# Patient Record
Sex: FEMALE | Race: WHITE | Employment: PART TIME | URBAN - METROPOLITAN AREA
[De-identification: names, ages, dates, MRNs, and addresses within clinical notes are randomized per-mention and may not be internally consistent; named-entity substitution may affect disease eponyms.]

---

## 2017-06-06 PROBLEM — N94.6 DYSMENORRHEA: Status: ACTIVE | Noted: 2017-03-08

## 2017-06-06 PROBLEM — E55.9 UNSPECIFIED VITAMIN D DEFICIENCY: Status: ACTIVE | Noted: 2017-03-08

## 2017-06-06 PROBLEM — T78.40XA ALLERGIC STATE: Status: ACTIVE | Noted: 2017-03-08

## 2017-06-06 PROBLEM — R53.83 FATIGUE: Status: ACTIVE | Noted: 2017-03-08

## 2018-02-09 ENCOUNTER — HOSPITAL ENCOUNTER (EMERGENCY)
Age: 38
Discharge: HOME | End: 2018-02-09
Attending: EMERGENCY MEDICINE
Payer: COMMERCIAL

## 2018-02-09 VITALS
DIASTOLIC BLOOD PRESSURE: 65 MMHG | HEIGHT: 62 IN | TEMPERATURE: 98.1 F | HEART RATE: 53 BPM | SYSTOLIC BLOOD PRESSURE: 129 MMHG | BODY MASS INDEX: 35.88 KG/M2 | WEIGHT: 195 LBS | RESPIRATION RATE: 20 BRPM | OXYGEN SATURATION: 100 %

## 2018-02-09 DIAGNOSIS — G43.811 OTHER MIGRAINE WITH STATUS MIGRAINOSUS, INTRACTABLE: Primary | ICD-10-CM

## 2018-02-09 PROCEDURE — 96372 THER/PROPH/DIAG INJ SC/IM: CPT | Performed by: EMERGENCY MEDICINE

## 2018-02-09 PROCEDURE — 99284 EMERGENCY DEPT VISIT MOD MDM: CPT | Performed by: EMERGENCY MEDICINE

## 2018-02-09 PROCEDURE — 74011250636 HC RX REV CODE- 250/636: Performed by: EMERGENCY MEDICINE

## 2018-02-09 PROCEDURE — 81003 URINALYSIS AUTO W/O SCOPE: CPT | Performed by: EMERGENCY MEDICINE

## 2018-02-09 PROCEDURE — 74011250637 HC RX REV CODE- 250/637: Performed by: EMERGENCY MEDICINE

## 2018-02-09 RX ORDER — DEXAMETHASONE SODIUM PHOSPHATE 100 MG/10ML
10 INJECTION INTRAMUSCULAR; INTRAVENOUS
Status: COMPLETED | OUTPATIENT
Start: 2018-02-09 | End: 2018-02-09

## 2018-02-09 RX ORDER — ONDANSETRON 4 MG/1
4 TABLET, ORALLY DISINTEGRATING ORAL
Status: COMPLETED | OUTPATIENT
Start: 2018-02-09 | End: 2018-02-09

## 2018-02-09 RX ORDER — METOCLOPRAMIDE 10 MG/1
10 TABLET ORAL
Status: COMPLETED | OUTPATIENT
Start: 2018-02-09 | End: 2018-02-09

## 2018-02-09 RX ORDER — SUMATRIPTAN 6 MG/.5ML
6 INJECTION, SOLUTION SUBCUTANEOUS ONCE
Status: COMPLETED | OUTPATIENT
Start: 2018-02-09 | End: 2018-02-09

## 2018-02-09 RX ORDER — DIPHENHYDRAMINE HCL 25 MG
25 CAPSULE ORAL
Status: COMPLETED | OUTPATIENT
Start: 2018-02-09 | End: 2018-02-09

## 2018-02-09 RX ADMIN — DIPHENHYDRAMINE HYDROCHLORIDE 25 MG: 25 CAPSULE ORAL at 19:46

## 2018-02-09 RX ADMIN — SUMATRIPTAN 6 MG: 6 INJECTION, SOLUTION SUBCUTANEOUS at 17:27

## 2018-02-09 RX ADMIN — DEXAMETHASONE SODIUM PHOSPHATE 10 MG: 10 INJECTION INTRAMUSCULAR; INTRAVENOUS at 19:46

## 2018-02-09 RX ADMIN — ONDANSETRON 4 MG: 4 TABLET, ORALLY DISINTEGRATING ORAL at 17:27

## 2018-02-09 RX ADMIN — METOCLOPRAMIDE HYDROCHLORIDE 10 MG: 10 TABLET ORAL at 19:46

## 2018-02-09 NOTE — ED PROVIDER NOTES
HPI Comments: ppatient states she has been having episodes of headache with feelings of being off balance since 2008 intermittently. She has been worked up extensively for this and was told that she had \"vestibular migraines\". She has had several CT scans and MRIs, the last one being in 2015. She normally takes Imitrex 150 mg tablets for this but states that she was at work and unable to take any medicine. She has had a mild diffuse headache today with feeling of leaning to her left. She also has had some difficulty speaking which she says is not unusual for her episodes. She had some mild nausea with no vomiting, denies any aggravating or alleviating factors. On the way home from work she was taken to an urgent care who felt it necessary to send her here for evaluation. Elements of this note were created using speech recognition software. As such, errors of speech recognition may be present. Patient is a 40 y.o. female presenting with migraines. The history is provided by the patient. Migraine    Pertinent negatives include no fever and no vomiting.         Past Medical History:   Diagnosis Date    Asthma     Hyperlipidemia     Migraines     Movement disorder     Vertigo     Vestibular nerve disorder 2008       Past Surgical History:   Procedure Laterality Date    HX HEENT      oral, bilateral eye muscle    HX ORTHOPAEDIC      right wrist x 2, right shoulder          Family History:   Problem Relation Age of Onset    Asthma Mother     Diabetes Mother     Thyroid Disease Mother     Diabetes Father     Arthritis-osteo Father     Cancer Maternal Grandmother      lymphoma    Heart Disease Maternal Grandfather     Stroke Maternal Grandfather     Cancer Paternal Grandmother      brain tumor    Macular Degen Paternal Grandfather     Parkinson's Disease Paternal Grandfather        Social History     Social History    Marital status: SINGLE     Spouse name: N/A    Number of children: N/A  Years of education: N/A     Occupational History    Not on file. Social History Main Topics    Smoking status: Never Smoker    Smokeless tobacco: Never Used    Alcohol use No    Drug use: No    Sexual activity: No     Other Topics Concern    Not on file     Social History Narrative         ALLERGIES: Laurey President; Keflex [cephalexin]; Sulfa (sulfonamide antibiotics); Theophylline; Topamax [topiramate]; Ultram [tramadol]; Pneumococcal vaccine; and Verapamil    Review of Systems   Constitutional: Negative for chills and fever. Gastrointestinal: Negative for abdominal pain and vomiting. All other systems reviewed and are negative. Vitals:    02/09/18 1701   BP: 153/85   Pulse: (!) 58   Resp: 20   Temp: 98.8 °F (37.1 °C)   SpO2: 100%   Weight: 88.5 kg (195 lb)   Height: 5' 2\" (1.575 m)            Physical Exam   Constitutional: She is oriented to person, place, and time. She appears well-developed and well-nourished. HENT:   Head: Normocephalic and atraumatic. Eyes: Conjunctivae are normal. Pupils are equal, round, and reactive to light. Neck: Normal range of motion. Neck supple. Musculoskeletal: She exhibits no edema or tenderness. Neurological: She is alert and oriented to person, place, and time. Skin: Skin is warm and dry. Psychiatric: She has a normal mood and affect. Her behavior is normal.   Nursing note and vitals reviewed.        MDM  Number of Diagnoses or Management Options  Other migraine with status migrainosus, intractable: new and does not require workup  Diagnosis management comments: differential diagnosis: acute stroke, migraine, vestibular dysfunction  5:09 PM 9/8/15 MRI was unremarkable  6:46 PM patient states her headache is worse but her dizziness and left arm heaviness have improved       Amount and/or Complexity of Data Reviewed  Decide to obtain previous medical records or to obtain history from someone other than the patient: yes  Review and summarize past medical records: yes    Risk of Complications, Morbidity, and/or Mortality  Presenting problems: moderate  Diagnostic procedures: moderate  Management options: moderate    Patient Progress  Patient progress: improved        ED Course       Procedures

## 2018-02-09 NOTE — DISCHARGE INSTRUCTIONS
Migraine Headache: Care Instructions  Your Care Instructions  Migraines are painful, throbbing headaches that often start on one side of the head. They may cause nausea and vomiting and make you sensitive to light, sound, or smell. Without treatment, migraines can last from 4 hours to a few days. Medicines can help prevent migraines or stop them after they have started. Your doctor can help you find which ones work best for you. Follow-up care is a key part of your treatment and safety. Be sure to make and go to all appointments, and call your doctor if you are having problems. It's also a good idea to know your test results and keep a list of the medicines you take. How can you care for yourself at home? · Do not drive if you have taken a prescription pain medicine. · Rest in a quiet, dark room until your headache is gone. Close your eyes, and try to relax or go to sleep. Don't watch TV or read. · Put a cold, moist cloth or cold pack on the painful area for 10 to 20 minutes at a time. Put a thin cloth between the cold pack and your skin. · Use a warm, moist towel or a heating pad set on low to relax tight shoulder and neck muscles. · Have someone gently massage your neck and shoulders. · Take your medicines exactly as prescribed. Call your doctor if you think you are having a problem with your medicine. You will get more details on the specific medicines your doctor prescribes. · Be careful not to take pain medicine more often than the instructions allow. You could get worse or more frequent headaches when the medicine wears off. To prevent migraines  · Keep a headache diary so you can figure out what triggers your headaches. Avoiding triggers may help you prevent headaches. Record when each headache began, how long it lasted, and what the pain was like.  (Was it throbbing, aching, stabbing, or dull?) Write down any other symptoms you had with the headache, such as nausea, flashing lights or dark spots, or sensitivity to bright light or loud noise. Note if the headache occurred near your period. List anything that might have triggered the headache. Triggers may include certain foods (chocolate, cheese, wine) or odors, smoke, bright light, stress, or lack of sleep. · If your doctor has prescribed medicine for your migraines, take it as directed. You may have medicine that you take only when you get a migraine and medicine that you take all the time to help prevent migraines. ¨ If your doctor has prescribed medicine for when you get a headache, take it at the first sign of a migraine, unless your doctor has given you other instructions. ¨ If your doctor has prescribed medicine to prevent migraines, take it exactly as prescribed. Call your doctor if you think you are having a problem with your medicine. · Find healthy ways to deal with stress. Migraines are most common during or right after stressful times. Take time to relax before and after you do something that has caused a migraine in the past.  · Try to keep your muscles relaxed by keeping good posture. Check your jaw, face, neck, and shoulder muscles for tension. Try to relax them. When you sit at a desk, change positions often. And make sure to stretch for 30 seconds each hour. · Get plenty of sleep and exercise. · Eat meals on a regular schedule. Avoid foods and drinks that often trigger migraines. These include chocolate, alcohol (especially red wine and port), aspartame, monosodium glutamate (MSG), and some additives found in foods (such as hot dogs, rowley, cold cuts, aged cheeses, and pickled foods). · Limit caffeine. Don't drink too much coffee, tea, or soda. But don't quit caffeine suddenly. That can also give you migraines. · Do not smoke or allow others to smoke around you. If you need help quitting, talk to your doctor about stop-smoking programs and medicines. These can increase your chances of quitting for good.   · If you are taking birth control pills or hormone therapy, talk to your doctor about whether they are triggering your migraines. When should you call for help? Call 911 anytime you think you may need emergency care. For example, call if:  ? · You have signs of a stroke. These may include:  ¨ Sudden numbness, paralysis, or weakness in your face, arm, or leg, especially on only one side of your body. ¨ Sudden vision changes. ¨ Sudden trouble speaking. ¨ Sudden confusion or trouble understanding simple statements. ¨ Sudden problems with walking or balance. ¨ A sudden, severe headache that is different from past headaches. ?Call your doctor now or seek immediate medical care if:  ? · You have new or worse nausea and vomiting. ? · You have a new or higher fever. ? · Your headache gets much worse. ? Watch closely for changes in your health, and be sure to contact your doctor if:  ? · You are not getting better after 2 days (48 hours). Where can you learn more? Go to http://darion-ayesha.info/. Enter A921 in the search box to learn more about \"Migraine Headache: Care Instructions. \"  Current as of: October 14, 2016  Content Version: 11.4  © 4321-7966 [employer], [employer]. Care instructions adapted under license by [employer] (which disclaims liability or warranty for this information). If you have questions about a medical condition or this instruction, always ask your healthcare professional. [place] [address] any warranty or liability for your use of this information.

## 2018-02-09 NOTE — ED TRIAGE NOTES
Pt states she has been having off balance feelings that has been ongoing since 2008. Pt was diagnosed with vestibular migraines. Pt has a slight headache today and pt states she is leaning to the left side today.

## 2018-02-10 NOTE — ED NOTES
Discharge instructions and 0 prescriptions reviewed with patient. Patient verbalized understanding. VSS. Discharged to home in stable condition, assisted to vehicle by RN via wheelchair, accompanied by her mother.

## 2018-04-16 ENCOUNTER — HOSPITAL ENCOUNTER (OUTPATIENT)
Dept: CT IMAGING | Age: 38
Discharge: HOME | End: 2018-04-16
Attending: FAMILY MEDICINE
Payer: COMMERCIAL

## 2018-04-16 DIAGNOSIS — R68.84 JAW PAIN, NON-TMJ: ICD-10-CM

## 2018-04-16 PROCEDURE — 70486 CT MAXILLOFACIAL W/O DYE: CPT

## 2019-04-09 PROBLEM — T78.40XA ALLERGIC STATE: Status: RESOLVED | Noted: 2017-03-08 | Resolved: 2019-04-09

## 2019-04-09 PROBLEM — R13.10 DYSPHAGIA: Status: ACTIVE | Noted: 2019-04-09

## 2019-04-09 PROBLEM — E66.01 SEVERE OBESITY (HCC): Status: ACTIVE | Noted: 2019-04-09

## 2019-04-09 PROBLEM — Z83.71 FAMILY HISTORY OF COLONIC POLYPS: Status: ACTIVE | Noted: 2019-04-09

## 2019-04-09 PROBLEM — M53.0 CERVICOCRANIAL SYNDROME: Status: ACTIVE | Noted: 2019-04-09

## 2019-04-09 PROBLEM — G50.0 TRIGEMINAL NEURALGIA: Status: ACTIVE | Noted: 2019-04-09

## 2019-04-09 PROBLEM — M54.12 CERVICAL RADICULOPATHY: Status: ACTIVE | Noted: 2019-04-09

## 2019-05-01 PROBLEM — E03.4 HYPOTHYROIDISM DUE TO ACQUIRED ATROPHY OF THYROID: Status: ACTIVE | Noted: 2019-05-01

## 2019-05-08 ENCOUNTER — HOSPITAL ENCOUNTER (OUTPATIENT)
Dept: MAMMOGRAPHY | Age: 39
Discharge: HOME | End: 2019-05-08
Attending: FAMILY MEDICINE
Payer: COMMERCIAL

## 2019-05-08 DIAGNOSIS — N60.11 FIBROCYSTIC BREAST CHANGES OF BOTH BREASTS: ICD-10-CM

## 2019-05-08 DIAGNOSIS — N60.12 FIBROCYSTIC BREAST CHANGES OF BOTH BREASTS: ICD-10-CM

## 2019-05-08 PROCEDURE — 77066 DX MAMMO INCL CAD BI: CPT

## 2019-05-08 PROCEDURE — 76642 ULTRASOUND BREAST LIMITED: CPT

## 2019-05-15 ENCOUNTER — HOSPITAL ENCOUNTER (OUTPATIENT)
Dept: PHYSICAL THERAPY | Age: 39
Discharge: HOME | End: 2019-05-15
Attending: FAMILY MEDICINE
Payer: MEDICARE

## 2019-05-15 DIAGNOSIS — M54.50 ACUTE RIGHT-SIDED LOW BACK PAIN WITHOUT SCIATICA: ICD-10-CM

## 2019-05-15 PROCEDURE — 97110 THERAPEUTIC EXERCISES: CPT

## 2019-05-15 PROCEDURE — 97161 PT EVAL LOW COMPLEX 20 MIN: CPT

## 2019-05-15 NOTE — THERAPY EVALUATION
Yanira Martines : 1980 Payor: Demetria Moore / Plan: Kareem Law / Product Type: Managed Care Medicare /  
 [address]  at [facility] [address], [place] [address] [phone]   [phone] OUTPATIENT PHYSICAL THERAPY:Initial Assessment 5/15/2019 ICD-10: Treatment Diagnosis: Low back pain (M54.5); Muscle spasm of back (M62.830); Unsteadiness on feet (R26.81) Precautions/Allergies:  
Vickye Reamer; Keflex [cephalexin]; Sulfa (sulfonamide antibiotics); Theophylline; Topamax [topiramate]; Ultram [tramadol]; Pneumococcal vaccine; and Verapamil , shea butter TREATMENT PLAN: 
Effective Dates: 5/15/2019 TO 2019 (60 days). Frequency/Duration: 2 times a week for 60 Day(s) MEDICAL/REFERRING DIAGNOSIS: 
Acute right-sided low back pain without sciatica [M54.5] DATE OF ONSET: 2019 REFERRING PHYSICIAN: Evgeny Herrmann MD MD Orders: PT eval and tx Return MD Appointment: None scheduled at this time INITIAL ASSESSMENT:  Ms. Meghan Castaneda presents to PT eval w/ c/o R sided LB pain that started when she had to wear a boot on her L ankle for plantar fasciitis. She displays gross deficits in balance due to her vestibular disorder. Additionally, she has decreased proximal hip strength and core strength. With palpation, she was tender in her R piriformis and it reproduced her symptoms. She will benefit from continued skilled PT services to improve her deficits listed below and to progress towards her PLOF. PROBLEM LIST (Impacting functional limitations): 1. Decreased Strength 2. Decreased ADL/Functional Activities 3. Decreased Transfer Abilities 4. Decreased Ambulation Ability/Technique 5. Decreased Balance 6. Increased Pain 7. Decreased Flexibility/Joint Mobility 8. Decreased [place] with Home Exercise Program INTERVENTIONS PLANNED: 
1. Balance Exercise 2. Cold 3. Heat 4. Home Exercise Program (HEP) 5. Manual Therapy 6. Range of Motion (ROM) 7. Therapeutic Exercise/Strengthening GOALS: (Goals have been discussed and agreed upon with patient.) Short-Term Functional Goals: Time Frame: 4 weeks 1. Pt will be independent w/ HEP in order to improve outcomes and decrease pain. 2. Pt will report pain of 5/10 or less while performing ADLs in order to improve functional mobility. 3. Pt will have decreased tenderness/tightness in her R piriformis muscle by 50% in order to increase her functional mobility. Discharge Goals: Time Frame: 5 weeks 1. Pt will have Oswestry score of 16 or less in order to report decreased pain and decreased functional impairments. 2. Pt will report pain of 3/10 or less while performing ADLs in order to improve functional mobility. 3. Pt will have R hip strength of 4+/5 or greater in all major muscle groups in order to increase her functional mobility and balance. Outcome Measure: Tool Used: Modified Oswestry Low Back Pain Questionnaire Score:  Initial: 19/50  Most Recent: X/50 (Date: -- ) Interpretation of Score: Each section is scored on a 0-5 scale, 5 representing the greatest disability. The scores of each section are added together for a total score of 50. Medical Necessity:  
· Patient is expected to demonstrate progress in strength, range of motion and balance to increase independence with functional tasks. Reason for Services/Other Comments: 
· Patient continues to demonstrate capacity to improve strength, ROM, balance, mobility which will increase independence. Total Duration: 25 min eval, 10 therex, 5 manual 
PT Patient Time In/Time Out Time In: 0945 Time Out: 1028 Rehabilitation Potential For Stated Goals: Good Regarding Ana Edge's therapy, I certify that the treatment plan above will be carried out by a therapist or under their direction.  
Thank you for this referral, 
Sandra Terry, PT    
 
    
 
 
 The information in this section was collected on 5/15/19 (except where otherwise noted). HISTORY:  
History of Present Injury/Illness (Reason for Referral): 
Pt got plantar fascitis in her L foot and had to wear a boot for a month. Around that time her LB started to hurt on the R side. She has not had any imaging of her LB. Past Medical History/Comorbidities: Ms. Jere Andersen  has a past medical history of Asthma, Hyperlipidemia, Migraines, Movement disorder, Vertigo, and Vestibular nerve disorder (2008). Ms. Jere Andersen  has a past surgical history that includes hx orthopaedic and hx heent. R wrist 1995 and 1997, R shoulder 2004, bilateral eye sx 2010, multiple oral surgeries Social History/Living Environment:  
  Lives alone in one story home w/ 4-5 steps to enter. Has a tub/shower combo with bath seat. Prior Level of Function/Work/Activity: 
Independent, works part time as an occupational therapist specializing in vision therapy, walks 2-3x a week for 30-45 minutes Dominant Side:  
      [facility] H2 Model Falls Risk Assessment 5/15/19 Risk Factors: 
     (1)  Dizziness/Vertigo 
     (1)  Orthostatic drop in BP Ability to Rise from Chair: 
     (1)  Pushes up, successful in one attempt Falls Prevention Plan: 
     Physical Limitations to Exercise (specify):  pt will be monitored as she has a vestibular disorder Total: (5 or greater = High Risk): 3  
 ©2010 [facility] . [facility] States Patent #[id number]. Federal Law prohibits the replication, distribution or use without written permission from [facility] [employer] Current Medications:   
  
Current Outpatient Medications:  
  diclofenac (FLECTOR) 1.3 % pt12, 1 Patch by TransDERmal route every twelve (12) hours every twelve (12) hours. , Disp: 30 Patch, Rfl: 2 
  oxytocin in 5 % dextrose (OXYTOCIN IN D5W) 10 unit/1,000 mL soln, 100 Units by IntraTRACHeal route daily. , Disp: , Rfl:  
   OTHER, PLACE 1 SHANA UNDER THE TONGUE ONCE A DAY  DO NOT EAT DRINK FOR 15MIN AFTER TAKING, Disp: , Rfl: 5 
  fexofenadine-pseudoephedrine (ALLEGRA-D 24 HOUR) 180-240 mg per tablet, Take 1 Tab by mouth., Disp: , Rfl:  
  mometasone (NASONEX) 50 mcg/actuation nasal spray, 2 Sprays daily. , Disp: , Rfl:  
  cholecalciferol, vitamin D3, (VITAMIN D3 PO), Take  by mouth daily. , Disp: , Rfl:  
  MAGNESIUM PO, Take  by mouth daily. , Disp: , Rfl:  
  VITAMIN A PO, Take  by mouth daily. , Disp: , Rfl:  
  ARMOUR THYROID 30 mg tablet, TAKE 1 TABLET BY MOUTH EVERY DAY IN THE MORNING, Disp: , Rfl: 5 
  BUTTERBUR ROOT EXTRACT PO, Take  by mouth., Disp: , Rfl:  
  coenzyme q10-vitamin e (COQ10 ) 100-100 mg-unit cap, Take  by mouth., Disp: , Rfl:  
  omega-3 fatty acids-vitamin e (FISH OIL) 1,000 mg cap, Take 1 Cap by mouth., Disp: , Rfl:  
  multivitamin capsule, Take 1 Cap by mouth daily. , Disp: , Rfl:   
Date Last Reviewed:  5/15/2019 Number of Personal Factors/Comorbidities that affect the Plan of Care: 1-2: MODERATE COMPLEXITY EXAMINATION:  
Observation/Orthostatic Postural Assessment: Forward head, rounded shoulders, unsteady with static stand, antalgic gait Palpation:   
      Tender to R paraspinal, R glute Special Tests:  - tender to entire lumbar spine with increasing tenderness the closer to sacrum Functional Mobility:  
      Gait/Ambulation:  Antalgic, unsteady Transfers:  Uses UEs to push Bed Mobility:  Independent Balance:   
      Unsteady, poor balance due to vestibular disorder, unable to remain upright with feet together eyes closed, able to hold balance with eyes open. Difficulty w/ holding SLS due to weakness and balance disorder Sensation:  
      Intact w/ light touch, noted dullness over R thigh and lateral R ankle Joint/Muscle ROM Strength Updates Hip flexion WFL 4/5 R, 5/5 L Hip extension WFL 4/5 R, 5/5 L   
 Hip abduction WFL 4-/5 B Knee flexion WFL 4/5 R, 5/5 L Knee extension WFL 4/5 R, 5/5 L   
[facility] Lumbar Decreased extension Decreased core Body Structures Involved: 1. Nerves 2. Bones 3. Joints 4. Muscles 5. Ligaments Body Functions Affected: 1. Mental 
2. Sensory/Pain 3. Neuromusculoskeletal 
4. Movement Related Activities and Participation Affected: 1. General Tasks and Demands 2. Mobility 3. Self Care 4. Domestic Life 5. Interpersonal Interactions and Relationships 6. Community, Social and [place] Number of elements (examined above) that affect the Plan of Care: 3: MODERATE COMPLEXITY CLINICAL PRESENTATION:  
Presentation: Stable and uncomplicated: LOW COMPLEXITY CLINICAL DECISION MAKING:  
  
Use of outcome tool(s) and clinical judgement create a POC that gives a: Clear prediction of patient's progress: LOW COMPLEXITY Pain: Initial:  
Pain Intensity 1: 8  Post Session:  5/10 [employer] Portal 
Access Code: [email or web address]  
URL: [email or web address]  
Date: 05/15/2019 Prepared by: Garrett Mccarty Exercises Supine Piriformis Stretch with Foot on Ground - 10 reps - 1 sets - 10 hold - 1x daily - 3x weekly Clamshell with Resistance - 10 reps - 2 sets - 5 hold - 1x daily - 3x weekly Supine Bridge - 10 reps - 2 sets - 5 hold - 1x daily - 3x weekly Supine Active Straight Leg Raise - 10 reps - 2 sets - 5 hold - 1x daily - 3x weekly Variance from POC: none Garrett Mccarty, PT

## 2019-05-15 NOTE — PROGRESS NOTES
Owen Johnson : 1980 Primary: [employer] Medicare Advantage Secondary:  [facility] [address] [place] [address], [address], [place] [address] [phone]   [phone] OUTPATIENT PHYSICAL THERAPY: Daily Treatment Note 5/15/2019 Visit Count:  1 ICD-10: Treatment Diagnosis: Low back pain (M54.5); Muscle spasm of back (M62.830); Unsteadiness on feet (R26.81) Precautions/Allergies:  
Lei Pema; Keflex [cephalexin]; Sulfa (sulfonamide antibiotics); Theophylline; Topamax [topiramate]; Ultram [tramadol]; Pneumococcal vaccine; and Verapamil , shea butter TREATMENT PLAN: 
Effective Dates: 5/15/2019 TO 2019 (60 days). Frequency/Duration: 2 times a week for 60 Day(s) MEDICAL/REFERRING DIAGNOSIS: 
Acute right-sided low back pain without sciatica [M54.5] DATE OF ONSET: 2019 REFERRING PHYSICIAN: Joesph Grier MD MD Orders: PT eval and tx Return MD Appointment: None scheduled at this time Pre-treatment Symptoms/Complaints:  R sided LB pain. Pain: Initial:Pain Intensity 1: 8 Post Session:  5/10 Medications Last Reviewed:  5/15/2019 Updated Objective Findings:  See evaluation note from today TREATMENT:  
 
THERAPEUTIC EXERCISE: (10 minutes):  Exercises per grid below to improve mobility, strength and balance. Required minimal verbal and tactile cues to promote proper body alignment, promote proper body posture and promote proper body mechanics. Progressed resistance, range, repetitions and complexity of movement as indicated. MANUAL THERAPY: (5 minutes): Soft tissue mobilization was utilized and necessary because of the patient's restricted joint motion, painful spasm, loss of articular motion and restricted motion of soft tissue. Date: 
5/15/19 Date: 
 Date: Activity/Exercise Parameters Parameters Parameters Piriformis stretch (modified)  3x R side Clamshell 15x B YTB Bridge 15x SLR 10x B Soleus stretch 3x L side [employer] Portal 
Access Code: [email or web address]  
URL: [email or web address]  
Date: 05/15/2019 Prepared by: Viktoria Stevens Exercises Supine Piriformis Stretch with Foot on Ground - 10 reps - 1 sets - 10 hold - 1x daily - 3x weekly Clamshell with Resistance - 10 reps - 2 sets - 5 hold - 1x daily - 3x weekly Supine Bridge - 10 reps - 2 sets - 5 hold - 1x daily - 3x weekly Supine Active Straight Leg Raise - 10 reps - 2 sets - 5 hold - 1x daily - 3x weekly Treatment/Session Summary: · Response to Treatment:  Pt had decreased pain in LB/R hip following STM. She was challenged w/ therex. · Communication/Consultation:  None today · Equipment provided today:  None today · Recommendations/Intent for next treatment session: Next visit will focus on progression of functional activities as tolerated. Total Treatment Billable Duration:  10 mins therex, 5 minutes manual 
PT Patient Time In/Time Out Time In: 0945 Time Out: 1028 Viktoria Stevens, PT Future Appointments Date Time Provider Jacquie Smith 5/17/2019  8:15 AM Nida Venegas, PT [facility]  
5/22/2019  8:15 AM Nida Venegas, PT [facility]  
5/24/2019  9:00 AM Nida Venegas, PT [facility]  
5/29/2019  9:00 AM Nida Venegas, PT [facility]  
5/31/2019  8:15 AM Nida Venegas, PT [facility]  
6/5/2019  9:00 AM Nida Venegas, PT [facility]  
6/7/2019  9:00 AM Nida Venegas, PT [facility]  
6/12/2019  9:00 AM Nida Venegas, PT [facility]  
6/14/2019  9:00 AM Nida Venegas, PT [facility]  
10/17/2019  9:00 AM Jessica Bolden MD [email or web address]

## 2019-05-17 ENCOUNTER — HOSPITAL ENCOUNTER (OUTPATIENT)
Dept: PHYSICAL THERAPY | Age: 39
Discharge: HOME | End: 2019-05-17
Attending: FAMILY MEDICINE
Payer: MEDICARE

## 2019-05-17 PROCEDURE — 97110 THERAPEUTIC EXERCISES: CPT

## 2019-05-17 NOTE — PROGRESS NOTES
Oli Chowdhury : 1980 Primary: [employer] Medicare Advantage Secondary:  Therapy Center at [facility] [address], [place] [address] [phone]   [phone] OUTPATIENT PHYSICAL THERAPY: Daily Treatment Note 2019 Visit Count:  2 ICD-10: Treatment Diagnosis: Low back pain (M54.5); Muscle spasm of back (M62.830); Unsteadiness on feet (R26.81) Precautions/Allergies:  
Cheek Alvord; Keflex [cephalexin]; Sulfa (sulfonamide antibiotics); Theophylline; Topamax [topiramate]; Ultram [tramadol]; Pneumococcal vaccine; and Verapamil , shea butter TREATMENT PLAN: 
Effective Dates: 5/15/2019 TO 2019 (60 days). Frequency/Duration: 2 times a week for 60 Day(s) MEDICAL/REFERRING DIAGNOSIS: 
Acute right-sided low back pain without sciatica [M54.5] DATE OF ONSET: 2019 REFERRING PHYSICIAN: Almaz Jackson MD MD Orders: PT eval and tx Return MD Appointment: None scheduled at this time Pre-treatment Symptoms/Complaints:  R sided LB pain has moved to R hip. Pain: Initial:Pain Intensity 1: 4 Post Session:  -2/10 Medications Last Reviewed:  2019 Updated Objective Findings:  See evaluation note from today TREATMENT:  
 
THERAPEUTIC EXERCISE: (40 minutes):  Exercises per grid below to improve mobility, strength and balance. Required minimal verbal and tactile cues to promote proper body alignment, promote proper body posture and promote proper body mechanics. Progressed resistance, range, repetitions and complexity of movement as indicated. Date: 
5/15/19 Date: 
19 Date: Activity/Exercise Parameters Parameters Parameters Piriformis stretch (modified)  3x R side Clamshell 15x B YTB Bridge 15x 10x SLR 10x B 2x10 B Soleus stretch 3x L side NuStep  10 mins, 2.0 Prone press up  10x2 Prone hip extension  2x10 B Self foam roll  To R hip and R glute MedBridge Portal 
Access Code: [email or web address] URL: [email or web address]  
Date: 05/15/2019 Prepared by: Elayne Little Exercises Supine Piriformis Stretch with Foot on Ground - 10 reps - 1 sets - 10 hold - 1x daily - 3x weekly Clamshell with Resistance - 10 reps - 2 sets - 5 hold - 1x daily - 3x weekly Supine Bridge - 10 reps - 2 sets - 5 hold - 1x daily - 3x weekly Supine Active Straight Leg Raise - 10 reps - 2 sets - 5 hold - 1x daily - 3x weekly Treatment/Session Summary: · Response to Treatment:  Pt had decreased pain in LB and R hip w/ foam roll and exercises. She progressed this session w/o issue. · Communication/Consultation:  None today · Equipment provided today:  None today · Recommendations/Intent for next treatment session: Next visit will focus on progression of functional activities as tolerated. Total Treatment Billable Duration: 40 min therex PT Patient Time In/Time Out Time In: 4869 Time Out: 0900 Elayne Little, PT Future Appointments Date Time Provider Jacquie Smith 5/22/2019  8:15 AM Carrie Prudent, PT [facility]  
5/24/2019  9:00 AM Carrie Prudent, PT [facility]  
5/29/2019  9:00 AM Carrie Prudent, PT [facility]  
5/31/2019  8:15 AM Carrie Prudent, PT [facility]  
6/5/2019  9:00 AM Carrie Prudent, PT [facility]  
6/7/2019  9:00 AM Carrie Prudent, PT [facility]  
6/12/2019  9:00 AM Carrie Prudent, PT [facility]  
6/14/2019  9:00 AM Carrie Prudent, PT [facility]  
10/17/2019  9:00 AM Adriana Bolden MD [facility]

## 2019-05-22 ENCOUNTER — HOSPITAL ENCOUNTER (OUTPATIENT)
Dept: PHYSICAL THERAPY | Age: 39
Discharge: HOME | End: 2019-05-22
Attending: FAMILY MEDICINE
Payer: MEDICARE

## 2019-05-22 PROCEDURE — 97110 THERAPEUTIC EXERCISES: CPT

## 2019-05-22 NOTE — PROGRESS NOTES
Perla Retana  : 1980  Primary: Bree Cam Medicare Advantage  Secondary:  [address] at [facility]  [address], [address], [place] [address]  [phone]   [phone]      OUTPATIENT PHYSICAL THERAPY: Daily Treatment Note 2019  Visit Count:  3    ICD-10: Treatment Diagnosis: Low back pain (M54.5); Muscle spasm of back (M62.830); Unsteadiness on feet (R26.81)  Precautions/Allergies:   Vivek Core; Keflex [cephalexin]; Sulfa (sulfonamide antibiotics); Theophylline; Topamax [topiramate]; Ultram [tramadol]; Pneumococcal vaccine; and Verapamil , shea butter  TREATMENT PLAN:  Effective Dates: 5/15/2019 TO 2019 (60 days). Frequency/Duration: 2 times a week for 60 Day(s) MEDICAL/REFERRING DIAGNOSIS:  Acute right-sided low back pain without sciatica [M54.5]   DATE OF ONSET: 2019  REFERRING PHYSICIAN: Katelyn Paredes MD MD Orders: PT eval and tx  Return MD Appointment: None scheduled at this time     Pre-treatment Symptoms/Complaints:  Low pain in hip/back but having ankle pain still. Notes she was in bed all weekend with a stomach bug.   Pain: Initial:Pain Intensity 1: 1 Post Session:  1/10   Medications Last Reviewed:  2019  Updated Objective Findings:  None Today  TREATMENT:     THERAPEUTIC EXERCISE: (40 minutes):  Exercises per grid below to improve mobility, strength and balance. Required minimal verbal and tactile cues to promote proper body alignment, promote proper body posture and promote proper body mechanics. Progressed resistance, range, repetitions and complexity of movement as indicated.    Date:  5/15/19 Date:  19 Date:  19   Activity/Exercise Parameters Parameters Parameters   Piriformis stretch (modified)  3x R side     Clamshell 15x B YTB  15x B YTB   Bridge 15x 10x 2x10 B   SLR 10x B 2x10 B 2x10 B    Soleus stretch 3x L side     NuStep  10 mins, 2.0 10 mins, 2.5   Prone press up  10x2 10x2    Prone hip extension  2x10 B 2x10 B   Self foam roll  To R hip and R glute    SAQ   20x 2# B     [employer]st[employer] Portal  Access Code: [email or web address]   URL: [email or web address]   Date: 05/15/2019   Prepared by: Ivan Bowman     Exercises  Supine Piriformis Stretch with Foot on Ground - 10 reps - 1 sets - 10 hold - 1x daily - 3x weekly  Clamshell with Resistance - 10 reps - 2 sets - 5 hold - 1x daily - 3x weekly  Supine Bridge - 10 reps - 2 sets - 5 hold - 1x daily - 3x weekly  Supine Active Straight Leg Raise - 10 reps - 2 sets - 5 hold - 1x daily - 3x weekly    Treatment/Session Summary:    · Response to Treatment:  Pt had no increase in pain w/ therex. She is progressing towards her goals. · Communication/Consultation:  None today  · Equipment provided today:  None today  · Recommendations/Intent for next treatment session: Next visit will focus on progression of functional activities as tolerated.     Total Treatment Billable Duration: 40 min therex  PT Patient Time In/Time Out  Time In: 0815  Time Out: 0900  Ivan Bowman, PT    Future Appointments   Date Time Provider Jacquie Camachoi   5/24/2019  9:00 AM Oksana Alicea, PT [facility]   5/29/2019  9:00 AM Oksana Alicea, PT [facility]   5/31/2019  8:15 AM Oksana Alicea, PT [facility]   6/5/2019  9:00 AM Oksana Alicea, PT [facility]   6/7/2019  9:00 AM Oksana Alicea, PT [facility]   6/12/2019  9:00 AM Oksana Alicea, PT [facility]   6/14/2019  9:00 AM Oksana Alicea, PT [facility]   10/17/2019  9:00 AM Micheal Bolden MD [facility]

## 2019-05-29 ENCOUNTER — HOSPITAL ENCOUNTER (OUTPATIENT)
Dept: PHYSICAL THERAPY | Age: 39
Discharge: HOME | End: 2019-05-29
Attending: FAMILY MEDICINE
Payer: MEDICARE

## 2019-05-29 PROCEDURE — 97110 THERAPEUTIC EXERCISES: CPT

## 2019-05-29 NOTE — PROGRESS NOTES
Hayes Harley  : 1980  Primary: Felicity [employer] Medicare Advantage  Secondary:  [address] at [facility]  [address], [address], [place] [address]  [phone]   [phone]      OUTPATIENT PHYSICAL THERAPY: Daily Treatment Note 2019  Visit Count:  4    ICD-10: Treatment Diagnosis: Low back pain (M54.5); Muscle spasm of back (M62.830); Unsteadiness on feet (R26.81)  Precautions/Allergies:   Suri Climes; Keflex [cephalexin]; Sulfa (sulfonamide antibiotics); Theophylline; Topamax [topiramate]; Ultram [tramadol]; Pneumococcal vaccine; and Verapamil , shea butter  TREATMENT PLAN:  Effective Dates: 5/15/2019 TO 2019 (60 days). Frequency/Duration: 2 times a week for 60 Day(s) MEDICAL/REFERRING DIAGNOSIS:  Acute right-sided low back pain without sciatica [M54.5]   DATE OF ONSET: 2019  REFERRING PHYSICIAN: Lico Barros MD MD Orders: PT eval and tx  Return MD Appointment: None scheduled at this time     Pre-treatment Symptoms/Complaints:  Low pain levels in LB. Pain: Initial:Pain Intensity 1: 2 Post Session:  -2/10   Medications Last Reviewed:  2019  Updated Objective Findings:  None Today  TREATMENT:     THERAPEUTIC EXERCISE: (40 minutes):  Exercises per grid below to improve mobility, strength and balance. Required minimal verbal and tactile cues to promote proper body alignment, promote proper body posture and promote proper body mechanics. Progressed resistance, range, repetitions and complexity of movement as indicated.    Date:  5/15/19 Date:  19 Date:  19 Date:  19   Activity/Exercise Parameters Parameters Parameters Parameters   Piriformis stretch (modified)  3x R side      Clamshell 15x B YTB  15x B YTB 15x B YTB    Bridge 15x 10x 2x10 B 2x10    SLR 10x B 2x10 B 2x10 B  2x10 B 1.5#    Soleus stretch 3x L side      NuStep  10 mins, 2.0 10 mins, 2.5 10 mins, 3.0    Prone press up  10x2 10x2  10x   Prone hip extension  2x10 B  2x10 B 2x10 B    Self foam roll  To R hip and R glute     SAQ   20x 2# B 20x 2# B   Adductor squeeze bridge    15x    Pelvic tilt     15x   Pelvic tilt march    5x each   Gilbertff press                [employer] Portal  Access Code: [email or web address]   URL: [email or web address]   Date: 05/15/2019   Prepared by: Elayne Little     Exercises  Supine Piriformis Stretch with Foot on Ground - 10 reps - 1 sets - 10 hold - 1x daily - 3x weekly  Clamshell with Resistance - 10 reps - 2 sets - 5 hold - 1x daily - 3x weekly  Supine Bridge - 10 reps - 2 sets - 5 hold - 1x daily - 3x weekly  Supine Active Straight Leg Raise - 10 reps - 2 sets - 5 hold - 1x daily - 3x weekly    Treatment/Session Summary:    · Response to Treatment:  Pt had no increase in pain w/ therex. C/o muscle tightness in LB so potentially perform STM next session if still tight. · Communication/Consultation:  None today  · Equipment provided today:  None today  · Recommendations/Intent for next treatment session: Next visit will focus on progression of functional activities as tolerated.     Total Treatment Billable Duration: 40 min therex  PT Patient Time In/Time Out  Time In: 0900  Time Out: 0945  Elayne Little, BENITA    Future Appointments   Date Time Provider Jacquie Smith   5/31/2019  8:15 AM Carrie Prudent, PT [facility]   6/5/2019  9:00 AM Carrie Prudent, PT [facility]   6/7/2019  9:00 AM Carrie Prudent, PT [facility]   6/12/2019  9:00 AM Carrie Prudent, PT [facility]   6/14/2019  9:00 AM Carrie Prudent, PT [facility]   10/17/2019  9:00 AM Adriana Bolden MD [facility]

## 2019-05-31 ENCOUNTER — HOSPITAL ENCOUNTER (OUTPATIENT)
Dept: PHYSICAL THERAPY | Age: 39
Discharge: HOME | End: 2019-05-31
Attending: FAMILY MEDICINE
Payer: MEDICARE

## 2019-05-31 PROCEDURE — 97110 THERAPEUTIC EXERCISES: CPT

## 2019-05-31 NOTE — PROGRESS NOTES
Sonido Atkinson  : 1980  Primary: Laith Cam Medicare Advantage  Secondary:  [address] at [address]  [facility] [address], [place] [address]  [phone]   [phone]      OUTPATIENT PHYSICAL THERAPY: Daily Treatment Note 2019  Visit Count:  5    ICD-10: Treatment Diagnosis: Low back pain (M54.5); Muscle spasm of back (M62.830); Unsteadiness on feet (R26.81)  Precautions/Allergies:   Delwin Martinet; Keflex [cephalexin]; Sulfa (sulfonamide antibiotics); Theophylline; Topamax [topiramate]; Ultram [tramadol]; Pneumococcal vaccine; and Verapamil , shea butter  TREATMENT PLAN:  Effective Dates: 5/15/2019 TO 2019 (60 days). Frequency/Duration: 2 times a week for 60 Day(s) MEDICAL/REFERRING DIAGNOSIS:  Acute right-sided low back pain without sciatica [M54.5]   DATE OF ONSET: 2019  REFERRING PHYSICIAN: Yi June MD MD Orders: PT eval and tx  Return MD Appointment: None scheduled at this time     Pre-treatment Symptoms/Complaints:  Feels stiff all over. Especially in her neck. She is having trouble motor planning today. Pain: Initial:Pain Intensity 1: 2 Post Session:  -2/10   Medications Last Reviewed:  2019  Updated Objective Findings:  None Today  TREATMENT:     THERAPEUTIC EXERCISE: (40 minutes):  Exercises per grid below to improve mobility, strength and balance. Required minimal verbal and tactile cues to promote proper body alignment, promote proper body posture and promote proper body mechanics. Progressed resistance, range, repetitions and complexity of movement as indicated. MODALITIES: (5 minutes): *  Hot Pack Therapy in order to provide analgesia and relieve muscle spasm.     Date:  5/15/19 Date:  19 Date:  19 Date:  19 Date:  19   Activity/Exercise Parameters Parameters Parameters Parameters Parameters   Piriformis stretch (modified)  3x R side    10x B   Clamshell 15x B YTB  15x B YTB 15x B YTB     Bridge 15x 10x 2x10 B 2x10  2x10    SLR 10x B 2x10 B 2x10 B  2x10 B 1.5#     Soleus stretch 3x L side       NuStep  10 mins, 2.0 10 mins, 2.5 10 mins, 3.0  10 mins, 3.0    Prone press up  10x2 10x2  10x 10x   Prone hip extension  2x10 B  2x10 B 2x10 B  2x10 B    Self foam roll  To R hip and R glute      SAQ   20x 2# B 20x 2# B    Adductor squeeze bridge    15x     Pelvic tilt     15x 15x   Pelvic tilt march    5x each    Hand to knee press     10x B   DKTC     10x   Cat camel        Prayer stretch     10x     SUNDAY[employer] Portal  Access Code: [email or web address]   URL: [email or web address] [employer]/   Date: 05/15/2019   Prepared by: Kelli Ayala     Exercises  Supine Piriformis Stretch with Foot on Ground - 10 reps - 1 sets - 10 hold - 1x daily - 3x weekly  Clamshell with Resistance - 10 reps - 2 sets - 5 hold - 1x daily - 3x weekly  Supine Bridge - 10 reps - 2 sets - 5 hold - 1x daily - 3x weekly  Supine Active Straight Leg Raise - 10 reps - 2 sets - 5 hold - 1x daily - 3x weekly    Treatment/Session Summary:    · Response to Treatment:  Pt had no increase in pain w/ therex. Stretches all felt good and loosened her up. · Communication/Consultation:  None today  · Equipment provided today:  None today  · Recommendations/Intent for next treatment session: Next visit will focus on progression of functional activities as tolerated.     Total Treatment Billable Duration: 40 min therex  PT Patient Time In/Time Out  Time In: 0815  Time Out: 0900  Kelli Ayala PT    Future Appointments   Date Time Provider Jacquie Milleristi   6/5/2019  9:00 [facility]   6/12/2019  9:00 AM [facility]   6/14/2019  9:00 AM [facility]   10/17/2019  9:00 AM Renée Bolden MD [facility]

## 2019-06-05 ENCOUNTER — HOSPITAL ENCOUNTER (OUTPATIENT)
Dept: PHYSICAL THERAPY | Age: 39
Discharge: HOME | End: 2019-06-05
Attending: FAMILY MEDICINE
Payer: MEDICARE

## 2019-06-05 PROCEDURE — 97110 THERAPEUTIC EXERCISES: CPT

## 2019-06-05 PROCEDURE — 97140 MANUAL THERAPY 1/> REGIONS: CPT

## 2019-06-05 NOTE — PROGRESS NOTES
Kerline Kauffman  : 1980  Primary: Quinton Cam Medicare Advantage  Secondary:  [address] at [address]  [facility], [address], [place] [address]  [phone]   [phone]      OUTPATIENT PHYSICAL THERAPY: Daily Treatment Note 2019  Visit Count:  6    ICD-10: Treatment Diagnosis: Low back pain (M54.5); Muscle spasm of back (M62.830); Unsteadiness on feet (R26.81)  Precautions/Allergies:   Dontrell Mckeon; Keflex [cephalexin]; Sulfa (sulfonamide antibiotics); Theophylline; Topamax [topiramate]; Ultram [tramadol]; Pneumococcal vaccine; and Verapamil , shea butter  TREATMENT PLAN:  Effective Dates: 5/15/2019 TO 2019 (60 days). Frequency/Duration: 2 times a week for 60 Day(s) MEDICAL/REFERRING DIAGNOSIS:  Acute right-sided low back pain without sciatica [M54.5]   DATE OF ONSET: 2019  REFERRING PHYSICIAN: Viky Robb MD MD Orders: PT eval and tx  Return MD Appointment: None scheduled at this time     Pre-treatment Symptoms/Complaints: Feels okay still some pain. Liked stretches from last time. Pain: Initial:Pain Intensity 1: 2 Post Session: 0-1/10   Medications Last Reviewed:  2019  Updated Objective Findings:  None Today  TREATMENT:     THERAPEUTIC EXERCISE: (30 minutes):  Exercises per grid below to improve mobility, strength and balance. Required minimal verbal and tactile cues to promote proper body alignment, promote proper body posture and promote proper body mechanics. Progressed resistance, range, repetitions and complexity of movement as indicated. MANUAL THERAPY: (10 minutes): Soft tissue mobilization was utilized and necessary because of the patient's restricted joint motion and painful spasm. MODALITIES: (5 minutes): *  Hot Pack Therapy in order to provide analgesia and relieve muscle spasm.     Date:  5/15/19 Date:  19 Date:  19 Date:  19 Date:  19 Date:  19   Activity/Exercise Parameters Parameters Parameters Parameters Parameters Parameters   Piriformis stretch (modified)  3x R side    10x B 10x B   Clamshell 15x B YTB  15x B YTB 15x B YTB      Bridge 15x 10x 2x10 B 2x10  2x10  2x10    SLR 10x B 2x10 B 2x10 B  2x10 B 1.5#      Soleus stretch 3x L side        NuStep  10 mins, 2.0 10 mins, 2.5 10 mins, 3.0  10 mins, 3.0     Prone press up  10x2 10x2  10x 10x    Prone hip extension  2x10 B  2x10 B 2x10 B  2x10 B  10x B   Self foam roll  To R hip and R glute       SAQ   20x 2# B 20x 2# B     Adductor squeeze bridge    15x      Pelvic tilt     15x 15x 15x    Pelvic tilt march    5x each  10x B    Hand to knee press     10x B 10x B   DKTC     10x 10x   Cat camel         Prayer stretch     10x 10x      [employer] Portal  Access Code: [email or web address]   URL: [email or web address]   Date: 05/15/2019   Prepared by: Romulo Cortes     Exercises  Supine Piriformis Stretch with Foot on Ground - 10 reps - 1 sets - 10 hold - 1x daily - 3x weekly  Clamshell with Resistance - 10 reps - 2 sets - 5 hold - 1x daily - 3x weekly  Supine Bridge - 10 reps - 2 sets - 5 hold - 1x daily - 3x weekly  Supine Active Straight Leg Raise - 10 reps - 2 sets - 5 hold - 1x daily - 3x weekly    Treatment/Session Summary:    · Response to Treatment:  Pt had reduced pain following exercises and STM. · Communication/Consultation:  None today  · Equipment provided today:  None today  · Recommendations/Intent for next treatment session: Next visit will focus on progression of functional activities as tolerated.     Total Treatment Billable Duration: 30 min therex, 10 mins manual   PT Patient Time In/Time Out  Time In: 0900  Time Out: 0945  Romulo Cortes PT    Future Appointments   Date Time Provider Jacquie Camachoi   6/12/2019  9:00 AM BENITA [facility]   6/14/2019  9:00 AM BENITA [facility]   10/17/2019  9:00 AM Cele Bolden MD [facility]

## 2019-06-07 ENCOUNTER — APPOINTMENT (OUTPATIENT)
Dept: PHYSICAL THERAPY | Age: 39
End: 2019-06-07
Attending: FAMILY MEDICINE
Payer: MEDICARE

## 2019-06-12 ENCOUNTER — HOSPITAL ENCOUNTER (OUTPATIENT)
Dept: PHYSICAL THERAPY | Age: 39
Discharge: HOME | End: 2019-06-12
Attending: FAMILY MEDICINE
Payer: MEDICARE

## 2019-06-12 PROCEDURE — 97110 THERAPEUTIC EXERCISES: CPT

## 2019-06-12 NOTE — PROGRESS NOTES
Jeanine Mikie  : 1980  Primary: Caleb Cam Medicare Advantage  Secondary:  [address] at [address]  [facility], [address], [place] [address]  [phone]   [phone]      OUTPATIENT PHYSICAL THERAPY: Daily Treatment Note 2019  Visit Count:  7    ICD-10: Treatment Diagnosis: Low back pain (M54.5); Muscle spasm of back (M62.830); Unsteadiness on feet (R26.81)  Precautions/Allergies:   Mandy Halim; Keflex [cephalexin]; Sulfa (sulfonamide antibiotics); Theophylline; Topamax [topiramate]; Ultram [tramadol]; Pneumococcal vaccine; and Verapamil , shea butter  TREATMENT PLAN:  Effective Dates: 5/15/2019 TO 2019 (60 days). Frequency/Duration: 2 times a week for 60 Day(s) MEDICAL/REFERRING DIAGNOSIS:  Acute right-sided low back pain without sciatica [M54.5]   DATE OF ONSET: 2019  REFERRING PHYSICIAN: Divine Ordaz MD MD Orders: PT eval and tx  Return MD Appointment: None scheduled at this time     Pre-treatment Symptoms/Complaints: Feels okay. Felt a pop and now she feels better. Pain: Initial:Pain Intensity 1: 2 Post Session: 3/10   Medications Last Reviewed:  2019  Updated Objective Findings:  None Today  TREATMENT:     THERAPEUTIC EXERCISE: (40 minutes):  Exercises per grid below to improve mobility, strength and balance. Required minimal verbal and tactile cues to promote proper body alignment, promote proper body posture and promote proper body mechanics. Progressed resistance, range, repetitions and complexity of movement as indicated. MODALITIES: (5 minutes): *  Hot Pack Therapy in order to provide analgesia and relieve muscle spasm.     Date:  5/15/19 Date:  19 Date:  19 Date:  19 Date:  19 Date:  19    Activity/Exercise Parameters Parameters Parameters Parameters Parameters Parameters    Piriformis stretch (modified)  3x R side   10x B 10x B 10x B    Clamshell 15x B YTB 15x B YTB 15x B YTB    15x B YTB Bridge 15x 2x10 B 2x10  2x10  2x10  2x10 YTB    SLR 10x B 2x10 B  2x10 B 1.5#        Soleus stretch 3x L side         NuStep  10 mins, 2.5 10 mins, 3.0  10 mins, 3.0   10 mins, 3.0     Prone press up  10x2  10x 10x  10x    Prone hip extension  2x10 B 2x10 B  2x10 B  10x B 10x B    Self foam roll          SAQ  20x 2# B 20x 2# B       Adductor squeeze bridge   15x        Pelvic tilt    15x 15x 15x  15x    Pelvic tilt march   5x each  10x B  10x B    Hand to knee press    10x B 10x B 10x B    DKTC    10x 10x 10x     Cat camel          Prayer stretch    10x 10x  10x    Dead bug      10x B    Primal press      10x     Bird dog      10x B      [email or web address] Portal  Access Code: [email or web address]   URL: [email or web address]   Date: 05/15/2019   Prepared by: Martinez Parkinson     Exercises  Supine Piriformis Stretch with Foot on Ground - 10 reps - 1 sets - 10 hold - 1x daily - 3x weekly  Clamshell with Resistance - 10 reps - 2 sets - 5 hold - 1x daily - 3x weekly  Supine Bridge - 10 reps - 2 sets - 5 hold - 1x daily - 3x weekly  Supine Active Straight Leg Raise - 10 reps - 2 sets - 5 hold - 1x daily - 3x weekly    Treatment/Session Summary:    · Response to Treatment:  Pt had slight increase in pain following exercises and moist heat. Some new core exercises add this session w/o issue. · Communication/Consultation:  None today  · Equipment provided today:  None today  · Recommendations/Intent for next treatment session: Next visit will focus on progression of functional activities as tolerated.     Total Treatment Billable Duration:40 min therex  PT Patient Time In/Time Out  Time In: 0900  Time Out: 0945  Martinez Parkinson PT    Future Appointments   Date Time Provider Jacquie Sarah   6/14/2019  9:00 AM Claudia Boyd PT [facility]   10/17/2019  9:00 AM Van Bolden MD [facility]

## 2019-06-14 ENCOUNTER — HOSPITAL ENCOUNTER (OUTPATIENT)
Dept: PHYSICAL THERAPY | Age: 39
Discharge: HOME | End: 2019-06-14
Attending: FAMILY MEDICINE
Payer: MEDICARE

## 2019-06-14 PROCEDURE — 97140 MANUAL THERAPY 1/> REGIONS: CPT

## 2019-06-14 PROCEDURE — 97110 THERAPEUTIC EXERCISES: CPT

## 2019-06-14 NOTE — PROGRESS NOTES
Licha Fitzgerald  : 1980  Payor: Peggy Browning / Plan: Zoie Stuart / Product Type: Managed Care Medicare /    [address] [place] [address]  at [facility]  [address], [address], [place] [address]  [phone]   [phone]         OUTPATIENT PHYSICAL THERAPY:Progress Report 2019    ICD-10: Treatment Diagnosis: Low back pain (M54.5); Muscle spasm of back (M62.830); Unsteadiness on feet (R26.81)  Precautions/Allergies:   Schuyler Chyle; Keflex [cephalexin]; Sulfa (sulfonamide antibiotics); Theophylline; Topamax [topiramate]; Ultram [tramadol]; Pneumococcal vaccine; and Verapamil , shea butter  TREATMENT PLAN:  Effective Dates: 5/15/2019 TO 2019 (60 days). Frequency/Duration: 2 times a week for 60 Day(s) MEDICAL/REFERRING DIAGNOSIS:  Acute right-sided low back pain without sciatica [M54.5]   DATE OF ONSET: 2019  REFERRING PHYSICIAN: Erwin Pittman MD MD Orders: PT eval and tx  Return MD Appointment: None scheduled at this time      INITIAL ASSESSMENT:  Ms. Brittni Finn presents to PT eval w/ c/o R sided LB pain that started when she had to wear a boot on her L ankle for plantar fasciitis. She displays gross deficits in balance due to her vestibular disorder. Additionally, she has decreased proximal hip strength and core strength. With palpation, she was tender in her R piriformis and it reproduced her symptoms. She will benefit from continued skilled PT services to improve her deficits listed below and to progress towards her PLOF. Update: 19:  Ms. Brittni Finn presented to PT w/ R sided LB pain, but her pain has been improving w/ therapy. Additionally, her strength has improved. Her balance is still intermittently poor due to her vestibular disorder. She is scheduled to be out of town for the next 2 weeks and therefore was issued a comprehensive HEP.  Her next visit will be when she returns to see how she does performing exercises on her own and pain level control. At this time, she has met 5/6 PT goals and is progressing towards her final goal.    PROBLEM LIST (Impacting functional limitations):  1. Decreased Strength  2. Decreased ADL/Functional Activities  3. Decreased Transfer Abilities  4. Decreased Ambulation Ability/Technique  5. Decreased Balance  6. Increased Pain  7. Decreased Flexibility/Joint Mobility  8. Decreased [place] with Home Exercise Program INTERVENTIONS PLANNED:  1. Balance Exercise  2. Cold  3. Heat  4. Home Exercise Program (HEP)  5. Manual Therapy  6. Range of Motion (ROM)  7. Therapeutic Exercise/Strengthening     GOALS: (Goals have been discussed and agreed upon with patient.)  Short-Term Functional Goals: Time Frame: 4 weeks  1. Pt will be independent w/ HEP in order to improve outcomes and decrease pain. MET  2. Pt will report pain of 5/10 or less while performing ADLs in order to improve functional mobility. MET  3. Pt will have decreased tenderness/tightness in her R piriformis muscle by 50% in order to increase her functional mobility. MET  Discharge Goals: Time Frame: 5 weeks  1. Pt will have Oswestry score of 16 or less in order to report decreased pain and decreased functional impairments. MET  2. Pt will report pain of 3/10 or less while performing ADLs in order to improve functional mobility. Progressing  3. Pt will have R hip strength of 4+/5 or greater in all major muscle groups in order to increase her functional mobility and balance. MET    Outcome Measure: Tool Used: Modified Oswestry Low Back Pain Questionnaire  Score:  Initial: 19/50  Most Recent: X/50 (Date: -- )   Interpretation of Score: Each section is scored on a 0-5 scale, 5 representing the greatest disability. The scores of each section are added together for a total score of 50. Medical Necessity:   · Patient is expected to demonstrate progress in strength, range of motion and balance to increase independence with functional tasks.   Reason for Services/Other Comments:  · Patient continues to demonstrate capacity to improve strength, ROM, balance, mobility which will increase independence. PT Patient Time In/Time Out  Time In: 0900  Time Out: 0945    Rehabilitation Potential For Stated Goals: Good  Regarding Sudarshan Edge's therapy, I certify that the treatment plan above will be carried out by a therapist or under their direction. Thank you for this referral,  Radha Orona, PT                 The information in this section was collected on 5/15/19 (except where otherwise noted). HISTORY:   History of Present Injury/Illness (Reason for Referral):  Pt got plantar fascitis in her L foot and had to wear a boot for a month. Around that time her LB started to hurt on the R side. She has not had any imaging of her LB. Past Medical History/Comorbidities:   Ms. Casey Gibson  has a past medical history of Asthma, Hyperlipidemia, Migraines, Movement disorder, Vertigo, and Vestibular nerve disorder (2008). Ms. Casey Gibson  has a past surgical history that includes hx orthopaedic and hx heent. R wrist 1995 and 1997, R shoulder 2004, bilateral eye sx 2010, multiple oral surgeries   Social History/Living Environment:     Lives alone in one story home w/ 4-5 steps to enter. Has a tub/shower combo with bath seat. Prior Level of Function/Work/Activity:  Independent, works part time as an occupational therapist specializing in vision therapy, walks 2-3x a week for 30-45 minutes  Dominant Side:         RIGHT       Ambulatory/Rehab Services H2 Model Falls Risk Assessment 5/15/19    Risk Factors:       (1)  Dizziness/Vertigo       (1)  Orthostatic drop in BP Ability to Rise from Chair:       (1)  Pushes up, successful in one attempt    Falls Prevention Plan:       Physical Limitations to Exercise (specify):  pt will be monitored as she has a vestibular disorder   Total: (5 or greater = High Risk): 3    ©2010 [employer].  [place] Patent [id number]. Federal Law prohibits the replication, distribution or use without written permission from [facility] [employer] [facility]     Current Medications:       Current Outpatient Medications:     diclofenac (FLECTOR) 1.3 % pt12, 1 Patch by TransDERmal route every twelve (12) hours every twelve (12) hours. , Disp: 30 Patch, Rfl: 2    oxytocin in 5 % dextrose (OXYTOCIN IN D5W) 10 unit/1,000 mL soln, 100 Units by IntraTRACHeal route daily. , Disp: , Rfl:     OTHER, PLACE 1 SHANA UNDER THE TONGUE ONCE A DAY  DO NOT EAT DRINK FOR 15MIN AFTER TAKING, Disp: , Rfl: 5    fexofenadine-pseudoephedrine (ALLEGRA-D 24 HOUR) 180-240 mg per tablet, Take 1 Tab by mouth., Disp: , Rfl:     mometasone (NASONEX) 50 mcg/actuation nasal spray, 2 Sprays daily. , Disp: , Rfl:     cholecalciferol, vitamin D3, (VITAMIN D3 PO), Take  by mouth daily. , Disp: , Rfl:     MAGNESIUM PO, Take  by mouth daily. , Disp: , Rfl:     VITAMIN A PO, Take  by mouth daily. , Disp: , Rfl:     ARMOUR THYROID 30 mg tablet, TAKE 1 TABLET BY MOUTH EVERY DAY IN THE MORNING, Disp: , Rfl: 5    BUTTERBUR ROOT EXTRACT PO, Take  by mouth., Disp: , Rfl:     coenzyme q10-vitamin e (COQ10 ) 100-100 mg-unit cap, Take  by mouth., Disp: , Rfl:     omega-3 fatty acids-vitamin e (FISH OIL) 1,000 mg cap, Take 1 Cap by mouth., Disp: , Rfl:     multivitamin capsule, Take 1 Cap by mouth daily. , Disp: , Rfl:    Date Last Reviewed:  6/14/2019    EXAMINATION:   Observation/Orthostatic Postural Assessment:           Forward head, rounded shoulders, unsteady with static stand, antalgic gait  Palpation:          Tender to R paraspinal, R glute  Special Tests:           - tender to entire lumbar spine with increasing tenderness the closer to sacrum   Functional Mobility:         Gait/Ambulation:  Antalgic, unsteady         Transfers:  Uses UEs to push        Bed Mobility:  Independent  Balance:          Unsteady, poor balance due to vestibular disorder, unable to remain upright with feet together eyes closed, able to hold balance with eyes open. Difficulty w/ holding SLS due to weakness and balance disorder  Sensation:         Intact w/ light touch, noted dullness over R thigh and lateral R ankle     Joint/Muscle ROM Strength Updates   Hip flexion WFL 4/5 R, 5/5 L 5/5 B   Hip extension WFL 4/5 R, 5/5 L 5/5 R   Hip abduction WFL 4-/5 B 4+/5 R, 5/5 L   Knee flexion WFL 4/5 R, 5/5 L 5/5 R   Knee extension WFL 4/5 R, 5/5 L 5/5 R   DF WFL WFL    Lumbar Decreased extension Decreased core               Body Structures Involved:  1. Nerves  2. Bones  3. Joints  4. Muscles  5. Ligaments Body Functions Affected:  1. Mental  2. Sensory/Pain  3. Neuromusculoskeletal  4. Movement Related Activities and Participation Affected:  1. General Tasks and Demands  2. Mobility  3. Self Care  4. Domestic Life  5. Interpersonal Interactions and Relationships  6. Community, Social and Civic Life   CLINICAL PRESENTATION:   CLINICAL DECISION MAKING:                 Pain: Initial:   Pain Intensity 1: 4  Post Session:  2/10     [employer] Portal  Access Code: [email or web address]   URL: [email or web address]   Date: 06/14/2019   Prepared by: Eva Castellanos     Exercises  Supine Piriformis Stretch with Foot on Ground - 10 reps - 1 sets - 10 hold - 1x daily - 3x weekly  Clamshell with Resistance - 10 reps - 2 sets - 5 hold - 1x daily - 3x weekly  Supine Bridge - 10 reps - 2 sets - 5 hold - 1x daily - 3x weekly  Supine Active Straight Leg Raise - 10 reps - 2 sets - 5 hold - 1x daily - 3x weekly  Prone Hip Extension - 10 reps - 2 sets - 5 hold - 1x daily - 3x weekly  Supine Posterior Pelvic Tilt - 10 reps - 2 sets - 5 hold - 1x daily - 3x weekly  Supine March with Posterior Pelvic Tilt - 10 reps - 2 sets - 5 hold - 1x daily - 3x weekly  Supine Double Knee to Chest - 10 reps - 1 sets - 5 hold - 1x daily - 3x weekly  Child's Pose Stretch - 10 reps - 1 sets - 5 hold - 1x daily - 3x weekly  Bird Dog - 10 reps - 1 sets - 5 hold - 1x daily - 3x weekly  Supine Dead Bug with Leg Extension - 10 reps - 1 sets - 5 hold - 1x daily - 3x weekly  Primal Push Up - 10 reps - 1 sets - 5 hold - 1x daily - 3x weekly    Variance from POC: none     Felicitas Rust, PT

## 2019-06-14 NOTE — PROGRESS NOTES
Jessica Rashmi  : 1980  Primary: [employer] Medicare Advantage  Secondary:  [address]  at [facility]  [address], [address], [place] [address]  [phone]   [phone]      OUTPATIENT PHYSICAL THERAPY: Daily Treatment Note 2019  Visit Count:  8    ICD-10: Treatment Diagnosis: Low back pain (M54.5); Muscle spasm of back (M62.830); Unsteadiness on feet (R26.81)  Precautions/Allergies:   Jonh Blase; Keflex [cephalexin]; Sulfa (sulfonamide antibiotics); Theophylline; Topamax [topiramate]; Ultram [tramadol]; Pneumococcal vaccine; and Verapamil , shea butter  TREATMENT PLAN:  Effective Dates: 5/15/2019 TO 2019 (60 days). Frequency/Duration: 2 times a week for 60 Day(s) MEDICAL/REFERRING DIAGNOSIS:  Acute right-sided low back pain without sciatica [M54.5]   DATE OF ONSET: 2019  REFERRING PHYSICIAN: Paula King MD MD Orders: PT eval and tx  Return MD Appointment: None scheduled at this time     Pre-treatment Symptoms/Complaints: Feels okay. Sore in her hip though. Pain: Initial:Pain Intensity 1: 4 Post Session: 2/10   Medications Last Reviewed:  2019  Updated Objective Findings:  See progress note  TREATMENT:     THERAPEUTIC EXERCISE: (25 minutes):  Exercises per grid below to improve mobility, strength and balance. Required minimal verbal and tactile cues to promote proper body alignment, promote proper body posture and promote proper body mechanics. Progressed resistance, range, repetitions and complexity of movement as indicated. MANUAL THERAPY: (15 minutes): Soft tissue mobilization was utilized and necessary because of the patient's restricted joint motion, painful spasm, loss of articular motion and restricted motion of soft tissue. MODALITIES: (5 minutes): *  Hot Pack Therapy in order to provide analgesia and relieve muscle spasm.     Date:  5/15/19 Date:  19 Date:  19 Date:  19 Date:  19 Date:  19 Date:  6/14/19   Activity/Exercise Parameters Parameters Parameters Parameters Parameters Parameters Parameters   Piriformis stretch (modified)  3x R side   10x B 10x B 10x B 10x B   Clamshell 15x B YTB 15x B YTB 15x B YTB    15x B YTB 15x B YTB   Bridge 15x 2x10 B 2x10  2x10  2x10  2x10 YTB 2x10 YTB    SLR 10x B 2x10 B  2x10 B 1.5#        Soleus stretch 3x L side         NuStep  10 mins, 2.5 10 mins, 3.0  10 mins, 3.0   10 mins, 3.0  10 mins, 3.0    Prone press up  10x2  10x 10x  10x    Prone hip extension  2x10 B 2x10 B  2x10 B  10x B 10x B 10x B   Self foam roll          SAQ  20x 2# B 20x 2# B       Adductor squeeze bridge   15x        Pelvic tilt    15x 15x 15x  15x    Pelvic tilt march   5x each  10x B  10x B    Hand to knee press    10x B 10x B 10x B    DKTC    10x 10x 10x     Cat camel          Prayer stretch    10x 10x  10x    Dead bug      10x B    Primal press      10x     Bird dog      10x B      [employer] Portal  Access Code: [email or web address]   URL: [email or web address]   Date: 06/14/2019   Prepared by: Kelli Ayala     Exercises  Supine Piriformis Stretch with Foot on Ground - 10 reps - 1 sets - 10 hold - 1x daily - 3x weekly  Clamshell with Resistance - 10 reps - 2 sets - 5 hold - 1x daily - 3x weekly  Supine Bridge - 10 reps - 2 sets - 5 hold - 1x daily - 3x weekly  Supine Active Straight Leg Raise - 10 reps - 2 sets - 5 hold - 1x daily - 3x weekly  Prone Hip Extension - 10 reps - 2 sets - 5 hold - 1x daily - 3x weekly  Supine Posterior Pelvic Tilt - 10 reps - 2 sets - 5 hold - 1x daily - 3x weekly  Supine March with Posterior Pelvic Tilt - 10 reps - 2 sets - 5 hold - 1x daily - 3x weekly  Supine Double Knee to Chest - 10 reps - 1 sets - 5 hold - 1x daily - 3x weekly  Child's Pose Stretch - 10 reps - 1 sets - 5 hold - 1x daily - 3x weekly  Bird Dog - 10 reps - 1 sets - 5 hold - 1x daily - 3x weekly  Supine Dead Bug with Leg Extension - 10 reps - 1 sets - 5 hold - 1x daily - 3x weekly  Primal Push Up - 10 reps - 1 sets - 5 hold - 1x daily - 3x weekly  Treatment/Session Summary:    · Response to Treatment:  Pt had a decrease in pain following STM. She was issued a comprehensive HEP due to being out of town for 2 weeks. · Communication/Consultation:  None today  · Equipment provided today:  None today  · Recommendations/Intent for next treatment session: Next visit will focus on progression of functional activities as tolerated.     Total Treatment Billable Duration: 25 therex, 15 manual   PT Patient Time In/Time Out  Time In: 0900  Time Out: 0945  Elayne Little PT    Future Appointments   Date Time Provider Jacquie Smith   7/2/2019  2:30 PM Carrie Quiñones PT [facility]   10/17/2019  9:00 AM Adriana Bolden MD [facility]

## 2019-07-02 ENCOUNTER — HOSPITAL ENCOUNTER (OUTPATIENT)
Dept: PHYSICAL THERAPY | Age: 39
Discharge: HOME | End: 2019-07-02
Attending: FAMILY MEDICINE
Payer: MEDICARE

## 2019-07-02 PROCEDURE — 97110 THERAPEUTIC EXERCISES: CPT

## 2019-07-02 NOTE — PROGRESS NOTES
Jerry Rogers  : 1980  Primary: Lonnie [place] Medicare Advantage  Secondary:  [address] at [facility]  [address], [address], [place] [address]  [phone]   [phone]      OUTPATIENT PHYSICAL THERAPY: Daily Treatment Note 2019  Visit Count:  9    ICD-10: Treatment Diagnosis: Low back pain (M54.5); Muscle spasm of back (M62.830); Unsteadiness on feet (R26.81)  Precautions/Allergies:   Madeleine Salk; Keflex [cephalexin]; Sulfa (sulfonamide antibiotics); Theophylline; Topamax [topiramate]; Ultram [tramadol]; Pneumococcal vaccine; and Verapamil , shea butter  TREATMENT PLAN:  Effective Dates: 5/15/2019 TO 2019 (60 days). Frequency/Duration: 2 times a week for 60 Day(s) MEDICAL/REFERRING DIAGNOSIS:  Acute right-sided low back pain without sciatica [M54.5]   DATE OF ONSET: 2019  REFERRING PHYSICIAN: Moses Chavarria MD MD Orders: PT eval and tx  Return MD Appointment: None scheduled at this time     Pre-treatment Symptoms/Complaints: Feels good. Had no issues when traveling. Pain: Initial:Pain Intensity 1: 1 Post Session: 0/10   Medications Last Reviewed:  2019  Updated Objective Findings:  See DC note  TREATMENT:     THERAPEUTIC EXERCISE: (40 minutes):  Exercises per grid below to improve mobility, strength and balance. Required minimal verbal and tactile cues to promote proper body alignment, promote proper body posture and promote proper body mechanics. Progressed resistance, range, repetitions and complexity of movement as indicated. MODALITIES: (5 minutes): *  Hot Pack Therapy in order to provide analgesia and relieve muscle spasm.     Date:  5/15/19 Date:  19 Date:  19 Date:  19 Date:  19 Date:  19 Date:  19 Date:  19   Activity/Exercise Parameters Parameters Parameters Parameters Parameters Parameters Parameters Parameters   Piriformis stretch (modified)  3x R side   10x B 10x B 10x B 10x B 10x B Clamshell 15x B YTB 15x B YTB 15x B YTB    15x B YTB 15x B YTB 2x10 B   Bridge 15x 2x10 B 2x10  2x10  2x10  2x10 YTB 2x10 YTB  2x10 B    SLR 10x B 2x10 B  2x10 B 1.5#      2x10 B    Soleus stretch 3x L side          NuStep  10 mins, 2.5 10 mins, 3.0  10 mins, 3.0   10 mins, 3.0  10 mins, 3.0  10 mins, 3.0    Prone press up  10x2  10x 10x  10x  10x    Prone hip extension  2x10 B 2x10 B  2x10 B  10x B 10x B 10x B 10x B   Self foam roll           SAQ  20x 2# B 20x 2# B        Adductor squeeze bridge   15x         Pelvic tilt    15x 15x 15x  15x  15x   Pelvic tilt march   5x each  10x B  10x B  10x B   Hand to knee press    10x B 10x B 10x B     DKTC    10x 10x 10x   10x   Cat camel           Prayer stretch    10x 10x  10x  10x   Dead bug      10x B     Primal press      10x      Bird dog      10x B  10x B     [employer] Portal  Access Code: [email or web address]   URL: [email or web address]   Date: 06/14/2019   Prepared by: Mich Tellez     Exercises  Supine Piriformis Stretch with Foot on Ground - 10 reps - 1 sets - 10 hold - 1x daily - 3x weekly  Clamshell with Resistance - 10 reps - 2 sets - 5 hold - 1x daily - 3x weekly  Supine Bridge - 10 reps - 2 sets - 5 hold - 1x daily - 3x weekly  Supine Active Straight Leg Raise - 10 reps - 2 sets - 5 hold - 1x daily - 3x weekly  Prone Hip Extension - 10 reps - 2 sets - 5 hold - 1x daily - 3x weekly  Supine Posterior Pelvic Tilt - 10 reps - 2 sets - 5 hold - 1x daily - 3x weekly  Supine March with Posterior Pelvic Tilt - 10 reps - 2 sets - 5 hold - 1x daily - 3x weekly  Supine Double Knee to Chest - 10 reps - 1 sets - 5 hold - 1x daily - 3x weekly  Child's Pose Stretch - 10 reps - 1 sets - 5 hold - 1x daily - 3x weekly  Bird Dog - 10 reps - 1 sets - 5 hold - 1x daily - 3x weekly  Supine Dead Bug with Leg Extension - 10 reps - 1 sets - 5 hold - 1x daily - 3x weekly  Primal Push Up - 10 reps - 1 sets - 5 hold - 1x daily - 3x weekly  Treatment/Session Summary: · Response to Treatment:  Pt displayed independence w/ HEP. No issues w/ exercises. · Communication/Consultation:  None today  · Equipment provided today:  None today  · Recommendations/Intent for next treatment session: Next visit will focus on progression of functional activities as tolerated.     Total Treatment Billable Duration: 40 therex  PT Patient Time In/Time Out  Time In: 6859  Time Out: 200 [facility],     Future Appointments   Date Time Provider Jacquie Sarah   10/17/2019  9:00 AM Jazz Go MD [facility]

## 2019-07-02 NOTE — PROGRESS NOTES
Robin Mccain  : 1980  Payor: Nida Peguero / Plan: Michael Camarillo / Product Type: Managed Care Medicare /    [address] [place] [address]  at [facility] hospital  [address], [address], [place] [address]  [phone]   [phone]         OUTPATIENT PHYSICAL [facility] 2019    ICD-10: Treatment Diagnosis: Low back pain (M54.5); Muscle spasm of back (M62.830); Unsteadiness on feet (R26.81)  Precautions/Allergies:   [place]; Keflex [cephalexin]; Sulfa (sulfonamide antibiotics); Theophylline; Topamax [topiramate]; Ultram [tramadol]; Pneumococcal vaccine; and Verapamil , shea butter  TREATMENT PLAN:  Effective Dates: 5/15/2019 TO 2019 (60 days). Frequency/Duration: 2 times a week for 60 Day(s) MEDICAL/REFERRING DIAGNOSIS:  Acute right-sided low back pain without sciatica [M54.5]   DATE OF ONSET: 2019  REFERRING PHYSICIAN: Markus Page MD MD Orders: PT eval and tx  Return MD Appointment: None scheduled at this time      INITIAL ASSESSMENT:  Ms. Alonzo Milan presents to PT eval w/ c/o R sided LB pain that started when she had to wear a boot on her L ankle for plantar fasciitis. She displays gross deficits in balance due to her vestibular disorder. Additionally, she has decreased proximal hip strength and core strength. With palpation, she was tender in her R piriformis and it reproduced her symptoms. She will benefit from continued skilled PT services to improve her deficits listed below and to progress towards her PLOF. Update: 19:  Ms. Alonzo Milan presented to PT w/ R sided LB pain, but her pain has been improving w/ therapy. Additionally, her strength has improved. Her balance is still intermittently poor due to her vestibular disorder. She is scheduled to be out of town for the next 2 weeks and therefore was issued a comprehensive HEP.  Her next visit will be when she returns to see how she does performing exercises on her own and pain level control. At this time, she has met 5/6 PT goals and is progressing towards her final goal.   Update: 7/2/19: Ms. Anabelle Mabry presented to PT w/ R sided LB pain. Her pain is improved and she is able to perform her HEP to keep it at a low level. Additionally, she has met all PT goals. DC from outpatient PT at this time. GOALS: (Goals have been discussed and agreed upon with patient.)  Short-Term Functional Goals: Time Frame: 4 weeks  1. Pt will be independent w/ HEP in order to improve outcomes and decrease pain. MET  2. Pt will report pain of 5/10 or less while performing ADLs in order to improve functional mobility. MET  3. Pt will have decreased tenderness/tightness in her R piriformis muscle by 50% in order to increase her functional mobility. MET  Discharge Goals: Time Frame: 5 weeks  1. Pt will have Oswestry score of 16 or less in order to report decreased pain and decreased functional impairments. MET  2. Pt will report pain of 3/10 or less while performing ADLs in order to improve functional mobility. MET  3. Pt will have R hip strength of 4+/5 or greater in all major muscle groups in order to increase her functional mobility and balance. MET    Outcome Measure: Tool Used: Modified Oswestry Low Back Pain Questionnaire  Score:  Initial: 19/50  Most Recent: 15/50 (Date: 7/2/19)   Interpretation of Score: Each section is scored on a 0-5 scale, 5 representing the greatest disability. The scores of each section are added together for a total score of 50. Medical Necessity:   · Patient is expected to demonstrate progress in strength, range of motion and balance to increase independence with functional tasks. Reason for Services/Other Comments:  · Patient continues to demonstrate capacity to improve strength, ROM, balance, mobility which will increase independence.       PT Patient Time In/Time Out  Time In: 3740  Time Out: 52 [place] Potential For Stated Goals: Good  Regarding Martin Gabriel Minesh's therapy, I certify that the treatment plan above will be carried out by a therapist or under their direction. Thank you for this referral,  King Lorraine PT                 The information in this section was collected on 5/15/19 (except where otherwise noted). HISTORY:   History of Present Injury/Illness (Reason for Referral):  Pt got plantar fascitis in her L foot and had to wear a boot for a month. Around that time her LB started to hurt on the R side. She has not had any imaging of her LB. Past Medical History/Comorbidities:   Ms. Doni Mcmillan  has a past medical history of Asthma, Hyperlipidemia, Migraines, Movement disorder, Vertigo, and Vestibular nerve disorder (2008). Ms. Doni Mcmillan  has a past surgical history that includes hx orthopaedic and hx heent. R wrist 1995 and 1997, R shoulder 2004, bilateral eye sx 2010, multiple oral surgeries   Social History/Living Environment:     Lives alone in one story home w/ 4-5 steps to enter. Has a tub/shower combo with bath seat. Prior Level of Function/Work/Activity:  Independent, works part time as an occupational therapist specializing in vision therapy, walks 2-3x a week for 30-45 minutes  Dominant Side:         RIGHT       Ambulatory/Rehab Services H2 Model Falls Risk Assessment 5/15/19    Risk Factors:       (1)  Dizziness/Vertigo       (1)  Orthostatic drop in BP Ability to Rise from Chair:       (1)  Pushes up, successful in one attempt    Falls Prevention Plan:       Physical Limitations to Exercise (specify):  pt will be monitored as she has a vestibular disorder   Total: (5 or greater = High Risk): 3    ©2010 [facility] [address] [place] [address] Patent #[id number].  Federal Law prohibits the replication, distribution or use without written permission from [facility] [employer]     Current Medications:       Current Outpatient Medications:     diclofenac (FLECTOR) 1.3 % pt12, 1 Patch by TransDERmal route every twelve (12) hours every twelve (12) hours. , Disp: 30 Patch, Rfl: 2    oxytocin in 5 % dextrose (OXYTOCIN IN D5W) 10 unit/1,000 mL soln, 100 Units by IntraTRACHeal route daily. , Disp: , Rfl:     OTHER, PLACE 1 SHANA UNDER THE TONGUE ONCE A DAY  DO NOT EAT DRINK FOR 15MIN AFTER TAKING, Disp: , Rfl: 5    fexofenadine-pseudoephedrine (ALLEGRA-D 24 HOUR) 180-240 mg per tablet, Take 1 Tab by mouth., Disp: , Rfl:     mometasone (NASONEX) 50 mcg/actuation nasal spray, 2 Sprays daily. , Disp: , Rfl:     cholecalciferol, vitamin D3, (VITAMIN D3 PO), Take  by mouth daily. , Disp: , Rfl:     MAGNESIUM PO, Take  by mouth daily. , Disp: , Rfl:     VITAMIN A PO, Take  by mouth daily. , Disp: , Rfl:     ARMOUR THYROID 30 mg tablet, TAKE 1 TABLET BY MOUTH EVERY DAY IN THE MORNING, Disp: , Rfl: 5    BUTTERBUR ROOT EXTRACT PO, Take  by mouth., Disp: , Rfl:     coenzyme q10-vitamin e (COQ10 ) 100-100 mg-unit cap, Take  by mouth., Disp: , Rfl:     omega-3 fatty acids-vitamin e (FISH OIL) 1,000 mg cap, Take 1 Cap by mouth., Disp: , Rfl:     multivitamin capsule, Take 1 Cap by mouth daily. , Disp: , Rfl:    Date Last Reviewed:  7/2/2019    EXAMINATION:   Observation/Orthostatic Postural Assessment: Forward head, rounded shoulders, unsteady with static stand, antalgic gait  Palpation:          Tender to R paraspinal, R glute  Special Tests:           - tender to entire lumbar spine with increasing tenderness the closer to sacrum   Functional Mobility:         Gait/Ambulation:  Antalgic, unsteady         Transfers:  Uses UEs to push        Bed Mobility:  Independent  Balance:          Unsteady, poor balance due to vestibular disorder, unable to remain upright with feet together eyes closed, able to hold balance with eyes open.  Difficulty w/ holding SLS due to weakness and balance disorder  Sensation:         Intact w/ light touch, noted dullness over R thigh and lateral R ankle     Joint/Muscle ROM Strength Updates   Hip flexion WFL 4/5 R, 5/5 L 5/5 B   Hip extension WFL 4/5 R, 5/5 L 5/5 R   Hip abduction WFL 4-/5 B 4+/5 R, 5/5 L   Knee flexion WFL 4/5 R, 5/5 L 5/5 R   Knee extension WFL 4/5 R, 5/5 L 5/5 R   DF WFL WFL    Lumbar Decreased extension Decreased core               Body Structures Involved:  1. Nerves  2. Bones  3. Joints  4. Muscles  5. Ligaments Body Functions Affected:  1. Mental  2. Sensory/Pain  3. Neuromusculoskeletal  4. Movement Related Activities and Participation Affected:  1. General Tasks and Demands  2. Mobility  3. Self Care  4. Domestic Life  5. Interpersonal Interactions and Relationships  6. Community, Social and Civic Life   CLINICAL PRESENTATION:   CLINICAL DECISION MAKING:                 Pain: Initial:   Pain Intensity 1: 1  Post Session:  0/10     [employer]  Access Code: [email or web address]   URL: [email or web address]   Date: 06/14/2019   Prepared by: Scarlett Lopez     Exercises  Supine Piriformis Stretch with Foot on Ground - 10 reps - 1 sets - 10 hold - 1x daily - 3x weekly  Clamshell with Resistance - 10 reps - 2 sets - 5 hold - 1x daily - 3x weekly  Supine Bridge - 10 reps - 2 sets - 5 hold - 1x daily - 3x weekly  Supine Active Straight Leg Raise - 10 reps - 2 sets - 5 hold - 1x daily - 3x weekly  Prone Hip Extension - 10 reps - 2 sets - 5 hold - 1x daily - 3x weekly  Supine Posterior Pelvic Tilt - 10 reps - 2 sets - 5 hold - 1x daily - 3x weekly  Supine March with Posterior Pelvic Tilt - 10 reps - 2 sets - 5 hold - 1x daily - 3x weekly  Supine Double Knee to Chest - 10 reps - 1 sets - 5 hold - 1x daily - 3x weekly  Child's Pose Stretch - 10 reps - 1 sets - 5 hold - 1x daily - 3x weekly  Bird Dog - 10 reps - 1 sets - 5 hold - 1x daily - 3x weekly  Supine Dead Bug with Leg Extension - 10 reps - 1 sets - 5 hold - 1x daily - 3x weekly  Primal Push Up - 10 reps - 1 sets - 5 hold - 1x daily - 3x weekly    Variance from POC: none     Scarlett Lopez, PT

## 2019-09-08 ENCOUNTER — HOSPITAL ENCOUNTER (EMERGENCY)
Age: 39
Discharge: HOME | End: 2019-09-08
Attending: EMERGENCY MEDICINE
Payer: MEDICARE

## 2019-09-08 ENCOUNTER — APPOINTMENT (OUTPATIENT)
Dept: ULTRASOUND IMAGING | Age: 39
End: 2019-09-08
Attending: NURSE PRACTITIONER
Payer: MEDICARE

## 2019-09-08 VITALS
SYSTOLIC BLOOD PRESSURE: 122 MMHG | RESPIRATION RATE: 12 BRPM | BODY MASS INDEX: 35.5 KG/M2 | OXYGEN SATURATION: 100 % | TEMPERATURE: 98.1 F | HEART RATE: 72 BPM | HEIGHT: 61 IN | WEIGHT: 188 LBS | DIASTOLIC BLOOD PRESSURE: 66 MMHG

## 2019-09-08 DIAGNOSIS — K80.20 GALLSTONES: Primary | ICD-10-CM

## 2019-09-08 LAB
ALBUMIN SERPL-MCNC: 4.2 G/DL (ref 3.5–5)
ALBUMIN/GLOB SERPL: 1 {RATIO} (ref 1.2–3.5)
ALP SERPL-CCNC: 88 U/L (ref 50–130)
ALT SERPL-CCNC: 54 U/L (ref 12–65)
ANION GAP SERPL CALC-SCNC: 9 MMOL/L (ref 7–16)
AST SERPL-CCNC: 18 U/L (ref 15–37)
BASOPHILS # BLD: 0 K/UL (ref 0–0.2)
BASOPHILS NFR BLD: 0 % (ref 0–2)
BILIRUB SERPL-MCNC: 0.5 MG/DL (ref 0.2–1.1)
BUN SERPL-MCNC: 10 MG/DL (ref 6–23)
CALCIUM SERPL-MCNC: 9.5 MG/DL (ref 8.3–10.4)
CHLORIDE SERPL-SCNC: 109 MMOL/L (ref 98–107)
CO2 SERPL-SCNC: 22 MMOL/L (ref 21–32)
CREAT SERPL-MCNC: 0.78 MG/DL (ref 0.6–1)
DIFFERENTIAL METHOD BLD: ABNORMAL
EOSINOPHIL # BLD: 0 K/UL (ref 0–0.8)
EOSINOPHIL NFR BLD: 0 % (ref 0.5–7.8)
ERYTHROCYTE [DISTWIDTH] IN BLOOD BY AUTOMATED COUNT: 11.8 % (ref 11.9–14.6)
GLOBULIN SER CALC-MCNC: 4.1 G/DL (ref 2.3–3.5)
GLUCOSE SERPL-MCNC: 124 MG/DL (ref 65–100)
HCT VFR BLD AUTO: 43 % (ref 35.8–46.3)
HGB BLD-MCNC: 15 G/DL (ref 11.7–15.4)
IMM GRANULOCYTES # BLD AUTO: 0.1 K/UL (ref 0–0.5)
IMM GRANULOCYTES NFR BLD AUTO: 1 % (ref 0–5)
LIPASE SERPL-CCNC: 213 U/L (ref 73–393)
LYMPHOCYTES # BLD: 1 K/UL (ref 0.5–4.6)
LYMPHOCYTES NFR BLD: 8 % (ref 13–44)
MCH RBC QN AUTO: 29.8 PG (ref 26.1–32.9)
MCHC RBC AUTO-ENTMCNC: 34.9 G/DL (ref 31.4–35)
MCV RBC AUTO: 85.3 FL (ref 79.6–97.8)
MONOCYTES # BLD: 0.3 K/UL (ref 0.1–1.3)
MONOCYTES NFR BLD: 3 % (ref 4–12)
NEUTS SEG # BLD: 11.3 K/UL (ref 1.7–8.2)
NEUTS SEG NFR BLD: 89 % (ref 43–78)
NRBC # BLD: 0 K/UL (ref 0–0.2)
PLATELET # BLD AUTO: 264 K/UL (ref 150–450)
PMV BLD AUTO: 10 FL (ref 9.4–12.3)
POTASSIUM SERPL-SCNC: 4.1 MMOL/L (ref 3.5–5.1)
PROT SERPL-MCNC: 8.3 G/DL (ref 6.3–8.2)
RBC # BLD AUTO: 5.04 M/UL (ref 4.05–5.2)
SODIUM SERPL-SCNC: 140 MMOL/L (ref 136–145)
WBC # BLD AUTO: 12.8 K/UL (ref 4.3–11.1)

## 2019-09-08 PROCEDURE — 83690 ASSAY OF LIPASE: CPT

## 2019-09-08 PROCEDURE — 99284 EMERGENCY DEPT VISIT MOD MDM: CPT | Performed by: NURSE PRACTITIONER

## 2019-09-08 PROCEDURE — 85025 COMPLETE CBC W/AUTO DIFF WBC: CPT

## 2019-09-08 PROCEDURE — 80053 COMPREHEN METABOLIC PANEL: CPT

## 2019-09-08 PROCEDURE — 96375 TX/PRO/DX INJ NEW DRUG ADDON: CPT | Performed by: NURSE PRACTITIONER

## 2019-09-08 PROCEDURE — 76705 ECHO EXAM OF ABDOMEN: CPT

## 2019-09-08 PROCEDURE — 96361 HYDRATE IV INFUSION ADD-ON: CPT | Performed by: NURSE PRACTITIONER

## 2019-09-08 PROCEDURE — 74011250636 HC RX REV CODE- 250/636: Performed by: NURSE PRACTITIONER

## 2019-09-08 PROCEDURE — 96374 THER/PROPH/DIAG INJ IV PUSH: CPT | Performed by: NURSE PRACTITIONER

## 2019-09-08 RX ORDER — ONDANSETRON 4 MG/1
4 TABLET, ORALLY DISINTEGRATING ORAL
Qty: 6 TAB | Refills: 0 | Status: SHIPPED | OUTPATIENT
Start: 2019-09-08 | End: 2019-12-17 | Stop reason: SDUPTHER

## 2019-09-08 RX ORDER — HYDROCODONE BITARTRATE AND ACETAMINOPHEN 5; 325 MG/1; MG/1
1 TABLET ORAL
Qty: 18 TAB | Refills: 0 | Status: SHIPPED | OUTPATIENT
Start: 2019-09-08 | End: 2019-09-11

## 2019-09-08 RX ORDER — ONDANSETRON 2 MG/ML
4 INJECTION INTRAMUSCULAR; INTRAVENOUS
Status: COMPLETED | OUTPATIENT
Start: 2019-09-08 | End: 2019-09-08

## 2019-09-08 RX ORDER — HYDROMORPHONE HYDROCHLORIDE 1 MG/ML
0.5 INJECTION, SOLUTION INTRAMUSCULAR; INTRAVENOUS; SUBCUTANEOUS
Status: COMPLETED | OUTPATIENT
Start: 2019-09-08 | End: 2019-09-08

## 2019-09-08 RX ADMIN — HYDROMORPHONE HYDROCHLORIDE 0.5 MG: 1 INJECTION, SOLUTION INTRAMUSCULAR; INTRAVENOUS; SUBCUTANEOUS at 11:26

## 2019-09-08 RX ADMIN — SODIUM CHLORIDE 1000 ML: 900 INJECTION, SOLUTION INTRAVENOUS at 11:26

## 2019-09-08 RX ADMIN — ONDANSETRON 4 MG: 2 INJECTION INTRAMUSCULAR; INTRAVENOUS at 11:26

## 2019-09-08 NOTE — DISCHARGE INSTRUCTIONS
Low-fat diet to help decrease pain and discomfort. Medications as prescribed. Call general surgery tomorrow to schedule a follow up appointment. Return to the emergency department for ongoing nausea, vomiting, fever, and/or for any additional concerns you may have.

## 2019-09-08 NOTE — ED TRIAGE NOTES
Pt to ED from UC needing an US of RUQ abdomen. Pt states pain after eating. +n/v. No diarrhea. No fever. Pt sent due to elevated WBC of 13. Urine completed at office.

## 2019-09-08 NOTE — ED NOTES
I have reviewed discharge instructions with the patient. The patient verbalized understanding. Patient left ED via Discharge Method: ambulatory to Home with family. Opportunity for questions and clarification provided. Patient given 2 scripts. To continue your aftercare when you leave the hospital, you may receive an automated call from our care team to check in on how you are doing. This is a free service and part of our promise to provide the best care and service to meet your aftercare needs.  If you have questions, or wish to unsubscribe from this service please call [phone]. Thank you for Choosing our [facility].

## 2019-09-08 NOTE — ED PROVIDER NOTES
Patient presents from urgent care with RUQ pain that has been present for the past 3 weeks. She states pain increases with eating and drinking. She states she has had nausea and vomiting also. The history is provided by the patient.         Past Medical History:   Diagnosis Date    Asthma     Hyperlipidemia     Migraines     Movement disorder     Vertigo     Vestibular nerve disorder 2008       Past Surgical History:   Procedure Laterality Date    HX HEENT      oral, bilateral eye muscle    HX ORTHOPAEDIC      right wrist x 2, right shoulder          Family History:   Problem Relation Age of Onset    Asthma Mother     Diabetes Mother     Thyroid Disease Mother     Diabetes Father     Arthritis-osteo Father     Cancer Maternal Grandmother         lymphoma    Heart Disease Maternal Grandfather     Stroke Maternal Grandfather     Cancer Paternal Grandmother         brain tumor    Macular Degen Paternal Grandfather     Parkinson's Disease Paternal Grandfather        Social History     Socioeconomic History    Marital status: SINGLE     Spouse name: Not on file    Number of children: Not on file    Years of education: Not on file    Highest education level: Not on file   Occupational History    Not on file   Social Needs    Financial resource strain: Not on file    Food insecurity:     Worry: Not on file     Inability: Not on file    Transportation needs:     Medical: Not on file     Non-medical: Not on file   Tobacco Use    Smoking status: Never Smoker    Smokeless tobacco: Never Used   Substance and Sexual Activity    Alcohol use: No    Drug use: No    Sexual activity: Never   Lifestyle    Physical activity:     Days per week: Not on file     Minutes per session: Not on file    Stress: Not on file   Relationships    Social connections:     Talks on phone: Not on file     Gets together: Not on file     Attends Evangelical service: Not on file     Active member of club or organization: Not on file     Attends meetings of clubs or organizations: Not on file     Relationship status: Not on file    Intimate partner violence:     Fear of current or ex partner: Not on file     Emotionally abused: Not on file     Physically abused: Not on file     Forced sexual activity: Not on file   Other Topics Concern    Not on file   Social History Narrative    Not on file         ALLERGIES: Marval Bullion; Keflex [cephalexin]; Sulfa (sulfonamide antibiotics); Theophylline; Topamax [topiramate]; Ultram [tramadol]; Pneumococcal vaccine; and Verapamil    Review of Systems   Constitutional: Negative for fever. Respiratory: Negative for cough. Gastrointestinal: Positive for abdominal pain, nausea and vomiting. Musculoskeletal: Negative for arthralgias and myalgias. Vitals:    09/08/19 1036   BP: 142/89   Pulse: 85   Resp: 18   Temp: 98.1 °F (36.7 °C)   SpO2: 98%   Weight: 85.3 kg (188 lb)   Height: 5' 1\" (1.549 m)            Physical Exam   Constitutional: She is oriented to person, place, and time. She appears well-developed and well-nourished. No distress. HENT:   Head: Normocephalic and atraumatic. Eyes: Conjunctivae and EOM are normal.   Neck: Normal range of motion. Neck supple. Cardiovascular: Normal rate and regular rhythm. Pulmonary/Chest: Effort normal and breath sounds normal.   Abdominal: Soft. Normal appearance and bowel sounds are normal. There is tenderness in the right upper quadrant. Neurological: She is alert and oriented to person, place, and time. Skin: Skin is warm and dry. She is not diaphoretic. Psychiatric: She has a normal mood and affect. Her behavior is normal.   Nursing note and vitals reviewed. [employer]    Result Date: 9/8/2019  RIGHT UPPER QUADRANT ULTRASOUND. HISTORY: Right upper quadrant quadrant pain, nausea and vomiting COMPARISON: No relevant comparison studies available. FINDINGS: The ultrasonographic Victor's sign is reported as negative.  Multiple small, 2-4 mm echogenic shadowing gallstones. The gallbladder wall is not thickened. The common bile duct is not dilated, 5 mm. Intrahepatic biliary tree is not dilated. Included portion of the pancreas and right kidney are unremarkable. IMPRESSION: Many small, 2-4 mm gallstones. No evidence of acute cholecystitis or biliary tree obstruction. Recent Results (from the past 12 hour(s))   CBC WITH AUTOMATED DIFF    Collection Time: 09/08/19 11:07 AM   Result Value Ref Range    WBC 12.8 (H) 4.3 - 11.1 K/uL    RBC 5.04 4.05 - 5.2 M/uL    HGB 15.0 11.7 - 15.4 g/dL    HCT 43.0 35.8 - 46.3 %    MCV 85.3 79.6 - 97.8 FL    MCH 29.8 26.1 - 32.9 PG    MCHC 34.9 31.4 - 35.0 g/dL    RDW 11.8 (L) 11.9 - 14.6 %    PLATELET 713 025 - 176 K/uL    MPV 10.0 9.4 - 12.3 FL    ABSOLUTE NRBC 0.00 0.0 - 0.2 K/uL    DF AUTOMATED      NEUTROPHILS 89 (H) 43 - 78 %    LYMPHOCYTES 8 (L) 13 - 44 %    MONOCYTES 3 (L) 4.0 - 12.0 %    EOSINOPHILS 0 (L) 0.5 - 7.8 %    BASOPHILS 0 0.0 - 2.0 %    IMMATURE GRANULOCYTES 1 0.0 - 5.0 %    ABS. NEUTROPHILS 11.3 (H) 1.7 - 8.2 K/UL    ABS. LYMPHOCYTES 1.0 0.5 - 4.6 K/UL    ABS. MONOCYTES 0.3 0.1 - 1.3 K/UL    ABS. EOSINOPHILS 0.0 0.0 - 0.8 K/UL    ABS. BASOPHILS 0.0 0.0 - 0.2 K/UL    ABS. IMM. GRANS. 0.1 0.0 - 0.5 K/UL   METABOLIC PANEL, COMPREHENSIVE    Collection Time: 09/08/19 11:07 AM   Result Value Ref Range    Sodium 140 136 - 145 mmol/L    Potassium 4.1 3.5 - 5.1 mmol/L    Chloride 109 (H) 98 - 107 mmol/L    CO2 22 21 - 32 mmol/L    Anion gap 9 7 - 16 mmol/L    Glucose 124 (H) 65 - 100 mg/dL    BUN 10 6 - 23 MG/DL    Creatinine 0.78 0.6 - 1.0 MG/DL    GFR est AA >60 >60 ml/min/1.73m2    GFR est non-AA >60 >60 ml/min/1.73m2    Calcium 9.5 8.3 - 10.4 MG/DL    Bilirubin, total 0.5 0.2 - 1.1 MG/DL    ALT (SGPT) 54 12 - 65 U/L    AST (SGOT) 18 15 - 37 U/L    Alk.  phosphatase 88 50 - 130 U/L    Protein, total 8.3 (H) 6.3 - 8.2 g/dL    Albumin 4.2 3.5 - 5.0 g/dL    Globulin 4.1 (H) 2.3 - 3.5 g/dL    A-G Ratio 1.0 (L) 1.2 - 3.5     LIPASE    Collection Time: 09/08/19 11:07 AM   Result Value Ref Range    Lipase 213 73 - 393 U/L       MDM  Number of Diagnoses or Management Options  Gallstones:   Diagnosis management comments: No acute abnormalities noted on lab results. US positive for gallstones but negative for cholecystitis. Patient referred to Dr. Jagruti Greene with general surgery. Amount and/or Complexity of Data Reviewed  Clinical lab tests: ordered and reviewed  Tests in the radiology section of CPT®: ordered and reviewed  Tests in the medicine section of CPT®: ordered and reviewed  Discuss the patient with other providers: yes (Deborah Raygoza  )    Risk of Complications, Morbidity, and/or Mortality  Presenting problems: moderate  Diagnostic procedures: moderate  Management options: moderate    Patient Progress  Patient progress: stable    ED Course as of Sep 08 1405 [facility] Sep 08, 2019   1218 Patient discussed with Dr. Deborah Raygoza. He agrees with plan of care. [JM]   6915 Patient back from ultrasound at this time. She voiced no concerns at this time. Patient waiting on ultrasound results.      [JM]      ED Course User Index  [JM] Milton Sprague, GLADIS       Procedures

## 2019-09-10 ENCOUNTER — ANESTHESIA EVENT (OUTPATIENT)
Dept: SURGERY | Age: 39
End: 2019-09-10
Payer: MEDICARE

## 2019-09-10 ENCOUNTER — HOSPITAL ENCOUNTER (OUTPATIENT)
Dept: SURGERY | Age: 39
Discharge: HOME | End: 2019-09-10

## 2019-09-10 VITALS — BODY MASS INDEX: 36.25 KG/M2 | WEIGHT: 192 LBS | HEIGHT: 61 IN

## 2019-09-10 RX ORDER — CLONAZEPAM 0.5 MG/1
0.5 TABLET ORAL
COMMUNITY
End: 2021-01-11 | Stop reason: SDUPTHER

## 2019-09-10 RX ORDER — MINERAL OIL
180 ENEMA (ML) RECTAL
COMMUNITY

## 2019-09-10 RX ORDER — ALBUTEROL SULFATE 90 UG/1
2 AEROSOL, METERED RESPIRATORY (INHALATION)
COMMUNITY

## 2019-09-10 RX ORDER — SUMATRIPTAN 100 MG/1
100 TABLET, FILM COATED ORAL
COMMUNITY
End: 2019-12-17 | Stop reason: SDUPTHER

## 2019-09-10 NOTE — H&P (VIEW-ONLY)
aDate: 9/10/2019      Name: Jorge Prince      MRN: [id number]       : 1980       Age: 44 y.o. Sex: female        Dejon Skaggs MD       CC:    Chief Complaint   Patient presents with    New Patient     ER 19 gallstones       HPI:     Jorge Prince is a 44 y.o. female who presents for evaluation of gallbladder problems as a referral from the ED. The patient was seen in the ED on 19 after she had her fifth episode of RUG pain, nausea and vomiting in the past 2 months. The patient had an ultrasound done on 19 which showed:    HISTORY: Right upper quadrant quadrant pain, nausea and vomiting     COMPARISON: No relevant comparison studies available.     FINDINGS: The ultrasonographic Victor's sign is reported as negative. Multiple  small, 2-4 mm echogenic shadowing gallstones. The gallbladder wall is not  thickened. The common bile duct is not dilated, 5 mm. Intrahepatic biliary tree  is not dilated. Included portion of the pancreas and right kidney are  unremarkable.     IMPRESSION  IMPRESSION: Many small, 2-4 mm gallstones. No evidence of acute cholecystitis or  biliary tree obstruction.       PMH:    Past Medical History:   Diagnosis Date    Asthma     Hyperlipidemia     Migraines     Movement disorder     Thyroid disease     Vertigo     Vestibular nerve disorder        PSH:    Past Surgical History:   Procedure Laterality Date    HX HEENT      oral, bilateral eye muscle    HX ORTHOPAEDIC      right wrist x 2, right shoulder        MEDS:    Current Outpatient Medications   Medication Sig    ondansetron (ZOFRAN ODT) 4 mg disintegrating tablet Take 1 Tab by mouth every eight (8) hours as needed for Nausea.  HYDROcodone-acetaminophen (NORCO) 5-325 mg per tablet Take 1 Tab by mouth every four (4) hours as needed for Pain for up to 3 days. Max Daily Amount: 6 Tabs.     OTHER PLACE 1 SHANA UNDER THE TONGUE ONCE A DAY  DO NOT EAT DRINK FOR 15MIN AFTER TAKING    fexofenadine-pseudoephedrine (ALLEGRA-D 24 HOUR) 180-240 mg per tablet Take 1 Tab by mouth.  mometasone (NASONEX) 50 mcg/actuation nasal spray 2 Sprays daily.  cholecalciferol, vitamin D3, (VITAMIN D3 PO) Take  by mouth daily.  MAGNESIUM PO Take  by mouth daily.  VITAMIN A PO Take  by mouth daily.  ARMOUR THYROID 30 mg tablet TAKE 1 TABLET BY MOUTH EVERY DAY IN THE MORNING    BUTTERBUR ROOT EXTRACT PO Take  by mouth.  coenzyme q10-vitamin e (COQ10 ) 100-100 mg-unit cap Take  by mouth.  omega-3 fatty acids-vitamin e (FISH OIL) 1,000 mg cap Take 1 Cap by mouth.  multivitamin capsule Take 1 Cap by mouth daily. No current facility-administered medications for this visit. ALLERGIES:      Allergies   Allergen Reactions    [place] Shortness of Breath and Swelling    Keflex [Cephalexin] Vertigo    Sulfa (Sulfonamide Antibiotics) Rash    Theophylline Shortness of Breath    Topamax [Topiramate] Vertigo    Ultram [Tramadol] Rash    Pneumococcal Vaccine Swelling    Verapamil Other (comments)     Lowers blood pressure       SH:    Social History     Tobacco Use    Smoking status: Never Smoker    Smokeless tobacco: Never Used   Substance Use Topics    Alcohol use: No    Drug use: No       FH:    Family History   Problem Relation Age of Onset    Asthma Mother     Diabetes Mother     Thyroid Disease Mother     Diabetes Father     Arthritis-osteo Father     Cancer Maternal Grandmother         lymphoma    Heart Disease Maternal Grandfather     Stroke Maternal Grandfather     Cancer Paternal Grandmother         brain tumor    Macular Degen Paternal Grandfather     Parkinson's Disease Paternal Grandfather        ROS: The patient has no difficulty with chest pain or shortness of breath. No fever or chills. Comprehensive 13 point review of systems was otherwise unremarkable except as noted above.     Physical Exam:     Visit Vitals  /90   Pulse 84   Ht 5' 1\" (1.549 m) Wt 192 lb (87.1 kg)   LMP 08/18/2019   BMI 36.28 kg/m²       General: Alert, oriented, cooperative white female in no acute distress. Eyes: Sclera are clear. Conjunctiva and lids within normal limits. No icterus. Ears and Nose: no gross deformities to visual inspection, gross hearing intact  Neck: Supple, trachea midline, no appreciable thyromegaly  Resp: Breathing is  non-labored. Lungs clear to auscultation without wheezing or rhonchi   CV: RRR. No murmurs, rubs or gallops appreciated. Abd: soft, RUQ tenderness to palpation, active BS's. Psych:  Mood and affect appropriate. Short-term memory and understanding intact      Assessment/Plan:  Do Peña is a 44 y.o. female who has signs and symptoms consistent with cholelithiasis/cholecystitis. 1. Laparoscopic, possible open, cholecystectomy. I went through the risks of bleeding, infection and anesthesia. I went through other risks of injury to the liver, biliary tree structures, stomach, small bowel, large bowel , pancreas and the potential need to convert to an open procedure.     Noel Crowley MD      FACS   9/10/2019  12:58 PM

## 2019-09-10 NOTE — PERIOP NOTES
Patient verified name and . Order for consent NOT found in EHR; patient verifies procedure. Type 2 surgery, phone assessment complete. Orders NOT received. Labs per surgeon: unknown; no orders received at time of phone assessment. Labs per anesthesia protocol: HGB=15.0 on 19; result within anesthesia protocols and available in EHR for reference if needed. EKG: not needed per anesthesia protocols. Patient answered medical/surgical history questions at their best of ability. All prior to admission medications documented in [facility]. Patient instructed to take the following medications the day of surgery according to anesthesia guidelines with a small sip of water: albuterol if needed, armour thyroid, klonopin if needed, norco if needed, zofran if needed, imitrex if needed. Hold all vitamins 7 days prior to surgery and NSAIDS 5 days prior to surgery. Prescription meds to hold: none. Instructed to bring inhaler dos. Patient instructed on the following:  Arrive at A Entrance, time of arrival to be called the day before by 1700  NPO after midnight including gum, mints, and ice chips  Responsible adult must drive patient to the hospital, stay during surgery, and patient will need supervision 24 hours after anesthesia  Use dial antibacterial soap in shower the night before surgery and on the morning of surgery  All piercings must be removed prior to arrival.    Leave all valuables (money and jewelry) at home but bring insurance card and ID on DOS. Do not wear make-up, nail polish, lotions, cologne, perfumes, powders, or oil on skin. Patient teach back successful and patient demonstrates knowledge of instruction.

## 2019-09-11 ENCOUNTER — HOSPITAL ENCOUNTER (OUTPATIENT)
Age: 39
Setting detail: OUTPATIENT SURGERY
Discharge: HOME | End: 2019-09-11
Attending: SURGERY | Admitting: SURGERY
Payer: MEDICARE

## 2019-09-11 ENCOUNTER — ANESTHESIA (OUTPATIENT)
Dept: SURGERY | Age: 39
End: 2019-09-11
Payer: MEDICARE

## 2019-09-11 VITALS
DIASTOLIC BLOOD PRESSURE: 69 MMHG | TEMPERATURE: 99.3 F | RESPIRATION RATE: 16 BRPM | HEART RATE: 65 BPM | SYSTOLIC BLOOD PRESSURE: 119 MMHG | BODY MASS INDEX: 36.35 KG/M2 | OXYGEN SATURATION: 98 % | WEIGHT: 192.4 LBS

## 2019-09-11 DIAGNOSIS — K80.10 CALCULUS OF GALLBLADDER WITH CHRONIC CHOLECYSTITIS WITHOUT OBSTRUCTION: Primary | ICD-10-CM

## 2019-09-11 LAB — HCG UR QL: NEGATIVE

## 2019-09-11 PROCEDURE — 77030008606 HC TRCR ENDOSC KII AMR -B: Performed by: SURGERY

## 2019-09-11 PROCEDURE — 74011250636 HC RX REV CODE- 250/636

## 2019-09-11 PROCEDURE — 77030036733 HC ENDOLP LIG VCRL SUT J&J -C: Performed by: SURGERY

## 2019-09-11 PROCEDURE — 77030040361 HC SLV COMPR DVT MDII -B: Performed by: SURGERY

## 2019-09-11 PROCEDURE — 81025 URINE PREGNANCY TEST: CPT

## 2019-09-11 PROCEDURE — 77030031139 HC SUT VCRL2 J&J -A: Performed by: SURGERY

## 2019-09-11 PROCEDURE — 76010000160 HC OR TIME 0.5 TO 1 HR INTENSV-TIER 1: Performed by: SURGERY

## 2019-09-11 PROCEDURE — 74011000250 HC RX REV CODE- 250: Performed by: ANESTHESIOLOGY

## 2019-09-11 PROCEDURE — 77030018836 HC SOL IRR NACL ICUM -A: Performed by: SURGERY

## 2019-09-11 PROCEDURE — 77030019940 HC BLNKT HYPOTHRM STRY -B: Performed by: ANESTHESIOLOGY

## 2019-09-11 PROCEDURE — 77030021158 HC TRCR BLN GELPRT AMR -B: Performed by: SURGERY

## 2019-09-11 PROCEDURE — 77030034154 HC SHR COAG HARM ACE J&J -F: Performed by: SURGERY

## 2019-09-11 PROCEDURE — 76210000020 HC REC RM PH II FIRST 0.5 HR: Performed by: SURGERY

## 2019-09-11 PROCEDURE — 77030039266 HC ADH SKN EXOFIN S2SG -A: Performed by: SURGERY

## 2019-09-11 PROCEDURE — 74011250636 HC RX REV CODE- 250/636: Performed by: ANESTHESIOLOGY

## 2019-09-11 PROCEDURE — 77030020829: Performed by: SURGERY

## 2019-09-11 PROCEDURE — 76060000033 HC ANESTHESIA 1 TO 1.5 HR: Performed by: SURGERY

## 2019-09-11 PROCEDURE — 77030008522 HC TBNG INSUF LAPRO STRY -B: Performed by: SURGERY

## 2019-09-11 PROCEDURE — 77030037088 HC TUBE ENDOTRACH ORAL NSL COVD-A: Performed by: ANESTHESIOLOGY

## 2019-09-11 PROCEDURE — 74011000250 HC RX REV CODE- 250: Performed by: SURGERY

## 2019-09-11 PROCEDURE — 77030002933 HC SUT MCRYL J&J -A: Performed by: SURGERY

## 2019-09-11 PROCEDURE — 76210000016 HC OR PH I REC 1 TO 1.5 HR: Performed by: SURGERY

## 2019-09-11 PROCEDURE — 74011250636 HC RX REV CODE- 250/636: Performed by: SURGERY

## 2019-09-11 PROCEDURE — 74011000250 HC RX REV CODE- 250

## 2019-09-11 PROCEDURE — 77030039425 HC BLD LARYNG TRULITE DISP TELE -A: Performed by: ANESTHESIOLOGY

## 2019-09-11 PROCEDURE — 88304 TISSUE EXAM BY PATHOLOGIST: CPT

## 2019-09-11 RX ORDER — EPHEDRINE SULFATE 50 MG/ML
INJECTION, SOLUTION INTRAVENOUS AS NEEDED
Status: DISCONTINUED | OUTPATIENT
Start: 2019-09-11 | End: 2019-09-11 | Stop reason: HOSPADM

## 2019-09-11 RX ORDER — SODIUM CHLORIDE, SODIUM LACTATE, POTASSIUM CHLORIDE, CALCIUM CHLORIDE 600; 310; 30; 20 MG/100ML; MG/100ML; MG/100ML; MG/100ML
50 INJECTION, SOLUTION INTRAVENOUS CONTINUOUS
Status: DISCONTINUED | OUTPATIENT
Start: 2019-09-11 | End: 2019-09-11 | Stop reason: HOSPADM

## 2019-09-11 RX ORDER — FENTANYL CITRATE 50 UG/ML
100 INJECTION, SOLUTION INTRAMUSCULAR; INTRAVENOUS ONCE
Status: DISCONTINUED | OUTPATIENT
Start: 2019-09-11 | End: 2019-09-11 | Stop reason: HOSPADM

## 2019-09-11 RX ORDER — BUPIVACAINE HYDROCHLORIDE 5 MG/ML
INJECTION, SOLUTION EPIDURAL; INTRACAUDAL AS NEEDED
Status: DISCONTINUED | OUTPATIENT
Start: 2019-09-11 | End: 2019-09-11 | Stop reason: HOSPADM

## 2019-09-11 RX ORDER — MIDAZOLAM HYDROCHLORIDE 1 MG/ML
2 INJECTION, SOLUTION INTRAMUSCULAR; INTRAVENOUS ONCE
Status: DISCONTINUED | OUTPATIENT
Start: 2019-09-11 | End: 2019-09-11 | Stop reason: HOSPADM

## 2019-09-11 RX ORDER — OXYCODONE AND ACETAMINOPHEN 5; 325 MG/1; MG/1
1-2 TABLET ORAL
Qty: 30 TAB | Refills: 0 | Status: SHIPPED | OUTPATIENT
Start: 2019-09-11 | End: 2019-09-14

## 2019-09-11 RX ORDER — DEXAMETHASONE SODIUM PHOSPHATE 4 MG/ML
INJECTION, SOLUTION INTRA-ARTICULAR; INTRALESIONAL; INTRAMUSCULAR; INTRAVENOUS; SOFT TISSUE AS NEEDED
Status: DISCONTINUED | OUTPATIENT
Start: 2019-09-11 | End: 2019-09-11 | Stop reason: HOSPADM

## 2019-09-11 RX ORDER — FENTANYL CITRATE 50 UG/ML
INJECTION, SOLUTION INTRAMUSCULAR; INTRAVENOUS AS NEEDED
Status: DISCONTINUED | OUTPATIENT
Start: 2019-09-11 | End: 2019-09-11 | Stop reason: HOSPADM

## 2019-09-11 RX ORDER — LIDOCAINE HYDROCHLORIDE 20 MG/ML
INJECTION, SOLUTION EPIDURAL; INFILTRATION; INTRACAUDAL; PERINEURAL AS NEEDED
Status: DISCONTINUED | OUTPATIENT
Start: 2019-09-11 | End: 2019-09-11 | Stop reason: HOSPADM

## 2019-09-11 RX ORDER — NEOSTIGMINE METHYLSULFATE 1 MG/ML
INJECTION INTRAVENOUS AS NEEDED
Status: DISCONTINUED | OUTPATIENT
Start: 2019-09-11 | End: 2019-09-11 | Stop reason: HOSPADM

## 2019-09-11 RX ORDER — ALBUTEROL SULFATE 0.83 MG/ML
2.5 SOLUTION RESPIRATORY (INHALATION) AS NEEDED
Status: DISCONTINUED | OUTPATIENT
Start: 2019-09-11 | End: 2019-09-11 | Stop reason: HOSPADM

## 2019-09-11 RX ORDER — SODIUM CHLORIDE, SODIUM LACTATE, POTASSIUM CHLORIDE, CALCIUM CHLORIDE 600; 310; 30; 20 MG/100ML; MG/100ML; MG/100ML; MG/100ML
75 INJECTION, SOLUTION INTRAVENOUS CONTINUOUS
Status: DISCONTINUED | OUTPATIENT
Start: 2019-09-11 | End: 2019-09-11 | Stop reason: HOSPADM

## 2019-09-11 RX ORDER — MIDAZOLAM HYDROCHLORIDE 1 MG/ML
2 INJECTION, SOLUTION INTRAMUSCULAR; INTRAVENOUS
Status: COMPLETED | OUTPATIENT
Start: 2019-09-11 | End: 2019-09-11

## 2019-09-11 RX ORDER — ONDANSETRON 2 MG/ML
INJECTION INTRAMUSCULAR; INTRAVENOUS AS NEEDED
Status: DISCONTINUED | OUTPATIENT
Start: 2019-09-11 | End: 2019-09-11 | Stop reason: HOSPADM

## 2019-09-11 RX ORDER — ROCURONIUM BROMIDE 10 MG/ML
INJECTION, SOLUTION INTRAVENOUS AS NEEDED
Status: DISCONTINUED | OUTPATIENT
Start: 2019-09-11 | End: 2019-09-11 | Stop reason: HOSPADM

## 2019-09-11 RX ORDER — PROPOFOL 10 MG/ML
INJECTION, EMULSION INTRAVENOUS AS NEEDED
Status: DISCONTINUED | OUTPATIENT
Start: 2019-09-11 | End: 2019-09-11 | Stop reason: HOSPADM

## 2019-09-11 RX ORDER — GLYCOPYRROLATE 0.2 MG/ML
INJECTION INTRAMUSCULAR; INTRAVENOUS AS NEEDED
Status: DISCONTINUED | OUTPATIENT
Start: 2019-09-11 | End: 2019-09-11 | Stop reason: HOSPADM

## 2019-09-11 RX ORDER — CLINDAMYCIN PHOSPHATE 900 MG/50ML
900 INJECTION INTRAVENOUS
Status: COMPLETED | OUTPATIENT
Start: 2019-09-11 | End: 2019-09-11

## 2019-09-11 RX ORDER — LIDOCAINE HYDROCHLORIDE 10 MG/ML
0.1 INJECTION INFILTRATION; PERINEURAL AS NEEDED
Status: DISCONTINUED | OUTPATIENT
Start: 2019-09-11 | End: 2019-09-11 | Stop reason: HOSPADM

## 2019-09-11 RX ORDER — HYDROMORPHONE HYDROCHLORIDE 2 MG/ML
0.5 INJECTION, SOLUTION INTRAMUSCULAR; INTRAVENOUS; SUBCUTANEOUS
Status: COMPLETED | OUTPATIENT
Start: 2019-09-11 | End: 2019-09-11

## 2019-09-11 RX ORDER — OXYCODONE HYDROCHLORIDE 5 MG/1
5 TABLET ORAL
Status: DISCONTINUED | OUTPATIENT
Start: 2019-09-11 | End: 2019-09-11 | Stop reason: HOSPADM

## 2019-09-11 RX ADMIN — SODIUM CHLORIDE, SODIUM LACTATE, POTASSIUM CHLORIDE, AND CALCIUM CHLORIDE 75 ML/HR: 600; 310; 30; 20 INJECTION, SOLUTION INTRAVENOUS at 10:21

## 2019-09-11 RX ADMIN — HYDROMORPHONE HYDROCHLORIDE 0.5 MG: 2 INJECTION INTRAMUSCULAR; INTRAVENOUS; SUBCUTANEOUS at 12:20

## 2019-09-11 RX ADMIN — PROPOFOL 40 MG: 10 INJECTION, EMULSION INTRAVENOUS at 11:20

## 2019-09-11 RX ADMIN — HYDROMORPHONE HYDROCHLORIDE 0.5 MG: 2 INJECTION INTRAMUSCULAR; INTRAVENOUS; SUBCUTANEOUS at 12:06

## 2019-09-11 RX ADMIN — HYDROMORPHONE HYDROCHLORIDE 0.5 MG: 2 INJECTION INTRAMUSCULAR; INTRAVENOUS; SUBCUTANEOUS at 12:13

## 2019-09-11 RX ADMIN — NEOSTIGMINE METHYLSULFATE 3 MG: 1 INJECTION INTRAVENOUS at 11:37

## 2019-09-11 RX ADMIN — PROPOFOL 160 MG: 10 INJECTION, EMULSION INTRAVENOUS at 10:57

## 2019-09-11 RX ADMIN — CLINDAMYCIN PHOSPHATE 900 MG: 900 INJECTION, SOLUTION INTRAVENOUS at 10:52

## 2019-09-11 RX ADMIN — LIDOCAINE HYDROCHLORIDE 80 MG: 20 INJECTION, SOLUTION EPIDURAL; INFILTRATION; INTRACAUDAL; PERINEURAL at 10:57

## 2019-09-11 RX ADMIN — FENTANYL CITRATE 100 MCG: 50 INJECTION, SOLUTION INTRAMUSCULAR; INTRAVENOUS at 10:57

## 2019-09-11 RX ADMIN — EPHEDRINE SULFATE 15 MG: 50 INJECTION, SOLUTION INTRAVENOUS at 11:07

## 2019-09-11 RX ADMIN — PROMETHAZINE HYDROCHLORIDE 3.12 MG: 25 INJECTION INTRAMUSCULAR; INTRAVENOUS at 12:04

## 2019-09-11 RX ADMIN — DEXAMETHASONE SODIUM PHOSPHATE 10 MG: 4 INJECTION, SOLUTION INTRA-ARTICULAR; INTRALESIONAL; INTRAMUSCULAR; INTRAVENOUS; SOFT TISSUE at 11:07

## 2019-09-11 RX ADMIN — MIDAZOLAM HYDROCHLORIDE 2 MG: 1 INJECTION, SOLUTION INTRAMUSCULAR; INTRAVENOUS at 10:44

## 2019-09-11 RX ADMIN — ROCURONIUM BROMIDE 30 MG: 10 INJECTION, SOLUTION INTRAVENOUS at 10:58

## 2019-09-11 RX ADMIN — GLYCOPYRROLATE 0.4 MG: 0.2 INJECTION INTRAMUSCULAR; INTRAVENOUS at 11:37

## 2019-09-11 RX ADMIN — ONDANSETRON 4 MG: 2 INJECTION INTRAMUSCULAR; INTRAVENOUS at 11:37

## 2019-09-11 RX ADMIN — HYDROMORPHONE HYDROCHLORIDE 0.5 MG: 2 INJECTION INTRAMUSCULAR; INTRAVENOUS; SUBCUTANEOUS at 11:59

## 2019-09-11 NOTE — DISCHARGE INSTRUCTIONS
1. Diet as tolerated except for a  low fat diet after laparoscopic cholecystectomy. 2. Showering is allowed, but no tub baths, hot tubs or swimming. 3. Drainage is common from the wounds. Change the dressings as needed. Call our office if the wounds become reddened, tender, feel warm to the touch or pus starts to drain from the wound. 4. Take prescribed pain medication as directed, usually Percocet, Norco, Ultram or Dilaudid. Take over the counter medication for minor pain. 5. Ice may be applied intermittently to the surgical site or sites. 6. Call or office, [phone], if problems arise. 7. Follow up in the office at the assigned time. 8. Resume all medications as taken per surgery, unless specifically instructed not to take certain ones. 9. No lifting more than 25 pounds until told otherwise. 10. Driving is allowed 3 days after surgery as long as you feel comfortable enough to drive and have not taken any prescription pain medication prior to driving.

## 2019-09-11 NOTE — ANESTHESIA POSTPROCEDURE EVALUATION
Procedure(s):  CHOLECYSTECTOMY LAPAROSCOPIC.     general    Anesthesia Post Evaluation      Multimodal analgesia: multimodal analgesia used between 6 hours prior to anesthesia start to PACU discharge  Patient location during evaluation: PACU  Patient participation: complete - patient participated  Level of consciousness: responsive to verbal stimuli and awake  Pain management: adequate  Airway patency: patent  Anesthetic complications: no  Cardiovascular status: acceptable  Respiratory status: acceptable, spontaneous ventilation and nonlabored ventilation  Hydration status: acceptable  Post anesthesia nausea and vomiting:  none      Vitals Value Taken Time   /65 9/11/2019 12:43 PM   Temp 37.4 °C (99.3 °F) 9/11/2019 11:57 AM   Pulse 61 9/11/2019 12:43 PM   Resp 16 9/11/2019 12:43 PM   SpO2 95 % 9/11/2019 12:43 PM

## 2019-09-11 NOTE — ANESTHESIA PREPROCEDURE EVALUATION
Relevant Problems   No relevant active problems       Anesthetic History   No history of anesthetic complications            Review of Systems / Medical History  Patient summary reviewed, nursing notes reviewed and pertinent labs reviewed    Pulmonary            Asthma        Neuro/Psych         Headaches     Cardiovascular                  Exercise tolerance: >4 METS     GI/Hepatic/Renal  Within defined limits              Endo/Other      Hypothyroidism: well controlled  Obesity     Other Findings   Comments: Vertigo- especially when changes position quickly         Physical Exam    Airway  Mallampati: II    Neck ROM: normal range of motion   Mouth opening: Normal     Cardiovascular  Regular rate and rhythm,  S1 and S2 normal,  no murmur, click, rub, or gallop             Dental  No notable dental hx       Pulmonary  Breath sounds clear to auscultation               Abdominal         Other Findings            Anesthetic Plan    ASA: 2  Anesthesia type: general          Induction: Intravenous  Anesthetic plan and risks discussed with: Patient and Mother

## 2019-09-11 NOTE — INTERVAL H&P NOTE
H&P Update:  Komal Johnston was seen and examined. History and physical has been reviewed. The patient has been examined.  There have been no significant clinical changes since the completion of the originally dated History and Physical.

## 2019-09-11 NOTE — OP NOTES
Cholecystectomy Op Note Template Note     Indications: The patient was admitted to the hospital with cholelithiasis/cholecystitis. The patient now presents for a laparoscopic, possible open, cholecystectomy after discussing therapeutic alternatives. Pre-Op Diagnosis: CHOLELITHIASIS/CHOLECYSTITIS    Post-Op Diagnosis: SAME    Procedure: LAPAROSCOPIC CHOLECYSTECTOMY      Surgeon: Eri Seaman MD    Anesthesia:  General plus local         Procedure Details     The patient was brought to the operating room and placed supine. After induction of a general anesthetic, the abdomen was prepped and draped in standard fashion. An incision was made in the umbilicus and a Koby trocar was placed in the usual fashion after which  the abdomen was insufflated to 15mmHg sterile CO2. The other trocars were then placed under direct vision. These were positioned four fingerbreadths below the right costal margin in the anterior axillary line and mid clavicular line and just to the right of the falciform ligament in the epigatrium. The gallbladder was grasped. Omental adhesions were taken down. The area of Calot's triangle was dissected with the cystic duct and cystic artery being exposed. Once these two structures had been identified the gallbladder graspers were repositioned, so that one was on the liver/gallbladder junction and a second on the fundus of the gallbladder. The gallbladder was  from the liver with the use of the Harmonic scalpel. The dissection was taken down to the cystic artery which was divided between four 5 mm clips. The cystic duct was divided between four 5 mm clips. The gallbladder was free of all attachments and was removed from the abdomen via the umbilical incision site. The gallbladder was sent to pathology for evaluation at this point. The Koby was returned to the umbilical incision site, the insufflation was restarted and the camera placed through the Koby trocar.  The gallbladder fossa was without evidence of bleeding, there was no bleeding from the cystic artery stump and there was no evidence of bile leak from the cystic duct stump. The three upper abdominal trocars were removed under the direct vision without bleeding from the trocar sites. The Koby trocar was removed and the fascia of the umbilicus was closed with 0'0  Vicryl. The trocar sites were closed with the subcuticular Monocryl and Steri-strips. A sterile dressing was applied. The patient was taken to the recovery room in good condition, having tolerated the procedure well. Instrument, sponge, and needle counts were correct prior to abdominal closure and at the conclusion of the case. Findings: Gallstones.     Estimated Blood Loss:    10 cc's          Specimens: Gallbladder       Signed: Yumiko Ayala MD

## 2019-09-18 PROBLEM — Z90.49 S/P CHOLECYSTECTOMY: Status: ACTIVE | Noted: 2019-09-18

## 2019-11-05 ENCOUNTER — HOSPITAL ENCOUNTER (OUTPATIENT)
Dept: PHYSICAL THERAPY | Age: 39
Discharge: HOME | End: 2019-11-05
Attending: PSYCHIATRY & NEUROLOGY
Payer: MEDICARE

## 2019-11-05 DIAGNOSIS — R42 VERTIGO: ICD-10-CM

## 2019-11-05 DIAGNOSIS — G43.109 MIGRAINE WITH VERTIGO: ICD-10-CM

## 2019-11-05 PROCEDURE — 97112 NEUROMUSCULAR REEDUCATION: CPT

## 2019-11-05 PROCEDURE — 97163 PT EVAL HIGH COMPLEX 45 MIN: CPT

## 2019-11-05 NOTE — THERAPY EVALUATION
Amanda Shah  : 1980  Primary: Susanne Cam Medicare Advantage  Secondary:  [address]  at [facility]  [address] [address], [facility].  [phone]   [phone]        OUTPATIENT PHYSICAL THERAPY:Initial Assessment 2019   ICD-10: Treatment Diagnosis: Difficulty in walking, not elsewhere classified (R26.2)  Precautions/Allergies:   Rupesh Pastures; Keflex [cephalexin]; Sulfa (sulfonamide antibiotics); Theophylline; Topamax [topiramate]; Ultram [tramadol]; Adhesive; Pneumococcal vaccine; and Verapamil   TREATMENT PLAN:  Effective Dates: 2019 TO 2/3/2020 (90 days). Frequency/Duration: 1-2 times a week for 90 Day(s) MEDICAL/REFERRING DIAGNOSIS:  Migraine with vertigo [G43.109]  Vertigo [R42]   DATE OF ONSET: Chronic   REFERRING PHYSICIAN: Kassidy Bolden MD Orders: Evaluate and treat   Return MD Appointment: unknown      INITIAL ASSESSMENT:  Ms. Carolann Torres presents with imbalance, dizziness, and difficulty walking. Patient is at higher fall risk based on history of falls and scores received on Elizabeth Balance Scale and Dynamic Gait Index. Patient would benefit from skilled physical therapy to address problems and goal.  Thank you for this referral.      PROBLEM LIST (Impacting functional limitations):  1. Decreased Ambulation Ability/Technique  2. Decreased Balance  3. Decreased Activity Tolerance  4. Increased Fatigue  5. Decreased [place] with Home Exercise Program  6. Increased dizziness INTERVENTIONS PLANNED: (Treatment may consist of any combination of the following)  1. Balance Exercise  2. Gait Training  3. Home Exercise Program (HEP)  4. Habituation exercises     GOALS: (Goals have been discussed and agreed upon with patient.)  Short-Term Functional Goals: Time Frame: 30 days   1. Patient will be independent with home exercise program to improve balance.    2. Patient will increase score on Elizabeth Balance Scale to greater than or equal to 40/56 demonstrating improved balance and decreased fall risk with daily activities. Discharge Goals: Time Frame: 90 days   1. Patient will increase score on Elizabeth Balance Scale to greater than or equal to 46/56 demonstrating improved balance and decreased fall risk with daily activities. 2. Patient will increase score on Dynamic Gait Index to greater than or equal to 18/24 demonstrating improved balance and decreased fall risk with daily activities. 3. Patient will report being able to walk with improved stability. OUTCOME MEASURE:   Tool Used: Elizabeth Balance Scale  Score:  Initial: 35/56 Most Recent: X/56 (Date: -- )   Interpretation of Score: Each section is scored on a 0-4 scale, 0 representing the patients inability to perform the task and 4 representing independence. The scores of each section are added together for a total score of 56. The higher the patients score, the more independent the patient is. Any score below 45 indicates increased risk for falls. Tool Used: Dynamic Gait Index  Score:  Initial: 9/24 Most Recent: X/24 (Date: -- )   Interpretation of Score: Each section is scored on a 0-3 scale, 0 representing the patients inability to perform the task and 3 representing independence. The scores of each section are added together for a total score of 24. Any score below 19 indicates increased risk for falls. MEDICAL NECESSITY:   · Patient is expected to demonstrate progress in balance and dizziness to improve safety during activities of daily living. REASON FOR SERVICES/OTHER COMMENTS:  · Patient continues to require skilled intervention due to higher fall risk with daily activities. Total Duration:  PT Patient Time In/Time Out  Time In: 1015  Time Out: 1100    Rehabilitation Potential For Stated Goals: Good  Regarding Jessy Edge's therapy, I certify that the treatment plan above will be carried out by a therapist or under their direction.   Thank you for this referral,  Lexii Lima PT     Referring Physician Signature: Alyson Holstein,* _______________________________ Date _____________     PAIN/SUBJECTIVE:   Initial: Pain Intensity 1: 2  Pain Location 1: Head  Post Session:  2/10   HISTORY:   History of Injury/Illness (Reason for Referral):  Patient has a complicated history. Patient reports vertigo began May 17, 2008. Patient then began experiencing balance issues in February 2013. Patient has been to the [facility]. They believe patient was exposed to lead which led to lead toxicity. Patient reports regressing since 9/11/2019 due to gallbladder surgery. Patient reports vestibular rehab has helped her symptoms in the past.  Patient rates current dizziness as 4/10. Patient rates pain level as 2/10 in her head. Patient has chronic migraines. Patient has had four falls over the past couple of months. Patient tends to lose her balance to the left. Patient uses a walking stick during ambulation. Past Medical History/Comorbidities:   Ms. Duane Doe  has a past medical history of Adverse effect of anesthesia, Asthma, BMI 36.0-36.9,adult, Chronic pain, Hyperlipidemia, Migraines, Movement disorder, Thyroid disease, Vertigo, and Vestibular nerve disorder (2008). She also has no past medical history of Difficult intubation, Malignant hyperthermia due to anesthesia, Nausea & vomiting, or Pseudocholinesterase deficiency. Ms. Duane Doe  has a past surgical history that includes hx orthopaedic and hx heent. Social History/Living Environment:     Lives alone in an apartment. Patient has four steps to enter home. Patient has family nearby which helps her when needed. Prior Level of Function/Work/Activity:  Independent. Works at [employer]. Was walking a mile several times a week, but stopped due to gallbladder surgery. Dominant Side:         RIGHT  Personal Factors:          Sex:  female        Age:  44 y.o.    Ambulatory/Rehab Services H2 Model Falls Risk Assessment   Risk Factors:       (1)  Dizziness/Vertigo       (1)  Any administered benzodiazepines       (5)  History of Recent Falls [w/in 3 months] Ability to Rise from Chair:       (0)  Ability to rise in a single movement   Falls Prevention Plan:       Exercise/Equipment Adaptation (specify):  Patient will be supervised at all times due to higher fall risk7   Total: (5 or greater = High Risk): 7   ©2010 [facility] . [employer] Patent #[id number]. Federal Law prohibits the replication, distribution or use without written permission from [facility] [employer]   Current Medications:       Current Outpatient Medications:     fexofenadine (ALLEGRA) 180 mg tablet, Take 180 mg by mouth nightly., Disp: , Rfl:     SUMAtriptan (IMITREX) 100 mg tablet, Take 100 mg by mouth daily as needed for Migraine. Take / use AM day of surgery  per anesthesia protocols if needed. Indications: migraine headache, Disp: , Rfl:     clonazePAM (KLONOPIN) 0.5 mg tablet, Take 0.5 mg by mouth daily as needed. Take / use AM day of surgery  per anesthesia protocols if needed. , Disp: , Rfl:     OTHER,NON-FORMULARY,, by IntraVENous route every Monday. Calcium EDTA treatments once a week, Disp: , Rfl:     albuterol (PROVENTIL HFA, VENTOLIN HFA, PROAIR HFA) 90 mcg/actuation inhaler, Take 2 Puffs by inhalation every four (4) hours as needed for Wheezing or Shortness of Breath. Take / use AM day of surgery  per anesthesia protocols if needed.    Indications: Asthma Attack, Disp: , Rfl:     ondansetron (ZOFRAN ODT) 4 mg disintegrating tablet, Take 1 Tab by mouth every eight (8) hours as needed for Nausea., Disp: 6 Tab, Rfl: 0    OTHER, PLACE 1 SHANA UNDER THE TONGUE ONCE A DAY  DO NOT EAT DRINK FOR 15MIN AFTER TAKING, Disp: , Rfl: 5    mometasone (NASONEX) 50 mcg/actuation nasal spray, 1 [place] by Both Nostrils route nightly., Disp: , Rfl:     cholecalciferol, vitamin D3, (VITAMIN D3 PO), Take 10,000 Units by mouth daily. , Disp: , Rfl:     MAGNESIUM PO, Take 2 Tabs by mouth daily. , Disp: , Rfl:     VITAMIN A PO, Take 1 Tab by mouth daily. , Disp: , Rfl:     ARMOUR THYROID 30 mg tablet, TAKE 1 TABLET BY MOUTH EVERY DAY IN THE MORNING, Disp: , Rfl: 5    BUTTERBUR ROOT EXTRACT PO, Take 2 Tabs by mouth daily. , Disp: , Rfl:     multivitamin capsule, Take 2 Caps by mouth daily. , Disp: , Rfl:    Date Last Reviewed:  11/5/2019   Number of Personal Factors/Comorbidities that affect the Plan of Care: 3+: HIGH COMPLEXITY   EXAMINATION:   Functional Mobility:         Gait/Ambulation:  Patient ambulates with decreased gait speed with wide base of support. Patient loses her balance more with left handed turns. Strength:          Bilateral lower extremity strength 5/5. Sensation:         Within normal limits. Postural Control & Balance:  · Elizabeth Balance Scale:  35/56.   (A score less than 45/56 indicates high risk of falls)     · Dynamic Gait Index:  9/24.   (A score less than or equal to19/24 is abnormal and predictive of falls)        Body Structures Involved:  1. Nerves  2. Eyes and Ears  3. Muscles Body Functions Affected:  1. Neuromusculoskeletal Activities and Participation Affected:  1. General Tasks and Demands  2. Mobility  3. Interpersonal Interactions and Relationships  4.  Community, Social and [place]   Number of elements (examined above) that affect the Plan of Care: 4+: HIGH COMPLEXITY   CLINICAL PRESENTATION:   Presentation: Evolving clinical presentation with unstable and unpredictable characteristics: HIGH COMPLEXITY   CLINICAL DECISION MAKING:   Use of outcome tool(s) and clinical judgement create a POC that gives a: Difficult prediction of patient's progress: HIGH COMPLEXITY

## 2019-11-05 NOTE — PROGRESS NOTES
Nilda Maciel  : 1980  Primary: Andrekeithlondon Earnestine [employer] Medicare Advantage  Secondary:  Emely Flowers at [address] [facility], [address], [place] [address].  [phone]   [phone]     OUTPATIENT PHYSICAL THERAPY: Daily Treatment Note 2019  Visit Count:  1    ICD-10: Treatment Diagnosis: Difficulty in walking, not elsewhere classified (R26.2)  Precautions/Allergies:   Kingsley Hawk; Keflex [cephalexin]; Sulfa (sulfonamide antibiotics); Theophylline; Topamax [topiramate]; Ultram [tramadol]; Adhesive; Pneumococcal vaccine; and Verapamil   TREATMENT PLAN:  Effective Dates: 2019 TO 2/3/2020 (90 days). Frequency/Duration: 1-2 times a week for 90 Day(s)    Pre-treatment Symptoms/Complaints:  Imbalance, dizziness, and difficulty walking  Pain: Initial: Pain Intensity 1: 2  Pain Location 1: Head  Post Session:  2/10   Medications Last Reviewed:  2019  Updated Objective Findings:  See evaluation note from today  TREATMENT:     NEUROMUSCULAR RE-EDUCATION: (10 minutes):  Exercise/activities per grid below to improve balance and coordination. Required minimal verbal cues to promote dynamic balance in standing. Date:  2019 Date:   Date:     Activity/Exercise Parameters Parameters Parameters   Marching in place 10 reps bilateral     Single step forward 10 reps bilateral     Single step sideways 10 reps bilateral                                 [facility] Portal  Treatment/Session Summary:    · Response to Treatment:  Patient reports increased dizziness with exercises. .  · Communication/Consultation:  None today  · Equipment provided today:  Home exercise program  · Recommendations/Intent for next treatment session: Next visit will focus on habituation exercises, gait, and balance exercises.     Total Treatment Billable Duration:  10 minutes  PT Patient Time In/Time Out  Time In: 1015  Time Out: [address], PT    Future Appointments   Date Time Provider Jacquie Smith   11/19/2019  9:30 AM Abdifatah Nielsen, PT SFEORPT S[facility]   12/17/2019  9:00 AM Artis Garrett NP BSNE BSNE

## 2019-11-19 ENCOUNTER — HOSPITAL ENCOUNTER (OUTPATIENT)
Dept: PHYSICAL THERAPY | Age: 39
Discharge: HOME | End: 2019-11-19
Attending: PSYCHIATRY & NEUROLOGY
Payer: MEDICARE

## 2019-11-19 PROCEDURE — 97112 NEUROMUSCULAR REEDUCATION: CPT

## 2019-11-19 NOTE — PROGRESS NOTES
Argentina Marks  : 1980  Primary: Artis Cam Medicare Advantage  Secondary:  [address]  at [address] [facility], [address], [place] [address].  [phone]   [phone]     OUTPATIENT PHYSICAL THERAPY: Daily Treatment Note 2019  Visit Count:  2    ICD-10: Treatment Diagnosis: Difficulty in walking, not elsewhere classified (R26.2)  Precautions/Allergies:   Deetta Seaman; Keflex [cephalexin]; Sulfa (sulfonamide antibiotics); Theophylline; Topamax [topiramate]; Ultram [tramadol]; Adhesive; Pneumococcal vaccine; and Verapamil   TREATMENT PLAN:  Effective Dates: 2019 TO 2/3/2020 (90 days). Frequency/Duration: 1-2 times a week for 90 Day(s)    Pre-treatment Symptoms/Complaints: \"No falls. I caught myself. No nausea. 2/10 dizziness. Went to bed after I saw you last time\". Pain: Initial: Pain Intensity 1: 3  Pain Location 1: Hip  Pain Orientation 1: Left  Post Session:  3/10   Medications Last Reviewed:  2019  Updated Objective Findings:  None Today  TREATMENT:     NEUROMUSCULAR RE-EDUCATION: (45 minutes):  Exercise/activities per grid below to improve balance and coordination. Required minimal verbal cues to promote dynamic balance in standing.     Balance/Vestibular Treatment:   Activity   Date  19 Date Date Date Date Date   Activity/Exercise   Sets/reps/equipment Sets/reps/  equipment Sets/reps/  equipment Sets/reps/  equipment Sets/reps/  equipment Sets/reps/  equipment   Walking with head turns             Walking with head up & down             Step ups     2 inch step  15 reps bilateral        Step taps     2 inch step  2x10 reps        Marching   2 laps        Sidestepping           Crossovers           [place]           Walking  backwards             Tandem walking           Weaving in/out of cones     4 laps        Picking up cones             Stepping over half foam   2x 10 reps-  Forward/ lateral         [employer] ball           Figure 8s            Circles right/left           Walking with 360 degree turns           Spirals           Weight shifting:    Left & Right             Weight shifting: Forward & Backward              Static Standing Balance             Standing with feet apart             Standing with feet together     Blue foam eyes open/ eyes closed        Standing with feet semitandem           Standing with feet tandem           Single leg stance           X1/X2 Viewing exercises             Hallpike-Martin testing for BPPV (Benign Paroxysmal Positional Vertigo)             Echevarria-Daroff exercises           Canalith Repositioning treatment/Epley Maneuver  for BPPV (Benign Paroxysmal Positional Vertigo)           Smart Equitest Training: See scanned report. [place] Portal  Treatment/Session Summary:    · Response to Treatment:  Patient reports increase in dizziness to 6/10 after treatment. Continue to progress to tolerance. · Communication/Consultation:  None today  · Equipment provided today:  None today  · Recommendations/Intent for next treatment session: Next visit will focus on habituation exercises, gait, and balance exercises.     Total Treatment Billable Duration:  45 minutes  PT Patient Time In/Time Out  Time In: 0930  Time Out: Lupillo Vale, PT    Future Appointments   Date Time Provider Jacquie Smith   11/25/2019  8:45 AM Stefanie Messina PT [facility]PT S[facility]   12/9/2019  9:30 AM Ollie Nielsen, PT [facility]PT [facility]   12/17/2019  9:00 AM Edin Knapp NP BSNE BSNE   1/14/2020 10:15 AM Ollie Nielsen, PT [facility]ORPT SFE   1/20/2020 10:15 AM Ollie Nielsen, PT [facility]ORPT SFE   1/27/2020 10:15 AM Ollie Nielsen, PT S[facility]ORPT SFE

## 2019-11-25 ENCOUNTER — HOSPITAL ENCOUNTER (OUTPATIENT)
Dept: PHYSICAL THERAPY | Age: 39
Discharge: HOME | End: 2019-11-25
Attending: PSYCHIATRY & NEUROLOGY
Payer: MEDICARE

## 2019-11-25 PROCEDURE — 97112 NEUROMUSCULAR REEDUCATION: CPT

## 2019-11-25 NOTE — PROGRESS NOTES
Anahy Marshall  : 1980  Primary: Glenn Yusuf Aetna Medicare Advantage  Secondary:  [address]  at [place] [id number] [facility], [address], [place] [address].  [phone]   [phone]     OUTPATIENT PHYSICAL THERAPY: Daily Treatment Note 2019  Visit Count:  3    ICD-10: Treatment Diagnosis: Difficulty in walking, not elsewhere classified (R26.2)  Precautions/Allergies:   W[place]en Genre; Keflex [cephalexin]; Sulfa (sulfonamide antibiotics); Theophylline; Topamax [topiramate]; Ultram [tramadol]; Adhesive; Pneumococcal vaccine; and Verapamil   TREATMENT PLAN:  Effective Dates: 2019 TO 2/3/2020 (90 days). Frequency/Duration: 1-2 times a week for 90 Day(s)    Pre-treatment Symptoms/Complaints: \"All weekend was bad.  4/10 dizziness\". Pain: Initial: Pain Intensity 1: 0  Post Session:  0/10   Medications Last Reviewed:  2019  Updated Objective Findings:  None Today  TREATMENT:     NEUROMUSCULAR RE-EDUCATION: (45 minutes):  Exercise/activities per grid below to improve balance and coordination. Required minimal verbal cues to promote dynamic balance in standing.     Balance/Vestibular Treatment:   Activity   Date  19 Date  19 Date Date Date Date   Activity/Exercise   Sets/reps/equipment Sets/reps/  equipment Sets/reps/  equipment Sets/reps/  equipment Sets/reps/  equipment Sets/reps/  equipment   Walking with head turns      Standing  3 reps       Walking with head up & down      Standing 5 reps        Step ups     2 inch step  15 reps bilateral 4 inch step  15 reps bilateral       Step taps     2 inch step  2x10 reps 4 inch step  2x10 reps       Marching   2 laps 4 laps       Sidestepping    4 laps       Crossovers           [place]           Walking  backwards             Tandem walking           Weaving in/out of cones     4 laps 4 laps       Picking up cones             Stepping over half foam   2x 10 reps-  Forward/ lateral  2x 5 reps-  Forward/lateral 2x10 reps        [employer] Networksck ball           Figure 8s            Circles right/left           Walking with 360 degree turns           Spirals           Weight shifting:    Left & Right             Weight shifting: Forward & Backward              Static Standing Balance             Standing with feet apart             Standing with feet together     Blue foam eyes open/ eyes closed Eyes open/ eyes closed       Standing with feet semitandem           Standing with feet tandem           Single leg stance           X1/X2 Viewing exercises             Hallpike-[place] testing for BPPV (Benign Paroxysmal Positional Vertigo)             Echevarria-Daroff exercises           Canalith Repositioning treatment/Epley Maneuver  for BPPV (Benign Paroxysmal Positional Vertigo)           Smart Equitest Training: See scanned report. [employer] Portal  Treatment/Session Summary:    · Response to Treatment:  Patient tends to lose her balancc with left turns. Patient rates dizziness as 6/10 after treatment. · Communication/Consultation:  None today  · Equipment provided today:  None today  · Recommendations/Intent for next treatment session: Next visit will focus on habituation exercises, gait, and balance exercises.     Total Treatment Billable Duration:  45 minutes  PT Patient Time In/Time Out  Time In: 0845  Time Out: Robert Irving 66., PT    Future Appointments   Date Time Provider Jacquie Smith   12/9/2019  9:30 AM Rafa Nash PT [facility]   12/17/2019  9:00 AM GENEVA MaldonadoNE BSNE   1/14/2020 10:15 AM Ida Nielsen, PT [facility]   1/20/2020 10:15 AM Ida Nielsen, PT [facility]   1/27/2020 10:15 AM Ida Nielsen PT [facility]

## 2019-12-09 ENCOUNTER — HOSPITAL ENCOUNTER (OUTPATIENT)
Dept: PHYSICAL THERAPY | Age: 39
Discharge: HOME | End: 2019-12-09
Attending: PSYCHIATRY & NEUROLOGY
Payer: MEDICARE

## 2019-12-09 PROCEDURE — 97112 NEUROMUSCULAR REEDUCATION: CPT

## 2019-12-09 NOTE — PROGRESS NOTES
Belenda Angles  : 1980  Primary: Tiago Epps Aetna Medicare Advantage  Secondary:  [address] at [place] [id number] [facility], [address] [place] [address],[address], [facility] [address]  [phone]   [phone]     OUTPATIENT PHYSICAL THERAPY: Daily Treatment Note 2019  Visit Count:  4    ICD-10: Treatment Diagnosis: Difficulty in walking, not elsewhere classified (R26.2)  Precautions/Allergies:   [place]; Keflex [cephalexin]; Sulfa (sulfonamide antibiotics); Theophylline; Topamax [topiramate]; Ultram [tramadol]; Adhesive; Pneumococcal vaccine; and Verapamil   TREATMENT PLAN:  Effective Dates: 2019 TO 2/3/2020 (90 days). Frequency/Duration: 1-2 times a week for 90 Day(s)    Pre-treatment Symptoms/Complaints:  \"Doing okay. Balance is better. Headache is worse\". Pain: Initial: Pain Intensity 1: 3  Pain Location 1: Head  Post Session:  3/10   Medications Last Reviewed:  2019  Updated Objective Findings:  None Today  TREATMENT:     NEUROMUSCULAR RE-EDUCATION: (45 minutes):  Exercise/activities per grid below to improve balance and coordination. Required minimal verbal cues to promote dynamic balance in standing.     Balance/Vestibular Treatment:   Activity   Date  19 Date  19 Date  19 Date Date Date   Activity/Exercise   Sets/reps/equipment Sets/reps/  equipment Sets/reps/  equipment Sets/reps/  equipment Sets/reps/  equipment Sets/reps/  equipment   Walking with head turns      Standing  3 reps 2 laps      Walking with head up & down      Standing 5 reps  2 laps      Step ups     2 inch step  15 reps bilateral 4 inch step  15 reps bilateral 4 inch step  15 reps bilateral      Step taps     2 inch step  2x10 reps 4 inch step  2x10 reps 4 inch step  2x10 reps      Marching   2 laps 4 laps 4 laps       Sidestepping    4 laps 4 laps      Crossovers           [place]           Walking  backwards             Tandem walking           Weaving in/out of cones     4 laps 4 laps 4 laps      Picking up cones             Stepping over half foam   2x 10 reps-  Forward/ lateral  2x 5 reps-  Forward/lateral 2x10 reps  Forward/lateral 2x10 reps       Ball toss           Kick ball           Figure 8s            Circles right/left           Walking with 360 degree turns           Spirals           Weight shifting:    Left & Right             Weight shifting: Forward & Backward              Static Standing Balance       [place]/ blue foam eyes open and eyes closed; tiltboard eyes open      Standing with feet apart             Standing with feet together     Blue foam eyes open/ eyes closed Eyes open/ eyes closed Eyes open      Standing with feet semitandem           Standing with feet tandem           Single leg stance           X1/X2 Viewing exercises             Hallpike-Per[place] testing for BPPV (Benign Paroxysmal Positional Vertigo)             Echevarria-Daroff exercises           Canalith Repositioning treatment/Epley Maneuver  for BPPV (Benign Paroxysmal Positional Vertigo)           Smart Equitest Training: See scanned report. [employer]  Treatment/Session Summary:    · Response to Treatment:  Patient tolerated addtional exercises. Patient reports increased dizziness after treatment. Patient progressing well with therapy. · Communication/Consultation:  None today  · Equipment provided today:  None today  · Recommendations/Intent for next treatment session: Next visit will focus on habituation exercises, gait, and balance exercises.     Total Treatment Billable Duration:  45 minutes  PT Patient Time In/Time Out  Time In: 0930  Time Out: Lupillo Vale, PT    Future Appointments   Date Time Provider Jacquie Smith   12/17/2019  9:00 AM Bettye Zamudio NP [facility]   1/14/2020 10:15 AM Santo Nielsen, PT [facility]   1/20/2020 10:15 AM Santo Nielsen, PT [facility]   1/27/2020 10:15 AM Santo Nielsen, PT [facility]

## 2019-12-09 NOTE — PROGRESS NOTES
Nilda Maciel  : 1980  Primary: Andreendlolondon Earnestine Aetna Medicare Advantage  Secondary:  [address]  at [place] [id number] [facility], [address] [place] [address],[address], [place] [address].  [phone]   [phone]        OUTPATIENT PHYSICAL THERAPY:Progress Report 2019   ICD-10: Treatment Diagnosis: Difficulty in walking, not elsewhere classified (R26.2)  Precautions/Allergies:   [place]; Keflex [cephalexin]; Sulfa (sulfonamide antibiotics); Theophylline; Topamax [topiramate]; Ultram [tramadol]; Adhesive; Pneumococcal vaccine; and Verapamil   TREATMENT PLAN:  Effective Dates: 2019 TO 2/3/2020 (90 days). Frequency/Duration: 1-2 times a week for 90 Day(s) MEDICAL/REFERRING DIAGNOSIS:  Migraine with aura, not intractable, without status migrainosus [G43.109]   DATE OF ONSET: Chronic   REFERRING PHYSICIAN: Jarod Bolden MD Orders: Evaluate and treat   Return MD Appointment: unknown      INITIAL ASSESSMENT:  Ms. Manuelito Roberson presents with imbalance, dizziness, and difficulty walking. Patient is at higher fall risk based on history of falls and scores received on Elizabeth Balance Scale and Dynamic Gait Index. Patient would benefit from skilled physical therapy to address problems and goal.  Thank you for this referral.     Progress note:  Patient has attended four scheduled physical therapy appointments from 2019 to 2019. Patient progressing well with therapy. Patient would benefit from continuing physical therapy to address problems and goals. Thank you for this referral.     PROBLEM LIST (Impacting functional limitations):  1. Decreased Ambulation Ability/Technique  2. Decreased Balance  3. Decreased Activity Tolerance  4. Increased Fatigue  5. Decreased [place] with Home Exercise Program  6. Increased dizziness INTERVENTIONS PLANNED: (Treatment may consist of any combination of the following)  1. Balance Exercise  2. Gait Training  3.  Home Exercise Program (HEP)  4. Habituation exercises     GOALS: (Goals have been discussed and agreed upon with patient.)  Short-Term Functional Goals: Time Frame: 30 days   1. Patient will be independent with home exercise program to improve balance. Goal met. 2. Patient will increase score on Elizabeth Balance Scale to greater than or equal to 40/56 demonstrating improved balance and decreased fall risk with daily activities. Goal met. Discharge Goals: Time Frame: 90 days   1. Patient will increase score on Elizabeth Balance Scale to greater than or equal to 46/56 demonstrating improved balance and decreased fall risk with daily activities. Goal ongoing. 2. Patient will increase score on Dynamic Gait Index to greater than or equal to 18/24 demonstrating improved balance and decreased fall risk with daily activities. Goal ongoing. 3. Patient will report being able to walk with improved stability. Goal ongoing. OUTCOME MEASURE:   Tool Used: Elizabeth Balance Scale  Score:  Initial: 35/56 Most Recent: 40/56 (Date: 12-9-19 )   Interpretation of Score: Each section is scored on a 0-4 scale, 0 representing the patients inability to perform the task and 4 representing independence. The scores of each section are added together for a total score of 56. The higher the patients score, the more independent the patient is. Any score below 45 indicates increased risk for falls. Tool Used: Dynamic Gait Index  Score:  Initial: 9/24 Most Recent: X/24 (Date: -- )   Interpretation of Score: Each section is scored on a 0-3 scale, 0 representing the patients inability to perform the task and 3 representing independence. The scores of each section are added together for a total score of 24. Any score below 19 indicates increased risk for falls. MEDICAL NECESSITY:   · Patient is expected to demonstrate progress in balance and dizziness to improve safety during activities of daily living.   REASON FOR SERVICES/OTHER COMMENTS:  · Patient continues to require skilled intervention due to higher fall risk with daily activities. PAIN/SUBJECTIVE:   Initial: Pain Intensity 1: 3  Pain Location 1: Head  Post Session:  3/10   HISTORY:   History of Injury/Illness (Reason for Referral):  Patient has a complicated history. Patient reports vertigo began May 17, 2008. Patient then began experiencing balance issues in February 2013. Patient has been to the [facility]. They believe patient was exposed to lead which led to lead toxicity. Patient reports regressing since 9/11/2019 due to gallbladder surgery. Patient reports vestibular rehab has helped her symptoms in the past.  Patient rates current dizziness as 4/10. Patient rates pain level as 2/10 in her head. Patient has chronic migraines. Patient has had four falls over the past couple of months. Patient tends to lose her balance to the left. Patient uses a walking stick during ambulation. Past Medical History/Comorbidities:   Ms. Natali Naik  has a past medical history of Adverse effect of anesthesia, Asthma, BMI 36.0-36.9,adult, Chronic pain, Hyperlipidemia, Migraines, Movement disorder, Thyroid disease, Vertigo, and Vestibular nerve disorder (2008). She also has no past medical history of Difficult intubation, Malignant hyperthermia due to anesthesia, Nausea & vomiting, or Pseudocholinesterase deficiency. Ms. Natali Naik  has a past surgical history that includes hx orthopaedic and hx heent. Social History/Living Environment:     Lives alone in an apartment. Patient has four steps to enter home. Patient has family nearby which helps her when needed. Prior Level of Function/Work/Activity:  Independent. Works at [employer]. Was walking a mile several times a week, but stopped due to gallbladder surgery. Dominant Side:         RIGHT  Personal Factors:          Sex:  female        Age:  44 y.o.    Ambulatory/Rehab Services H2 Model Falls Risk Assessment   Risk Factors:       (1) Dizziness/Vertigo       (1)  Any administered benzodiazepines       (5)  History of Recent Falls [w/in 3 months] Ability to Rise from Chair:       (0)  Ability to rise in a single movement   Falls Prevention Plan:       Exercise/Equipment Adaptation (specify):  Patient will be supervised at all times due to higher fall risk7   Total: (5 or greater = High Risk): 7   ©2010 [facility] Patent #[id number]. Federal Law prohibits the replication, distribution or use without written permission from [facility] [employer]   Current Medications:       Current Outpatient Medications:     fexofenadine (ALLEGRA) 180 mg tablet, Take 180 mg by mouth nightly., Disp: , Rfl:     SUMAtriptan (IMITREX) 100 mg tablet, Take 100 mg by mouth daily as needed for Migraine. Take / use AM day of surgery  per anesthesia protocols if needed. Indications: migraine headache, Disp: , Rfl:     clonazePAM (KLONOPIN) 0.5 mg tablet, Take 0.5 mg by mouth daily as needed. Take / use AM day of surgery  per anesthesia protocols if needed. , Disp: , Rfl:     OTHER,NON-FORMULARY,, by IntraVENous route every Monday. Calcium EDTA treatments once a week, Disp: , Rfl:     albuterol (PROVENTIL HFA, VENTOLIN HFA, PROAIR HFA) 90 mcg/actuation inhaler, Take 2 Puffs by inhalation every four (4) hours as needed for Wheezing or Shortness of Breath. Take / use AM day of surgery  per anesthesia protocols if needed. Indications: Asthma Attack, Disp: , Rfl:     ondansetron (ZOFRAN ODT) 4 mg disintegrating tablet, Take 1 Tab by mouth every eight (8) hours as needed for Nausea., Disp: 6 Tab, Rfl: 0    OTHER, PLACE 1 SHANA UNDER THE TONGUE ONCE A DAY  DO NOT EAT DRINK FOR 15MIN AFTER TAKING, Disp: , Rfl: 5    mometasone (NASONEX) 50 mcg/actuation nasal spray, 1 Zephyr by Both Nostrils route nightly., Disp: , Rfl:     cholecalciferol, vitamin D3, (VITAMIN D3 PO), Take 10,000 Units by mouth daily. , Disp: , Rfl:    MAGNESIUM PO, Take 2 Tabs by mouth daily. , Disp: , Rfl:     VITAMIN A PO, Take 1 Tab by mouth daily. , Disp: , Rfl:     ARMOUR THYROID 30 mg tablet, TAKE 1 TABLET BY MOUTH EVERY DAY IN THE MORNING, Disp: , Rfl: 5    BUTTERBUR ROOT EXTRACT PO, Take 2 Tabs by mouth daily. , Disp: , Rfl:     multivitamin capsule, Take 2 Caps by mouth daily. , Disp: , Rfl:    Date Last Reviewed:  12/9/2019   Number of Personal Factors/Comorbidities that affect the Plan of Care: 3+: HIGH COMPLEXITY   EXAMINATION:   Functional Mobility:         Gait/Ambulation:  Patient ambulates with decreased gait speed with wide base of support. Patient loses her balance more with left handed turns. Strength:          Bilateral lower extremity strength 5/5. Sensation:         Within normal limits. Postural Control & Balance:  · Elizabeth Balance Scale:  35/56.   (A score less than 45/56 indicates high risk of falls)     · Dynamic Gait Index:  9/24.   (A score less than or equal to19/24 is abnormal and predictive of falls)        Body Structures Involved:  1. Nerves  2. Eyes and Ears  3. Muscles Body Functions Affected:  1. Neuromusculoskeletal Activities and Participation Affected:  1. General Tasks and Demands  2. Mobility  3. Interpersonal Interactions and Relationships  4.  Community, Social and [place]   Number of elements (examined above) that affect the Plan of Care: 4+: HIGH COMPLEXITY   CLINICAL PRESENTATION:   Presentation: Evolving clinical presentation with unstable and unpredictable characteristics: HIGH COMPLEXITY   CLINICAL DECISION MAKING:   Use of outcome tool(s) and clinical judgement create a POC that gives a: Difficult prediction of patient's progress: HIGH COMPLEXITY

## 2019-12-17 PROBLEM — G43.809 VESTIBULAR MIGRAINE: Status: ACTIVE | Noted: 2019-12-17

## 2019-12-31 ENCOUNTER — HOSPITAL ENCOUNTER (OUTPATIENT)
Dept: MRI IMAGING | Age: 39
Discharge: HOME | End: 2019-12-31
Attending: NURSE PRACTITIONER
Payer: MEDICARE

## 2019-12-31 DIAGNOSIS — G43.809 VESTIBULAR MIGRAINE: ICD-10-CM

## 2019-12-31 DIAGNOSIS — R42 VERTIGO: ICD-10-CM

## 2019-12-31 PROCEDURE — 70544 MR ANGIOGRAPHY HEAD W/O DYE: CPT

## 2020-01-14 ENCOUNTER — APPOINTMENT (OUTPATIENT)
Dept: PHYSICAL THERAPY | Age: 40
End: 2020-01-14
Attending: PSYCHIATRY & NEUROLOGY
Payer: MEDICARE

## 2020-01-20 ENCOUNTER — HOSPITAL ENCOUNTER (OUTPATIENT)
Dept: PHYSICAL THERAPY | Age: 40
Discharge: HOME | End: 2020-01-20
Attending: PSYCHIATRY & NEUROLOGY
Payer: MEDICARE

## 2020-01-20 PROCEDURE — 97112 NEUROMUSCULAR REEDUCATION: CPT

## 2020-01-20 NOTE — PROGRESS NOTES
Morgan Camarillo  : 1980  Primary: Shante Cam Medicare Advantage  Secondary:  [address] at [place] [address] [facility], [address] [place] [address],[address], [address] [facility]  [phone]   [phone]     OUTPATIENT PHYSICAL THERAPY: Daily Treatment Note 2020  Visit Count:  5    ICD-10: Treatment Diagnosis: Difficulty in walking, not elsewhere classified (R26.2)  Precautions/Allergies:   Aaron Left; Keflex [cephalexin]; Sulfa (sulfonamide antibiotics); Theophylline; Topamax [topiramate]; Ultram [tramadol]; Adhesive; Pneumococcal vaccine; and Verapamil   TREATMENT PLAN:  Effective Dates: 2019 TO 2/3/2020 (90 days). Frequency/Duration: 1-2 times a week for 90 Day(s)    Pre-treatment Symptoms/Complaints: \"Feel like I am going sideways. Dizziness 3/10 and no headache. I would like you to show me how to fall and then how to get up from the floor\". Pain: Initial: Pain Intensity 1: 0  Post Session:  3/10   Medications Last Reviewed:  2020  Updated Objective Findings:  None Today  TREATMENT:     NEUROMUSCULAR RE-EDUCATION: (30 minutes):  Exercise/activities per grid below to improve balance and coordination. Required minimal verbal cues to promote dynamic balance in standing.     Balance/Vestibular Treatment:   Activity   Date  19 Date  19 Date  19 Date  20 Date Date   Activity/Exercise   Sets/reps/equipment Sets/reps/  equipment Sets/reps/  equipment Sets/reps/  equipment Sets/reps/  equipment Sets/reps/  equipment   Walking with head turns      Standing  3 reps 2 laps      Walking with head up & down      Standing 5 reps  2 laps      Step ups     2 inch step  15 reps bilateral 4 inch step  15 reps bilateral 4 inch step  15 reps bilateral      Step taps     2 inch step  2x10 reps 4 inch step  2x10 reps 4 inch step  2x10 reps      Marching   2 laps 4 laps 4 laps       Sidestepping    4 laps 4 laps 4 laps     Crossovers           [place] Walking  backwards             Tandem walking           Weaving in/out of cones     4 laps 4 laps 4 laps      Picking up cones             Stepping over half foam   2x 10 reps-  Forward/ lateral  2x 5 reps-  Forward/lateral 2x10 reps  Forward/lateral 2x10 reps       Ball toss           Kick ball           Figure 8s            Circles right/left           Walking with 360 degree turns           Spirals           Weight shifting:    Left & Right             Weight shifting: Forward & Backward              Static Standing Balance       [place]/ blue foam eyes open and eyes closed; tiltboard eyes open      Standing with feet apart             Standing with feet together     Blue foam eyes open/ eyes closed Eyes open/ eyes closed Eyes open      Practiced getting down to the floor and then getting up from the floor      Red mat x 25 minutes     Standing with feet tandem           Single leg stance           X1/X2 Viewing exercises             Hallpike-Martin testing for BPPV (Benign Paroxysmal Positional Vertigo)             Echevarria-Daroff exercises           Canalith Repositioning treatment/Epley Maneuver  for BPPV (Benign Paroxysmal Positional Vertigo)           Smart Equitest Training: See scanned report. [employer] Portal  Treatment/Session Summary:    · Response to Treatment:  Patient tends to lose her balance to the left when getting up from the floor. Educated patient regarding slowing down her movements with transitions for tall kneeling to half kneeling to standing. This seemed to help patient's stability. Patient requested to stop therapy early due to not feeling well from getting up and down from the floor. · Communication/Consultation:  None today  · Equipment provided today:  None today  · Recommendations/Intent for next treatment session: Next visit will focus on habituation exercises, gait, and balance exercises.     Total Treatment Billable Duration:  30 minutes  PT Patient Time In/Time Out  Time In: 7516  Time Out: [id number], PT    Future Appointments   Date Time Provider Jacquie Sarah   1/27/2020 10:15 AM Edna Stevens, PT [facility]   2/10/2020 10:15 AM Terry Nielsen, PT [facility]   2/17/2020 10:15 AM Terry Nielsen, PT [facility]   2/24/2020 11:00 AM Terry Nielsen, PT [facility] S[facility]   3/24/2020  9:00 AM Davon Deleon NP BSNE BSNE

## 2020-01-27 ENCOUNTER — HOSPITAL ENCOUNTER (OUTPATIENT)
Dept: PHYSICAL THERAPY | Age: 40
Discharge: HOME | End: 2020-01-27
Attending: PSYCHIATRY & NEUROLOGY
Payer: MEDICARE

## 2020-01-27 NOTE — PROGRESS NOTES
Lashae Spann  : 1980  Primary: Mica Cam Medicare Advantage  Secondary:  [address] [place]  at [address] [place] [address], [address] [place] [address]  [phone]   [phone]     OUTPATIENT DAILY NOTE    NAME/AGE/GENDER: Lashae Spann is a 44 y.o. female. DATE: 2020    Ms. Saint Sloan for today's appointment due to illness.     Oliver Lawler, PT

## 2020-02-10 ENCOUNTER — HOSPITAL ENCOUNTER (OUTPATIENT)
Dept: PHYSICAL THERAPY | Age: 40
Discharge: HOME | End: 2020-02-10
Attending: PSYCHIATRY & NEUROLOGY
Payer: MEDICARE

## 2020-02-10 PROCEDURE — 97112 NEUROMUSCULAR REEDUCATION: CPT

## 2020-02-10 NOTE — THERAPY RECERTIFICATION
Arnie Carroll  : 1980  Primary: Mahamed Cam Medicare Advantage  Secondary:  [address] at [facility]  [address] [facility], [address] [place] [address] [address], 7[address] [facility]  [phone]   [phone]        OUTPATIENT PHYSICAL [address]    ICD-10: Treatment Diagnosis: Difficulty in walking, not elsewhere classified (R26.2)  Precautions/Allergies:   Zada Borer; Keflex [cephalexin]; Sulfa (sulfonamide antibiotics); Theophylline; Topamax [topiramate]; Ultram [tramadol]; Adhesive; Kenalog [triamcinolone acetonide]; Pneumococcal vaccine; and Verapamil   TREATMENT PLAN:  Effective Dates: 2/10/2020 TO 5/10/2020 (90 days). Frequency/Duration: 1-2 times a week for 90 Days   MEDICAL/REFERRING DIAGNOSIS:  Migraine with aura, not intractable, without status migrainosus [G43.109]   DATE OF ONSET: Chronic   REFERRING PHYSICIAN: Isabela Bolden MD Orders: Evaluate and treat   Return MD Appointment: unknown      INITIAL ASSESSMENT:  Ms. Jean  presents with imbalance, dizziness, and difficulty walking. Patient is at higher fall risk based on history of falls and scores received on Elizabeth Balance Scale and Dynamic Gait Index. Patient would benefit from skilled physical therapy to address problems and goal.  Thank you for this referral.     Recertification:  Patient attended six scheduled physical therapy appointments from 2019 to 2/10/2020. Patient had to cancel an appointment due to illness. Recent illness has set patient back regarding her therapy. Patient would benefit from continuing skilled physical therapy to address problems and goals. Thank you for this referral.     PROBLEM LIST (Impacting functional limitations):  1. Decreased Ambulation Ability/Technique  2. Decreased Balance  3. Decreased Activity Tolerance  4. Increased Fatigue  5. Decreased [place] with Home Exercise Program  6.  Increased dizziness INTERVENTIONS PLANNED: (Treatment may consist of any combination of the following)  1. Balance Exercise  2. Gait Training  3. Home Exercise Program (HEP)  4. Habituation exercises     GOALS: (Goals have been discussed and agreed upon with patient.)  Short-Term Functional Goals: Time Frame: 30 days   1. Patient will be independent with home exercise program to improve balance. Goal met. 2. Patient will increase score on Elizabeth Balance Scale to greater than or equal to 40/56 demonstrating improved balance and decreased fall risk with daily activities. Goal ongoing. Discharge Goals: Time Frame: 90 days   1. Patient will increase score on Elizabeth Balance Scale to greater than or equal to 46/56 demonstrating improved balance and decreased fall risk with daily activities. Goal ongoing. 2. Patient will increase score on Dynamic Gait Index to greater than or equal to 18/24 demonstrating improved balance and decreased fall risk with daily activities. Goal ongoing. 3. Patient will report being able to walk with improved stability. Goal ongoing. OUTCOME MEASURE:   Tool Used: Elizabeth Balance Scale  Score:  Initial: 35/56 Most Recent: 40/56 (Date: 2- )   Interpretation of Score: Each section is scored on a 0-4 scale, 0 representing the patients inability to perform the task and 4 representing independence. The scores of each section are added together for a total score of 56. The higher the patients score, the more independent the patient is. Any score below 45 indicates increased risk for falls. Tool Used: Dynamic Gait Index  Score:  Initial: 9/24 Most Recent: X/24 (Date: -- )   Interpretation of Score: Each section is scored on a 0-3 scale, 0 representing the patients inability to perform the task and 3 representing independence. The scores of each section are added together for a total score of 24. Any score below 19 indicates increased risk for falls.       MEDICAL NECESSITY:   · Patient is expected to demonstrate progress in balance and dizziness to improve safety during activities of daily living. REASON FOR SERVICES/OTHER COMMENTS:  · Patient continues to require skilled intervention due to higher fall risk with daily activities. Total Duration:  PT Patient Time In/Time Out  Time In: 1015  Time Out: 1100    Rehabilitation Potential For Stated Goals: Good  Regarding Toñito Edge's therapy, I certify that the treatment plan above will be carried out by a therapist or under their direction. Thank you for this referral,  Claudia Jang, BENITA     Referring Physician Signature: Fabiola Mo,* _______________________________ Date _____________     PAIN/SUBJECTIVE:   Initial: Pain Intensity 1: 0  Post Session:  0/10   HISTORY:   History of Injury/Illness (Reason for Referral):  Patient has a complicated history. Patient reports vertigo began May 17, 2008. Patient then began experiencing balance issues in February 2013. Patient has been to the [facility]. They believe patient was exposed to lead which led to lead toxicity. Patient reports regressing since 9/11/2019 due to gallbladder surgery. Patient reports vestibular rehab has helped her symptoms in the past.  Patient rates current dizziness as 4/10. Patient rates pain level as 2/10 in her head. Patient has chronic migraines. Patient has had four falls over the past couple of months. Patient tends to lose her balance to the left. Patient uses a walking stick during ambulation. Past Medical History/Comorbidities:   Ms. Amy Vargas  has a past medical history of Adverse effect of anesthesia, Asthma, BMI 36.0-36.9,adult, Chronic pain, Hyperlipidemia, Migraines, Movement disorder, Thyroid disease, Vertigo, and Vestibular nerve disorder (2008). She also has no past medical history of Difficult intubation, Malignant hyperthermia due to anesthesia, Nausea & vomiting, or Pseudocholinesterase deficiency.   Ms. Amy Vargas  has a past surgical history that includes hx orthopaedic and hx heent. Social History/Living Environment:     Lives alone in an apartment. Patient has four steps to enter home. Patient has family nearby which helps her when needed. Prior Level of Function/Work/Activity:  Independent. Works at [employer]. Was walking a mile several times a week, but stopped due to gallbladder surgery. Dominant Side:         RIGHT  Personal Factors:          Sex:  female        Age:  44 y.o. Ambulatory/Rehab Services H2 Model Falls Risk Assessment   Risk Factors:       (1)  Dizziness/Vertigo       (1)  Any administered benzodiazepines       (5)  History of Recent Falls [w/in 3 months] Ability to Rise from Chair:       (0)  Ability to rise in a single movement   Falls Prevention Plan:       Exercise/Equipment Adaptation (specify):  Patient will be supervised at all times due to higher fall risk7   Total: (5 or greater = High Risk): 7   ©2010 [facility]. [facility] Patent #[id number]. Federal Law prohibits the replication, distribution or use without written permission from [facility] [employer]   Current Medications:       Current Outpatient Medications:     fremanezumab-vfrm (AJOVY) 225 mg/1.5 mL syrg, 225 mg by SubCUTAneous route every thirty (30) days. , Disp: 1 Syringe, Rfl: 11    ondansetron (ZOFRAN ODT) 4 mg disintegrating tablet, Take 1 Tab by mouth every eight (8) hours as needed for Nausea., Disp: 24 Tab, Rfl: 2    SUMAtriptan (IMITREX) 100 mg tablet, Take 1 Tab by mouth daily as needed for Migraine. Indications: a migraine headache, Disp: 10 Tab, Rfl: 5    fexofenadine (ALLEGRA) 180 mg tablet, Take 180 mg by mouth nightly., Disp: , Rfl:     clonazePAM (KLONOPIN) 0.5 mg tablet, Take 0.5 mg by mouth daily as needed. Take / use AM day of surgery  per anesthesia protocols if needed. , Disp: , Rfl:     OTHER,NON-FORMULARY,, by IntraVENous route every Monday.  Calcium EDTA treatments once a week, Disp: , Rfl:     albuterol (PROVENTIL HFA, VENTOLIN HFA, PROAIR HFA) 90 mcg/actuation inhaler, Take 2 Puffs by inhalation every four (4) hours as needed for Wheezing or Shortness of Breath. Take / use AM day of surgery  per anesthesia protocols if needed. Indications: Asthma Attack, Disp: , Rfl:     OTHER, PLACE 1 SHANA UNDER THE TONGUE ONCE A DAY  DO NOT EAT DRINK FOR 15MIN AFTER TAKING, Disp: , Rfl: 5    mometasone (NASONEX) 50 mcg/actuation nasal spray, 1 [place] by Both Nostrils route nightly., Disp: , Rfl:     cholecalciferol, vitamin D3, (VITAMIN D3 PO), Take 10,000 Units by mouth daily. , Disp: , Rfl:     MAGNESIUM PO, Take 2 Tabs by mouth daily. , Disp: , Rfl:     VITAMIN A PO, Take 1 Tab by mouth daily. , Disp: , Rfl:     ARMOUR THYROID 30 mg tablet, TAKE 1 TABLET BY MOUTH EVERY DAY IN THE MORNING, Disp: , Rfl: 5    BUTTERBUR ROOT EXTRACT PO, Take 2 Tabs by mouth daily. , Disp: , Rfl:     multivitamin capsule, Take 2 Caps by mouth daily. , Disp: , Rfl:    Date Last Reviewed:  2/10/2020   Number of Personal Factors/Comorbidities that affect the Plan of Care: 3+: HIGH COMPLEXITY   EXAMINATION:   Functional Mobility:         Gait/Ambulation:  Patient ambulates with decreased gait speed with wide base of support. Patient loses her balance more with left handed turns. Strength:          Bilateral lower extremity strength 5/5. Sensation:         Within normal limits. Postural Control & Balance:  · Elizabeth Balance Scale:  35/56.   (A score less than 45/56 indicates high risk of falls)     · Dynamic Gait Index:  9/24.   (A score less than or equal to19/24 is abnormal and predictive of falls)        Body Structures Involved:  1. Nerves  2. Eyes and Ears  3. Muscles Body Functions Affected:  1. Neuromusculoskeletal Activities and Participation Affected:  1. General Tasks and Demands  2. Mobility  3. Interpersonal Interactions and Relationships  4.  Community, Social and [place]   Number of elements (examined above) that affect the Plan of Care: 4+: HIGH COMPLEXITY   CLINICAL PRESENTATION:   Presentation: Evolving clinical presentation with unstable and unpredictable characteristics: HIGH COMPLEXITY   CLINICAL DECISION MAKING:   Use of outcome tool(s) and clinical judgement create a POC that gives a: Difficult prediction of patient's progress: HIGH COMPLEXITY

## 2020-02-10 NOTE — PROGRESS NOTES
Shree Troncoso  : 1980  Primary: Beverely Corona [employer] Medicare Advantage  Secondary:  [address]  at [place] [address] [facility], [address] [place] [address],[address], 2009 [facility]  [phone]   [phone]     OUTPATIENT PHYSICAL THERAPY: Daily Treatment Note 2/10/2020  Visit Count:  6    ICD-10: Treatment Diagnosis: Difficulty in walking, not elsewhere classified (R26.2)  Precautions/Allergies:   Geannie Legato; Keflex [cephalexin]; Sulfa (sulfonamide antibiotics); Theophylline; Topamax [topiramate]; Ultram [tramadol]; Adhesive; Pneumococcal vaccine; and Verapamil   TREATMENT PLAN:  Effective Dates: 2/10/2020 TO 5/10/2020 (90 days). Frequency/Duration: 1-2 times a week for 90 Days  Pre-treatment Symptoms/Complaints:  \"I have not done anything in two weeks. They put me on steroids and I broke out in hive. I had to take Benadryl and it made me sleepy. I slept for a week. The doctor thinks I need to see an ENT doctor regarding my voice. No falls. Dizziness 6/10 with no nausea\". Pain: Initial: Pain Intensity 1: 0  Post Session:  0/10   Medications Last Reviewed:  2/10/2020  Updated Objective Findings:  None Today  TREATMENT:     NEUROMUSCULAR RE-EDUCATION: (45 minutes):  Exercise/activities per grid below to improve balance and coordination. Required minimal verbal cues to promote dynamic balance in standing.     Balance/Vestibular Treatment:   Activity   Date  19 Date  19 Date  19 Date  2/10/20 Date Date   Activity/Exercise   Sets/reps/equipment Sets/reps/  equipment Sets/reps/  equipment Sets/reps/  equipment Sets/reps/  equipment Sets/reps/  equipment   Walking with head turns      Standing  3 reps 2 laps      Walking with head up & down      Standing 5 reps  2 laps      Step ups     2 inch step  15 reps bilateral 4 inch step  15 reps bilateral 4 inch step  15 reps bilateral      Step taps     2 inch step  2x10 reps 4 inch step  2x10 reps 4 inch step  2x10 reps      Marching   2 laps 4 laps 4 laps  4 laps with hiking pole     Sidestepping    4 laps 4 laps      Crossovers           Seated alternate hip flexion    2x5 reps     Walking  backwards             Tandem walking           Weaving in/out of cones     4 laps 4 laps 4 laps      Picking up cones             Stepping over half foam   2x 10 reps-  Forward/ lateral  2x 5 reps-  Forward/lateral 2x10 reps  Forward/lateral 2x10 reps       Ball toss           Sitting looking over a shoulder      5 reps     Figure 8s            Circles right/left           Walking with 360 degree turns           Focusing on white ball following with eyes left and right    3 reps each     Weight shifting:    Left & Right             Weight shifting: Forward & Backward              Static Standing Balance       Sanddune/ blue foam eyes open and eyes closed; tiltboard eyes open      Standing with feet apart             Standing with feet together     Blue foam eyes open/ eyes closed Eyes open/ eyes closed Eyes open      Standing with feet semitandem           Standing with feet tandem           Single leg stance           X1/X2 Viewing exercises             Hallpike-Martin testing for BPPV (Benign Paroxysmal Positional Vertigo)             Echevarria-Daroff exercises           Canalith Repositioning treatment/Epley Maneuver  for BPPV (Benign Paroxysmal Positional Vertigo)           Smart Equitest Training: See scanned report. [employer] Portal  Treatment/Session Summary:    · Response to Treatment:  Patient needs extensive rest breaks due to increased dizziness and instability. Patient has diffiuclty tolerating standing exercises so exercises were performed sitting down in a chair. .  · Communication/Consultation:  None today  · Equipment provided today:  None today  · Recommendations/Intent for next treatment session: Next visit will focus on habituation exercises, gait, and balance exercises.     Total Treatment Billable Duration:  45 minutes  PT Patient Time In/Time Out  Time In: 1015  Time Out: Divina 51, PT    Future Appointments   Date Time Provider Jacquie Sarah   2/17/2020  7:00 PM Stacey Stuart, PT [facility]   2/24/2020 11:00 AM Stephania Nielsen, PT [facility]   3/2/2020 10:15 AM [facility]   3/24/2020  9:00 AM Yue Morales NP [facility]

## 2020-02-17 ENCOUNTER — APPOINTMENT (OUTPATIENT)
Dept: PHYSICAL THERAPY | Age: 40
End: 2020-02-17
Attending: PSYCHIATRY & NEUROLOGY
Payer: MEDICARE

## 2020-02-24 ENCOUNTER — HOSPITAL ENCOUNTER (OUTPATIENT)
Dept: PHYSICAL THERAPY | Age: 40
Discharge: HOME | End: 2020-02-24
Attending: PSYCHIATRY & NEUROLOGY
Payer: MEDICARE

## 2020-02-24 PROCEDURE — 97112 NEUROMUSCULAR REEDUCATION: CPT

## 2020-02-24 NOTE — PROGRESS NOTES
Ada Rhodes  : 1980  Primary: Saroj Cam Medicare Advantage  Secondary:  [address] at [place] [id number] [facility], [address] [place] [address],[address], [facility] [address]  [phone]   [phone]     OUTPATIENT PHYSICAL THERAPY: Daily Treatment Note 2020  Visit Count:  7    ICD-10: Treatment Diagnosis: Difficulty in walking, not elsewhere classified (R26.2)  Precautions/Allergies:   Samuella Olivia; Keflex [cephalexin]; Sulfa (sulfonamide antibiotics); Theophylline; Topamax [topiramate]; Ultram [tramadol]; Adhesive; Pneumococcal vaccine; and Verapamil   TREATMENT PLAN:  Effective Dates: 2/10/2020 TO 5/10/2020 (90 days). Frequency/Duration: 1-2 times a week for 90 Days  Pre-treatment Symptoms/Complaints:  \"I went to see an ENT and they discovered that my vocal cords were not closing all the way. I fell at the course I was taking in [place]. I think I caught my toe, but I am not sure. I have an headache, not as dizzy\". Pain: Initial: Pain Intensity 1: 5  Pain Location 1: Head  Post Session:  3/10   Medications Last Reviewed:  2020  Updated Objective Findings:  None Today  TREATMENT:     NEUROMUSCULAR RE-EDUCATION: (45 minutes):  Exercise/activities per grid below to improve balance and coordination. Required minimal verbal cues to promote dynamic balance in standing.     Balance/Vestibular Treatment:   Activity   Date  19 Date  19 Date  19 Date  2/10/20 Date  20 Date   Activity/Exercise   Sets/reps/equipment Sets/reps/  equipment Sets/reps/  equipment Sets/reps/  equipment Sets/reps/  equipment Sets/reps/  equipment   Walking with head turns      Standing  3 reps 2 laps      Walking with head up & down      Standing 5 reps  2 laps      Step ups     2 inch step  15 reps bilateral 4 inch step  15 reps bilateral 4 inch step  15 reps bilateral      Step taps     2 inch step  2x10 reps 4 inch step  2x10 reps 4 inch step  2x10 reps      Marching   2 laps 4 laps 4 laps  4 laps with hiking pole Seated marching x 15 bilateral     Sidestepping    4 laps 4 laps      Crossovers           Seated alternate hip flexion    2x5 reps     Walking  backwards             Tandem walking           Weaving in/out of cones     4 laps 4 laps 4 laps  4 laps    Picking up cones             Stepping over half foam   2x 10 reps-  Forward/ lateral  2x 5 reps-  Forward/lateral 2x10 reps  Forward/lateral 2x10 reps       Ball toss           Sitting looking over a shoulder      5 reps 5 reps bilateral    Figure 8s            Circles right/left           Walking with 360 degree turns           Focusing on white ball following with eyes left and right    3 reps each     Weight shifting:    Left & Right             Weight shifting: Forward & Backward              Static Standing Balance       Sanddune/ blue foam eyes open and eyes closed; tiltboard eyes open      Standing with feet apart             Standing with feet together     Blue foam eyes open/ eyes closed Eyes open/ eyes closed Eyes open      Standing with feet semitandem           Standing with feet tandem           Bending forward       5 reps     X1/X2 Viewing exercises             Hallpike-[place] testing for BPPV (Benign Paroxysmal Positional Vertigo)             Echevarria-Daroff exercises           Canalith Repositioning treatment/Epley Maneuver  for BPPV (Benign Paroxysmal Positional Vertigo)           Smart Equitest Training: See scanned report. [employer] Portal  Treatment/Session Summary:    · Response to Treatment:  Patient reports more dizziness and less headache after treatment. Paitent admitted to not moving as much at home. Patient encouraged to move more and perform seated exerciss without visual fixation at home.   · Communication/Consultation:  None today  · Equipment provided today:  None today  · Recommendations/Intent for next treatment session: Next visit will focus on habituation exercises, gait, and balance exercises.     Total Treatment Billable Duration:  45 minutes  PT Patient Time In/Time Out  Time In: 1100  Time Out: 1010 [facility], PT    Future Appointments   Date Time Provider Jacquie Sarah   3/2/2020 10:15 AM Taco Peña, BENITA [facility]   3/10/2020  9:30 AM [facility]reg[facility]   3/16/2020 10:15 AM Gladys VICKERS, PT [facility]   3/24/2020  9:00 AM Fadi Pandya NP [facility]   3/30/2020 10:15 AM Eli Nielsen, PT [facility]

## 2020-03-02 ENCOUNTER — HOSPITAL ENCOUNTER (OUTPATIENT)
Dept: PHYSICAL THERAPY | Age: 40
Discharge: HOME | End: 2020-03-02
Attending: PSYCHIATRY & NEUROLOGY
Payer: MEDICARE

## 2020-03-02 PROCEDURE — 97112 NEUROMUSCULAR REEDUCATION: CPT

## 2020-03-02 NOTE — PROGRESS NOTES
Declan Horn  : 1980  Primary: Gini Cam Medicare Advantage  Secondary:  [address] at [place] [id number] [facility], [address] [place] [address],[address], [address] [facility]  [phone]   [phone]     OUTPATIENT PHYSICAL THERAPY: Daily Treatment Note 3/2/2020  Visit Count:  8    ICD-10: Treatment Diagnosis: Difficulty in walking, not elsewhere classified (R26.2)  Precautions/Allergies:   Chevis Roll; Keflex [cephalexin]; Sulfa (sulfonamide antibiotics); Theophylline; Topamax [topiramate]; Ultram [tramadol]; Adhesive; Pneumococcal vaccine; and Verapamil   TREATMENT PLAN:  Effective Dates: 2/10/2020 TO 5/10/2020 (90 days). Frequency/Duration: 1-2 times a week for 90 Days  Pre-treatment Symptoms/Complaints:  \"I had a breakdown on Tuesday. 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 Dizziness 2/10. No headache right now, but I had one for most of the week\". Pain: Initial: Pain Intensity 1: 0  Post Session:  010   Medications Last Reviewed:  3/2/2020  Updated Objective Findings:  None Today  TREATMENT:     NEUROMUSCULAR RE-EDUCATION: (45 minutes):  Exercise/activities per grid below to improve balance and coordination. Required minimal verbal cues to promote dynamic balance in standing.     Balance/Vestibular Treatment:   Activity   Date  19 Date  19 Date  19 Date  2/10/20 Date  20 Date  3/2/2020   Activity/Exercise   Sets/reps/equipment Sets/reps/  equipment Sets/reps/  equipment Sets/reps/  equipment Sets/reps/  equipment Sets/reps/  equipment   Walking with head turns      Standing  3 reps 2 laps   Seated 5 reps bilateral    Walking with head up & down      Standing 5 reps  2 laps      Step ups     2 inch step  15 reps bilateral 4 inch step  15 reps bilateral 4 inch step  15 reps bilateral      Step taps     2 inch step  2x10 reps 4 inch step  2x10 reps 4 inch step  2x10 reps   Blue foam  5 reps bilateral    Marching   2 laps 4 laps 4 laps  4 laps with hiking pole Seated marching x 15 bilateral  2 laps in hallway   Sidestepping    4 laps 4 laps   2 laps   Crossovers           Seated alternate hip flexion    2x5 reps  2x5 reps   Walking  backwards             Tandem walking           Weaving in/out of cones     4 laps 4 laps 4 laps  4 laps    Picking up cones             Stepping over half foam   2x 10 reps-  Forward/ lateral  2x 5 reps-  Forward/lateral 2x10 reps  Forward/lateral 2x10 reps       Ball toss           Sitting looking over a shoulder      5 reps 5 reps bilateral    Figure 8s            Circles right/left           Walking with 360 degree turns           Focusing on white ball following with eyes left and right    3 reps each     Weight shifting:    Left & Right             Weight shifting: Forward & Backward              Static Standing Balance       Sanddune/ blue foam eyes open and eyes closed; tiltboard eyes open      Standing with feet apart             Standing with feet together     Blue foam eyes open/ eyes closed Eyes open/ eyes closed Eyes open      Standing with feet semitandem           Standing with feet tandem           Bending forward       5 reps     Bouncing red ball while seated in chair-focusing on red ball      2x5 reps   Hallpike-[place] testing for BPPV (Benign Paroxysmal Positional Vertigo)             Echevarria-Daroff exercises           Canalith Repositioning treatment/Epley Maneuver  for BPPV (Benign Paroxysmal Positional Vertigo)           Smart Equitest Training: See scanned report. [employer] Portal  Treatment/Session Summary:    · Response to Treatment:  Patient has most difficulty turning to the left. Patient rates increase in dizziness as 6/10 after treatment. · Communication/Consultation:  None today  · Equipment provided today:  None today  · Recommendations/Intent for next treatment session: Next visit will focus on habituation exercises, gait, and balance exercises.     Total Treatment Billable Duration: 45 minutes  PT Patient Time In/Time Out  Time In: 1015  Time Out: Divina 51, PT    Future Appointments   Date Time Provider Jacquie Milleristi   3/10/2020  9:30 AM Sarahi Gilmore MD [facility] [place] [facility]   3/16/2020 10:15 AM Moni VICKERS, PT [facility]   3/24/2020  9:00 AM Verónica Price NP [facility]   3/30/2020 10:15 AM Carlton Nielsen, PT [facility]

## 2020-03-16 ENCOUNTER — HOSPITAL ENCOUNTER (OUTPATIENT)
Dept: PHYSICAL THERAPY | Age: 40
Discharge: HOME | End: 2020-03-16
Attending: PSYCHIATRY & NEUROLOGY
Payer: MEDICARE

## 2020-03-16 NOTE — PROGRESS NOTES
Erick Oliva  : 1980  Primary: Jimena Cam Medicare Advantage  Secondary:  [address]  at [place] [address],[address] [facility], [place] [address].  [phone]   [phone]     OUTPATIENT DAILY NOTE    NAME/AGE/GENDER: Erick Oliva is a 44 y.o. female. DATE: 3/16/2020    Ms. Yashiranguyen Lucas for today's appointment due to her co-worker showing symptoms related to COVID-19.     Jorje Cruz, PT

## 2020-09-08 NOTE — THERAPY DISCHARGE
Sonido Atkinson  : 1980  Primary: Laith Cam Medicare Advantage  Secondary:  [address] at [facility]  [address] [facility], [address] [place] [address],[address], [place] [address].  [phone]   [phone]        OUTPATIENT PHYSICAL [facility] Summary    ICD-10: Treatment Diagnosis: Difficulty in walking, not elsewhere classified (R26.2)  Precautions/Allergies:   Delwin Martinet; Keflex [cephalexin]; Sulfa (sulfonamide antibiotics); Theophylline; Topamax [topiramate]; Ultram [tramadol]; Adhesive; Kenalog [triamcinolone acetonide]; Pneumococcal vaccine; and Verapamil   TREATMENT PLAN:   Frequency/Duration: Patient discharged from physical therapy. MEDICAL/REFERRING DIAGNOSIS:  Migraine with aura, not intractable, without status migrainosus [G43.109]   DATE OF ONSET: Chronic   REFERRING PHYSICIAN: Simeon Bolden MD Orders: Evaluate and treat   Return MD Appointment: unknown      INITIAL ASSESSMENT:  Ms. Elbert Felipe presents with imbalance, dizziness, and difficulty walking. Patient is at higher fall risk based on history of falls and scores received on Elizabeth Balance Scale and Dynamic Gait Index. Patient would benefit from skilled physical therapy to address problems and goal.  Thank you for this referral.     Discharge note:  Patient attended eight scheduled physical therapy appointments from 2019 to 3/2/2020. Patient cancelled last scheduled appointment on 3/16/2020 due to COVID-19. Problems and goals unable to be reassessed. Patient discharged from physical therapy. Thank you for this referral.     PROBLEM LIST (Impacting functional limitations):  1. Decreased Ambulation Ability/Technique  2. Decreased Balance  3. Decreased Activity Tolerance  4. Increased Fatigue  5. Decreased [place] with Home Exercise Program  6. Increased dizziness INTERVENTIONS PLANNED: (Treatment may consist of any combination of the following)  1. Balance Exercise  2. Gait Training  3.  Home Exercise Program (HEP)  4. Habituation exercises     GOALS: (Goals have been discussed and agreed upon with patient.)  Short-Term Functional Goals: Time Frame: 30 days   1. Patient will be independent with home exercise program to improve balance. Goal met. 2. Patient will increase score on Elizabeth Balance Scale to greater than or equal to 40/56 demonstrating improved balance and decreased fall risk with daily activities. Goal unable to be reassessed. Discharge Goals: Time Frame: 90 days   1. Patient will increase score on Elizabeth Balance Scale to greater than or equal to 46/56 demonstrating improved balance and decreased fall risk with daily activities. Goal unable to be reassessed. 2. Patient will increase score on Dynamic Gait Index to greater than or equal to 18/24 demonstrating improved balance and decreased fall risk with daily activities. Goal unable to be reassessed. 3. Patient will report being able to walk with improved stability. Goal unable to be reassessed. OUTCOME MEASURE:   Tool Used: Elizabeth Balance Scale  Score:  Initial: 35/56 Most Recent: 40/56 (Date: 2- )   Interpretation of Score: Each section is scored on a 0-4 scale, 0 representing the patients inability to perform the task and 4 representing independence. The scores of each section are added together for a total score of 56. The higher the patients score, the more independent the patient is. Any score below 45 indicates increased risk for falls. Tool Used: Dynamic Gait Index  Score:  Initial: 9/24 Most Recent: X/24 (Date: -- )   Interpretation of Score: Each section is scored on a 0-3 scale, 0 representing the patients inability to perform the task and 3 representing independence. The scores of each section are added together for a total score of 24. Any score below 19 indicates increased risk for falls.            PAIN/SUBJECTIVE:       HISTORY:   History of Injury/Illness (Reason for Referral):  Patient has a complicated history. Patient reports vertigo began May 17, 2008. Patient then began experiencing balance issues in February 2013. Patient has been to the [facility]. They believe patient was exposed to lead which led to lead toxicity. Patient reports regressing since 9/11/2019 due to gallbladder surgery. Patient reports vestibular rehab has helped her symptoms in the past.  Patient rates current dizziness as 4/10. Patient rates pain level as 2/10 in her head. Patient has chronic migraines. Patient has had four falls over the past couple of months. Patient tends to lose her balance to the left. Patient uses a walking stick during ambulation. Past Medical History/Comorbidities:   Ms. Toño Grimes  has a past medical history of Adverse effect of anesthesia, Asthma, BMI 36.0-36.9,adult, Chronic pain, Hyperlipidemia, Migraines, Movement disorder, Thyroid disease, Vertigo, and Vestibular nerve disorder (2008). She also has no past medical history of Difficult intubation, Malignant hyperthermia due to anesthesia, Nausea & vomiting, or Pseudocholinesterase deficiency. Ms. Toño Grimes  has a past surgical history that includes hx orthopaedic and hx heent. Social History/Living Environment:     Lives alone in an apartment. Patient has four steps to enter home. Patient has family nearby which helps her when needed. Prior Level of Function/Work/Activity:  Independent. Works at [employer]. Was walking a mile several times a week, but stopped due to gallbladder surgery. Dominant Side:         RIGHT  Personal Factors:          Sex:  female        Age:  36 y.o.    Ambulatory/Rehab Services H2 Model Falls Risk Assessment   Risk Factors:       (1)  Dizziness/Vertigo       (1)  Any administered benzodiazepines       (5)  History of Recent Falls [w/in 3 months] Ability to Rise from Chair:       (0)  Ability to rise in a single movement   Falls Prevention Plan:       Exercise/Equipment Adaptation (specify):  Patient will be supervised at all times due to higher fall risk7   Total: (5 or greater = High Risk): 7   ©2010 [facility] States Patent #[id number]. Federal Law prohibits the replication, distribution or use without written permission from [facility] [employer]   Current Medications:       Current Outpatient Medications:     amoxicillin-clavulanate (AUGMENTIN) 500-125 mg per tablet, Take 1 Tab by mouth two (2) times a day., Disp: 20 Tab, Rfl: 0    predniSONE (DELTASONE) 10 mg tablet, Take 10 mg by mouth daily (with breakfast). , Disp: 5 Tab, Rfl: 0    SUMAtriptan (Imitrex) 100 mg tablet, Take 1 Tab by mouth daily as needed for Migraine. Indications: a migraine headache, Disp: 10 Tab, Rfl: 5    esomeprazole (NEXIUM) 40 mg capsule, Take 1 Cap by mouth two (2) times a day. Indications: gastroesophageal reflux disease, Disp: 60 Cap, Rfl: 6    fremanezumab-vfrm (AJOVY) 225 mg/1.5 mL syrg, 225 mg by SubCUTAneous route every thirty (30) days. , Disp: 1 Syringe, Rfl: 11    ondansetron (ZOFRAN ODT) 4 mg disintegrating tablet, Take 1 Tab by mouth every eight (8) hours as needed for Nausea., Disp: 24 Tab, Rfl: 2    fexofenadine (ALLEGRA) 180 mg tablet, Take 180 mg by mouth nightly., Disp: , Rfl:     clonazePAM (KLONOPIN) 0.5 mg tablet, Take 0.5 mg by mouth daily as needed. Take / use AM day of surgery  per anesthesia protocols if needed. , Disp: , Rfl:     OTHER,NON-FORMULARY,, by IntraVENous route every Monday. Calcium EDTA treatments once a week, Disp: , Rfl:     albuterol (PROVENTIL HFA, VENTOLIN HFA, PROAIR HFA) 90 mcg/actuation inhaler, Take 2 Puffs by inhalation every four (4) hours as needed for Wheezing or Shortness of Breath. Take / use AM day of surgery  per anesthesia protocols if needed.    Indications: Asthma Attack, Disp: , Rfl:     OTHER, PLACE 1 SHANA UNDER THE TONGUE ONCE A DAY  DO NOT EAT DRINK FOR 15MIN AFTER TAKING, Disp: , Rfl: 5    mometasone (NASONEX) 50 mcg/actuation nasal spray, 1 [place] by Both Nostrils route nightly., Disp: , Rfl:     cholecalciferol, vitamin D3, (VITAMIN D3 PO), Take 10,000 Units by mouth daily. , Disp: , Rfl:     MAGNESIUM PO, Take 2 Tabs by mouth daily. , Disp: , Rfl:     VITAMIN A PO, Take 1 Tab by mouth daily. , Disp: , Rfl:     ARMOUR THYROID 30 mg tablet, TAKE 1 TABLET BY MOUTH EVERY DAY IN THE MORNING, Disp: , Rfl: 5    BUTTERBUR ROOT EXTRACT PO, Take 2 Tabs by mouth daily. , Disp: , Rfl:     multivitamin capsule, Take 2 Caps by mouth daily. , Disp: , Rfl:       Number of Personal Factors/Comorbidities that affect the Plan of Care: 3+: HIGH COMPLEXITY   EXAMINATION:   Functional Mobility:         Gait/Ambulation:  Patient ambulates with decreased gait speed with wide base of support. Patient loses her balance more with left handed turns. Strength:          Bilateral lower extremity strength 5/5. Sensation:         Within normal limits. Postural Control & Balance:  · Elizabeth Balance Scale:  35/56.   (A score less than 45/56 indicates high risk of falls)     · Dynamic Gait Index:  9/24.   (A score less than or equal to19/24 is abnormal and predictive of falls)        Body Structures Involved:  1. Nerves  2. Eyes and Ears  3. Muscles Body Functions Affected:  1. Neuromusculoskeletal Activities and Participation Affected:  1. General Tasks and Demands  2. Mobility  3. Interpersonal Interactions and Relationships  4.  Community, Social and [place]   Number of elements (examined above) that affect the Plan of Care: 4+: HIGH COMPLEXITY   CLINICAL PRESENTATION:   Presentation: Evolving clinical presentation with unstable and unpredictable characteristics: HIGH COMPLEXITY   CLINICAL DECISION MAKING:   Use of outcome tool(s) and clinical judgement create a POC that gives a: Difficult prediction of patient's progress: HIGH COMPLEXITY

## 2021-01-18 ENCOUNTER — HOSPITAL ENCOUNTER (OUTPATIENT)
Dept: MRI IMAGING | Age: 41
Discharge: HOME | End: 2021-01-18
Attending: NURSE PRACTITIONER
Payer: MEDICARE

## 2021-01-18 DIAGNOSIS — G43.809 VESTIBULAR MIGRAINE: ICD-10-CM

## 2021-01-18 DIAGNOSIS — G43.809 MIGRAINE VARIANT: ICD-10-CM

## 2021-01-18 PROCEDURE — 72156 MRI NECK SPINE W/O & W/DYE: CPT

## 2021-01-18 PROCEDURE — 74011250636 HC RX REV CODE- 250/636: Performed by: NURSE PRACTITIONER

## 2021-01-18 PROCEDURE — 70553 MRI BRAIN STEM W/O & W/DYE: CPT

## 2021-01-18 PROCEDURE — A9575 INJ GADOTERATE MEGLUMI 0.1ML: HCPCS | Performed by: NURSE PRACTITIONER

## 2021-01-18 RX ORDER — SODIUM CHLORIDE 0.9 % (FLUSH) 0.9 %
10 SYRINGE (ML) INJECTION
Status: COMPLETED | OUTPATIENT
Start: 2021-01-18 | End: 2021-01-18

## 2021-01-18 RX ORDER — GADOTERATE MEGLUMINE 376.9 MG/ML
18 INJECTION INTRAVENOUS
Status: COMPLETED | OUTPATIENT
Start: 2021-01-18 | End: 2021-01-18

## 2021-01-18 RX ADMIN — GADOTERATE MEGLUMINE 18 ML: 376.9 INJECTION INTRAVENOUS at 14:53

## 2021-01-18 RX ADMIN — Medication 10 ML: at 14:53

## 2021-02-15 PROBLEM — T50.905A DRUG REACTION: Status: ACTIVE | Noted: 2021-02-15

## 2021-02-15 PROBLEM — G43.109 CHRONIC MIGRAINE WITH AURA: Status: ACTIVE | Noted: 2021-02-15

## 2021-03-02 ENCOUNTER — HOSPITAL ENCOUNTER (OUTPATIENT)
Dept: PHYSICAL THERAPY | Age: 41
Discharge: HOME | End: 2021-03-02
Attending: NURSE PRACTITIONER
Payer: MEDICARE

## 2021-03-02 DIAGNOSIS — R42 VERTIGO: ICD-10-CM

## 2021-03-02 PROCEDURE — 97162 PT EVAL MOD COMPLEX 30 MIN: CPT

## 2021-03-02 PROCEDURE — 97140 MANUAL THERAPY 1/> REGIONS: CPT

## 2021-03-02 NOTE — THERAPY EVALUATION
Omaira Guerrier  : 1980  Primary: Freya Smalls [employer] Medicare Advantage  Secondary:  [address]  at [place] [id number] [facility], [address] [place] [address],[address], [place] [address].  [phone]   [phone]        OUTPATIENT PHYSICAL THERAPY:Initial Assessment 3/2/2021   ICD-10: Treatment Diagnosis: Difficulty in walking, not elsewhere classified (R26.2)  Precautions/Allergies:   Creig Miguelina, Imitrex [sumatriptan], Keflex [cephalexin], Sulfa (sulfonamide antibiotics), Theophylline, Topamax [topiramate], Ultram [tramadol], Zomig [zolmitriptan], Adhesive, Kenalog [triamcinolone acetonide], Pneumococcal vaccine, and Verapamil   TREATMENT PLAN:  Effective Dates: 3/2/2021 TO 2021 (90 days). Frequency/Duration: 2 times a week for 90 Day(s) MEDICAL/REFERRING DIAGNOSIS:  Vertigo [R42]   DATE OF ONSET: Chronic   REFERRING PHYSICIAN: Laila Haile NP/Valerie Whittington MD MD Orders: Evaluate and treat  Return MD Appointment: unknown      INITIAL ASSESSMENT:  Ms. Kory Mcmahan presents with dizziness, neck pain, imbalance, and difficulty walking. Patient could be experiencing some cervicogenic dizziness. Patient is at higher fall risk based on history of multiple falls and score received on Elizabeth Balance Scale. Patient would benefit from skilled physical therapy to address problems and goals. Thank you for this referral.      PROBLEM LIST (Impacting functional limitations):  1. Decreased Transfer Abilities  2. Decreased Ambulation Ability/Technique  3. Decreased Balance  4. Increased Pain  5. Decreased Flexibility/Joint Mobility  6. Decreased [place] with Home Exercise Program  7. Increased dizziness INTERVENTIONS PLANNED: (Treatment may consist of any combination of the following)  1. Balance Exercise  2. Gait Training  3. Home Exercise Program (HEP)  4. Manual Therapy  5.  Range of Motion (ROM)  6. Habituation exercises     GOALS: (Goals have been discussed and agreed upon with patient.)  Short-Term Functional Goals: Time Frame: 30 days   1. Patient will be independent with home exercise program to improve balance. 2. Patient will score greater than or equal to 30/56 on Elizabeth Balance Scale indicating improved balance and decreased fall risk with daily activities. Discharge Goals: Time Frame: 90 days   1. Patient will score greater than or equal to 40/56 on Elizabeth Balance Scale indicating improved balance and decreased fall risk with daily activities. 2. Patient will report improved stability at home and at work. OUTCOME MEASURE:   Tool Used: Elizabeth Balance Scale  Score:  Initial: 26/56 Most Recent: X/56 (Date: -- )   Interpretation of Score: Each section is scored on a 0-4 scale, 0 representing the patients inability to perform the task and 4 representing independence. The scores of each section are added together for a total score of 56. The higher the patients score, the more independent the patient is. Any score below 45 indicates increased risk for falls. MEDICAL NECESSITY:   · Patient is expected to demonstrate progress in balance and dizziness to improve safety during activities of daily living. REASON FOR SERVICES/OTHER COMMENTS:  · Patient continues to require skilled intervention due to higher fall risk with daily activities. Total Duration:  PT Patient Time In/Time Out  Time In: 1015  Time Out: 1100    Rehabilitation Potential For Stated Goals: Good  Regarding Zhanna Edge's therapy, I certify that the treatment plan above will be carried out by a therapist or under their direction.   Thank you for this referral,  Jorje Cruz, PT     Referring Physician Signature: Oskar Soliz MD _______________________________ Date _____________     PAIN/SUBJECTIVE:   Initial: Pain Intensity 1: 3  Pain Location 1: Neck  Pain Orientation 1: Left  Post Session:  3/10   HISTORY:   History of Injury/Illness (Reason for Referral):  Patient complains of dizziness, imbalance, and difficulty walking. Patient here in the clinic last year, but stopped therapy when COVID-19 began. Patient reports symptoms have gotten worse. Patient has been falling a lot more frequently. Patient has to use two hiking poles when walking instead of just one hiking pole. Patient rates current dizziness as 3/10 and current pain level in cervical spine as 3/10. Patient tends to lose her balance backwards and to the left. Patient has a history of lead toxicity. Patient reports more recent exposure to mold. Past Medical History/Comorbidities:   Ms. Lula Santamaria  has a past medical history of Adverse effect of anesthesia, Asthma, BMI 36.0-36.9,adult, Chronic pain, Hyperlipidemia, Migraines, Movement disorder, Thyroid disease, Vertigo, and Vestibular nerve disorder (2008). She also has no past medical history of Difficult intubation, Malignant hyperthermia due to anesthesia, Nausea & vomiting, or Pseudocholinesterase deficiency. Ms. Lula Santamaria  has a past surgical history that includes hx orthopaedic and hx heent. Social History/Living Environment:     Lives alone in an apartment. Patient has four steps to enter her home. Patient has family nearby which helps her when needed. Prior Level of Function/Work/Activity:  Independent. Works as an OT. Dominant Side:         RIGHT  Personal Factors:          Sex:  female        Age:  36 y.o. Ambulatory/Rehab Services H2 Model Falls Risk Assessment   Risk Factors:       (1)  Dizziness/Vertigo       (1)  Any administered benzodiazepines       (5)  History of Recent Falls [w/in 3 months] Ability to Rise from Chair:       (1)  Pushes up, successful in one attempt   Falls Prevention Plan:       Exercise/Equipment Adaptation (specify):  Patient will be supervised in the clinic at all times due to higher fall risk   Total: (5 or greater = High Risk): 8   ©2010 [facility] of [facility]. [facility] States Patent #[id number].  Federal Law prohibits the replication, distribution or use without written permission from [facility]   Current Medications:       Current Outpatient Medications:     ubrogepant (Ubrelvy) 100 mg tablet, Take 1 Tab by mouth daily as needed for Migraine. TAKE ONE AT ONSET OF MIGRAINE, MAY REPEAT X 1 IF NEEDED. NOT TO EXCEED 2 TABS PER 24 HOUR PERIOD., Disp: 10 Tab, Rfl: 11    erenumab-aooe (Aimovig Autoinjector) 70 mg/mL injection, 1 mL by SubCUTAneous route every thirty (30) days. , Disp: 1 Each, Rfl: 11    clonazePAM (KlonoPIN) 0.5 mg tablet, Take 1 Tab by mouth daily as needed (severe vertigo). Take / use AM day of surgery  per anesthesia protocols if needed. , Disp: 30 Tab, Rfl: 1    esomeprazole (NEXIUM) 40 mg capsule, Take 1 Cap by mouth two (2) times a day. Indications: gastroesophageal reflux disease, Disp: 60 Cap, Rfl: 6    ondansetron (ZOFRAN ODT) 4 mg disintegrating tablet, Take 1 Tab by mouth every eight (8) hours as needed for Nausea., Disp: 24 Tab, Rfl: 2    fexofenadine (ALLEGRA) 180 mg tablet, Take 180 mg by mouth nightly., Disp: , Rfl:     OTHER,NON-FORMULARY,, by IntraVENous route every Monday. Calcium EDTA treatments once a week, Disp: , Rfl:     albuterol (PROVENTIL HFA, VENTOLIN HFA, PROAIR HFA) 90 mcg/actuation inhaler, Take 2 Puffs by inhalation every four (4) hours as needed for Wheezing or Shortness of Breath. Take / use AM day of surgery  per anesthesia protocols if needed. Indications: Asthma Attack, Disp: , Rfl:     OTHER, PLACE 1 SHANA UNDER THE TONGUE ONCE A DAY  DO NOT EAT DRINK FOR 15MIN AFTER TAKING, Disp: , Rfl: 5    mometasone (NASONEX) 50 mcg/actuation nasal spray, 1 [place] by Both Nostrils route nightly., Disp: , Rfl:     cholecalciferol, vitamin D3, (VITAMIN D3 PO), Take 10,000 Units by mouth daily. , Disp: , Rfl:     MAGNESIUM PO, Take 2 Tabs by mouth daily. , Disp: , Rfl:     VITAMIN A PO, Take 1 Tab by mouth daily. , Disp: , Rfl:     ARMOUR THYROID 30 mg tablet, TAKE 1 TABLET BY MOUTH EVERY DAY IN THE MORNING, Disp: , Rfl: 5    BUTTERBUR ROOT EXTRACT PO, Take 2 Tabs by mouth daily. , Disp: , Rfl:     multivitamin capsule, Take 2 Caps by mouth daily. , Disp: , Rfl:    Date Last Reviewed:  3/2/2021   Number of Personal Factors/Comorbidities that affect the Plan of Care: 3+: HIGH COMPLEXITY   EXAMINATION:   Observation/Orthostatic Postural Assessment:          Slight forward head rounded shoulders posture. Palpation: Increased tenderness along bilateral upper traps/SCM, left worse than right. Tender throughout C5-C7. Functional Mobility:         Gait/Ambulation:  Patient ambulates with bilateral hiking poles demonstrating decreased gait speed with wider base of support and moderate deviation from straight path. ROM:          Cervical range of motion is as follows: flexion within normal limits, extension within normal limits, right rotation within normal limits, left rotation 75%, right lateral flexion 50%, left lateral flexion 75%. Postural Control & Balance:  · Elizabeth Balance Scale:  26/56.   (A score less than 45/56 indicates high risk of falls)     · Dynamic Gait Index:  Not tested. Body Structures Involved:  1. Nerves  2. Eyes and Ears  3. Muscles Body Functions Affected:  1. Neuromusculoskeletal Activities and Participation Affected:  1. General Tasks and Demands  2. Mobility  3.  Community, Social and [place]   Number of elements (examined above) that affect the Plan of Care: 4+: HIGH COMPLEXITY   CLINICAL PRESENTATION:   Presentation: Evolving clinical presentation with changing clinical characteristics: MODERATE COMPLEXITY   CLINICAL DECISION MAKING:   Use of outcome tool(s) and clinical judgement create a POC that gives a: Questionable prediction of patient's progress: MODERATE COMPLEXITY

## 2021-03-02 NOTE — PROGRESS NOTES
Danika Ashleigh  : 1980  Primary: Amy Cam Medicare Advantage  Secondary:  [address]  at [place] [id number] [facility], [address] [place] [address],[address], [address] [facility]  [phone]   [phone]     OUTPATIENT PHYSICAL THERAPY: Daily Treatment Note 3/2/2021  Visit Count:  1    ICD-10: Treatment Diagnosis: Difficulty in walking, not elsewhere classified (R26.2)  Precautions/Allergies:   [place], Imitrex [sumatriptan], Keflex [cephalexin], Sulfa (sulfonamide antibiotics), Theophylline, Topamax [topiramate], Ultram [tramadol], Zomig [zolmitriptan], Adhesive, Kenalog [triamcinolone acetonide], Pneumococcal vaccine, and Verapamil   TREATMENT PLAN:  Effective Dates: 3/2/2021 TO 2021 (90 days). Frequency/Duration: 2 times a week for 90 Day(s)    Pre-treatment Symptoms/Complaints:  Dizziness, imbalance, increased neck pain, and difficulty walking  Pain: Initial: Pain Intensity 1: 3  Pain Location 1: Neck  Pain Orientation 1: Left  Post Session:  3/10   Medications Last Reviewed:  3/2/2021  Updated Objective Findings:  See evaluation note from today  TREATMENT:     MANUAL THERAPY: (10 minutes): Soft tissue mobilization was utilized and necessary because of the patient's painful spasm. Patient received trigger point release along left upper trap/SCM/left rhomboid to decrease pain and tightness. Skin intact after treatment. [employer] Portal  Treatment/Session Summary:    · Response to Treatment:  Patient reports increased dizziness with trigger point along left upper trap and patient reports decreased dizziness with trigger point release along left rhomboid. · Communication/Consultation:  None today  · Equipment provided today:  None today  · Recommendations/Intent for next treatment session: Next visit will focus on balance exercises, vestibular exercises, and manual therapy.     Total Treatment Billable Duration:  10 minutes+ evaluation  PT Patient Time In/Time Out  Time In: 1015  Time Out: [facility] Appointments   Date Time Provider Jacquie Smith   3/5/2021  8:00 AM Syd Barajas, BENITA [facility] SFSAMSON   3/16/2021  8:45 AM Stefanie Nielsen, PT SFEORPSY SFE   3/19/2021 10:15 AM Stefanie Nielsen, PT SFEORPT SFE   3/29/2021 11:00 AM Stefanie Nielsen, PT SFEORPT SFE   5/11/2021 10:00 AM Boni Chang NP BSNE BSNE

## 2021-03-05 ENCOUNTER — HOSPITAL ENCOUNTER (OUTPATIENT)
Dept: PHYSICAL THERAPY | Age: 41
Discharge: HOME | End: 2021-03-05
Attending: NURSE PRACTITIONER
Payer: MEDICARE

## 2021-03-05 PROCEDURE — 97112 NEUROMUSCULAR REEDUCATION: CPT

## 2021-03-05 NOTE — PROGRESS NOTES
Fred Brito  : 1980  Primary: Robin Cam Medicare Advantage  Secondary:  [address] at [address], [address] [place] [address],[address] [facility], [place] [address].  [phone]   [phone]     OUTPATIENT PHYSICAL THERAPY: Daily Treatment Note 3/5/2021  Visit Count:  2    ICD-10: Treatment Diagnosis: Difficulty in walking, not elsewhere classified (R26.2)  Precautions/Allergies:   [place], Imitrex [sumatriptan], Keflex [cephalexin], Sulfa (sulfonamide antibiotics), Theophylline, Topamax [topiramate], Ultram [tramadol], Zomig [zolmitriptan], Adhesive, Kenalog [triamcinolone acetonide], Pneumococcal vaccine, and Verapamil   TREATMENT PLAN:  Effective Dates: 3/2/2021 TO 2021 (90 days). Frequency/Duration: 2 times a week for 90 Day(s)    Pre-treatment Symptoms/Complaints:  Patient reports feeling more pain and popping in her neck after trigger point release last appointment. \"Slight headache today\". Pain: Initial: Pain Intensity 1: 3  Pain Location 1: Neck  Pain Orientation 1: Left  Post Session:  3/10   Medications Last Reviewed:  3/5/2021  Updated Objective Findings:  None Today  TREATMENT:     NEUROMUSCULAR RE-EDUCATION: (45 minutes):  Exercise/activities per grid below to improve balance, coordination, kinesthetic sense and posture. Required moderate verbal cues to promote static and dynamic balance in standing.      Date:  3/5/21 Date:   Date:     Activity/Exercise Parameters Parameters Parameters   Marching in down anthony with bilateral hiking poles 4 laps     Sidestepping down hallway with bilateral hiking poles 4 laps     Walking through cones with bilateral hiking poles 4 laps     Single steps forward with bilateral hiking poles 2x10 reps     Single step sideways with bilateral hiking poles 2x10 reps     Kinesio taping left upper trap/left thoracic paraspinal 5 minutes             [employer] Portal  Treatment/Session Summary:    · Response to Treatment:  Patient tends to lose her balance to the left when turning to the left. Patient needs verbal cues to slow down movements and focus on putting more weight on right lower extremity to avoid losing her balance to the left with exercises. Patient reports improved stability after kinesio taping. · Communication/Consultation:  None today  · Equipment provided today:  None today  · Recommendations/Intent for next treatment session: Next visit will focus on balance exercises, vestibular exercises, and manual therapy.     Total Treatment Billable Duration:  45 minutes  PT Patient Time In/Time Out  Time In: 0805  Time Out: [place], PT    Future Appointments   Date Time Provider Jacquie Smith   3/16/2021  8:45 AM Caren Zaragoza, PT [facility] SFE   3/19/2021 10:15 AM Zohreh Nielsen, PT [facility]   3/29/2021 11:00 AM Zohreh Nielsen, PT [facility]   4/2/2021 10:15 AM Zohreh Nielsen, PT [facility]   4/13/2021 10:15 AM Zohreh Nielsen, PT [facility]   4/16/2021 10:15 AM Zohreh Nielsen, PT [facility]   4/27/2021 10:15 AM Zohreh Nielsen, PT [facility]   4/30/2021 10:15 AM Zohreh Nielsen, PT [facility]   5/11/2021 10:00 AM Carlton Lew, NP [facility] BS[facility]

## 2021-03-16 ENCOUNTER — HOSPITAL ENCOUNTER (OUTPATIENT)
Dept: PHYSICAL THERAPY | Age: 41
Discharge: HOME | End: 2021-03-16
Attending: NURSE PRACTITIONER
Payer: MEDICARE

## 2021-03-16 PROCEDURE — 97112 NEUROMUSCULAR REEDUCATION: CPT

## 2021-03-16 NOTE — PROGRESS NOTES
Elza Sever  : 1980  Primary: Inna Cam Medicare Advantage  Secondary:  [address] at [place] [address] [facility], [address] [place] [address],[address], [facility].  [phone]   [phone]     OUTPATIENT PHYSICAL THERAPY: Daily Treatment Note 3/16/2021  Visit Count:  3    ICD-10: Treatment Diagnosis: Difficulty in walking, not elsewhere classified (R26.2)  Precautions/Allergies:   [place], Imitrex [sumatriptan], Keflex [cephalexin], Sulfa (sulfonamide antibiotics), Theophylline, Topamax [topiramate], Ultram [tramadol], Zomig [zolmitriptan], Adhesive, Kenalog [triamcinolone acetonide], Pneumococcal vaccine, and Verapamil   TREATMENT PLAN:  Effective Dates: 3/2/2021 TO 2021 (90 days). Frequency/Duration: 2 times a week for 90 Day(s)    Pre-treatment Symptoms/Complaints: \"Dizziness 5/10.  kinesiotaping really helped last time. I was able to go grocery shopping that weekend\". Pain: Initial: Pain Intensity 1: 1  Pain Location 1: Neck  Pain Orientation 1: Left  Post Session:  1/10   Medications Last Reviewed:  3/16/2021  Updated Objective Findings:  None Today  TREATMENT:     NEUROMUSCULAR RE-EDUCATION: (45 minutes):  Exercise/activities per grid below to improve balance, coordination, kinesthetic sense and posture. Required moderate verbal cues to promote static and dynamic balance in standing.      Date:  3/5/21 Date:  3/16/21 Date:     Activity/Exercise Parameters Parameters Parameters   Marching in down anthony with bilateral hiking poles 4 laps 4 laps    Sidestepping down hallway with bilateral hiking poles 4 laps 4 laps    Walking through cones with bilateral hiking poles 4 laps 4 laps    Single steps forward with bilateral hiking poles 2x10 reps 2x10 reps without poles    Single step sideways with bilateral hiking poles 2x10 reps 2x10 reps without poles    Kinesio taping left upper trap/left thoracic paraspinal 5 minutes 2 minutes    Step ups  Lateral step ups  2 inch step left lower extremity 10 reps      [employer] Portal  Treatment/Session Summary:    · Response to Treatment:  Kinesiotaped patient at the beginning of therapy and this seemed to help her stability. Patient reports improved proprioreception in left lower extremity during treatment. Patient has difficulty activating left hip abductor during lateral step ups. · Communication/Consultation:  None today  · Equipment provided today:  None today  · Recommendations/Intent for next treatment session: Next visit will focus on balance exercises, vestibular exercises, and manual therapy.     Total Treatment Billable Duration:  45 minutes  PT Patient Time In/Time Out  Time In: 6835  Time Out: [address], PT    Future Appointments   Date Time Provider Jacquie Smith   3/19/2021 10:15 AM Graciela Archibald, PT [facility]FE   3/29/2021 11:00 AM Molly Nielsen Old, PT [facility]   4/2/2021 10:15 AM VisCastillo johnsonona Old, PT [facility]   4/13/2021 10:15 AM Castillo Nielsenona Old, PT [facility]   4/16/2021 10:15 AM Viselizabeth Delona Old, PT [facility]   4/27/2021 10:15 AM VissaCastillo proona Old, PT [facility]   4/30/2021 10:15 AM Castillo Nielsenona Old, PT [facility]   5/11/2021 10:00 AM GENEVA SykesNE [facility]

## 2021-03-19 ENCOUNTER — HOSPITAL ENCOUNTER (OUTPATIENT)
Dept: PHYSICAL THERAPY | Age: 41
Discharge: HOME | End: 2021-03-19
Attending: NURSE PRACTITIONER
Payer: MEDICARE

## 2021-03-19 PROCEDURE — 97112 NEUROMUSCULAR REEDUCATION: CPT

## 2021-03-19 NOTE — PROGRESS NOTES
Nawaf Prim  : 1980  Primary: Gael Cam Medicare Advantage  Secondary:  [address] at [place] [address] [facility], [address] [place] [address],[address], [address] [facility]  [phone]   [phone]     OUTPATIENT PHYSICAL THERAPY: Daily Treatment Note 3/19/2021  Visit Count:  4    ICD-10: Treatment Diagnosis: Difficulty in walking, not elsewhere classified (R26.2)  Precautions/Allergies:   [place], Imitrex [sumatriptan], Keflex [cephalexin], Sulfa (sulfonamide antibiotics), Theophylline, Topamax [topiramate], Ultram [tramadol], Zomig [zolmitriptan], Adhesive, Kenalog [triamcinolone acetonide], Pneumococcal vaccine, and Verapamil   TREATMENT PLAN:  Effective Dates: 3/2/2021 TO 2021 (90 days). Frequency/Duration: 2 times a week for 90 Day(s)    Pre-treatment Symptoms/Complaints: \"Dizziness is 1/10. I had to take the kinesiotape off yesterday because it was bothering my neck \". Pain: Initial: Pain Intensity 1: 5  Pain Location 1: Neck, Head  Pain Orientation 1: Right  Post Session:  3/10   Medications Last Reviewed:  3/19/2021  Updated Objective Findings:  None Today  TREATMENT:     NEUROMUSCULAR RE-EDUCATION: (45 minutes):  Exercise/activities per grid below to improve balance, coordination, kinesthetic sense and posture. Required moderate verbal cues to promote static and dynamic balance in standing.      Date:  3/5/21 Date:  3/16/21 Date:  3/19/2021   Activity/Exercise Parameters Parameters Parameters   Marching in down anthony with bilateral hiking poles 4 laps 4 laps 4 laps   Sidestepping down hallway with bilateral hiking poles 4 laps 4 laps 4 laps   Walking through cones with bilateral hiking poles 4 laps 4 laps 4 laps   Single steps forward with bilateral hiking poles 2x10 reps 2x10 reps without poles 2x10 reps using half foam   Single step sideways with bilateral hiking poles 2x10 reps 2x10 reps without poles X   Kinesio taping left upper trap/left thoracic paraspinal 5 minutes 2 minutes 2 minutes   Step ups  Lateral step ups  2 inch step left lower extremity 10 reps Lateral step ups  2 inch step left lower extremity 10 reps  4 inch step left lower extremity 10 reps     [employer] Portal  Treatment/Session Summary:    · Response to Treatment:  Patient reports decreased headache, but increased dizziness after treatment. (headache 3/10 and dizziness 4/10). Patient able to activate left hip abductors better with lateral step ups today. · Communication/Consultation:  None today  · Equipment provided today:  None today  · Recommendations/Intent for next treatment session: Next visit will focus on balance exercises, vestibular exercises, and manual therapy.     Total Treatment Billable Duration:  45 minutes  PT Patient Time In/Time Out  Time In: 1015  Time Out: [address], PT    Future Appointments   Date Time Provider Jacquie Smith   3/29/2021 11:00 AM Nabil Munoz, PT [facility] SFE   4/1/2021  8:00 AM Vissage, Raford Bowels, PT [facility]   4/13/2021 10:15 AM Vissage, Raford Bowels, PT [facility]   4/16/2021 10:15 AM Vissage, Raford Bowels, PT [facility]   4/27/2021 10:15 AM Vissage, Raford Bowels, PT [facility]   4/30/2021 10:15 AM Vissage, Raford Bowels, PT [facility]   5/11/2021 10:00 AM Salas Andre NP [facility] BSNE

## 2021-03-29 ENCOUNTER — HOSPITAL ENCOUNTER (OUTPATIENT)
Dept: PHYSICAL THERAPY | Age: 41
Discharge: HOME | End: 2021-03-29
Attending: NURSE PRACTITIONER
Payer: MEDICARE

## 2021-03-29 PROCEDURE — 97112 NEUROMUSCULAR REEDUCATION: CPT

## 2021-03-29 NOTE — PROGRESS NOTES
Michael Singer  : 1980  Primary: Juan Francisco Cam Medicare Advantage  Secondary:  [address] at [place] [address] [facility], [address] [place] [address],[address], [facility] [address].  [phone]   [phone]     OUTPATIENT PHYSICAL THERAPY: Daily Treatment Note 3/29/2021  Visit Count:  5    ICD-10: Treatment Diagnosis: Difficulty in walking, not elsewhere classified (R26.2)  Precautions/Allergies:   [place], Imitrex [sumatriptan], Keflex [cephalexin], Sulfa (sulfonamide antibiotics), Theophylline, Topamax [topiramate], Ultram [tramadol], Zomig [zolmitriptan], Adhesive, Kenalog [triamcinolone acetonide], Pneumococcal vaccine, and Verapamil   TREATMENT PLAN:  Effective Dates: 3/2/2021 TO 2021 (90 days). Frequency/Duration: 2 times a week for 90 Day(s)    Pre-treatment Symptoms/Complaints: \"Dizziness is 4/10. I fell last week. I tried to throw my fleece over my bed and lost my balance \". Pain: Initial: Pain Intensity 1: 0  Post Session:  0/10   Medications Last Reviewed:  3/29/2021  Updated Objective Findings:  None Today  TREATMENT:     NEUROMUSCULAR RE-EDUCATION: (45 minutes):  Exercise/activities per grid below to improve balance, coordination, kinesthetic sense and posture. Required moderate verbal cues to promote static and dynamic balance in standing.      Date:  3/29/21 Date:  3/16/21 Date:  3/19/2021   Activity/Exercise Parameters Parameters Parameters   Marching in down anthony with bilateral hiking poles 4 laps 4 laps 4 laps   Sidestepping down hallway with bilateral hiking poles 4 laps 4 laps 4 laps   Walking through cones with bilateral hiking poles 4 laps 4 laps 4 laps   Single steps forward with bilateral hiking poles X 2x10 reps without poles 2x10 reps using half foam   Single step sideways with bilateral hiking poles X 2x10 reps without poles X   Kinesio taping left upper trap/left thoracic paraspinal X 2 minutes 2 minutes   Standing on sanddune Eyes open; eyes open with head turns     Step ups 4 inch step left lower extremity 10 reps Lateral step ups  2 inch step left lower extremity 10 reps Lateral step ups  2 inch step left lower extremity 10 reps  4 inch step left lower extremity 10 reps     [employer] Portal  Treatment/Session Summary:    · Response to Treatment:  Patient reports increase in dizziness to 7/10. Patient needed extended rest breaks due to dizziness. Patient demonstrates increased instability when turning to the left. · Communication/Consultation:  None today  · Equipment provided today:  None today  · Recommendations/Intent for next treatment session: Next visit will focus on balance exercises, vestibular exercises, and manual therapy.     Total Treatment Billable Duration:  45 minutes  PT Patient Time In/Time Out  Time In: 1100  Time Out: 1010 [facility],     Future Appointments   Date Time Provider Jacquie Smith   4/1/2021  8:00 AM Kwame Beasley, PT [facility] SFE   4/13/2021 10:15 AM Albania Nielsen, PT [facility]   4/16/2021 10:15 AM Albania Nielsen, PT [facility]   4/27/2021 10:15 AM Santa Nielsenlia Brad, PT [facility]   4/30/2021 10:15 AM [place] Nielsen, PT [facility]   5/11/2021 10:00 AM GENEVA JuárezNE PRANEETHNE

## 2021-04-01 ENCOUNTER — HOSPITAL ENCOUNTER (OUTPATIENT)
Dept: PHYSICAL THERAPY | Age: 41
Discharge: HOME | End: 2021-04-01
Attending: NURSE PRACTITIONER
Payer: MEDICARE

## 2021-04-01 PROCEDURE — 97112 NEUROMUSCULAR REEDUCATION: CPT

## 2021-04-01 NOTE — PROGRESS NOTES
Sissy Franklin  : 1980  Primary: Emma Talavera [employer] Medicare Advantage  Secondary:  [address] at [facility]  [address] [facility], [address] [place] [address],[address], [facility] [address].  [phone]   [phone]     OUTPATIENT PHYSICAL THERAPY: Daily Treatment Note 2021  Visit Count:  6    ICD-10: Treatment Diagnosis: Difficulty in walking, not elsewhere classified (R26.2)  Precautions/Allergies:   [place], Imitrex [sumatriptan], Keflex [cephalexin], Sulfa (sulfonamide antibiotics), Theophylline, Topamax [topiramate], Ultram [tramadol], Zomig [zolmitriptan], Adhesive, Kenalog [triamcinolone acetonide], Pneumococcal vaccine, and Verapamil   TREATMENT PLAN:  Effective Dates: 3/2/2021 TO 2021 (90 days). Frequency/Duration: 2 times a week for 90 Day(s)    Pre-treatment Symptoms/Complaints: \"Dizziness is 6/10. Dizziness has been so bad over the past week. I had to take half of a Klonopin yesterday \". Pain: Initial: Pain Intensity 1: 0  Post Session:  0/10   Medications Last Reviewed:  2021  Updated Objective Findings:  None Today  TREATMENT:     NEUROMUSCULAR RE-EDUCATION: (40 minutes):  Exercise/activities per grid below to improve balance, coordination, kinesthetic sense and posture. Required moderate verbal cues to promote static and dynamic balance in standing.      Date:  3/29/21 Date:  21 Date:  3/19/2021   Activity/Exercise Parameters Parameters Parameters   Marching in down anthony with bilateral hiking poles 4 laps 4 laps 4 laps   Sidestepping down hallway with bilateral hiking poles 4 laps 4 laps 4 laps   Walking through cones with bilateral hiking poles 4 laps 4 laps 4 laps   Single steps forward with bilateral hiking poles X X  2x10 reps using half foam   Single step sideways with bilateral hiking poles X X X   Kinesio taping left upper trap/left thoracic paraspinal X 2 minutes 2 minutes   Standing on sanddune Eyes open; eyes open with head turns X    Step ups 4 inch step left lower extremity 10 reps Lateral step ups  4 inch step left lower extremity 10 reps Lateral step ups  2 inch step left lower extremity 10 reps  4 inch step left lower extremity 10 reps     [employer] Portal  Treatment/Session Summary:    · Response to Treatment:  Patient reports increase in dizziness to 7/10. Patient continues to need rest breaks due to extreme dizziness. · Communication/Consultation:  None today  · Equipment provided today:  None today  · Recommendations/Intent for next treatment session: Next visit will focus on balance exercises, vestibular exercises, and manual therapy.     Total Treatment Billable Duration:  40 minutes  PT Patient Time In/Time Out  Time In: 0805  Time Out: 1010 [facility], PT    Future Appointments   Date Time Provider Jacquie Smith   4/13/2021 10:15 AM Torrie Landa PT [facility] SFE   4/16/2021 10:15 AM Angela Nielsen, PT [facility]FE   4/27/2021 10:15 AM Angela Nielsen, PT [facility] SFE   4/30/2021 10:15 AM Angela Nielsen, PT [facility]   6/15/2021 11:30 AM Bebe Montiel MD [facility] BSNE

## 2021-04-13 ENCOUNTER — HOSPITAL ENCOUNTER (OUTPATIENT)
Dept: PHYSICAL THERAPY | Age: 41
Discharge: HOME | End: 2021-04-13
Attending: NURSE PRACTITIONER
Payer: MEDICARE

## 2021-04-13 PROCEDURE — 97112 NEUROMUSCULAR REEDUCATION: CPT

## 2021-04-13 NOTE — PROGRESS NOTES
Jeannine Atkins  : 1980  Primary: María Elena Cam Medicare Advantage  Secondary:  [address]  at [facility]  [address], [address] [facility] 83,[address], [facility] [address].  [phone]   [phone]     OUTPATIENT PHYSICAL THERAPY: Daily Treatment Note 2021  Visit Count:  7    ICD-10: Treatment Diagnosis: Difficulty in walking, not elsewhere classified (R26.2)  Precautions/Allergies:   [place], Imitrex [sumatriptan], Keflex [cephalexin], Sulfa (sulfonamide antibiotics), Theophylline, Topamax [topiramate], Ultram [tramadol], Zomig [zolmitriptan], Adhesive, Kenalog [triamcinolone acetonide], Pneumococcal vaccine, and Verapamil   TREATMENT PLAN:  Effective Dates: 3/2/2021 TO 2021 (90 days). Frequency/Duration: 2 times a week for 90 Day(s)    Pre-treatment Symptoms/Complaints:  \"Last Wednesday I had a really good day. I fell yesterday. I reached up to get a plate and turned to the left and fell to the left. Dizziness 8/10 and headache 2/10 \". Pain: Initial: Pain Intensity 1: 2  Post Session:  10   Medications Last Reviewed:  2021  Updated Objective Findings:  See progress note  TREATMENT:     NEUROMUSCULAR RE-EDUCATION: (40 minutes):  Exercise/activities per grid below to improve balance, coordination, kinesthetic sense and posture. Required moderate verbal cues to promote static and dynamic balance in standing.      Date:  3/29/21 Date:  21 Date:  2021   Activity/Exercise Parameters Parameters Parameters   Marching in down anthony with bilateral hiking poles 4 laps 4 laps    Sidestepping down hallway with bilateral hiking poles 4 laps 4 laps    Walking through cones with bilateral hiking poles 4 laps 4 laps    Single steps forward with bilateral hiking poles X X     Single step sideways with bilateral hiking poles X X    Kinesio taping left upper trap/left thoracic paraspinal X 2 minutes 5 minutes   Standing on sanddune Eyes open; eyes open with head turns X Walking with rolling walker   25 minutes   Seated looking at a ball with trunk rotation, head turns left/right, up/down, and diagonals   10 minutes         Step ups 4 inch step left lower extremity 10 reps Lateral step ups  4 inch step left lower extremity 10 reps      [employer] Portal  Treatment/Session Summary:    · Response to Treatment:  Patient needed verbal cues to slow down gait speed walking with rolling walker. Patient seemed to do better when her hips could touch inside of her walker to increase proprioception. Had to remove kinesio tape due to increased headache after applying it. Patient reports slight blurred vision after therapy, which cleared up with rest.       · Communication/Consultation:  None today  · Equipment provided today:  None today  · Recommendations/Intent for next treatment session: Next visit will focus on balance exercises, vestibular exercises, and manual therapy.     Total Treatment Billable Duration:  40 minutes  PT Patient Time In/Time Out  Time In: 1020  Time Out: [address], PT    Future Appointments   Date Time Provider Jacquie Smith   4/16/2021 10:15 AM Vissage, Poncho Boys, PT [facility]FE   4/27/2021 10:15 AM Vissage, Poncho Boys, PT [facility] SFE   4/30/2021 10:15 AM Vissage, Poncho Boys, PT [facility] SFE   5/11/2021 10:15 AM Vissage, Poncho Boys, PT [facility] SFE   5/14/2021 10:15 AM Vissage, Poncho Boys, PT [facility]FE   5/25/2021 10:15 AM Vissage, Poncho Boys, PT [facility] SFE   5/28/2021 10:15 AM Vissage, Poncho Boys, PT [facility] SFE   6/15/2021 11:30 AM Dennis John MD BSNE BSNE

## 2021-04-13 NOTE — PROGRESS NOTES
Michael Singer  : 1980  Primary: Juan Francisco Cam Medicare Advantage  Secondary:  [address] at [place] [id number] [facility], [address] [place] [address],[address] [facility], [place] [address].  [phone]   [phone]        OUTPATIENT PHYSICAL THERAPY:Progress Report 2021   ICD-10: Treatment Diagnosis: Difficulty in walking, not elsewhere classified (R26.2)  Precautions/Allergies:   Дмитрий [place], Imitrex [sumatriptan], Keflex [cephalexin], Sulfa (sulfonamide antibiotics), Theophylline, Topamax [topiramate], Ultram [tramadol], Zomig [zolmitriptan], Adhesive, Kenalog [triamcinolone acetonide], Pneumococcal vaccine, and Verapamil   TREATMENT PLAN:  Effective Dates: 3/2/2021 TO 2021 (90 days). Frequency/Duration: 2 times a week for 90 Day(s) MEDICAL/REFERRING DIAGNOSIS:  Vertigo [R42]    DATE OF ONSET: Chronic   REFERRING PHYSICIAN: Adriana Bose NP MD Orders: Evaluate and treat  Return MD Appointment: unknown      INITIAL ASSESSMENT:  Ms. Farheen Turner presents with dizziness, neck pain, imbalance, and difficulty walking. Patient could be experiencing some cervicogenic dizziness. Patient is at higher fall risk based on history of multiple falls and score received on Elizabeth Balance Scale. Patient would benefit from skilled physical therapy to address problems and goals. Thank you for this referral.     Progress note:  Patient has attended seven scheduled physical therapy appointments from 3/2/2021 to 2021. Patient would benefit from continuing skilled physical therapy to address problems and goals. Thank you for this referral.     PROBLEM LIST (Impacting functional limitations):  1. Decreased Transfer Abilities  2. Decreased Ambulation Ability/Technique  3. Decreased Balance  4. Increased Pain  5. Decreased Flexibility/Joint Mobility  6. Decreased [place] with Home Exercise Program  7.  Increased dizziness INTERVENTIONS PLANNED: (Treatment may consist of any combination of the following)  1. Balance Exercise  2. Gait Training  3. Home Exercise Program (HEP)  4. Manual Therapy  5. Range of Motion (ROM)  6. Habituation exercises     GOALS: (Goals have been discussed and agreed upon with patient.)  Short-Term Functional Goals: Time Frame: 30 days   1. Patient will be independent with home exercise program to improve balance. Goal met. 2. Patient will score greater than or equal to 30/56 on Elizabeth Balance Scale indicating improved balance and decreased fall risk with daily activities. Goal ongoing. Discharge Goals: Time Frame: 90 days   1. Patient will score greater than or equal to 40/56 on Elizabeth Balance Scale indicating improved balance and decreased fall risk with daily activities. Goal ongoing. 2. Patient will report improved stability at home and at work. Goal ongoing. OUTCOME MEASURE:   Tool Used: Elizabeth Balance Scale  Score:  Initial: 26/56 Most Recent: X/56 (Date: -- )   Interpretation of Score: Each section is scored on a 0-4 scale, 0 representing the patients inability to perform the task and 4 representing independence. The scores of each section are added together for a total score of 56. The higher the patients score, the more independent the patient is. Any score below 45 indicates increased risk for falls. MEDICAL NECESSITY:   · Patient is expected to demonstrate progress in balance and dizziness to improve safety during activities of daily living. REASON FOR SERVICES/OTHER COMMENTS:  · Patient continues to require skilled intervention due to higher fall risk with daily activities. PAIN/SUBJECTIVE:   Initial: Pain Intensity 1: 2  Post Session:  2/10   HISTORY:   History of Injury/Illness (Reason for Referral):  Patient complains of dizziness, imbalance, and difficulty walking. Patient here in the clinic last year, but stopped therapy when COVID-19 began. Patient reports symptoms have gotten worse. Patient has been falling a lot more frequently.   Patient has to use two hiking poles when walking instead of just one hiking pole. Patient rates current dizziness as 3/10 and current pain level in cervical spine as 3/10. Patient tends to lose her balance backwards and to the left. Patient has a history of lead toxicity. Patient reports more recent exposure to mold. Past Medical History/Comorbidities:   Ms. Екатерина Levin  has a past medical history of Adverse effect of anesthesia, Asthma, BMI 36.0-36.9,adult, Chronic pain, Hyperlipidemia, Migraines, Movement disorder, Thyroid disease, Vertigo, and Vestibular nerve disorder (2008). She also has no past medical history of Difficult intubation, Malignant hyperthermia due to anesthesia, Nausea & vomiting, or Pseudocholinesterase deficiency. Ms. Екатерина Levin  has a past surgical history that includes hx orthopaedic and hx heent. Social History/Living Environment:     Lives alone in an apartment. Patient has four steps to enter her home. Patient has family nearby which helps her when needed. Prior Level of Function/Work/Activity:  Independent. Works as an OT. Dominant Side:         RIGHT  Personal Factors:          Sex:  female        Age:  39 y.o. Ambulatory/Rehab Services H2 Model Falls Risk Assessment   Risk Factors:       (1)  Dizziness/Vertigo       (1)  Any administered benzodiazepines       (5)  History of Recent Falls [w/in 3 months] Ability to Rise from Chair:       (1)  Pushes up, successful in one attempt   Falls Prevention Plan:       Exercise/Equipment Adaptation (specify):  Patient will be supervised in the clinic at all times due to higher fall risk   Total: (5 or greater = High Risk): 8   ©2010 [facility] [place] [address] Patent #[id number].  Federal Law prohibits the replication, distribution or use without written permission from [facility] [email or web address]   Current Medications:       Current Outpatient Medications:     ubrogepant (Ubrelvy) 100 mg tablet, Take 1 Tab by mouth daily as needed for Migraine. TAKE ONE AT ONSET OF MIGRAINE, MAY REPEAT X 1 IF NEEDED. NOT TO EXCEED 2 TABS PER 24 HOUR PERIOD., Disp: 10 Tab, Rfl: 11    erenumab-aooe (Aimovig Autoinjector) 70 mg/mL injection, 1 mL by SubCUTAneous route every thirty (30) days. , Disp: 1 Each, Rfl: 11    clonazePAM (KlonoPIN) 0.5 mg tablet, Take 1 Tab by mouth daily as needed (severe vertigo). Take / use AM day of surgery  per anesthesia protocols if needed. , Disp: 30 Tab, Rfl: 1    esomeprazole (NEXIUM) 40 mg capsule, Take 1 Cap by mouth two (2) times a day. Indications: gastroesophageal reflux disease, Disp: 60 Cap, Rfl: 6    ondansetron (ZOFRAN ODT) 4 mg disintegrating tablet, Take 1 Tab by mouth every eight (8) hours as needed for Nausea., Disp: 24 Tab, Rfl: 2    fexofenadine (ALLEGRA) 180 mg tablet, Take 180 mg by mouth nightly., Disp: , Rfl:     OTHER,NON-FORMULARY,, by IntraVENous route every Monday. Calcium EDTA treatments once a week, Disp: , Rfl:     albuterol (PROVENTIL HFA, VENTOLIN HFA, PROAIR HFA) 90 mcg/actuation inhaler, Take 2 Puffs by inhalation every four (4) hours as needed for Wheezing or Shortness of Breath. Take / use AM day of surgery  per anesthesia protocols if needed. Indications: Asthma Attack, Disp: , Rfl:     OTHER, PLACE 1 SHANA UNDER THE TONGUE ONCE A DAY  DO NOT EAT DRINK FOR 15MIN AFTER TAKING, Disp: , Rfl: 5    mometasone (NASONEX) 50 mcg/actuation nasal spray, 1 [place] by Both Nostrils route nightly., Disp: , Rfl:     cholecalciferol, vitamin D3, (VITAMIN D3 PO), Take 10,000 Units by mouth daily. , Disp: , Rfl:     MAGNESIUM PO, Take 2 Tabs by mouth daily. , Disp: , Rfl:     VITAMIN A PO, Take 1 Tab by mouth daily. , Disp: , Rfl:     ARMOUR THYROID 30 mg tablet, TAKE 1 TABLET BY MOUTH EVERY DAY IN THE MORNING, Disp: , Rfl: 5    BUTTERBUR ROOT EXTRACT PO, Take 2 Tabs by mouth daily. , Disp: , Rfl:     multivitamin capsule, Take 2 Caps by mouth daily. , Disp: , Rfl:    Date Last Reviewed: 4/13/2021   Number of Personal Factors/Comorbidities that affect the Plan of Care: 3+: HIGH COMPLEXITY   EXAMINATION:   Observation/Orthostatic Postural Assessment:          Slight forward head rounded shoulders posture. Palpation: Increased tenderness along bilateral upper traps/SCM, left worse than right. Tender throughout C5-C7. Functional Mobility:         Gait/Ambulation:  Patient ambulates with bilateral hiking poles demonstrating decreased gait speed with wider base of support and moderate deviation from straight path. ROM:          Cervical range of motion is as follows: flexion within normal limits, extension within normal limits, right rotation within normal limits, left rotation 75%, right lateral flexion 50%, left lateral flexion 75%. Postural Control & Balance:  · Elizabeth Balance Scale:  26/56.   (A score less than 45/56 indicates high risk of falls)     · Dynamic Gait Index:  Not tested. Body Structures Involved:  1. Nerves  2. Eyes and Ears  3. Muscles Body Functions Affected:  1. Neuromusculoskeletal Activities and Participation Affected:  1. General Tasks and Demands  2. Mobility  3.  Community, Social and [place]   Number of elements (examined above) that affect the Plan of Care: 4+: HIGH COMPLEXITY   CLINICAL PRESENTATION:   Presentation: Evolving clinical presentation with changing clinical characteristics: MODERATE COMPLEXITY   CLINICAL DECISION MAKING:   Use of outcome tool(s) and clinical judgement create a POC that gives a: Questionable prediction of patient's progress: MODERATE COMPLEXITY

## 2021-04-16 ENCOUNTER — HOSPITAL ENCOUNTER (OUTPATIENT)
Dept: PHYSICAL THERAPY | Age: 41
Discharge: HOME | End: 2021-04-16
Attending: NURSE PRACTITIONER
Payer: MEDICARE

## 2021-04-16 PROCEDURE — 97112 NEUROMUSCULAR REEDUCATION: CPT

## 2021-04-16 NOTE — PROGRESS NOTES
Kelly Eduardo  : 1980  Primary: Ritika Cam Medicare Advantage  Secondary:  [address] at [facility]  [address] [facility], [address] [place] [address],[address], [place] [address].  [phone]   [phone]     OUTPATIENT PHYSICAL THERAPY: Daily Treatment Note 2021  Visit Count:  8    ICD-10: Treatment Diagnosis: Difficulty in walking, not elsewhere classified (R26.2)  Precautions/Allergies:   [place], Imitrex [sumatriptan], Keflex [cephalexin], Sulfa (sulfonamide antibiotics), Theophylline, Topamax [topiramate], Ultram [tramadol], Zomig [zolmitriptan], Adhesive, Kenalog [triamcinolone acetonide], Pneumococcal vaccine, and Verapamil   TREATMENT PLAN:  Effective Dates: 3/2/2021 TO 2021 (90 days). Frequency/Duration: 2 times a week for 90 Day(s)    Pre-treatment Symptoms/Complaints:  \"I took a full klonopin on Wednesday so I am a little foggy today. Dizziness 3/10, no headache \". Pain: Initial: Pain Intensity 1: 0  Post Session:  0/10   Medications Last Reviewed:  2021  Updated Objective Findings:  None Today  TREATMENT:     NEUROMUSCULAR RE-EDUCATION: (45 minutes):  Exercise/activities per grid below to improve balance, coordination, kinesthetic sense and posture. Required moderate verbal cues to promote static and dynamic balance in standing.      Date:  21 Date:  21 Date:  2021   Activity/Exercise Parameters Parameters Parameters   Marching in down anthony with bilateral hiking poles 4 laps 4 laps    Sidestepping down hallway with bilateral hiking poles 4 laps 4 laps    Walking through cones with bilateral hiking poles 4 laps 4 laps    Single steps forward with bilateral hiking poles 2x10 reps X     Single step sideways with bilateral hiking poles 2x10 reps X    Kinesio taping left upper trap/left thoracic paraspinal  2 minutes 5 minutes   Standing on sanddune  X    Walking with rolling walker   25 minutes   Seated looking at a ball with trunk rotation, head turns left/right, up/down, and diagonals   10 minutes         Step ups  Lateral step ups  4 inch step left lower extremity 10 reps      [employer] Portal  Treatment/Session Summary:    · Response to Treatment:  Patient's left lower extremity buckled twice during treatment. Patient continues to have issues with decreased proprioception in left lower extremity. Patient needed extended rest breaks due to increased dizziness. Patient reports increase in dizziness to 5/10 after treatment. · Communication/Consultation:  None today  · Equipment provided today:  None today  · Recommendations/Intent for next treatment session: Next visit will focus on balance exercises, vestibular exercises, and manual therapy.     Total Treatment Billable Duration:  45 minutes  PT Patient Time In/Time Out  Time In: 1015  Time Out: Divina 51, PT    Future Appointments   Date Time Provider Jacquie Smith   4/27/2021 10:15 AM Litzy Rendon, PT [facility] SFE   4/30/2021 10:15 AM Pablo Nielsen Sleeper, PT [facility] SFE   5/11/2021 10:15 AM VisPablo johnson Sleeper, PT [facility] SFE   5/14/2021 10:15 AM VisPablo johnson Sleeper, PT [facility] SFE   5/25/2021 10:15 AM VisPablo johnson Sleeper, PT [facility] SFE   5/28/2021 10:15 AM VisPablo johnson Sleeper, PT [facility] SFE   6/15/2021 11:30 AM Kayleigh Juárez MD BSNE BSNE

## 2021-04-27 ENCOUNTER — HOSPITAL ENCOUNTER (OUTPATIENT)
Dept: PHYSICAL THERAPY | Age: 41
Discharge: HOME | End: 2021-04-27
Attending: NURSE PRACTITIONER
Payer: MEDICARE

## 2021-04-27 PROCEDURE — 97112 NEUROMUSCULAR REEDUCATION: CPT

## 2021-04-27 NOTE — PROGRESS NOTES
Herb Allen  : 1980  Primary: Latesha Glaser [place] Medicare Advantage  Secondary:  [address]  at [address], [address] [place] [address],[address], [place] [address].  [phone]   [phone]     OUTPATIENT PHYSICAL THERAPY: Daily Treatment Note 2021  Visit Count:  9    ICD-10: Treatment Diagnosis: Difficulty in walking, not elsewhere classified (R26.2)  Precautions/Allergies:   [place], Imitrex [sumatriptan], Keflex [cephalexin], Sulfa (sulfonamide antibiotics), Theophylline, Topamax [topiramate], Ultram [tramadol], Zomig [zolmitriptan], Adhesive, Kenalog [triamcinolone acetonide], Pneumococcal vaccine, and Verapamil   TREATMENT PLAN:  Effective Dates: 3/2/2021 TO 2021 (90 days). Frequency/Duration: 2 times a week for 90 Day(s)    Pre-treatment Symptoms/Complaints:  \"I had a really good week last week, but I had two falls. Dizziness 3/10. No headache \". Pain: Initial: Pain Intensity 1: 0  Post Session:  0/10   Medications Last Reviewed:  2021  Updated Objective Findings:  None Today  TREATMENT:     NEUROMUSCULAR RE-EDUCATION: (45 minutes):  Exercise/activities per grid below to improve balance, coordination, kinesthetic sense and posture. Required moderate verbal cues to promote static and dynamic balance in standing.      Date:  21 Date:  21 Date:  2021   Activity/Exercise Parameters Parameters Parameters   Marching in down anthony with bilateral hiking poles 4 laps 4 laps    Sidestepping down hallway with bilateral hiking poles 4 laps 4 laps    Walking through cones with bilateral hiking poles 4 laps 4 laps    Single steps forward with bilateral hiking poles 2x10 reps X     Single step sideways with bilateral hiking poles 2x10 reps X    Kinesio taping left upper trap/left thoracic paraspinal  X 5 minutes   Standing on sanddune  Sanddune eyes open with head turns and eyes closed    Walking with rolling walker   25 minutes   Seated looking at a ball with trunk rotation, head turns left/right, up/down, and diagonals   10 minutes         Step ups  Lateral step ups  4 inch step left lower extremity 10 reps      [employer] Portal  Treatment/Session Summary:    · Response to Treatment:  Patient reports dizziness as 5-6/10 after treatment. Overall, patient demonstrates improved balance/stability compared to last visit. Patient had difficulty with left lateral step ups due to decreased proprioception in left lower extremity. · Communication/Consultation:  None today  · Equipment provided today:  None today  · Recommendations/Intent for next treatment session: Next visit will focus on balance exercises, vestibular exercises, and manual therapy.     Total Treatment Billable Duration:  45 minutes  PT Patient Time In/Time Out  Time In: 0930  Time Out: Lupillo Vale, PT    Future Appointments   Date Time Provider Jacquie Smith   4/30/2021 10:15 AM Nancie Stein PT [facility]ated [facility] SFE   4/30/2021  4:15 PM SFE MRI UNIT 1 ERMRI SFE   4/30/2021  5:00 PM SFE MRI UNIT 1 SFERMRI SFE   5/11/2021 10:15 AM Vissage, Jessica Pro, PT [facility] SFE   5/14/2021 10:15 AM Vissage, Jessica Pro, PT [facility] SFE   5/25/2021 10:15 AM Vissage, Jessica Pro, PT [facility] SFE   5/28/2021 10:15 AM Vissage, Jessica Pro, PT [facility] SFE   6/15/2021 11:30 AM Rosemarie Christian MD BSNE BSNE

## 2021-04-30 ENCOUNTER — HOSPITAL ENCOUNTER (OUTPATIENT)
Dept: MRI IMAGING | Age: 41
Discharge: HOME | End: 2021-04-30
Attending: FAMILY MEDICINE
Payer: MEDICARE

## 2021-04-30 ENCOUNTER — HOSPITAL ENCOUNTER (OUTPATIENT)
Dept: PHYSICAL THERAPY | Age: 41
Discharge: HOME | End: 2021-04-30
Attending: NURSE PRACTITIONER
Payer: MEDICARE

## 2021-04-30 DIAGNOSIS — R29.898 LEFT LEG WEAKNESS: ICD-10-CM

## 2021-04-30 DIAGNOSIS — R26.89 LOSS OF BALANCE: ICD-10-CM

## 2021-04-30 DIAGNOSIS — R29.6 FREQUENT FALLS: ICD-10-CM

## 2021-04-30 DIAGNOSIS — M54.50 CHRONIC MIDLINE LOW BACK PAIN WITHOUT SCIATICA: ICD-10-CM

## 2021-04-30 DIAGNOSIS — R20.2 NUMBNESS AND TINGLING OF RIGHT ARM: ICD-10-CM

## 2021-04-30 DIAGNOSIS — R20.0 NUMBNESS AND TINGLING OF RIGHT ARM: ICD-10-CM

## 2021-04-30 DIAGNOSIS — R29.898 RIGHT ARM WEAKNESS: ICD-10-CM

## 2021-04-30 DIAGNOSIS — G89.29 CHRONIC MIDLINE LOW BACK PAIN WITHOUT SCIATICA: ICD-10-CM

## 2021-04-30 DIAGNOSIS — G89.29 UPPER BACK PAIN, CHRONIC: ICD-10-CM

## 2021-04-30 DIAGNOSIS — M54.9 UPPER BACK PAIN, CHRONIC: ICD-10-CM

## 2021-04-30 PROCEDURE — 72148 MRI LUMBAR SPINE W/O DYE: CPT

## 2021-04-30 PROCEDURE — 72146 MRI CHEST SPINE W/O DYE: CPT

## 2021-04-30 PROCEDURE — 97112 NEUROMUSCULAR REEDUCATION: CPT

## 2021-04-30 NOTE — PROGRESS NOTES
Rachel Hernandez  : 1980  Primary: Angelina Cam Medicare Advantage  Secondary:  [address] at [address] [facility]  [address], [address], [facility] [address]  [phone]   [phone]     OUTPATIENT PHYSICAL THERAPY: Daily Treatment Note 2021  Visit Count:  10    ICD-10: Treatment Diagnosis: Difficulty in walking, not elsewhere classified (R26.2)  Precautions/Allergies:   [place], Imitrex [sumatriptan], Keflex [cephalexin], Sulfa (sulfonamide antibiotics), Theophylline, Topamax [topiramate], Ultram [tramadol], Zomig [zolmitriptan], Adhesive, Kenalog [triamcinolone acetonide], Pneumococcal vaccine, and Verapamil   TREATMENT PLAN:  Effective Dates: 3/2/2021 TO 2021 (90 days). Frequency/Duration: 2 times a week for 90 Day(s)    Pre-treatment Symptoms/Complaints:  Patient reports she bought a rolling walker on Tuesday. \"No falls. Dizziness 2-3/10\". Patient reports she is going on a trip with friends in couple of months and would like to be safer walking longer distances. Pain: Initial: Pain Intensity 1: 5  Pain Location 1: Head  Post Session:  5/10   Medications Last Reviewed:  2021  Updated Objective Findings:  None Today  TREATMENT:     NEUROMUSCULAR RE-EDUCATION: (45 minutes):  Exercise/activities per grid below to improve balance, coordination, kinesthetic sense and posture. Required moderate verbal cues to promote static and dynamic balance in standing. Patient walked CGA with rolling walker throughout the clinic working on turning to the left and turning to the right around gym equipment. When turning to the right, patient was more stable with right hip touching rolling walker and more weight on right upper extremity to help prevent loss of balance to the left. When turning to the left, patient was more stable with left hip touching walker with more weight on right upper extremity.   Patient's left leg would tend to scissor with left turns, so patient instructed to think about left foot placement. Patient practiced opening and closing the door/walking into the bathroom. Patient mentioned using a reverse pediatric walker in the past and it helping. So I took her rolling walker reversed it and put wheels on the back of the walker. (So the walker had four wheels). Patient walked throughout the clinic with modified reverse walker. Patient was much more steady. Patient did not scissor when turing to the left using the modified reverse walker. Spoke to the patient about modifying her current walker to become a reverse walker so she could practiced in her home and community. [employer] Portal  Treatment/Session Summary:    · Response to Treatment:  Patient needed several rest breaks due to increased dizziness with turning. Patient much more stable walking with modified reverse walker in the clinic due to increased proprioception through her hips via the walker. · Communication/Consultation:  None today  · Equipment provided today:  None today  · Recommendations/Intent for next treatment session: Next visit will focus on balance exercises, vestibular exercises, and manual therapy.     Total Treatment Billable Duration:  45 minutes  PT Patient Time In/Time Out  Time In: 1015  Time Out: 1010 [facility],     Future Appointments   Date Time Provider Jacquie Smith   4/30/2021  4:15 PM SFE MRI UNIT 1 SFERMRI SFE   4/30/2021  5:00 PM SFE MRI UNIT 1 SFERMRI SFE   5/11/2021 10:15 AM Pretty Nielsen, PT [facility] SFE   5/14/2021 10:15 AM Pretty Nielsen, PT [facility] SFE   5/25/2021 10:15 AM Pretty Nielsen, PT [facility] SFE   5/28/2021 10:15 AM Pretty Nielsen, PT [facility] SFE   6/2/2021  9:00 AM Cecilio Law MD BSNE BSNE   6/15/2021 11:30 AM Irene Daley MD BSNE BSNE

## 2021-05-11 ENCOUNTER — HOSPITAL ENCOUNTER (OUTPATIENT)
Dept: PHYSICAL THERAPY | Age: 41
Discharge: HOME | End: 2021-05-11
Attending: NURSE PRACTITIONER
Payer: MEDICARE

## 2021-05-11 PROCEDURE — 97112 NEUROMUSCULAR REEDUCATION: CPT

## 2021-05-11 NOTE — PROGRESS NOTES
Jonathan Strong  : 1980  Primary: Carmen Cam Medicare Advantage  Secondary:  [address]  at [place]  [address] [facility], [address] [place] [address],[address] Floor [facility], [place] [address].  [phone]   [phone]     OUTPATIENT PHYSICAL THERAPY: Daily Treatment Note 2021  Visit Count:  11    ICD-10: Treatment Diagnosis: Difficulty in walking, not elsewhere classified (R26.2)  Precautions/Allergies:   [place], Imitrex [sumatriptan], Keflex [cephalexin], Sulfa (sulfonamide antibiotics), Theophylline, Topamax [topiramate], Ultram [tramadol], Zomig [zolmitriptan], Adhesive, Kenalog [triamcinolone acetonide], Pneumococcal vaccine, and Verapamil   TREATMENT PLAN:  Effective Dates: 3/2/2021 TO 2021 (90 days). Frequency/Duration: 2 times a week for 90 Day(s)    Pre-treatment Symptoms/Complaints:  \"I fell on Saturday. I went to turn in my house and lost my balance. I couldn't finish work on Friday because I was so unstable. Dizziness 4/10\". Pain: Initial: Pain Intensity 1: 0  Pain Location 1: Head  Post Session:  0/10   Medications Last Reviewed:  2021  Updated Objective Findings:  None Today  TREATMENT:     NEUROMUSCULAR RE-EDUCATION: (45 minutes):  Exercise/activities per grid below to improve balance, coordination, kinesthetic sense and posture. Required moderate verbal cues to promote static and dynamic balance in standing.      Date:  21 Date:  21 Date:  2021   Activity/Exercise Parameters Parameters Parameters   Marching in down anthony with bilateral hiking poles 4 laps 4 laps 4 laps   Sidestepping down hallway with bilateral hiking poles 4 laps 4 laps 4 laps   Walking through cones with bilateral hiking poles 4 laps 4 laps 4 laps   Single steps forward with bilateral hiking poles 2x10 reps X     Single step sideways with bilateral hiking poles 2x10 reps X    Kinesio taping left upper trap/left thoracic paraspinal  X    Standing on sanddune  Sanddune eyes open with head turns and eyes closed    Walking with rolling walker      Seated looking at a ball with trunk rotation, head turns left/right, up/down, and diagonals      Standing on blue therafoam   Eyes open and eyes closed   Step ups  Lateral step ups  4 inch step left lower extremity 10 reps      [employer] Portal  Treatment/Session Summary:    · Response to Treatment:  Patient needed extended rest breaks today due to increased dizziness. Patient reports room tilting with turns. · Communication/Consultation:  None today  · Equipment provided today:  None today  · Recommendations/Intent for next treatment session: Next visit will focus on balance exercises, vestibular exercises, and manual therapy.     Total Treatment Billable Duration:  45 minutes  PT Patient Time In/Time Out  Time In: 1015  Time Out: Divina 51, PT    Future Appointments   Date Time Provider Jacquie Smith   2021 10:15 AM Ivan Almonte, PT [facility] SFE   2021 10:15 AM Vissage, Tollie Apgar, PT [facility] SFE   2021 10:15 AM Vissage, Tollie Apgar, PT [facility] SFE   2021  9:00 AM Fer Howe MD [facility] BSNE   2021 10:15 AM Vissage, Tollie Apgar, PT [facility] SFE   2021 11:00 AM Vissage, Tollie Apgar, PT [facility] SFE   6/15/2021 11:30 AM Tito Couch MD [facility] BSNE

## 2021-05-14 ENCOUNTER — HOSPITAL ENCOUNTER (OUTPATIENT)
Dept: PHYSICAL THERAPY | Age: 41
Discharge: HOME | End: 2021-05-14
Attending: NURSE PRACTITIONER
Payer: MEDICARE

## 2021-05-14 PROCEDURE — 97112 NEUROMUSCULAR REEDUCATION: CPT

## 2021-05-14 NOTE — PROGRESS NOTES
Gloria Parson  : 1980  Primary: [place] [employer] Medicare Advantage  Secondary:  [address] at [place]  [address], [address], [address] [facility]  [phone]   [phone]     OUTPATIENT PHYSICAL THERAPY: Daily Treatment Note 2021  Visit Count:  12    ICD-10: Treatment Diagnosis: Difficulty in walking, not elsewhere classified (R26.2)  Precautions/Allergies:   [place], Imitrex [sumatriptan], Keflex [cephalexin], Sulfa (sulfonamide antibiotics), Theophylline, Topamax [topiramate], Ultram [tramadol], Zomig [zolmitriptan], Adhesive, Kenalog [triamcinolone acetonide], Pneumococcal vaccine, and Verapamil   TREATMENT PLAN:  Effective Dates: 3/2/2021 TO 2021 (90 days). Frequency/Duration: 2 times a week for 90 Day(s)    Pre-treatment Symptoms/Complaints:  Patient reports she needs help problem solving some things. Patient walked into the clinic with reverse walker with elevated arm rests. Pain: Initial: Pain Intensity 1: 0  Post Session:  0/10   Medications Last Reviewed:  2021  Updated Objective Findings:  None Today  TREATMENT:     NEUROMUSCULAR RE-EDUCATION: (45 minutes):  Exercise/activities per grid below to improve balance, coordination, kinesthetic sense and posture. Required moderate verbal cues to promote static and dynamic balance in standing. Patient practiced walking in the clinic with reverse walker and elevated arm rests. Eliane Drivers wanted to tip forward. Took time to adjust length of arm rest and rotate handles inward to help with balance and body posture. This seemed to help decrease walker tipping forward. Patient walked in the clinic turning right and left + walking in between machines. Patient then practiced going up and down several ramps. Patient contracted her abdominals going down the ramp to stabilize and help with her balance. Patient tended to deviate to the left and scissor with her feet when going up the ramp.   Patient kept to the left side of the railing and braced with her elbows and this seemed to help stabilize her balance doing up the ramp and decreased her scissoring with her feet. [employer] Portal  Treatment/Session Summary:    · Response to Treatment:  Patient tolerated treatment well. · Communication/Consultation:  None today  · Equipment provided today:  None today  · Recommendations/Intent for next treatment session: Next visit will focus on balance exercises, vestibular exercises, and manual therapy.     Total Treatment Billable Duration:  45 minutes  PT Patient Time In/Time Out  Time In: 1015  Time Out: Divina 51, PT    Future Appointments   Date Time Provider Jacquie Smith   5/25/2021 10:15 AM Stefanie Messina PT [facility] SFE   5/28/2021 10:15 AM Ollie Nielsen, PT [facility] SFE   6/2/2021  9:00 AM Mi Mack MD [facility] BS[facility]   6/8/2021 10:15 AM Ollie Nielsen, PT [facility] SFE   6/11/2021 11:00 AM Ollie Nielsen, PT [facility] SFE   6/15/2021 11:30 AM Gerome Holter, MD [facility] BS[facility]

## 2021-05-25 ENCOUNTER — HOSPITAL ENCOUNTER (OUTPATIENT)
Dept: PHYSICAL THERAPY | Age: 41
Discharge: HOME | End: 2021-05-25
Attending: NURSE PRACTITIONER
Payer: MEDICARE

## 2021-05-25 PROCEDURE — 97112 NEUROMUSCULAR REEDUCATION: CPT

## 2021-05-25 NOTE — PROGRESS NOTES
[facility]  : 1980  Primary: Susan Cam Medicare Advantage  Secondary:  [address]  at [facility]  [address] [facility], [address] [place] [address],[address], [facility] [address].  [phone]   [phone]     OUTPATIENT PHYSICAL THERAPY: Daily Treatment Note 2021  Visit Count:  13    ICD-10: Treatment Diagnosis: Difficulty in walking, not elsewhere classified (R26.2)  Precautions/Allergies:   [place], Imitrex [sumatriptan], Keflex [cephalexin], Sulfa (sulfonamide antibiotics), Theophylline, Topamax [topiramate], Ultram [tramadol], Zomig [zolmitriptan], Adhesive, Kenalog [triamcinolone acetonide], Pneumococcal vaccine, and Verapamil   TREATMENT PLAN:  Effective Dates: 3/2/2021 TO 2021 (90 days). Frequency/Duration: 2 times a week for 90 Day(s)    Pre-treatment Symptoms/Complaints:  Patient reports she got a Drive LeisureLink Posterior Walker last week. \"I haven't used it much, I was waiting to come here to problem solve with you\". Pain: Initial: Pain Intensity 1: 0  Post Session:  0/10   Medications Last Reviewed:  2021  Updated Objective Findings:  None Today  TREATMENT:     NEUROMUSCULAR RE-EDUCATION: (45 minutes):  Exercise/activities per grid below to improve balance, coordination, kinesthetic sense and posture. Required moderate verbal cues to promote static and dynamic balance in standing. Adjusted the height of Drive LeisureLink Posterior Walker to proper height of patient. Patient practiced walking in the clinic with Drive LeisureLink Posterior [address], turning right and left and going in between machines. Patient practiced walking in hallway + up and down ramps+up/down elevator+ across sidewalks/pavement made with bricks+up and down curves+ across grass. Patient also practiced open/shutting the door to get in and out of the bathroom safely.       [employer] Portal  Treatment/Session Summary:    · Response to Treatment:  Patient much more stable using Drive LeisureLink Posterior [address] versus reverse walker with elevated arms. Patient did not scisssor going up the ramps using Drive Nimbo Posterior Walker. Also, the Drive Nimbo Posterior Walker did not tip forward during ambulation. Will practice going up and down steps with Drive Nimbo Posterior Walker next visit. · Communication/Consultation:  None today  · Equipment provided today:  None today  · Recommendations/Intent for next treatment session: Next visit will focus on balance exercises, vestibular exercises, and manual therapy.       Total Treatment Billable Duration:  45 minutes  PT Patient Time In/Time Out  Time In: 1015  Time Out: Divina 51, PT    Future Appointments   Date Time Provider Jacquie Smith   5/28/2021 10:15 AM Moi Keen, PT [facility] SFE   6/2/2021  9:00 AM Shaylee Chin MD [facility] BS[facility]   6/8/2021 10:15 AM Moi Keen, PT [facility]   6/11/2021 11:00 AM Roshan Nielsen PT [facility]   6/15/2021 11:30 AM Niall Ty MD [facility]

## 2021-05-28 ENCOUNTER — HOSPITAL ENCOUNTER (OUTPATIENT)
Dept: PHYSICAL THERAPY | Age: 41
Discharge: HOME | End: 2021-05-28
Attending: NURSE PRACTITIONER
Payer: MEDICARE

## 2021-05-28 PROCEDURE — 97112 NEUROMUSCULAR REEDUCATION: CPT

## 2021-05-28 NOTE — PROGRESS NOTES
Liliana Linares  : 1980  Primary: Dipak Wendyleighton Bradytna Medicare Advantage  Secondary:  [address] at [facility]  [address] [facility], [address] [place] [address],[address], [facility] [address].  [phone]   [phone]     OUTPATIENT PHYSICAL THERAPY: Daily Treatment Note 2021  Visit Count:  14    ICD-10: Treatment Diagnosis: Difficulty in walking, not elsewhere classified (R26.2)  Precautions/Allergies:   [place], Imitrex [sumatriptan], Keflex [cephalexin], Sulfa (sulfonamide antibiotics), Theophylline, Topamax [topiramate], Ultram [tramadol], Zomig [zolmitriptan], Adhesive, Kenalog [triamcinolone acetonide], Pneumococcal vaccine, and Verapamil   TREATMENT PLAN:  Effective Dates: 3/2/2021 TO 2021 (90 days). Frequency/Duration: 2 times a week for 90 Day(s)    Pre-treatment Symptoms/Complaints:  Patient reports having more dizziness today. \"Dizziness 3/10 and pain level 0/10\". Pain: Initial: Pain Intensity 1: 0  Post Session:  0/10   Medications Last Reviewed:  2021  Updated Objective Findings:  None Today  TREATMENT:     NEUROMUSCULAR RE-EDUCATION: (45 minutes):  Exercise/activities per grid below to improve balance, coordination, kinesthetic sense and posture. Required moderate verbal cues to promote static and dynamic balance in standing. Patient practiced opening and closing Drive Nimbo Posterior Walker to prepare to navigate up and down stairs. Patient practiced going up and down stairs with Drive Nimbo Posterior Walker holding onto left railing with upper extremity. Patient practiced going up forward, up sideways, and up backwards with stairs. Patient needed seat on walker to be adjusted because it would fall down easily. Adjusted seat with screwdriver.        [employer] Portal  Treatment/Session Summary:    · Response to Treatment:  Patient more stable going up stairs sideways holding onto rail with left upper extremity and holding onto [address] Nimbo Posterior Walker with right upper extremity. Patient needed frequent rest breaks due to increased dizziness with movement. · Communication/Consultation:  None today  · Equipment provided today:  None today  · Recommendations/Intent for next treatment session: Next visit will focus on balance exercises, vestibular exercises, and manual therapy.       Total Treatment Billable Duration:  45 minutes  PT Patient Time In/Time Out  Time In: 1010  Time Out: [address], PT    Future Appointments   Date Time Provider Jacquie Smith   6/2/2021  9:00 AM Cedric Siemens, MD [facility]   6/8/2021 10:15 AM Angela Nielsen PT [facility] S[facility]   6/11/2021 11:00 AM Angela Nielsen PT [facility]   6/15/2021 11:30 AM Bebe Montiel MD [facility]

## 2021-06-02 PROBLEM — R29.898 LEFT LEG WEAKNESS: Status: ACTIVE | Noted: 2021-06-02

## 2021-06-08 ENCOUNTER — HOSPITAL ENCOUNTER (OUTPATIENT)
Dept: PHYSICAL THERAPY | Age: 41
Discharge: HOME | End: 2021-06-08
Attending: NURSE PRACTITIONER
Payer: MEDICARE

## 2021-06-08 PROCEDURE — 97112 NEUROMUSCULAR REEDUCATION: CPT

## 2021-06-08 NOTE — THERAPY RECERTIFICATION
Anabel Alcantara  : 1980  Primary: Donn Cam Medicare Advantage  Secondary:  [address]  at [facility]  [address] [facility], [address] [place] [address],[address], [place] [address].  [phone]   [phone]        OUTPATIENT PHYSICAL [address] 3/5/6730   ICD-10: Treatment Diagnosis: Difficulty in walking, not elsewhere classified (R26.2)  Precautions/Allergies:   Gayleen Claw antimigraine agents, Imitrex [sumatriptan], Keflex [cephalexin], Sulfa (sulfonamide antibiotics), Theophylline, Topamax [topiramate], Ultram [tramadol], Zomig [zolmitriptan], Adhesive, Kenalog [triamcinolone acetonide], Pneumococcal vaccine, and Verapamil   TREATMENT PLAN:  Effective Dates: 2021 TO 2021 (90 days). Frequency/Duration: 2 times a week for 90 Days     MEDICAL/REFERRING DIAGNOSIS:  Vertigo [R42]    DATE OF ONSET: Chronic   REFERRING PHYSICIAN: Laura Lugo NP/Dante Whittington MD MD Orders: Evaluate and treat  Return MD Appointment: unknown      INITIAL ASSESSMENT:  Ms. Nicki Wagner presents with dizziness, neck pain, imbalance, and difficulty walking. Patient could be experiencing some cervicogenic dizziness. Patient is at higher fall risk based on history of multiple falls and score received on Elizabeth Balance Scale. Patient would benefit from skilled physical therapy to address problems and goals. Thank you for this referral.     Progress note:  Patient has attended seven scheduled physical therapy appointments from 3/2/2021 to 2021. Patient would benefit from continuing skilled physical therapy to address problems and goals. Thank you for this referral.    Recertification note:  Patient has attended fifteen scheduled physical therapy appointments from 3/2/2021 to 2021. Patient demonstrates improved balance since beginning therapy. Patient also reports falling less since beginning therapy.   Patient would benefit from continuing skilled physical therapy to address problems and goals. Thank you for this referral.     PROBLEM LIST (Impacting functional limitations):  1. Decreased Transfer Abilities  2. Decreased Ambulation Ability/Technique  3. Decreased Balance  4. Increased Pain  5. Decreased Flexibility/Joint Mobility  6. Decreased [place] with Home Exercise Program  7. Increased dizziness INTERVENTIONS PLANNED: (Treatment may consist of any combination of the following)  1. Balance Exercise  2. Gait Training  3. Home Exercise Program (HEP)  4. Manual Therapy  5. Range of Motion (ROM)  6. Habituation exercises     GOALS: (Goals have been discussed and agreed upon with patient.)  Short-Term Functional Goals: Time Frame: 30 days   1. Patient will be independent with home exercise program to improve balance. Goal met. 2. Patient will score greater than or equal to 30/56 on Elizabeth Balance Scale indicating improved balance and decreased fall risk with daily activities. Goal met. Discharge Goals: Time Frame: 90 days   1. Patient will score greater than or equal to 40/56 on Elizabeth Balance Scale indicating improved balance and decreased fall risk with daily activities. Goal ongoing. 2. Patient will report improved stability at home and at work. Goal ongoing. OUTCOME MEASURE:   Tool Used: Elizabeth Balance Scale  Score:  Initial: 26/56 Most Recent: 34/56 (Date: 6-8-2021 )   Interpretation of Score: Each section is scored on a 0-4 scale, 0 representing the patients inability to perform the task and 4 representing independence. The scores of each section are added together for a total score of 56. The higher the patients score, the more independent the patient is. Any score below 45 indicates increased risk for falls. MEDICAL NECESSITY:   · Patient is expected to demonstrate progress in balance and dizziness to improve safety during activities of daily living.   REASON FOR SERVICES/OTHER COMMENTS:  · Patient continues to require skilled intervention due to higher fall risk with daily activities. Regarding Mylene Edge's therapy, I certify that the treatment plan above will be carried out by a therapist or under their direction. Thank you for this referral,  Alonzo Castleman, PT     Referring Physician Signature: Rome Dean MD _______________________________ Date _____________       PAIN/SUBJECTIVE:   Initial: Pain Intensity 1: 0  Post Session:  0/10   HISTORY:   History of Injury/Illness (Reason for Referral):  Patient complains of dizziness, imbalance, and difficulty walking. Patient here in the clinic last year, but stopped therapy when COVID-19 began. Patient reports symptoms have gotten worse. Patient has been falling a lot more frequently. Patient has to use two hiking poles when walking instead of just one hiking pole. Patient rates current dizziness as 3/10 and current pain level in cervical spine as 3/10. Patient tends to lose her balance backwards and to the left. Patient has a history of lead toxicity. Patient reports more recent exposure to mold. Past Medical History/Comorbidities:   Ms. Salome Ashley  has a past medical history of Adverse effect of anesthesia, Asthma, BMI 36.0-36.9,adult, Chronic pain, Hyperlipidemia, Migraines, Movement disorder, Thyroid disease, Vertigo, and Vestibular nerve disorder (2008). She also has no past medical history of Difficult intubation, Malignant hyperthermia due to anesthesia, Nausea & vomiting, or Pseudocholinesterase deficiency. Ms. Salome Ashley  has a past surgical history that includes hx orthopaedic and hx heent. Social History/Living Environment:     Lives alone in an apartment. Patient has four steps to enter her home. Patient has family nearby which helps her when needed. Prior Level of Function/Work/Activity:  Independent. Works as an OT. Dominant Side:         RIGHT  Personal Factors:          Sex:  female        Age:  39 y.o.    Ambulatory/Rehab Services H2 Model Falls Risk Assessment   Risk Factors:       (1)  Dizziness/Vertigo       (1)  Any administered benzodiazepines       (5)  History of Recent Falls [w/in 3 months] Ability to Rise from Chair:       (1)  Pushes up, successful in one attempt   Falls Prevention Plan:       Exercise/Equipment Adaptation (specify):  Patient will be supervised in the clinic at all times due to higher fall risk   Total: (5 or greater = High Risk): 8   ©2010 [facility] . [facility] States Patent #[id number]. Federal Law prohibits the replication, distribution or use without written permission from [facility] eÃ‡ift   Current Medications:       Current Outpatient Medications:     ubrogepant (Ubrelvy) 100 mg tablet, Take 1 Tab by mouth daily as needed for Migraine. TAKE ONE AT ONSET OF MIGRAINE, MAY REPEAT X 1 IF NEEDED. NOT TO EXCEED 2 TABS PER 24 HOUR PERIOD., Disp: 10 Tab, Rfl: 11    erenumab-aooe (Aimovig Autoinjector) 70 mg/mL injection, 1 mL by SubCUTAneous route every thirty (30) days. , Disp: 1 Each, Rfl: 11    clonazePAM (KlonoPIN) 0.5 mg tablet, Take 1 Tab by mouth daily as needed (severe vertigo). Take / use AM day of surgery  per anesthesia protocols if needed. , Disp: 30 Tab, Rfl: 1    esomeprazole (NEXIUM) 40 mg capsule, Take 1 Cap by mouth two (2) times a day. Indications: gastroesophageal reflux disease, Disp: 60 Cap, Rfl: 6    ondansetron (ZOFRAN ODT) 4 mg disintegrating tablet, Take 1 Tab by mouth every eight (8) hours as needed for Nausea., Disp: 24 Tab, Rfl: 2    fexofenadine (ALLEGRA) 180 mg tablet, Take 180 mg by mouth nightly., Disp: , Rfl:     OTHER,NON-FORMULARY,, by IntraVENous route every Monday. Calcium EDTA treatments every other week, Disp: , Rfl:     albuterol (PROVENTIL HFA, VENTOLIN HFA, PROAIR HFA) 90 mcg/actuation inhaler, Take 2 Puffs by inhalation every four (4) hours as needed for Wheezing or Shortness of Breath. Take / use AM day of surgery  per anesthesia protocols if needed.    Indications: Asthma Attack, Disp: , Rfl:     OTHER, PLACE 1 SHANA UNDER THE TONGUE ONCE A DAY  DO NOT EAT DRINK FOR 15MIN AFTER TAKING, Disp: , Rfl: 5    mometasone (NASONEX) 50 mcg/actuation nasal spray, 1 [place] by Both Nostrils route nightly., Disp: , Rfl:     cholecalciferol, vitamin D3, (VITAMIN D3 PO), Take 10,000 Units by mouth daily. , Disp: , Rfl:     MAGNESIUM PO, Take 2 Tabs by mouth daily. , Disp: , Rfl:     VITAMIN A PO, Take 1 Tab by mouth daily. , Disp: , Rfl:     ARMOUR THYROID 30 mg tablet, TAKE 1 TABLET BY MOUTH EVERY DAY IN THE MORNING, Disp: , Rfl: 5    BUTTERBUR ROOT EXTRACT PO, Take 2 Tabs by mouth daily. , Disp: , Rfl:     multivitamin capsule, Take 2 Caps by mouth daily. , Disp: , Rfl:    Date Last Reviewed:  6/8/2021   Number of Personal Factors/Comorbidities that affect the Plan of Care: 3+: HIGH COMPLEXITY   EXAMINATION:   Observation/Orthostatic Postural Assessment:          Slight forward head rounded shoulders posture. Palpation: Increased tenderness along bilateral upper traps/SCM, left worse than right. Tender throughout C5-C7. Functional Mobility:         Gait/Ambulation:  Patient ambulates with bilateral hiking poles demonstrating decreased gait speed with wider base of support and moderate deviation from straight path. ROM:          Cervical range of motion is as follows: flexion within normal limits, extension within normal limits, right rotation within normal limits, left rotation 75%, right lateral flexion 50%, left lateral flexion 75%. Postural Control & Balance:  · Elizabeth Balance Scale:  34/56.   (A score less than 45/56 indicates high risk of falls). Score improved from 26/56 on initial evaluation. · Dynamic Gait Index:  Not tested. Body Structures Involved:  1. Nerves  2. Eyes and Ears  3. Muscles Body Functions Affected:  1. Neuromusculoskeletal Activities and Participation Affected:  1. General Tasks and Demands  2. Mobility  3.  [employer], [employer] and [employer]   Number of elements (examined above) that affect the Plan of Care: 4+: HIGH COMPLEXITY   CLINICAL PRESENTATION:   Presentation: Evolving clinical presentation with changing clinical characteristics: MODERATE COMPLEXITY   CLINICAL DECISION MAKING:   Use of outcome tool(s) and clinical judgement create a POC that gives a: Questionable prediction of patient's progress: MODERATE COMPLEXITY

## 2021-06-08 NOTE — PROGRESS NOTES
Liliana Vijay  : 1980  Primary: Dipak Wendyleighton Bradytna Medicare Advantage  Secondary:  [address] at [place] [address] [facility], [address] [place] [address],[address], [place] [address].  [phone]   [phone]     OUTPATIENT PHYSICAL THERAPY: Daily Treatment Note 2021  Visit Count:  15    ICD-10: Treatment Diagnosis: Difficulty in walking, not elsewhere classified (R26.2)  Precautions/Allergies:   [place], Imitrex [sumatriptan], Keflex [cephalexin], Sulfa (sulfonamide antibiotics), Theophylline, Topamax [topiramate], Ultram [tramadol], Zomig [zolmitriptan], Adhesive, Kenalog [triamcinolone acetonide], Pneumococcal vaccine, and Verapamil   TREATMENT PLAN:  Effective Dates: 2021 TO 2021 (90 days). Frequency/Duration: 2 times a week for 90 Days    Pre-treatment Symptoms/Complaints:  Patient reports no falls. \"Dizziness 3/10 and pain level 0/10. Nerve conduction test was normal\". Pain: Initial: Pain Intensity 1: 0  Post Session:  0/10   Medications Last Reviewed:  2021  Updated Objective Findings:  See recertification note  TREATMENT:     NEUROMUSCULAR RE-EDUCATION: (45 minutes):  Exercise/activities per grid below to improve balance, coordination, kinesthetic sense and posture. Required moderate verbal cues to promote static and dynamic balance in standing. Date:  2021 Date:   Date:     Activity/Exercise Parameters Parameters Parameters   Practiced walking up/down ramps with Drive adsquare Posterior Walker 15 minutes     Stepping ups 6 inch  2x10 reps     Lunges 2x10 reps     Side lunges 2x10 reps     Side step ups 4 inch 10 reps  2 inch 10 reps                     [employer] Portal  Treatment/Session Summary:    · Response to Treatment:  Patient demonstrates improved balance since initial evaluation.    · Communication/Consultation:  None today  · Equipment provided today:  None today  · Recommendations/Intent for next treatment session: Next visit will focus on balance exercises, vestibular exercises, and manual therapy.       Total Treatment Billable Duration:  45 minutes  PT Patient Time In/Time Out  Time In: 1015  Time Out: Divina 51, PT    Future Appointments   Date Time Provider Jacquie Smith   6/11/2021 11:00 AM Hilton France, BENITA [facility] SFE   6/15/2021 11:30 AM Verna Horton MD [facility] BS[facility]   7/20/2021 11:00 AM Hilton France, PT [facility]FE   7/23/2021 10:15 AM Jori Nielsen, PT [facility]   8/3/2021 10:15 AM Jori Nielsen, PT [facility]   8/6/2021 10:15 AM Jori Nielsen, PT [facility]

## 2021-06-11 ENCOUNTER — HOSPITAL ENCOUNTER (OUTPATIENT)
Dept: PHYSICAL THERAPY | Age: 41
Discharge: HOME | End: 2021-06-11
Attending: NURSE PRACTITIONER
Payer: MEDICARE

## 2021-06-11 PROCEDURE — 97112 NEUROMUSCULAR REEDUCATION: CPT

## 2021-06-11 NOTE — PROGRESS NOTES
Harvinder Askew  : 1980  Primary: Derrick Cam Medicare Advantage  Secondary:  [address] at [place] [id number] [facility], [address] [place] [address],[address], [place] [address].  [phone]   [phone]     OUTPATIENT PHYSICAL THERAPY: Daily Treatment Note 2021  Visit Count:  16    ICD-10: Treatment Diagnosis: Difficulty in walking, not elsewhere classified (R26.2)  Precautions/Allergies:   [place], Imitrex [sumatriptan], Keflex [cephalexin], Sulfa (sulfonamide antibiotics), Theophylline, Topamax [topiramate], Ultram [tramadol], Zomig [zolmitriptan], Adhesive, Kenalog [triamcinolone acetonide], Pneumococcal vaccine, and Verapamil   TREATMENT PLAN:  Effective Dates: 2021 TO 2021 (90 days). Frequency/Duration: 2 times a week for 90 Days    Pre-treatment Symptoms/Complaints:   \"I started off the day with a bad headache and took some medication. The medication has helped my headache\". Dizziness 0/10 and pain level 3/10. Pain: Initial: Pain Intensity 1: 3  Post Session:  310   Medications Last Reviewed:  2021  Updated Objective Findings:  None Today  TREATMENT:     NEUROMUSCULAR RE-EDUCATION: (45 minutes):  Exercise/activities per grid below to improve balance, coordination, kinesthetic sense and posture. Required moderate verbal cues to promote static and dynamic balance in standing.     Date:  2021 Date:  2021 Date:     Activity/Exercise Parameters Parameters Parameters   Practiced walking up/down ramps with Drive Virtual 3-D Display for Smartphones Posterior Walker 15 minutes 15 minutes    Stepping ups 6 inch  2x10 reps     Lunges 2x10 reps     Side lunges 2x10 reps     Side step ups 4 inch 10 reps  2 inch 10 reps     Stepping over half foam  Forward 10 reps each  Lateral 10 reps each    Standing holding red ball with trunk rotation and diagonals-trying to hold midline balance  10 minutes    Standing on blue foam  Eyes open; eyes open with head turns    Quadriped- alternate arm lifts; quadriped alternate arm/leg lifts  10 reps                    [employer] Portal  Treatment/Session Summary:    · Response to Treatment:  Patient has harder time stabilizing left scapular region with lifting right arm in quadriped. Patient more stable going up and down ramps with Nimbo walker. · Communication/Consultation:  None today  · Equipment provided today:  None today  · Recommendations/Intent for next treatment session: Next visit will focus on balance exercises, vestibular exercises, and manual therapy.       Total Treatment Billable Duration:  45 minutes  PT Patient Time In/Time Out  Time In: 1100  Time Out: 1011 [place] Mitzi Rubio, PT    Future Appointments   Date Time Provider Jacquie Smith   6/15/2021 11:30 AM Erik Jackson MD [facility] BS[facility]   7/20/2021 11:00 AM Kwame Beasley, PT [facility] SFE   7/23/2021 10:15 AM Albania Nielsen, PT [facility]PT SFE   8/3/2021 10:15 AM Albania Nielsen, PT [facility]PT SFE   8/6/2021 10:15 AM Albania Nielsen, PT SFE[facility]PT SFE

## 2021-07-20 ENCOUNTER — HOSPITAL ENCOUNTER (OUTPATIENT)
Dept: PHYSICAL THERAPY | Age: 41
Discharge: HOME | End: 2021-07-20
Attending: NURSE PRACTITIONER
Payer: MEDICARE

## 2021-07-20 PROCEDURE — 97112 NEUROMUSCULAR REEDUCATION: CPT

## 2021-07-20 NOTE — PROGRESS NOTES
Dois Reema  : 1980  Primary: Tonya Cam Medicare Advantage  Secondary:  [address] at [facility]  [address] [facility], [address] [place] [address],[address], [address] [facility]  [phone]   [phone]     OUTPATIENT PHYSICAL THERAPY: Daily Treatment Note 2021  Visit Count:  17    ICD-10: Treatment Diagnosis: Difficulty in walking, not elsewhere classified (R26.2)  Precautions/Allergies:   [place], Imitrex [sumatriptan], Keflex [cephalexin], Sulfa (sulfonamide antibiotics), Theophylline, Topamax [topiramate], Ultram [tramadol], Zomig [zolmitriptan], Adhesive, Kenalog [triamcinolone acetonide], Pneumococcal vaccine, and Verapamil   TREATMENT PLAN:  Effective Dates: 2021 TO 2021 (90 days). Frequency/Duration: 2 times a week for 90 Days    Pre-treatment Symptoms/Complaints:   \"I had one fall over vacation. I am struggling getting back into work. I got into [place]. I see them -\". Dizziness 3/10 and pain level 4/10. Pain: Initial: Pain Intensity 1: 4  Post Session:  4/10   Medications Last Reviewed:  2021  Updated Objective Findings:  None Today  TREATMENT:     NEUROMUSCULAR RE-EDUCATION: (45 minutes):  Exercise/activities per grid below to improve balance, coordination, kinesthetic sense and posture. Required moderate verbal cues to promote static and dynamic balance in standing.     Date:  2021 Date:  2021 Date:  21   Activity/Exercise Parameters Parameters Parameters   Practiced walking up/down ramps with Drive Nimbo Posterior Walker 15 minutes 15 minutes 10 minutes   Stepping ups 6 inch  2x10 reps     Lunges 2x10 reps  2x10 reps   Side lunges 2x10 reps  2x10 reps   Side step ups 4 inch 10 reps  2 inch 10 reps     Stepping over half foam  Forward 10 reps each  Lateral 10 reps each    Standing holding red ball with trunk rotation and diagonals-trying to hold midline balance  10 minutes    Standing on blue foam  Eyes open; eyes open with head turns Eyes open; eyes open with head turns   Quadriped- alternate arm lifts; quadriped alternate arm/leg lifts  10 reps    Walking through cones using iLive poles   4 laps             You[employer] Portal  Treatment/Session Summary:    · Response to Treatment:  Patient more stable during ambulation today. Will see patient after she gets back from [facility]. · Communication/Consultation:  None today  · Equipment provided today:  None today  · Recommendations/Intent for next treatment session: Next visit will focus on balance exercises, vestibular exercises, and manual therapy.       Total Treatment Billable Duration:  45 minutes  PT Patient Time In/Time Out  Time In: 1100  Time Out: Lupillo Vale, PT    Future Appointments   Date Time Provider Jacquie Smith   8/31/2021 10:15 AM Mishel Nielsen, PT ISMAELE[facility]LEY SFE

## 2021-07-23 ENCOUNTER — APPOINTMENT (OUTPATIENT)
Dept: PHYSICAL THERAPY | Age: 41
End: 2021-07-23
Attending: NURSE PRACTITIONER
Payer: MEDICARE

## 2021-08-03 ENCOUNTER — APPOINTMENT (OUTPATIENT)
Dept: PHYSICAL THERAPY | Age: 41
End: 2021-08-03
Attending: NURSE PRACTITIONER
Payer: MEDICARE

## 2021-08-06 ENCOUNTER — APPOINTMENT (OUTPATIENT)
Dept: PHYSICAL THERAPY | Age: 41
End: 2021-08-06
Attending: NURSE PRACTITIONER
Payer: MEDICARE

## 2021-08-31 ENCOUNTER — HOSPITAL ENCOUNTER (OUTPATIENT)
Dept: PHYSICAL THERAPY | Age: 41
Discharge: HOME | End: 2021-08-31
Attending: NURSE PRACTITIONER
Payer: MEDICARE

## 2021-08-31 PROCEDURE — 97112 NEUROMUSCULAR REEDUCATION: CPT

## 2021-08-31 NOTE — PROGRESS NOTES
Alexi Peres  : 1980  Primary: Petty Cam Medicare Advantage  Secondary:  [address]  at [address], [address] [place] [address],[address], [place] [address].  [phone]   [phone]     OUTPATIENT PHYSICAL THERAPY: Daily Treatment Note 2021  Visit Count:  18    ICD-10: Treatment Diagnosis: Difficulty in walking, not elsewhere classified (R26.2)  Precautions/Allergies:   [place], Imitrex [sumatriptan], Keflex [cephalexin], Sulfa (sulfonamide antibiotics), Theophylline, Topamax [topiramate], Ultram [tramadol], Zomig [zolmitriptan], Adhesive, Kenalog [triamcinolone acetonide], Pneumococcal vaccine, and Verapamil   TREATMENT PLAN:  Effective Dates: 2021 TO 2021 (90 days). Frequency/Duration: 2 times a week for 90 Days    Pre-treatment Symptoms/Complaints:   \" I went to [facility] clinic. They said anxiety, depression, and movement disorder. They started me on effexor and klonopin. I have had two falls since I saw you last.  I fell both times backwards and to the left\". Dizziness 5/10 and pain level 2/10. Pain: Initial: Pain Intensity 1: 2  Post Session:  2/10   Medications Last Reviewed:  2021  Updated Objective Findings:  See recertification  TREATMENT:     NEUROMUSCULAR RE-EDUCATION: (45 minutes):  Exercise/activities per grid below to improve balance, coordination, kinesthetic sense and posture. Required moderate verbal cues to promote static and dynamic balance in standing.     Date:  2021 Date:  2021 Date:  21   Activity/Exercise Parameters Parameters Parameters   Practiced walking up/down ramps with Drive [place] Posterior Walker 10 minutes 15 minutes 10 minutes   Sidestepping down parallel bars 6 laps     Standing weight shifting- left/right 30 reps  2x10 reps   Standing weight shifting-diagonals 30 reps  2x10 reps   Side step ups      Stepping over half foam  Forward 10 reps each  Lateral 10 reps each    Standing holding red ball with trunk rotation and diagonals-trying to hold midline balance  10 minutes    Standing on blue foam  Eyes open; eyes open with head turns Eyes open; eyes open with head turns   Quadriped- alternate arm lifts; quadriped alternate arm/leg lifts  10 reps    Walking through cones using EasilyDo poles   4 laps           [email or web address] Portal  Treatment/Session Summary:    · Response to Treatment:  Patient reports increased fatigued after treatment. · Communication/Consultation:  None today  · Equipment provided today:  None today  · Recommendations/Intent for next treatment session: Next visit will focus on balance exercises, vestibular exercises, and manual therapy. Total Treatment Billable Duration:  45 minutes  PT Patient Time In/Time Out  Time In: 1005  Time Out: 826 [facility],     Future Appointments   Date Time Provider Jacquie Smith   9/3/2021 11:00 AM Katya Kinsey PT [facility] SFE   9/14/2021  8:45 AM Olga Nielsen PT [facility] SFE   9/17/2021  3:15 PM Olga Nielsen PT [facility] SFE   9/28/2021  8:45 AM Olga Nielsen PT [facility]PT SFE     Visit # Therapist initials Date Arrived NS/ Cx < 24 hr >24 hr Cx Visit Comments   18 PV 8/31/2021 X   [employer] MCR-Thirty-five dollar copay per appointment.   Recertification done today                                                                                                                                                              Abbreviations: NS = No Show; CX = cancelled

## 2021-08-31 NOTE — THERAPY RECERTIFICATION
Antonio Scott  : 1980  Primary: Stephen Cam Medicare Advantage  Secondary:  [address]  at [facility]  [address], [facility] [address]  [phone]   [phone]        OUTPATIENT PHYSICAL [address]    ICD-10: Treatment Diagnosis: Difficulty in walking, not elsewhere classified (R26.2)  Precautions/Allergies:   [place] antimigraine agents, Imitrex [sumatriptan], Keflex [cephalexin], Sulfa (sulfonamide antibiotics), Theophylline, Topamax [topiramate], Ultram [tramadol], Zomig [zolmitriptan], Adhesive, Kenalog [triamcinolone acetonide], Pneumococcal vaccine, and Verapamil   TREATMENT PLAN:  Effective Dates: 2021 TO 2021 (90 days). Frequency/Duration: 2 times a week for 90 Days       MEDICAL/REFERRING DIAGNOSIS:  Vertigo [R42]    DATE OF ONSET: Chronic   REFERRING PHYSICIAN: Monisha Puga NP/Sherrell Whittington MD MD Orders: Evaluate and treat  Return MD Appointment: unknown      INITIAL ASSESSMENT:  Ms. Christoph Weems presents with dizziness, neck pain, imbalance, and difficulty walking. Patient could be experiencing some cervicogenic dizziness. Patient is at higher fall risk based on history of multiple falls and score received on Elizabeth Balance Scale. Patient would benefit from skilled physical therapy to address problems and goals. Thank you for this referral.     Recertification note:  Patient has attended eighteen scheduled physical therapy appointments from 3/2/2021 to 2021. Patient reports going for a follow up at the [facility]. Patient has been put on Klonopin and Effexor for her anxiety and depression. Patient reports having more difficulty at work due to imbalance and concentration. Patient would benefit from continuing skilled physical therapy to address problems and goals. Thank you for this referral.     PROBLEM LIST (Impacting functional limitations):  1.  Decreased Transfer Abilities  2. Decreased Ambulation Ability/Technique  3. Decreased Balance  4. Increased Pain  5. Decreased Flexibility/Joint Mobility  6. Decreased [place] with Home Exercise Program  7. Increased dizziness INTERVENTIONS PLANNED: (Treatment may consist of any combination of the following)  1. Balance Exercise  2. Gait Training  3. Home Exercise Program (HEP)  4. Manual Therapy  5. Range of Motion (ROM)  6. Habituation exercises     GOALS: (Goals have been discussed and agreed upon with patient.)  Short-Term Functional Goals: Time Frame: 30 days   1. Patient will be independent with home exercise program to improve balance. Goal met. 2. Patient will score greater than or equal to 30/56 on Elizabeth Balance Scale indicating improved balance and decreased fall risk with daily activities. Goal met. Discharge Goals: Time Frame: 90 days   1. Patient will score greater than or equal to 40/56 on Elizabeth Balance Scale indicating improved balance and decreased fall risk with daily activities. Goal ongoing. 2. Patient will report improved stability at home and at work. Goal ongoing. OUTCOME MEASURE:   Tool Used: Elizabeth Balance Scale  Score:  Initial: 26/56 Most Recent: 34/56 (Date: 8- )   Interpretation of Score: Each section is scored on a 0-4 scale, 0 representing the patients inability to perform the task and 4 representing independence. The scores of each section are added together for a total score of 56. The higher the patients score, the more independent the patient is. Any score below 45 indicates increased risk for falls. MEDICAL NECESSITY:   · Patient is expected to demonstrate progress in balance and dizziness to improve safety during activities of daily living. REASON FOR SERVICES/OTHER COMMENTS:  · Patient continues to require skilled intervention due to higher fall risk with daily activities.      Regarding Janice Edge's therapy, I certify that the treatment plan above will be carried out by a therapist or under their direction. Thank you for this referral,  Tess Mckeon PT     Referring Physician Signature: Gatito Case MD, MD _______________________________ Date _____________       PAIN/SUBJECTIVE:   Initial:    Post Session:  0/10   HISTORY:   History of Injury/Illness (Reason for Referral):  Patient complains of dizziness, imbalance, and difficulty walking. Patient here in the clinic last year, but stopped therapy when COVID-19 began. Patient reports symptoms have gotten worse. Patient has been falling a lot more frequently. Patient has to use two hiking poles when walking instead of just one hiking pole. Patient rates current dizziness as 3/10 and current pain level in cervical spine as 3/10. Patient tends to lose her balance backwards and to the left. Patient has a history of lead toxicity. Patient reports more recent exposure to mold. Past Medical History/Comorbidities:   Ms. Abdon Moseley  has a past medical history of Adverse effect of anesthesia, Asthma, BMI 36.0-36.9,adult, Chronic pain, Hyperlipidemia, Migraines, Movement disorder, Thyroid disease, Vertigo, and Vestibular nerve disorder (2008). She also has no past medical history of Difficult intubation, Malignant hyperthermia due to anesthesia, Nausea & vomiting, or Pseudocholinesterase deficiency. Ms. Abdon Moseley  has a past surgical history that includes hx orthopaedic and hx heent. Social History/Living Environment:     Lives alone in an apartment. Patient has four steps to enter her home. Patient has family nearby which helps her when needed. Prior Level of Function/Work/Activity:  Independent. Works as an OT. Dominant Side:         RIGHT  Personal Factors:          Sex:  female        Age:  39 y.o.    Ambulatory/Rehab Services H2 Model Falls Risk Assessment   Risk Factors:       (1)  Dizziness/Vertigo       (1)  Any administered benzodiazepines       (5)  History of Recent Falls [w/in 3 months] Ability to Rise from Chair:       (1)  Pushes up, successful in one attempt   Falls Prevention Plan:       Exercise/Equipment Adaptation (specify):  Patient will be supervised in the clinic at all times due to higher fall risk   Total: (5 or greater = High Risk): 8   ©2010 [facility] Patent #[id number]. Federal Law prohibits the replication, distribution or use without written permission from [facility] [employer]   Current Medications:       Current Outpatient Medications:     amoxicillin-clavulanate (AUGMENTIN) 500-125 mg per tablet, Take 1 Tablet by mouth two (2) times a day., Disp: 20 Tablet, Rfl: 0    DISABLED PLACARD (DISABLED PLACARD) DMV, Please provide handicap placard for neurologic condition affecting gait and balance., Disp: 1 Each, Rfl: 0    clonazePAM (KlonoPIN) 0.5 mg tablet, Take 1 Tablet by mouth daily as needed (severe vertigo). Take / use AM day of surgery  per anesthesia protocols if needed. , Disp: 30 Tablet, Rfl: 1    ondansetron (ZOFRAN ODT) 4 mg disintegrating tablet, Take 1 Tablet by mouth every eight (8) hours as needed for Nausea., Disp: 24 Tablet, Rfl: 2    ubrogepant (Ubrelvy) 100 mg tablet, Take 1 Tab by mouth daily as needed for Migraine. TAKE ONE AT ONSET OF MIGRAINE, MAY REPEAT X 1 IF NEEDED. NOT TO EXCEED 2 TABS PER 24 HOUR PERIOD., Disp: 10 Tab, Rfl: 11    erenumab-aooe (Aimovig Autoinjector) 70 mg/mL injection, 1 mL by SubCUTAneous route every thirty (30) days. , Disp: 1 Each, Rfl: 11    esomeprazole (NEXIUM) 40 mg capsule, Take 1 Cap by mouth two (2) times a day. Indications: gastroesophageal reflux disease, Disp: 60 Cap, Rfl: 6    fexofenadine (ALLEGRA) 180 mg tablet, Take 180 mg by mouth nightly., Disp: , Rfl:     OTHER,NON-FORMULARY,, by IntraVENous route every Monday.  Calcium EDTA treatments every other week, Disp: , Rfl:     albuterol (PROVENTIL HFA, VENTOLIN HFA, PROAIR HFA) 90 mcg/actuation inhaler, Take 2 Puffs by inhalation every four (4) hours as needed for Wheezing or Shortness of Breath. Take / use AM day of surgery  per anesthesia protocols if needed. Indications: Asthma Attack, Disp: , Rfl:     OTHER, PLACE 1 SHANA UNDER THE TONGUE ONCE A DAY  DO NOT EAT DRINK FOR 15MIN AFTER TAKING, Disp: , Rfl: 5    mometasone (NASONEX) 50 mcg/actuation nasal spray, 1 [place] by Both Nostrils route nightly., Disp: , Rfl:     cholecalciferol, vitamin D3, (VITAMIN D3 PO), Take 10,000 Units by mouth daily. , Disp: , Rfl:     MAGNESIUM PO, Take 2 Tabs by mouth daily. , Disp: , Rfl:     VITAMIN A PO, Take 1 Tab by mouth daily. , Disp: , Rfl:     ARMOUR THYROID 30 mg tablet, TAKE 1 TABLET BY MOUTH EVERY DAY IN THE MORNING, Disp: , Rfl: 5    BUTTERBUR ROOT EXTRACT PO, Take 2 Tabs by mouth daily. , Disp: , Rfl:     multivitamin capsule, Take 2 Caps by mouth daily. , Disp: , Rfl:    Date Last Reviewed:  8/31/2021   Number of Personal Factors/Comorbidities that affect the Plan of Care: 3+: HIGH COMPLEXITY   EXAMINATION:   Observation/Orthostatic Postural Assessment:          Slight forward head rounded shoulders posture. Palpation: Increased tenderness along bilateral upper traps/SCM, left worse than right. Tender throughout C5-C7. Functional Mobility:         Gait/Ambulation:  Patient ambulates with bilateral hiking poles demonstrating decreased gait speed with wider base of support and moderate deviation from straight path. ROM:          Cervical range of motion is as follows: flexion within normal limits, extension within normal limits, right rotation within normal limits, left rotation 75%, right lateral flexion 50%, left lateral flexion 75%. Postural Control & Balance:  · Elizabeth Balance Scale:  34/56.   (A score less than 45/56 indicates high risk of falls). Score improved from 26/56 on initial evaluation. · Dynamic Gait Index:  Not tested. Body Structures Involved:  1. Nerves  2. Eyes and Ears  3.  Muscles Body Functions Affected:  1. Neuromusculoskeletal Activities and Participation Affected:  1. General Tasks and Demands  2. Mobility  3.  Community, Social and [place]   Number of elements (examined above) that affect the Plan of Care: 4+: HIGH COMPLEXITY   CLINICAL PRESENTATION:   Presentation: Evolving clinical presentation with changing clinical characteristics: MODERATE COMPLEXITY   CLINICAL DECISION MAKING:   Use of outcome tool(s) and clinical judgement create a POC that gives a: Questionable prediction of patient's progress: MODERATE COMPLEXITY

## 2021-09-03 ENCOUNTER — HOSPITAL ENCOUNTER (OUTPATIENT)
Dept: PHYSICAL THERAPY | Age: 41
Discharge: HOME | End: 2021-09-03
Attending: NURSE PRACTITIONER
Payer: MEDICARE

## 2021-09-03 PROCEDURE — 97112 NEUROMUSCULAR REEDUCATION: CPT

## 2021-09-03 NOTE — PROGRESS NOTES
Gama Wolf  : 1980  Primary: Renetta Cam Medicare Advantage  Secondary:  [address]  at [place] [address],[address], [place] [address].  [phone]   [phone]     OUTPATIENT PHYSICAL THERAPY: Daily Treatment Note 9/3/2021  Visit Count:  19    ICD-10: Treatment Diagnosis: Difficulty in walking, not elsewhere classified (R26.2)  Precautions/Allergies:   [place], Imitrex [sumatriptan], Keflex [cephalexin], Sulfa (sulfonamide antibiotics), Theophylline, Topamax [topiramate], Ultram [tramadol], Zomig [zolmitriptan], Adhesive, Kenalog [triamcinolone acetonide], Pneumococcal vaccine, and Verapamil   TREATMENT PLAN:  Effective Dates: 2021 TO 2021 (90 days). Frequency/Duration: 2 times a week for 90 Days    Pre-treatment Symptoms/Complaints:   \"When I look down I want to fall\". Dizziness 3/10 and pain level 0/10. Pain: Initial: Pain Intensity 1: 0  Post Session:  0/10   Medications Last Reviewed:  9/3/2021  Updated Objective Findings:  None Today  TREATMENT:     NEUROMUSCULAR RE-EDUCATION: (45 minutes):  Exercise/activities per grid below to improve balance, coordination, kinesthetic sense and posture. Required moderate verbal cues to promote static and dynamic balance in standing.     Date:  9/3/2021 Date:   Date:  21   Activity/Exercise Parameters Parameters Parameters   Walking in the clinic with hiking poles 10 minutes 15 minutes 10 minutes   Sidestepping in hallway with hiking poles 4 laps     Marching in hallway with hiking poles 4 laps  2x10 reps      2x10 reps   Side step ups      Stepping over half foam Forward 10 reps each  Lateral 10 reps each Forward 10 reps each  Lateral 10 reps each    Standing holding red ball with trunk rotation and diagonals-trying to hold midline balance  10 minutes    Tiltboard Weight shifting forward/backwad; Eyes open; eyes open with head turns Eyes open; eyes open with head turns Eyes open; eyes open with head turns   Quadriped- alternate arm lifts; quadriped alternate arm/leg lifts  10 reps    Walking through cones using [place] poles 4 laps  4 laps           [employer] Portal  Treatment/Session Summary:    · Response to Treatment:  Patient took no sitting rest breaks today demonstrating improvement. Patient reports increase in dizziness to 5/10 with slight headache after treatment. · Communication/Consultation:  None today  · Equipment provided today:  None today  · Recommendations/Intent for next treatment session: Next visit will focus on balance exercises, vestibular exercises, and manual therapy. Total Treatment Billable Duration:  45 minutes  PT Patient Time In/Time Out  Time In: 1100  Time Out: 1010 [facility], PT    Future Appointments   Date Time Provider Jacquie Smith   9/14/2021  8:45 AM Abdirahman Bone PT [facility]Y [facility]   9/17/2021  3:15 PM Bryant Nielsen, PT [facility]PT ISMAELE   9/28/2021  8:45 AM Bryant Nielsen, PT SFEORPT SFE     Visit # Therapist initials Date Arrived NS/ Cx < 24 hr >24 hr Cx Visit Comments   18 PV 8/31/2021 X   [employer] MCR-Thirty-five dollar copay per appointment.   Recertification done today   19 PV 9/3/2021 X                                                                                                                                                        Abbreviations: NS = No Show; CX = cancelled

## 2021-09-14 ENCOUNTER — HOSPITAL ENCOUNTER (OUTPATIENT)
Dept: PHYSICAL THERAPY | Age: 41
Discharge: HOME | End: 2021-09-14
Attending: NURSE PRACTITIONER
Payer: MEDICARE

## 2021-09-14 PROCEDURE — 97112 NEUROMUSCULAR REEDUCATION: CPT

## 2021-09-14 NOTE — PROGRESS NOTES
Gama Wolf  : 1980  Primary: Renetta Cam Medicare Advantage  Secondary:  [address]  at [address], [address] [place] [address],[address], [address] [facility]  [phone]   [phone]     OUTPATIENT PHYSICAL THERAPY: Daily Treatment Note 2021  Visit Count:  20    ICD-10: Treatment Diagnosis: Difficulty in walking, not elsewhere classified (R26.2)  Precautions/Allergies:   [place], Imitrex [sumatriptan], Keflex [cephalexin], Sulfa (sulfonamide antibiotics), Theophylline, Topamax [topiramate], Ultram [tramadol], Zomig [zolmitriptan], Adhesive, Kenalog [triamcinolone acetonide], Pneumococcal vaccine, and Verapamil   TREATMENT PLAN:  Effective Dates: 2021 TO 2021 (90 days). Frequency/Duration: 2 times a week for 90 Days    Pre-treatment Symptoms/Complaints:   \"I still don't know about the meds. I go up again with the Effexor tomorrow\". Dizziness 3/10 and pain level 0/10. Pain: Initial: Pain Intensity 1: 0  Post Session:  0/10   Medications Last Reviewed:  2021  Updated Objective Findings:  None Today  TREATMENT:     NEUROMUSCULAR RE-EDUCATION: (45 minutes):  Exercise/activities per grid below to improve balance, coordination, kinesthetic sense and posture. Required moderate verbal cues to promote static and dynamic balance in standing.     Date:  9/3/2021 Date:  2021 Date:  21   Activity/Exercise Parameters Parameters Parameters   Walking in the clinic with hiking poles 10 minutes  10 minutes   Sidestepping in hallway with hiking poles 4 laps 4 laps    Marching in hallway with hiking poles 4 laps 4 laps 2x10 reps   Standing on blue therafoam  Eyes open with head turns and eyes closed 2x10 reps   Side step ups  6 inch  Forward 2x10 reps  Lateral 2x10 reps    Stepping over half foam Forward 10 reps each  Lateral 10 reps each     Standing holding red ball with trunk rotation and diagonals-trying to hold midline balance      Tiltboard Weight shifting forward/backwad; Eyes open; eyes open with head turns  Eyes open; eyes open with head turns   Quadriped- alternate arm lifts; quadriped alternate arm/leg lifts      Walking through cones using [place] poles 4 laps 4 laps 4 laps           [employer] Portal  Treatment/Session Summary:    · Response to Treatment:  Patient reports increased fatigue after treatment. Patient rates dizziness after treatment as 5/10. · Communication/Consultation:  None today  · Equipment provided today:  None today  · Recommendations/Intent for next treatment session: Next visit will focus on balance exercises, vestibular exercises, and manual therapy. Total Treatment Billable Duration:  45 minutes  PT Patient Time In/Time Out  Time In: 0845  Time Out: Robert Irving 66., PT    Future Appointments   Date Time Provider Jacquie Smith   9/17/2021  3:15 PM Satya Florian, PT [facility]   9/28/2021  8:45 AM Parth Nielsen, PT [facility]     Visit # Therapist initials Date Arrived NS/ Cx < 24 hr >24 hr Cx Visit Comments   18 PV 8/31/2021 X   [employer] MCR-Thirty-five dollar copay per appointment.   Recertification done today   19 PV 9/3/2021 X      20 PV 9/14/2021 X                                                                                                                                               Abbreviations: NS = No Show; CX = cancelled

## 2021-09-17 ENCOUNTER — HOSPITAL ENCOUNTER (OUTPATIENT)
Dept: PHYSICAL THERAPY | Age: 41
Discharge: HOME | End: 2021-09-17
Attending: NURSE PRACTITIONER
Payer: MEDICARE

## 2021-09-17 PROCEDURE — 97112 NEUROMUSCULAR REEDUCATION: CPT

## 2021-09-17 NOTE — PROGRESS NOTES
Lashonda Newer  : 1980  Primary: Evelin Cam Medicare Advantage  Secondary:  [address]  at [place] [address],[address], [place] [address].  [phone]   [phone]     OUTPATIENT PHYSICAL THERAPY: Daily Treatment Note 2021  Visit Count:  21    ICD-10: Treatment Diagnosis: Difficulty in walking, not elsewhere classified (R26.2)  Precautions/Allergies:   [place], Imitrex [sumatriptan], Keflex [cephalexin], Sulfa (sulfonamide antibiotics), Theophylline, Topamax [topiramate], Ultram [tramadol], Zomig [zolmitriptan], Adhesive, Kenalog [triamcinolone acetonide], Pneumococcal vaccine, and Verapamil   TREATMENT PLAN:  Effective Dates: 2021 TO 2021 (90 days). Frequency/Duration: 2 times a week for 90 Days    Pre-treatment Symptoms/Complaints:   \"I fell to the left when I was in the kitchen doing dishes\". Dizziness 4/10 and pain level 2/10. Pain: Initial: Pain Intensity 1: 0  Post Session:  0/10   Medications Last Reviewed:  2021  Updated Objective Findings:  None Today  TREATMENT:     NEUROMUSCULAR RE-EDUCATION: (40 minutes):  Exercise/activities per grid below to improve balance, coordination, kinesthetic sense and posture. Required moderate verbal cues to promote static and dynamic balance in standing.     Date:  9/3/2021 Date:  2021 Date:  21   Activity/Exercise Parameters Parameters Parameters   Walking in the clinic with hiking poles 10 minutes     Sidestepping in hallway with hiking poles 4 laps 4 laps 4 laps   Marching in hallway with hiking poles 4 laps 4 laps 4 laps   Standing on blue therafoam  Eyes open with head turns and eyes closed    Side step ups  6 inch  Forward 2x10 reps  Lateral 2x10 reps    Stepping over half foam Forward 10 reps each  Lateral 10 reps each  Forward 10 reps each  Lateral 10 reps each   Standing holding red ball with trunk rotation and diagonals-trying to hold midline balance      Tiltboard Weight shifting forward/backwad; Eyes open; eyes open with head turns     [place]   Eyes open; eyes open with head turns   Walking through cones using hiking poles 4 laps 4 laps 4 laps           [employer] Portal  Treatment/Session Summary:    · Response to Treatment:  Patient experienced more visual issues during treatment. Patient needed several standing rest breaks to decrease dizziness and visual issues. · Communication/Consultation:  None today  · Equipment provided today:  None today  · Recommendations/Intent for next treatment session: Next visit will focus on balance exercises, vestibular exercises, and manual therapy. Total Treatment Billable Duration:  40 minutes  PT Patient Time In/Time Out  Time In: 0053  Time Out: Robbi 72, PT    Future Appointments   Date Time Provider Jacquie Smith   9/28/2021  8:45 AM Beverly Nielsen, PT [facility]     Visit # Therapist initials Date Arrived NS/ Cx < 24 hr >24 hr Cx Visit Comments   18 PV 8/31/2021 X   [employer] MCR-Thirty-five dollar copay per appointment.   Recertification done today   19 PV 9/3/2021 X      20 PV 9/14/2021 X      21 PV 9/17/2021 X                                                                                                                                      Abbreviations: NS = No Show; CX = cancelled

## 2021-09-28 ENCOUNTER — HOSPITAL ENCOUNTER (OUTPATIENT)
Dept: PHYSICAL THERAPY | Age: 41
Discharge: HOME | End: 2021-09-28
Attending: NURSE PRACTITIONER
Payer: MEDICARE

## 2021-09-28 PROCEDURE — 97112 NEUROMUSCULAR REEDUCATION: CPT

## 2021-09-28 NOTE — PROGRESS NOTES
Park Greg  : 1980  Primary: Fabiana Cam Medicare Advantage  Secondary:  [address] at [address] [facility], [address] [place] [address],[address], [address]  [phone]   [phone]     OUTPATIENT PHYSICAL THERAPY: Daily Treatment Note 2021  Visit Count:  22    ICD-10: Treatment Diagnosis: Difficulty in walking, not elsewhere classified (R26.2)  Precautions/Allergies:   [place], Imitrex [sumatriptan], Keflex [cephalexin], Sulfa (sulfonamide antibiotics), Theophylline, Topamax [topiramate], Ultram [tramadol], Zomig [zolmitriptan], Adhesive, Kenalog [triamcinolone acetonide], Pneumococcal vaccine, and Verapamil   TREATMENT PLAN:  Effective Dates: 2021 TO 2021 (90 days). Frequency/Duration: 2 times a week for 90 Days    Pre-treatment Symptoms/Complaints:   \"I fell on Tuesday. I was walking and fell backwards and to the left\". Dizziness 3/10 and pain level 0/10. Pain: Initial: Pain Intensity 1: 0  Post Session:  0/10   Medications Last Reviewed:  2021  Updated Objective Findings:  None Today  TREATMENT:     NEUROMUSCULAR RE-EDUCATION: (45 minutes):  Exercise/activities per grid below to improve balance, coordination, kinesthetic sense and posture. Required moderate verbal cues to promote static and dynamic balance in standing.     Date:  2021 Date:  2021 Date:  21   Activity/Exercise Parameters Parameters Parameters   Walking in the clinic with hiking poles      Sidestepping in hallway with hiking poles 4 laps 4 laps 4 laps   Marching in hallway with hiking poles 4 laps 4 laps 4 laps   Standing on blue therafoam  Eyes open with head turns and eyes closed    [place] Eyes open with head turns eyes closed 6 inch  Forward 2x10 reps  Lateral 2x10 reps    Stepping over half foam Forward 10 reps each  Lateral 10 reps each  Forward 10 reps each  Lateral 10 reps each   Walking with vertical head turns with hiking poles 4 laps     Tiltboard Walking through cones using hiking poles 4 laps 4 laps 4 laps   Walking with horizontal head turns with hiking poles 4 laps       [employer] Portal  Treatment/Session Summary:    · Response to Treatment:  Patient tolerated exercises with no seated rest breaks. Patient reports increase in dizziness to 6/10 after treatment. · Communication/Consultation:  None today  · Equipment provided today:  None today  · Recommendations/Intent for next treatment session: Next visit will focus on balance exercises, vestibular exercises, and manual therapy. Total Treatment Billable Duration:  45 minutes  PT Patient Time In/Time Out  Time In: 0845  Time Out: Robert Út 66., PT    Future Appointments   Date Time Provider Jacquie Smtih   10/12/2021  9:30 AM [facility]   10/26/2021  9:30 AM Teresa Nielsen, PT [facility] E   10/29/2021  9:30 AM Teresa Nielsen, PT [facility]     Visit # Therapist initials Date Arrived NS/ Cx < 24 hr >24 hr Cx Visit Comments   18 PV 8/31/2021 X   [employer] MCR-Thirty-five dollar copay per appointment.   Recertification done today   19 PV 9/3/2021 X      20 PV 9/14/2021 X      21 PV 9/17/2021 X      22 PV 9/28/2021 X                                                                                                                             Abbreviations: NS = No Show; CX = cancelled

## 2021-09-29 ENCOUNTER — APPOINTMENT (OUTPATIENT)
Dept: PHYSICAL THERAPY | Age: 41
End: 2021-09-29
Attending: NURSE PRACTITIONER
Payer: MEDICARE

## 2021-10-12 ENCOUNTER — HOSPITAL ENCOUNTER (OUTPATIENT)
Dept: PHYSICAL THERAPY | Age: 41
Discharge: HOME | End: 2021-10-12
Attending: NURSE PRACTITIONER
Payer: MEDICARE

## 2021-10-12 PROCEDURE — 97112 NEUROMUSCULAR REEDUCATION: CPT

## 2021-10-12 NOTE — PROGRESS NOTES
Turner Baca  : 1980  Primary: Doroteo Cam Medicare Advantage  Secondary:  [address] at [facility]  [address] [facility], [address] [place] [address],[address], [address] [facility]  [phone]   [phone]     OUTPATIENT PHYSICAL THERAPY: Daily Treatment Note 10/12/2021  Visit Count:  23    ICD-10: Treatment Diagnosis: Difficulty in walking, not elsewhere classified (R26.2)  Precautions/Allergies:   [place], Imitrex [sumatriptan], Keflex [cephalexin], Sulfa (sulfonamide antibiotics), Theophylline, Topamax [topiramate], Ultram [tramadol], Zomig [zolmitriptan], Adhesive, Kenalog [triamcinolone acetonide], Pneumococcal vaccine, and Verapamil   TREATMENT PLAN:  Effective Dates: 2021 TO 2021 (90 days). Frequency/Duration: 2 times a week for 90 Days    Pre-treatment Symptoms/Complaints:   \"I want to practice turning. I lose my balance when I turn to the left in the bathroom and in my kitchen\". Dizziness 2/10 and pain level 3/10. Pain: Initial: Pain Intensity 1: 0  Post Session:  0/10   Medications Last Reviewed:  10/12/2021  Updated Objective Findings:  None Today  TREATMENT:     NEUROMUSCULAR RE-EDUCATION: (45 minutes):  Exercise/activities per grid below to improve balance, coordination, kinesthetic sense and posture. Required moderate verbal cues to promote static and dynamic balance in standing.     Date:  2021 Date:  10/12/2021 Date:  21   Activity/Exercise Parameters Parameters Parameters   Walking in the clinic with hiking poles      Sidestepping in hallway with hiking poles 4 laps 4 laps 4 laps   Marching in hallway with hiking poles 4 laps 4 laps 4 laps   Standing on blue therafoam      [place] Eyes open with head turns eyes closed     Stepping over half foam Forward 10 reps each  Lateral 10 reps each  Forward 10 reps each  Lateral 10 reps each   Walking with vertical head turns with hiking poles 4 laps     Tiltboard      Practiced turning to the left   30 minutes Walking through cones using hiking poles 4 laps 4 laps 4 laps   Walking with horizontal head turns with hiking poles 4 laps       [employer] Portal  Treatment/Session Summary:    · Response to Treatment:  Patient needed verbal cues for weight shifting to prepare for left turns. Attempted to turn 180, but patient kept having loss of balance and increased dizziness. Decreased turns to 90 degrees and patient able to tolerate better with improved balance and less dizziness. Patient instructed to continue practicing with left turns at home for home exercise program.    · Communication/Consultation:  None today  · Equipment provided today:  None today  · Recommendations/Intent for next treatment session: Next visit will focus on balance exercises, vestibular exercises, and manual therapy. Total Treatment Billable Duration:  45 minutes  PT Patient Time In/Time Out  Time In: 0930  Time Out: Lupillo Vale, PT    Future Appointments   Date Time Provider Jacquie Smith   10/14/2021  9:20 AM Flako Kramer MD [facility]   10/26/2021  9:30 AM Perry Gutierrez, PT [facility]   10/29/2021  9:30 AM Messi Nielsen, PT [facility]     Visit # Therapist initials Date Arrived NS/ Cx < 24 hr >24 hr Cx Visit Comments   18 PV 8/31/2021 X   [employer] MCR-Thirty-five dollar copay per appointment.   Recertification done today   19 PV 9/3/2021 X      20 PV 9/14/2021 X      21 PV 9/17/2021 X      22 PV 9/28/2021 X      23 PV 10/12/2021 X                                                                                                                    Abbreviations: NS = No Show; CX = cancelled

## 2021-10-14 PROBLEM — F41.1 GENERALIZED ANXIETY DISORDER: Status: ACTIVE | Noted: 2021-08-22

## 2021-10-14 PROBLEM — F44.9 CONVERSION DISORDER: Status: ACTIVE | Noted: 2021-08-22

## 2021-10-14 PROBLEM — F32.1 MAJOR DEPRESSIVE DISORDER, SINGLE EPISODE, MODERATE (HCC): Status: ACTIVE | Noted: 2021-08-22

## 2021-10-26 ENCOUNTER — HOSPITAL ENCOUNTER (OUTPATIENT)
Dept: PHYSICAL THERAPY | Age: 41
Discharge: HOME | End: 2021-10-26
Attending: NURSE PRACTITIONER
Payer: MEDICARE

## 2021-10-26 PROCEDURE — 97112 NEUROMUSCULAR REEDUCATION: CPT

## 2021-10-26 NOTE — PROGRESS NOTES
Nawaf Thong  : 1980  Primary: Gael Cam Medicare Advantage  Secondary:  [address] at [address], [address] [place] [address],[address] [facility], [place] [address].  [phone]   [phone]     OUTPATIENT PHYSICAL THERAPY: Daily Treatment Note 10/26/2021  Visit Count:  24    ICD-10: Treatment Diagnosis: Difficulty in walking, not elsewhere classified (R26.2)  Precautions/Allergies:   [place], Imitrex [sumatriptan], Keflex [cephalexin], Sulfa (sulfonamide antibiotics), Theophylline, Topamax [topiramate], Ultram [tramadol], Zomig [zolmitriptan], Adhesive, Kenalog [triamcinolone acetonide], Pneumococcal vaccine, and Verapamil   TREATMENT PLAN:  Effective Dates: 2021 TO 2021 (90 days). Frequency/Duration: 2 times a week for 90 Days    Pre-treatment Symptoms/Complaints:   \"I had one fall. It happened last Tuesday. I was playing with my cat and turned and fell. I had a migraine yesterday\". Dizziness 4/10 and pain level 2/10. Pain: Initial: 210 Post Session:  210   Medications Last Reviewed:  10/26/2021  Updated Objective Findings:  None Today  TREATMENT:     NEUROMUSCULAR RE-EDUCATION: (45 minutes):  Exercise/activities per grid below to improve balance, coordination, kinesthetic sense and posture. Required moderate verbal cues to promote static and dynamic balance in standing.     Date:  2021 Date:  10/12/2021 Date:  10/26/21   Activity/Exercise Parameters Parameters Parameters   Walking in the clinic with hiking poles      Sidestepping in hallway with hiking poles 4 laps 4 laps 4 laps   Marching in hallway with hiking poles 4 laps 4 laps 4 laps   Standing on blue therafoam      [place] Eyes open with head turns eyes closed     Stepping over half foam Forward 10 reps each  Lateral 10 reps each  Forward 10 reps each  Lateral 10 reps each   Walking with vertical head turns with hiking poles 4 laps     Tiltboard      Practiced turning to the left   30 minutes 20 minutes   Walking through cones using hiking poles 4 laps 4 laps 4 laps   Walking with horizontal head turns with hiking poles 4 laps       [employer] Portal  Treatment/Session Summary:    · Response to Treatment:  Patient tolerated treatment without complaints. · Communication/Consultation:  None today  · Equipment provided today:  None today  · Recommendations/Intent for next treatment session: Next visit will focus on balance exercises, vestibular exercises, and manual therapy. Total Treatment Billable Duration:  45 minutes  PT Patient Time In/Time Out  Time In: 0930  Time Out: Lupillo Vale, PT    Future Appointments   Date Time Provider Jacquie Smith   10/29/2021  9:30 AM Graciela Archibald PT [facility]   11/8/2021  1:45 PM Molly Nielsen, PT [facility]   11/11/2021 11:00 AM Molly Nielsen, PT [facility]   11/23/2021 10:15 AM Molly Nielsen, PT [facility]     Visit # Therapist initials Date Arrived NS/ Cx < 24 hr >24 hr Cx Visit Comments   18 PV 8/31/2021 X   [employer] MCR-Thirty-five dollar copay per appointment.   Recertification done today   19 PV 9/3/2021 X      20 PV 9/14/2021 X      21 PV 9/17/2021 X      22 PV 9/28/2021 X      23 PV 10/12/2021 X      24 PV 10/26/2021 X                                                                                                           Abbreviations: NS = No Show; CX = cancelled

## 2021-10-29 ENCOUNTER — HOSPITAL ENCOUNTER (OUTPATIENT)
Dept: PHYSICAL THERAPY | Age: 41
Discharge: HOME | End: 2021-10-29
Attending: NURSE PRACTITIONER
Payer: MEDICARE

## 2021-10-29 PROCEDURE — 97112 NEUROMUSCULAR REEDUCATION: CPT

## 2021-10-29 NOTE — PROGRESS NOTES
Jessa Pearson  : 1980  Primary: Elin Cam Medicare Advantage  Secondary:  [address] at [facility]  27[address] [facility], [address] [place] [address],[address], [address] [facility]  [phone]   [phone]     OUTPATIENT PHYSICAL THERAPY: Daily Treatment Note 10/29/2021  Visit Count:  25    ICD-10: Treatment Diagnosis: Difficulty in walking, not elsewhere classified (R26.2)  Precautions/Allergies:   [place], Imitrex [sumatriptan], Keflex [cephalexin], Sulfa (sulfonamide antibiotics), Theophylline, Topamax [topiramate], Ultram [tramadol], Zomig [zolmitriptan], Adhesive, Kenalog [triamcinolone acetonide], Pneumococcal vaccine, and Verapamil   TREATMENT PLAN:  Effective Dates: 2021 TO 2021 (90 days). Frequency/Duration: 2 times a week for 90 Days    Pre-treatment Symptoms/Complaints:   \"I had a small fall. My cat ran past me when I was turning left and I fell\". Dizziness 4/10 and pain level 0/10. Pain: Initial: 0/10 Post Session:  0/10   Medications Last Reviewed:  10/29/2021  Updated Objective Findings:  None Today  TREATMENT:     NEUROMUSCULAR RE-EDUCATION: (45 minutes):  Exercise/activities per grid below to improve balance, coordination, kinesthetic sense and posture. Required moderate verbal cues to promote static and dynamic balance in standing.     Date:  10/29/2021 Date:  10/12/2021 Date:  10/26/21   Activity/Exercise Parameters Parameters Parameters   Walking in the clinic with hiking poles      Sidestepping in hallway with hiking poles  4 laps 4 laps   Marching in hallway with hiking poles  4 laps 4 laps   Standing gastroc stretch in incline board 3 reps     Seated trunk rotation while visual tracking red ball 5 minutes     Stepping over half foam   Forward 10 reps each  Lateral 10 reps each   Walking with vertical head turns with hiking poles      Walking in hallway with turns 10 minutes     Practiced turning to the left  15 minutes 30 minutes 20 minutes   Walking through cones using hiking poles  4 laps 4 laps   Walking with horizontal head turns with hiking poles        [employer] Portal  Treatment/Session Summary:    · Response to Treatment:  Patient reports increased dizziness to 7/10 after treatment. Patient needed extended rest breaks during treatment due to increased dizziness. · Communication/Consultation:  None today  · Equipment provided today:  None today  · Recommendations/Intent for next treatment session: Next visit will focus on balance exercises, vestibular exercises, and manual therapy. Total Treatment Billable Duration:  45 minutes  PT Patient Time In/Time Out  Time In: 0930  Time Out: Lupillo Vale, PT    Future Appointments   Date Time Provider Jacquie Smith   11/8/2021  1:45 PM Chris Bryant, BENITA [facility]   11/12/2021 10:15 AM Mari Nielsen, PT [facility]   11/23/2021 10:15 AM Mari Nielsen, PT [facility]     Visit # Therapist initials Date Arrived NS/ Cx < 24 hr >24 hr Cx Visit Comments   18 PV 8/31/2021 X   [employer] MCR-Thirty-five dollar copay per appointment.   Recertification done today   19 PV 9/3/2021 X      20 PV 9/14/2021 X      21 PV 9/17/2021 X      22 PV 9/28/2021 X      23 PV 10/12/2021 X      24 PV 10/26/2021 X      25 PV 10/29/2021 X                                                                                                  Abbreviations: NS = No Show; CX = cancelled

## 2021-11-08 ENCOUNTER — HOSPITAL ENCOUNTER (OUTPATIENT)
Dept: PHYSICAL THERAPY | Age: 41
Discharge: HOME | End: 2021-11-08
Attending: NURSE PRACTITIONER
Payer: MEDICARE

## 2021-11-08 PROCEDURE — 97112 NEUROMUSCULAR REEDUCATION: CPT

## 2021-11-08 NOTE — PROGRESS NOTES
Park Garza  : 1980  Primary: Fabiana Cam Medicare Advantage  Secondary:  [address] at [place] [id number] [facility], [address] [place] [address],[address], [place] [address].  [phone]   [phone]     OUTPATIENT PHYSICAL THERAPY: Daily Treatment Note 2021  Visit Count:  26    ICD-10: Treatment Diagnosis: Difficulty in walking, not elsewhere classified (R26.2)  Precautions/Allergies:   [place], Imitrex [sumatriptan], Keflex [cephalexin], Sulfa (sulfonamide antibiotics), Theophylline, Topamax [topiramate], Ultram [tramadol], Zomig [zolmitriptan], Adhesive, Kenalog [triamcinolone acetonide], Pneumococcal vaccine, and Verapamil   TREATMENT PLAN:  Effective Dates: 2021 TO 2021 (90 days). Frequency/Duration: 2 times a week for 90 Days    Pre-treatment Symptoms/Complaints:   \"I took a whole muscle relaxer on Friday. I am going to see a specialist next Monday in [facility]". Dizziness 2/10 and pain level 5/10. Pain: Initial: 0/10 Post Session:  0/10   Medications Last Reviewed:  2021  Updated Objective Findings:  None Today  TREATMENT:     NEUROMUSCULAR RE-EDUCATION: (45 minutes):  Exercise/activities per grid below to improve balance, coordination, kinesthetic sense and posture. Required moderate verbal cues to promote static and dynamic balance in standing.     Date:  10/29/2021 Date:  2021 Date:  10/26/21   Activity/Exercise Parameters Parameters Parameters   Walking in the clinic with hiking poles      Sidestepping in hallway with hiking poles  4 laps 4 laps   Marching in hallway with hiking poles  4 laps 4 laps   Standing gastroc stretch in incline board 3 reps     Seated trunk rotation while visual tracking red ball 5 minutes     Stepping over half foam  Forward 10 reps each  Lateral 10 reps each Forward 10 reps each  Lateral 10 reps each   Walking with vertical head turns with hiking poles      Standing feet together  Blue foam eyes open with head turns; eyes clsoed    Standing semitandem stance   Blue foam eyes open and eyes closed    Walking through cones using hiking poles  4 laps 4 laps   Walking with horizontal head turns with hiking poles        [employer] Portal  Treatment/Session Summary:    · Response to Treatment:  Patient tolerated treatment without specific complaints. · Communication/Consultation:  None today  · Equipment provided today:  None today  · Recommendations/Intent for next treatment session: Next visit will focus on balance exercises, vestibular exercises, and manual therapy. Total Treatment Billable Duration:  45 minutes  PT Patient Time In/Time Out  Time In: 1345  Time Out: Theo Macias, PT    Future Appointments   Date Time Provider Jacquie Smith   11/12/2021 10:15 AM [facility]   11/23/2021 10:15 AM Bryan Nielsen, PT [facility]   12/7/2021 10:15 AM Bryan Nielsen, PT [facility]   12/28/2021 10:15 AM Bryan Nielsen, PT [facility]   1/31/2022  1:00 PM Lynette Milligan MD [facility]     Visit # Therapist initials Date Arrived NS/ Cx < 24 hr >24 hr Cx Visit Comments   18 PV 8/31/2021 X   [employer] MCR-Thirty-five dollar copay per appointment.   Recertification done today   19 PV 9/3/2021 X      20 PV 9/14/2021 X      21 PV 9/17/2021 X      22 PV 9/28/2021 X      23 PV 10/12/2021 X      24 PV 10/26/2021 X      25 PV 10/29/2021 X      26 PV  11/8/2021 X                                                                                         Abbreviations: NS = No Show; CX = cancelled

## 2021-11-11 ENCOUNTER — APPOINTMENT (OUTPATIENT)
Dept: PHYSICAL THERAPY | Age: 41
End: 2021-11-11
Attending: NURSE PRACTITIONER
Payer: MEDICARE

## 2021-11-12 ENCOUNTER — HOSPITAL ENCOUNTER (OUTPATIENT)
Dept: PHYSICAL THERAPY | Age: 41
Discharge: HOME | End: 2021-11-12
Attending: NURSE PRACTITIONER
Payer: MEDICARE

## 2021-11-12 PROCEDURE — 97112 NEUROMUSCULAR REEDUCATION: CPT

## 2021-11-12 NOTE — PROGRESS NOTES
Turner Baca  : 1980  Primary: Doroteo Cam Medicare Advantage  Secondary:  [address] at [place] [id number] [facility], [address] [place] [address],[address], [place] [address].  [phone]   [phone]     OUTPATIENT PHYSICAL THERAPY: Daily Treatment Note 2021  Visit Count:  27    ICD-10: Treatment Diagnosis: Difficulty in walking, not elsewhere classified (R26.2)  Precautions/Allergies:   Sandra Bis antimigraine agents, Valium [diazepam], Imitrex [sumatriptan], Keflex [cephalexin], Sulfa (sulfonamide antibiotics), Theophylline, Topamax [topiramate], Ultram [tramadol], Zomig [zolmitriptan], Adhesive, Adhesive tape-silicones, Kenalog [triamcinolone acetonide], Pneumococcal vaccine, and Verapamil   TREATMENT PLAN:  Effective Dates: 2021 TO 2/10/2022 (90 days). Frequency/Duration: 2 times a week for 90 Days    Pre-treatment Symptoms/Complaints:   \"I took the COVID booster on Monday. I haven't done much \".    Dizziness 2/10 and pain level 2/10. Pain: Initial: 0/10 Post Session:  0/10   Medications Last Reviewed:  2021  Updated Objective Findings:  See recertification   TREATMENT:     NEUROMUSCULAR RE-EDUCATION: (45 minutes):  Exercise/activities per grid below to improve balance, coordination, kinesthetic sense and posture. Required moderate verbal cues to promote static and dynamic balance in standing.     Date:  10/29/2021 Date:  2021 Date:  21   Activity/Exercise Parameters Parameters Parameters   Walking in the clinic with hiking poles      Sidestepping in hallway with hiking poles  4 laps 4 laps   Marching in hallway with hiking poles  4 laps 4 laps   Standing gastroc stretch in incline board 3 reps  3x30 seconds   Seated trunk rotation while visual tracking red ball 5 minutes     Stepping over half foam  Forward 10 reps each  Lateral 10 reps each Forward 10 reps each  Lateral 10 reps each   Walking with vertical head turns with hiking poles Standing feet together  Blue foam eyes open with head turns; eyes clsoed    Standing semitandem stance   Blue foam eyes open and eyes closed    Walking through cones using hiking poles  4 laps 4 laps   Walking with horizontal head turns with hiking poles        Curis Portal  Treatment/Session Summary:    · Response to Treatment:  Patient demonstrates slight improvement in balance. · Communication/Consultation:  None today  · Equipment provided today:  None today  · Recommendations/Intent for next treatment session: Next visit will focus on balance exercises, vestibular exercises, and manual therapy. Total Treatment Billable Duration:  45 minutes  PT Patient Time In/Time Out  Time In: 1015  Time Out: Divina 51, PT    Future Appointments   Date Time Provider Jacquie Smith   11/23/2021 10:15 AM Seng Brito PT [facility] SFE   12/2/2021  8:45 AM Cb Hurtado MD [facility] PO[facility] POA   12/7/2021 10:15 AM Seng Brito, PT [facility] SFE   12/28/2021 10:15 AM Brielle Nielsen, PT [facility] E   1/31/2022  1:00 PM Danilo Ivan MD [facility]     Visit # Therapist initials Date Arrived NS/ Cx < 24 hr >24 hr Cx Visit Comments   18 PV 8/31/2021 X   [employer] MCR-Thirty-five dollar copay per appointment.   Recertification done today   19 PV 9/3/2021 X      20 PV 9/14/2021 X      21 PV 9/17/2021 X      22 PV 9/28/2021 X      23 PV 10/12/2021 X      24 PV 10/26/2021 X      25 PV 10/29/2021 X      26 PV  11/8/2021 X      27 PV 83/73/3131 X   Recertification                                                                             Abbreviations: NS = No Show; CX = cancelled

## 2021-11-12 NOTE — THERAPY RECERTIFICATION
Rodney Aristides  : 1980  Primary: Abbi Cam Medicare Advantage  Secondary:  [address] at [facility]  [address] [facility], [address] [place] [address],[address], [address] [facility]  [phone]   [phone]        OUTPATIENT PHYSICAL [address]    ICD-10: Treatment Diagnosis: Difficulty in walking, not elsewhere classified (R26.2)  Precautions/Allergies:   Sandra Rich antimigraine agents, Valium [diazepam], Imitrex [sumatriptan], Keflex [cephalexin], Sulfa (sulfonamide antibiotics), Theophylline, Topamax [topiramate], Ultram [tramadol], Zomig [zolmitriptan], Adhesive, Adhesive tape-silicones, Kenalog [triamcinolone acetonide], Pneumococcal vaccine, and Verapamil   TREATMENT PLAN:  Effective Dates: 2021 TO 2/10/2022 (90 days). Frequency/Duration: 2 times a week for 90 Days         MEDICAL/REFERRING DIAGNOSIS:  Vertigo [R42]    DATE OF ONSET: Chronic   REFERRING PHYSICIAN: Jason Olson NP/Stanley Whittington MD MD Orders: Evaluate and treat  Return MD Appointment: unknown      INITIAL ASSESSMENT:  Ms. Lisa White presents with dizziness, neck pain, imbalance, and difficulty walking. Patient could be experiencing some cervicogenic dizziness. Patient is at higher fall risk based on history of multiple falls and score received on Eilzabeth Balance Scale. Patient would benefit from skilled physical therapy to address problems and goals. Thank you for this referral.     Recertification note:  Patient has attended twenty-seven scheduled physical therapy appointments from 3/2/2021 to 2021. Patient reports having more difficulty with turning, especially with left turns. Patient would benefit from continuing skilled physical therapy to address problems and goals. Thank you for this referral.     PROBLEM LIST (Impacting functional limitations):  1. Decreased Transfer Abilities  2. Decreased Ambulation Ability/Technique  3. Decreased Balance  4.  Increased Pain  5. Decreased Flexibility/Joint Mobility  6. Decreased [place] with Home Exercise Program  7. Increased dizziness INTERVENTIONS PLANNED: (Treatment may consist of any combination of the following)  1. Balance Exercise  2. Gait Training  3. Home Exercise Program (HEP)  4. Manual Therapy  5. Range of Motion (ROM)  6. Habituation exercises     GOALS: (Goals have been discussed and agreed upon with patient.)  Short-Term Functional Goals: Time Frame: 30 days   1. Patient will be independent with home exercise program to improve balance. Goal met. 2.  Patient will score greater than or equal to 30/56 on Elizabeth Balance Scale indicating improved balance and decreased fall risk with daily activities. Goal met. Discharge Goals: Time Frame: 90 days   1. Patient will score greater than or equal to 40/56 on Elizabeth Balance Scale indicating improved balance and decreased fall risk with daily activities. Goal ongoing. 2.  Patient will report improved stability at home and at work. Goal ongoing. 3.  Patient will score less than or equal to 22 seconds on Timed Up and Go Test indicating improved gait speed. New goal.     OUTCOME MEASURE:   Tool Used: Elizabeth Balance Scale  Score:  Initial: 26/56 Most Recent: 36/56 (Date: 11- )   Interpretation of Score: Each section is scored on a 0-4 scale, 0 representing the patients inability to perform the task and 4 representing independence. The scores of each section are added together for a total score of 56. The higher the patients score, the more independent the patient is. Any score below 45 indicates increased risk for falls. Tool Used: Timed Up and Go (TUG)  Score:  Initial: 26.4 seconds Most Recent: X seconds (Date: -- )   Interpretation of Score:  The test measures, in seconds, the time taken by an individual to stand up from a standard arm chair (seat height 46 cm [18 in], arm height 65 cm [25.6 in]), walk a distance of 3 meters (118 in, approx 10 ft), turn, walk back to the chair and sit down. If the individual takes longer than 14 seconds to complete TUG, this indicates risk for falls. MEDICAL NECESSITY:   · Patient is expected to demonstrate progress in balance and dizziness to improve safety during activities of daily living. REASON FOR SERVICES/OTHER COMMENTS:  · Patient continues to require skilled intervention due to higher fall risk with daily activities. Regarding Dany Edge's therapy, I certify that the treatment plan above will be carried out by a therapist or under their direction. Thank you for this referral,  Zonia Bartholomew PT     Referring Physician Signature: Maria C Adams MD, MD _______________________________ Date _____________       PAIN/SUBJECTIVE:   Initial: Pain Intensity 1: 2  Post Session:  0/10   HISTORY:   History of Injury/Illness (Reason for Referral):  Patient complains of dizziness, imbalance, and difficulty walking. Patient here in the clinic last year, but stopped therapy when COVID-19 began. Patient reports symptoms have gotten worse. Patient has been falling a lot more frequently. Patient has to use two hiking poles when walking instead of just one hiking pole. Patient rates current dizziness as 3/10 and current pain level in cervical spine as 3/10. Patient tends to lose her balance backwards and to the left. Patient has a history of lead toxicity. Patient reports more recent exposure to mold. Past Medical History/Comorbidities:   Ms. Jay Ponce  has a past medical history of Adverse effect of anesthesia, Asthma, BMI 36.0-36.9,adult, Chronic pain, Hyperlipidemia, Migraines, Movement disorder, Thyroid disease, Vertigo, and Vestibular nerve disorder (2008). She also has no past medical history of Difficult intubation, Malignant hyperthermia due to anesthesia, Nausea & vomiting, or Pseudocholinesterase deficiency.   Ms. Jay Ponce  has a past surgical history that includes hx orthopaedic and hx heent. Social History/Living Environment:     Lives alone in an apartment. Patient has four steps to enter her home. Patient has family nearby which helps her when needed. Prior Level of Function/Work/Activity:  Independent. Works as an OT. Dominant Side:         RIGHT  Personal Factors:          Sex:  female        Age:  39 y.o. Ambulatory/Rehab Services H2 Model Falls Risk Assessment   Risk Factors:       (1)  Dizziness/Vertigo       (1)  Any administered benzodiazepines       (5)  History of Recent Falls [w/in 3 months] Ability to Rise from Chair:       (1)  Pushes up, successful in one attempt   Falls Prevention Plan:       Exercise/Equipment Adaptation (specify):  Patient will be supervised in the clinic at all times due to higher fall risk   Total: (5 or greater = High Risk): 8   ©2010 [facility] [address] [place] [address] Patent #[id number]. Federal Law prohibits the replication, distribution or use without written permission from [facility]   Current Medications:       Current Outpatient Medications:     venlafaxine-SR (EFFEXOR-XR) 75 mg capsule, , Disp: , Rfl:     venlafaxine-SR (EFFEXOR-XR) 75 mg capsule, Take 75 mg by mouth., Disp: , Rfl:     predniSONE (STERAPRED DS) 10 mg dose pack, Take 1 Tablet by mouth See Admin Instructions. See administration instruction per 10mg dose pack, Disp: 21 Tablet, Rfl: 0    cyclobenzaprine (FLEXERIL) 5 mg tablet, Take 1 Tablet by mouth two (2) times daily as needed for Muscle Spasm(s). , Disp: 30 Tablet, Rfl: 0    DISABLED PLACARD (DISABLED PLACARD) DMV, Please provide handicap placard for neurologic condition affecting gait and balance., Disp: 1 Each, Rfl: 0    clonazePAM (KlonoPIN) 0.5 mg tablet, Take 1 Tablet by mouth daily as needed (severe vertigo). Take / use AM day of surgery  per anesthesia protocols if needed. (Patient taking differently: Take 0.5 mg by mouth daily.  Take / use AM day of surgery  per anesthesia protocols if needed. ), Disp: 30 Tablet, Rfl: 1    ondansetron (ZOFRAN ODT) 4 mg disintegrating tablet, Take 1 Tablet by mouth every eight (8) hours as needed for Nausea., Disp: 24 Tablet, Rfl: 2    ubrogepant (Ubrelvy) 100 mg tablet, Take 1 Tab by mouth daily as needed for Migraine. TAKE ONE AT ONSET OF MIGRAINE, MAY REPEAT X 1 IF NEEDED. NOT TO EXCEED 2 TABS PER 24 HOUR PERIOD., Disp: 10 Tab, Rfl: 11    esomeprazole (NEXIUM) 40 mg capsule, Take 1 Cap by mouth two (2) times a day. Indications: gastroesophageal reflux disease (Patient taking differently: Take 40 mg by mouth two (2) times daily as needed. Indications: gastroesophageal reflux disease), Disp: 60 Cap, Rfl: 6    fexofenadine (ALLEGRA) 180 mg tablet, Take 180 mg by mouth nightly., Disp: , Rfl:     OTHER,NON-FORMULARY,, by IntraVENous route every Monday. Calcium EDTA treatments every other week, Disp: , Rfl:     albuterol (PROVENTIL HFA, VENTOLIN HFA, PROAIR HFA) 90 mcg/actuation inhaler, Take 2 Puffs by inhalation every four (4) hours as needed for Wheezing or Shortness of Breath. Take / use AM day of surgery  per anesthesia protocols if needed. Indications: Asthma Attack, Disp: , Rfl:     OTHER, PLACE 1 SHANA UNDER THE TONGUE ONCE A DAY  DO NOT EAT DRINK FOR 15MIN AFTER TAKING, Disp: , Rfl: 5    mometasone (NASONEX) 50 mcg/actuation nasal spray, 1 [place] by Both Nostrils route nightly., Disp: , Rfl:     cholecalciferol, vitamin D3, (VITAMIN D3 PO), Take 10,000 Units by mouth daily. , Disp: , Rfl:     MAGNESIUM PO, Take 2 Tabs by mouth daily. , Disp: , Rfl:     VITAMIN A PO, Take 1 Tab by mouth daily. , Disp: , Rfl:     ARMOUR THYROID 30 mg tablet, TAKE 1 TABLET BY MOUTH EVERY DAY IN THE MORNING, Disp: , Rfl: 5    BUTTERBUR ROOT EXTRACT PO, Take 2 Tabs by mouth daily. , Disp: , Rfl:     multivitamin capsule, Take 2 Caps by mouth daily. , Disp: , Rfl:    Date Last Reviewed:  11/12/2021   Number of Personal Factors/Comorbidities that affect the Plan of Care: 3+: HIGH COMPLEXITY   EXAMINATION:   Observation/Orthostatic Postural Assessment:          Slight forward head rounded shoulders posture. Palpation: Increased tenderness along bilateral upper traps/SCM, left worse than right. Tender throughout C5-C7. Functional Mobility:         Gait/Ambulation:  Patient ambulates with bilateral hiking poles demonstrating decreased gait speed with wider base of support and moderate deviation from straight path. ROM:          Cervical range of motion is as follows: flexion within normal limits, extension within normal limits, right rotation within normal limits, left rotation 75%, right lateral flexion 50%, left lateral flexion 75%. Postural Control & Balance:  · Elizabeth Balance Scale:  36/56.   (A score less than 45/56 indicates high risk of falls). Score improved from 26/56 on initial evaluation. · Dynamic Gait Index:  Not tested. Body Structures Involved:  1. Nerves  2. Eyes and Ears  3. Muscles Body Functions Affected:  1. Neuromusculoskeletal Activities and Participation Affected:  1. General Tasks and Demands  2. Mobility  3.  Community, Social and [place]   Number of elements (examined above) that affect the Plan of Care: 4+: HIGH COMPLEXITY   CLINICAL PRESENTATION:   Presentation: Evolving clinical presentation with changing clinical characteristics: MODERATE COMPLEXITY   CLINICAL DECISION MAKING:   Use of outcome tool(s) and clinical judgement create a POC that gives a: Questionable prediction of patient's progress: MODERATE COMPLEXITY

## 2021-11-23 ENCOUNTER — HOSPITAL ENCOUNTER (OUTPATIENT)
Dept: PHYSICAL THERAPY | Age: 41
Discharge: HOME | End: 2021-11-23
Attending: NURSE PRACTITIONER
Payer: MEDICARE

## 2021-11-23 PROCEDURE — 97112 NEUROMUSCULAR REEDUCATION: CPT

## 2021-11-23 NOTE — PROGRESS NOTES
Deanna Elliott  : 1980  Primary: Yunier Cam Medicare Advantage  Secondary:  [address] at [address], [address] [place] [address],[address], [address] [facility]  [phone]   [phone]     OUTPATIENT PHYSICAL THERAPY: Daily Treatment Note 2021  Visit Count:  28    ICD-10: Treatment Diagnosis: Difficulty in walking, not elsewhere classified (R26.2)  Precautions/Allergies:   Dareen Coad antimigraine agents, Valium [diazepam], Imitrex [sumatriptan], Keflex [cephalexin], Sulfa (sulfonamide antibiotics), Theophylline, Topamax [topiramate], Ultram [tramadol], Zomig [zolmitriptan], Adhesive, Adhesive tape-silicones, Kenalog [triamcinolone acetonide], Pneumococcal vaccine, and Verapamil   TREATMENT PLAN:  Effective Dates: 2021 TO 2/10/2022 (90 days). Frequency/Duration: 2 times a week for 90 Days    Pre-treatment Symptoms/Complaints:   Patient reports feeling very encouraged after seeing the doctor in [place]. \"He recommends special glasses and inserts to help tap into my proprioception system\". Patient reports she was able to walk without an assistive device wearing the special glasses and having no loss of balance. Pain: Initial: 0/10 Post Session:  0/10   Medications Last Reviewed:  2021  Updated Objective Findings:  None Today  TREATMENT:     NEUROMUSCULAR RE-EDUCATION: (45 minutes):  Exercise/activities per grid below to improve balance, coordination, kinesthetic sense and posture. Required moderate verbal cues to promote static and dynamic balance in standing.     Date:  2021 Date:  2021 Date:  21   Activity/Exercise Parameters Parameters Parameters   Walking in the clinic with hiking poles Without poles applying pressure to bilateral hips working on heel toe gait pattern     Sidestepping in hallway with hiking poles Without poles applying pressure to bilateral hips 4 laps 4 laps 4 laps   Marching in hallway with hiking poles Applying pressure to left scapular region 4 laps 4 laps 4 laps   Standing gastroc stretch in incline board   3x30 seconds   Seated trunk rotation while visual tracking red ball      Stepping over half foam  Forward 10 reps each  Lateral 10 reps each Forward 10 reps each  Lateral 10 reps each   Walking with vertical head turns with hiking poles      Standing feet together  Blue foam eyes open with head turns; eyes clsoed    Standing semitandem stance   Blue foam eyes open and eyes closed    Walking through cones using hiking poles Without poles applying pressure to bilateral hips  4 laps 4 laps 4 laps   Walking with horizontal head turns with hiking poles        [place] Portal  Treatment/Session Summary:    · Response to Treatment:  Patient reports increased sweating /chills after treatment with pressure points. Patient needed extended rest breaks during exercises. · Communication/Consultation:  None today  · Equipment provided today:  None today  · Recommendations/Intent for next treatment session: Next visit will focus on balance exercises, vestibular exercises, and manual therapy. Total Treatment Billable Duration:  45 minutes  PT Patient Time In/Time Out  Time In: 1015  Time Out: Divina 51, PT    Future Appointments   Date Time Provider Jacquie Smith   12/2/2021  8:45 AM Tamara Donaldson MD [facility] POA   12/7/2021 10:15 AM Miladys Thakur, PT [facility] SFE   12/28/2021 10:15 AM Juan Pablo Nielsen, PT [facility] SFE   1/31/2022  1:00 PM Omid Haq MD [facility]     Visit # Therapist initials Date Arrived NS/ Cx < 24 hr >24 hr Cx Visit Comments   18 PV 8/31/2021 X   [employer] MCR-Thirty-five dollar copay per appointment.   Recertification done today   19 PV 9/3/2021 X      20 PV 9/14/2021 X      21 PV 9/17/2021 X      22 PV 9/28/2021 X      23 PV 10/12/2021 X      24 PV 10/26/2021 X      25 PV 10/29/2021 X      26 PV  11/8/2021 X      27 PV 17/33/2409 X   Recertification   28 PV 11/23/2021 X Abbreviations: NS = No Show; CX = cancelled

## 2021-12-07 ENCOUNTER — HOSPITAL ENCOUNTER (OUTPATIENT)
Dept: PHYSICAL THERAPY | Age: 41
Discharge: HOME | End: 2021-12-07
Attending: NURSE PRACTITIONER
Payer: MEDICARE

## 2021-12-07 PROCEDURE — 97112 NEUROMUSCULAR REEDUCATION: CPT

## 2021-12-07 NOTE — PROGRESS NOTES
Park Garza  : 1980  Primary: Fabiana Cam Medicare Advantage  Secondary:  [address] at [place] [address] [facility], [address] [place] [address],[address] [place] [address].  [phone]   [phone]     OUTPATIENT PHYSICAL THERAPY: Daily Treatment Note 2021  Visit Count:  29    ICD-10: Treatment Diagnosis: Difficulty in walking, not elsewhere classified (R26.2)  Precautions/Allergies:   Noemi Arena antimigraine agents, Valium [diazepam], Imitrex [sumatriptan], Keflex [cephalexin], Sulfa (sulfonamide antibiotics), Theophylline, Topamax [topiramate], Ultram [tramadol], Zomig [zolmitriptan], Adhesive, Adhesive tape-silicones, Kenalog [triamcinolone acetonide], Pneumococcal vaccine, and Verapamil   TREATMENT PLAN:  Effective Dates: 2021 TO 2/10/2022 (90 days). Frequency/Duration: 2 times a week for 90 Days    Pre-treatment Symptoms/Complaints:     \"I got my new glasses last Monday\". Pain: Initial: 0/10 Post Session:  0/10   Medications Last Reviewed:  2021  Updated Objective Findings:  None Today  TREATMENT:     NEUROMUSCULAR RE-EDUCATION: (45 minutes):  Exercise/activities per grid below to improve balance, coordination, kinesthetic sense and posture. Required moderate verbal cues to promote static and dynamic balance in standing.     Date:  2021 Date:  2021 Date:  21   Activity/Exercise Parameters Parameters Parameters   Walking in the clinic with hiking poles Without poles applying pressure to bilateral hips working on heel toe gait pattern     Sidestepping in hallway with hiking poles Without poles applying pressure to bilateral hips 4 laps 4 laps without assistive device 4 laps   Marching in hallway with hiking poles Applying pressure to left scapular region 4 laps 4 laps without assistive device 4 laps   Standing gastroc stretch in incline board   3x30 seconds   Seated trunk rotation while visual tracking red ball      Stepping over half foam   Forward 10 reps each  Lateral 10 reps each   Walking with vertical head turns with hiking poles  2 lap without assistive device    Standing feet together  Blue foam eyes open with head turns; eyes closed    Standing semitandem stance       Walking through cones using hiking poles Without poles applying pressure to bilateral hips  4 laps 4 laps without assistive device 4 laps   Walking with horizontal head turns with hiking poles  4 laps without assistive device    Circles right/circles left  1 rep x 2 times each direction    Figure eights  1 rep x 2 times each direction                  [facility]  Treatment/Session Summary:    · Response to Treatment:  Patient much more stable with exercises using new glasses. Patient still has more difficulty with left circles than right circles. Patient able to stand and rest to decrease symptoms for dizziness. Dizziness decreases much quicker during rest breaks. Patient reports no nausea during treatment  · Communication/Consultation:  None today  · Equipment provided today:  None today  · Recommendations/Intent for next treatment session: Next visit will focus on balance exercises, vestibular exercises, and manual therapy.       Total Treatment Billable Duration:  45 minutes  PT Patient Time In/Time Out  Time In: 1015  Time Out: Divina 51, PT    Future Appointments   Date Time Provider Jacquie Smith   12/10/2021  7:45 AM POA INT MRI INTMR AMB RAD SC   12/21/2021  8:15 AM Ene Mariano MD POAG POA   12/28/2021 10:15 AM Kayleigh Nielsen, PT [facility] SFE   1/4/2022 10:15 AM Kayleigh Nielsen, PT [facility] SFE   1/7/2022 11:00 AM Kayleigh Nielsen, PT [facility] SFE   1/11/2022 10:15 AM Kayleigh Nielsen, PT [facility] SFE   1/18/2022 10:15 AM Kayleigh Nielsen, PT [facility] SFE   1/21/2022 11:00 AM Kayleigh Nielsen, PT [facility] SFE   1/25/2022 10:15 AM Kayleigh Nielsen, PT [facility]PT SFE   1/31/2022  1:00 PM Ruth Bangura MD [facility]   6/17/2022 10:40 AM Nely Rivas Marileena Marina MD [facility]     Visit # Therapist initials Date Arrived NS/ Cx < 24 hr >24 hr Cx Visit Comments   18 PV 8/31/2021 X   [employer] MCR-Thirty-five dollar copay per appointment.   Recertification done today   19 PV 9/3/2021 X      20 PV 9/14/2021 X      21 PV 9/17/2021 X      22 PV 9/28/2021 X      23 PV 10/12/2021 X      24 PV 10/26/2021 X      25 PV 10/29/2021 X      26 PV  11/8/2021 X      27 PV 90/04/8350 X   Recertification   28 PV 11/23/2021 X      29 PV 12/7/2021 X                                                              Abbreviations: NS = No Show; CX = cancelled

## 2021-12-16 PROBLEM — M25.512 LEFT SHOULDER PAIN: Status: ACTIVE | Noted: 2021-12-16

## 2021-12-16 PROBLEM — R20.2 ARM PARESTHESIA, LEFT: Status: ACTIVE | Noted: 2021-12-16

## 2021-12-16 PROBLEM — S46.012A TRAUMATIC TEAR OF LEFT ROTATOR CUFF: Status: ACTIVE | Noted: 2021-12-16

## 2021-12-16 PROBLEM — M54.2 NECK PAIN: Status: ACTIVE | Noted: 2021-12-16

## 2021-12-28 ENCOUNTER — HOSPITAL ENCOUNTER (OUTPATIENT)
Dept: PHYSICAL THERAPY | Age: 41
Discharge: HOME | End: 2021-12-28
Attending: NURSE PRACTITIONER
Payer: MEDICARE

## 2021-12-28 PROCEDURE — 97112 NEUROMUSCULAR REEDUCATION: CPT

## 2021-12-28 NOTE — PROGRESS NOTES
Roshan Sexton  : 1980  Primary: [place] [employer]vantage  Secondary:  Alireza Brito at [address] [id number] [facility], [address] [place] [address],[address], [place] [address].  [phone]   [phone]     OUTPATIENT PHYSICAL THERAPY: Daily Treatment Note 2021  Visit Count:  30    ICD-10: Treatment Diagnosis: Difficulty in walking, not elsewhere classified (R26.2)  Precautions/Allergies:   Juan Councilman antimigraine agents, Valium [diazepam], Imitrex [sumatriptan], Keflex [cephalexin], Sulfa (sulfonamide antibiotics), Theophylline, Topamax [topiramate], Ultram [tramadol], Zomig [zolmitriptan], Adhesive, Adhesive tape-silicones, Kenalog [triamcinolone acetonide], Pneumococcal vaccine, and Verapamil   TREATMENT PLAN:  Effective Dates: 2021 TO 2/10/2022 (90 days). Frequency/Duration: 2 times a week for 90 Days    Pre-treatment Symptoms/Complaints:     \"I am still having issues walking on ramps and uneven issues. I see the spine specialist on Monday. Last Tuesday I got a cortisone injecion in my left biceps\". Pain: Initial: 0/10 Post Session:  0/10   Medications Last Reviewed:  2021  Updated Objective Findings:  None Today  TREATMENT:     NEUROMUSCULAR RE-EDUCATION: (45 minutes):  Exercise/activities per grid below to improve balance, coordination, kinesthetic sense and posture. Required moderate verbal cues to promote static and dynamic balance in standing.     Date:  2021 Date:  2021 Date:  21   Activity/Exercise Parameters Parameters Parameters   Walking in the clinic Without poles applying pressure to bilateral hips working on heel toe gait pattern  With glasses 4 laps, with glasses plus insert in shoes 4 laps   Sidestepping in hallway  Without poles applying pressure to bilateral hips 4 laps 4 laps without assistive device With glasses 4 laps   Marching in hallway  Applying pressure to left scapular region 4 laps 4 laps without assistive device With glasses 4 laps   Standing gastroc stretch in incline board      Seated trunk rotation while visual tracking red ball      Stepping over half foam   Forward 5 reps    Walking with vertical head turns with hiking poles  2 lap without assistive device    Standing feet together  Blue foam eyes open with head turns; eyes closed    Standing semitandem stance       Walking through cones Without poles applying pressure to bilateral hips  4 laps 4 laps without assistive device With glasses 4 laps   Walking with horizontal head turns with hiking poles  4 laps without assistive device 2 laps with glasses. Circles right/circles left  1 rep x 2 times each direction    Figure eights  1 rep x 2 times each direction    Walking up/down ramps   With glasses 15 minutes           [employer]  Treatment/Session Summary:    · Response to Treatment:  Patient more dizzy with head turns today. Patient unable to tolerate walking with inserts in her shoes. With insert in her shoes, patient loses her balance and complains of increased dizziness. Patient had to sit down and rest, patient close to tears after walking with inserts. Patient rates dizziness as 5/10 after treatment. · Communication/Consultation:  None today  · Equipment provided today:  None today  · Recommendations/Intent for next treatment session: Next visit will focus on balance exercises, vestibular exercises, and manual therapy.       Total Treatment Billable Duration:  45 minutes  PT Patient Time In/Time Out  Time In: 1015  Time Out: Divina 51, PT    Future Appointments   Date Time Provider Jacquie Smith   1/3/2022 10:00 AM Adrian Hernandez   1/4/2022 10:15 AM Vissage, Malick Breeze, PT [facility] SFE   1/7/2022 11:00 AM Vissage, Malick Breeze, PT [facility] SFE   1/11/2022 10:15 AM Vissage, Hatfield Breeze, PT [facility] SFE   1/18/2022 10:15 AM Vissage, Malick Breeze, PT [facility] SFE   1/21/2022 11:00 AM Vissage, Hatfield Breeze, PT [facility] SFE   1/25/2022 10:15 AM Mary Mckeon, PT [facility] SFE   1/31/2022  1:00 PM Nyasia Lester MD [facility]   2/1/2022 10:15 AM Marleni Wheatley MD [facility] POAI POA   2/2/2022  9:30 AM Lizandro Can MD BSNE BSNE   6/17/2022 10:40 AM Chris Hemphill MD [facility] CAFM CAFM     Visit # Therapist initials Date Arrived NS/ Cx < 24 hr >24 hr Cx Visit Comments   18 PV 8/31/2021 X   [employer] MCR-Thirty-five dollar copay per appointment.   Recertification done today   19 PV 9/3/2021 X      20 PV 9/14/2021 X      21 PV 9/17/2021 X      22 PV 9/28/2021 X      23 PV 10/12/2021 X      24 PV 10/26/2021 X      25 PV 10/29/2021 X      26 PV  11/8/2021 X      27 PV 18/76/0804 X   Recertification   28 PV 11/23/2021 X      29 PV 12/7/2021 X      30  PV 12/28/2021 X                                                     Abbreviations: NS = No Show; CX = cancelled

## 2022-01-04 ENCOUNTER — HOSPITAL ENCOUNTER (OUTPATIENT)
Dept: PHYSICAL THERAPY | Age: 42
Discharge: HOME | End: 2022-01-04
Attending: NURSE PRACTITIONER
Payer: MEDICARE

## 2022-01-04 PROCEDURE — 97112 NEUROMUSCULAR REEDUCATION: CPT

## 2022-01-04 NOTE — PROGRESS NOTES
Porter Leung  : 1980  Primary: Ramon Gamboa [place] Medicare Advantage  Secondary:  [address] at [address] [facility] [address] [address],[address], [address] [facility]  [phone]   [phone]     OUTPATIENT PHYSICAL THERAPY: Daily Treatment Note 2022  Visit Count:  31    ICD-10: Treatment Diagnosis: Difficulty in walking, not elsewhere classified (R26.2)  Precautions/Allergies:   Jaciel Coni antimigraine agents, Valium [diazepam], Imitrex [sumatriptan], Keflex [cephalexin], Sulfa (sulfonamide antibiotics), Theophylline, Topamax [topiramate], Ultram [tramadol], Zomig [zolmitriptan], Adhesive, Adhesive tape-silicones, Kenalog [triamcinolone acetonide], Pneumococcal vaccine, and Verapamil   TREATMENT PLAN:  Effective Dates: 2021 TO 2/10/2022 (90 days). Frequency/Duration: 2 times a week for 90 Days    Pre-treatment Symptoms/Complaints:     \"I am scheduled to have an MRI on my neck. I have a headache right now\". Pain: Initial: 4/10 Post Session:  0/10   Medications Last Reviewed:  2022  Updated Objective Findings:  None Today  TREATMENT:     NEUROMUSCULAR RE-EDUCATION: (45 minutes):  Exercise/activities per grid below to improve balance, coordination, kinesthetic sense and posture. Required moderate verbal cues to promote static and dynamic balance in standing.     Date:  2022 Date:  2021 Date:  21   Activity/Exercise Parameters Parameters Parameters   Walking in the clinic With glasses 4 laps, with glasses plus insert in shoes 4 laps  With glasses 4 laps, with glasses plus insert in shoes 4 laps   Sidestepping in hallway  With glasses 4 laps 4 laps without assistive device With glasses 4 laps   Marching in hallway  With glasses 4 laps 4 laps without assistive device With glasses 4 laps   Standing gastroc stretch in incline board      Seated trunk rotation while visual tracking red ball      Stepping over half foam Forward 2x5 reps  Forward 5 reps    Walking with vertical head turns with hiking poles  2 lap without assistive device    Standing feet together  Blue foam eyes open with head turns; eyes closed    Standing semitandem stance       Walking through cones With glasses 4 laps 4 laps without assistive device With glasses 4 laps   Walking with horizontal head turns with hiking poles  4 laps without assistive device 2 laps with glasses. Circles right/circles left  1 rep x 2 times each direction    Figure eights  1 rep x 2 times each direction    Walking up/down ramps   With glasses 15 minutes           [employer] Portal  Treatment/Session Summary:    · Response to Treatment:  Patient reports no headache, only increase in dizziness after treatment. Patient rates dizziness after treatment as 4/10. Patient continues to have more instability/decreased proprioception with inserts in her shoes. · Communication/Consultation:  None today  · Equipment provided today:  None today  · Recommendations/Intent for next treatment session: Next visit will focus on balance exercises, vestibular exercises, and manual therapy.       Total Treatment Billable Duration:  45 minutes  PT Patient Time In/Time Out  Time In: 1015  Time Out: Divina 51, PT    Future Appointments   Date Time Provider Jacquie Smith   1/7/2022 11:00 AM Horris Sandifer, PT [facility] SFE   1/11/2022 10:15 AM Joseph Nielsen, PT [facility] SFE   1/12/2022  9:15 AM POA INT MRI INTMR AMB RAD SC   1/18/2022 10:15 AM Joseph Nielsen, PT [facility] SFE   1/19/2022  9:45 AM Liat Hall PA POAG POA   1/21/2022 11:00 AM Horris Sandifer, PT [facility] SFE   1/25/2022 10:15 AM Joseph Nielsen, PT [facility] SFE   1/31/2022  1:00 PM Joao Todd MD [facility]   2/1/2022 10:15 AM Americo Mcarthur MD [facility] POAI POA   2/2/2022  9:30 AM Landon Kang MD BSNE BSNE   6/17/2022 10:40 AM Tim Wilkes MD [facility] CAF CAFM     Visit # Therapist initials Date Arrived NS/ Cx < 24 hr >24 hr Cx Visit Comments   18 PV 8/31/2021 X   [employer] MCR-Thirty-five dollar copay per appointment.   Recertification done today   19 PV 9/3/2021 X      20 PV 9/14/2021 X      21 PV 9/17/2021 X      22 PV 9/28/2021 X      23 PV 10/12/2021 X      24 PV 10/26/2021 X      25 PV 10/29/2021 X      26 PV  11/8/2021 X      27 PV 55/40/1261 X   Recertification   28 PV 11/23/2021 X      29 PV 12/7/2021 X      30  PV 12/28/2021 X      31 PV 1/4/2022 X                                            Abbreviations: NS = No Show; CX = cancelled

## 2022-01-07 ENCOUNTER — HOSPITAL ENCOUNTER (OUTPATIENT)
Dept: PHYSICAL THERAPY | Age: 42
Discharge: HOME | End: 2022-01-07
Attending: NURSE PRACTITIONER
Payer: MEDICARE

## 2022-01-07 PROCEDURE — 97112 NEUROMUSCULAR REEDUCATION: CPT

## 2022-01-07 NOTE — PROGRESS NOTES
Cherelle August  : 1980  Primary: [place] Medicare Advantage  Secondary:  [address]  at [facility] [place] [place] [address], [address] [place] [address]  [phone]   [phone]     OUTPATIENT PHYSICAL THERAPY: Daily Treatment Note 2022  Visit Count:  32    ICD-10: Treatment Diagnosis: Difficulty in walking, not elsewhere classified (R26.2)  Precautions/Allergies:   Camacho Mandi antimigraine agents, Valium [diazepam], Imitrex [sumatriptan], Keflex [cephalexin], Sulfa (sulfonamide antibiotics), Theophylline, Topamax [topiramate], Ultram [tramadol], Zomig [zolmitriptan], Adhesive, Adhesive tape-silicones, Kenalog [triamcinolone acetonide], Pneumococcal vaccine, and Verapamil   TREATMENT PLAN:  Effective Dates: 2021 TO 2/10/2022 (90 days). Frequency/Duration: 2 times a week for 90 Days    Pre-treatment Symptoms/Complaints: \"Dizziness 3/10. Yesterday I didn't do anything\". Pain: Initial: 0/10 Post Session:  0/10   Medications Last Reviewed:  2022  Updated Objective Findings:  None Today  TREATMENT:     NEUROMUSCULAR RE-EDUCATION: (45 minutes):  Exercise/activities per grid below to improve balance, coordination, kinesthetic sense and posture. Required moderate verbal cues to promote static and dynamic balance in standing.     Date:  2022 Date:  22 Date:  21   Activity/Exercise Parameters Parameters Parameters   Walking in the clinic With glasses 4 laps, with glasses plus insert in shoes 4 laps In hallway with verbal cues for heel toe walk with glasses With glasses 4 laps, with glasses plus insert in shoes 4 laps   Sidestepping in hallway  With glasses 4 laps 4 laps without assistive device With glasses 4 laps   Marching in hallway  With glasses 4 laps 4 laps without assistive device With glasses 4 laps   Standing gastroc stretch in incline board      Seated trunk rotation while visual tracking red ball      Stepping over half foam Forward 2x5 reps Forward 10 reps  Lateral 10 reps Forward 5 reps    Walking with vertical head turns with hiking poles      Standing feet together      Standing semitandem stance       Walking through cones With glasses 4 laps 4 laps without assistive device With glasses 4 laps   Walking with horizontal head turns with hiking poles   2 laps with glasses. Circles right/circles left      Figure eights      Walking up/down ramps  3 ramps With glasses 15 minutes           [employer] Portal  Treatment/Session Summary:    · Response to Treatment:  Patient reports increased dizziness during treatment/with exercises. Patient reports increase in dizziness to 6/10. Patient reports dizziness as 4/10 after treatment. · Communication/Consultation:  None today  · Equipment provided today:  None today  · Recommendations/Intent for next treatment session: Next visit will focus on balance exercises, vestibular exercises, and manual therapy. Total Treatment Billable Duration:  45 minutes  PT Patient Time In/Time Out  Time In: 1100  Time Out: 4606 [facility],     Future Appointments   Date Time Provider Jacquie Smith   1/11/2022 10:15 AM Lucía Beltre PT [facility] SFE   1/12/2022  9:15 AM POA INT MRI INTMR AMB RAD SC   1/18/2022 10:15 AM Kit Nielsen, PT [facility] SFE   1/19/2022  9:45 AM Liat Hall PA POAG POA   1/21/2022 11:00 AM Lucía Beltre, PT [facility] SFE   1/25/2022 10:15 AM Kit Nielsne, PT [facility] SFE   1/31/2022  1:00 PM Karen Wolf MD [facility]   2/1/2022 10:15 AM Katerine Talavera MD SSA POAI POA   2/2/2022  9:30 AM Altaf Galvez MD BSNE BSNE   6/17/2022 10:40 AM Fredy Peace MD [facility] CAFM CAFM     Visit # Therapist initials Date Arrived NS/ Cx < 24 hr >24 hr Cx Visit Comments   18 PV 8/31/2021 X   [employer] MCR-Thirty-five dollar copay per appointment.   Recertification done today   19 PV 9/3/2021 X      20 PV 9/14/2021 X      21 PV 9/17/2021 X      22 PV 9/28/2021 X      23 PV 10/12/2021 X      24 PV 10/26/2021 X      25 PV 10/29/2021 X      26 PV  11/8/2021 X      27 PV 54/40/0162 X   Recertification   28 PV 11/23/2021 X      29 PV 12/7/2021 X      30  PV 12/28/2021 X      31 PV 1/4/2022 X      32 PV 1/7/2022 X                                   Abbreviations: NS = No Show; CX = cancelled

## 2022-01-11 ENCOUNTER — HOSPITAL ENCOUNTER (OUTPATIENT)
Dept: PHYSICAL THERAPY | Age: 42
Discharge: HOME | End: 2022-01-11
Attending: NURSE PRACTITIONER
Payer: MEDICARE

## 2022-01-11 PROCEDURE — 97112 NEUROMUSCULAR REEDUCATION: CPT

## 2022-01-11 NOTE — PROGRESS NOTES
Rc Ruckerolga  : 1980  Primary: [place] Medicare Advantage  Secondary:  [address] at [address] [id number] [facility], [address] [place] [address],[address], [facility] [address].  [phone]   [phone]     OUTPATIENT PHYSICAL THERAPY: Daily Treatment Note 2022  Visit Count:  33    ICD-10: Treatment Diagnosis: Difficulty in walking, not elsewhere classified (R26.2)  Precautions/Allergies:   Isadore Neigh antimigraine agents, Valium [diazepam], Imitrex [sumatriptan], Keflex [cephalexin], Sulfa (sulfonamide antibiotics), Theophylline, Topamax [topiramate], Ultram [tramadol], Zomig [zolmitriptan], Adhesive, Adhesive tape-silicones, Kenalog [triamcinolone acetonide], Pneumococcal vaccine, and Verapamil   TREATMENT PLAN:  Effective Dates: 2021 TO 2/10/2022 (90 days). Frequency/Duration: 2 times a week for 90 Days    Pre-treatment Symptoms/Complaints:     Patient reports doing a lot on Saturday. No headache, dizziness 1/10. Pain: Initial: 0/10 Post Session:  0/10   Medications Last Reviewed:  2022  Updated Objective Findings:  None Today  TREATMENT:     NEUROMUSCULAR RE-EDUCATION: (45 minutes):  Exercise/activities per grid below to improve balance, coordination, kinesthetic sense and posture. Required moderate verbal cues to promote static and dynamic balance in standing.     Date:  2022 Date:  22 Date:     Activity/Exercise Parameters Parameters Parameters   Walking in the clinic With glasses 4 laps, with glasses plus insert in shoes 4 laps In hallway with verbal cues for heel toe walk with glasses With glasses 4 laps, with glasses plus insert in shoes 4 laps   Sidestepping in hallway  With glasses 4 laps 4 laps without assistive device With glasses 4 laps   Marching in hallway  With glasses 4 laps 4 laps without assistive device With glasses 4 laps   Standing gastroc stretch in incline board      Seated trunk rotation while visual tracking red ball Stepping over half foam Forward 2x5 reps  Forward 5 reps    Walking with vertical head turns with hiking poles      Step tap/ Step ups  6 inches  2x10 reps    Standing semitandem stance       Walking through cones With glasses 4 laps 4 laps without assistive device With glasses 4 laps   Walking with horizontal head turns with hiking poles  2 laps with glasses. 2 laps with glasses. Circles right/circles left      Figure eights      Walking up/down ramps  3 ramps With glasses 15 minutes   Tiltboard  Eyes open; weight shifting left. /right and up/down      [employer] Portal  Treatment/Session Summary:    · Response to Treatment:  Patient tolerated exercises well. Patient reports slight increase in dizziness to 3/10 after treatment. · Communication/Consultation:  None today  · Equipment provided today:  None today  · Recommendations/Intent for next treatment session: Next visit will focus on balance exercises, vestibular exercises, and manual therapy. Total Treatment Billable Duration:  45 minutes  PT Patient Time In/Time Out  Time In: 1015  Time Out: Divina 51, PT    Future Appointments   Date Time Provider Jacquie Smith   1/12/2022  9:15 AM POA INT MRI INTMR AMB RAD SC   1/18/2022 10:15 AM Hadley Nielsen, PT [facility] SFE   1/19/2022  9:45 AM Liat Hall PA POAG POA   1/21/2022 11:00 AM Hadley Nielsen, PT [facility] SFE   1/25/2022 10:15 AM Hadley Nielsen, PT [facility]PT SFE   1/31/2022  1:00 PM Naya Anaya MD [facility]   2/1/2022 10:15 AM Tonya Ha MD [facility] POAI POA   2/2/2022  9:30 AM Kely Rangel MD BSNE BSNE   6/17/2022 10:40 AM Jane Maxwell MD [facility] CAF CAFM     Visit # Therapist initials Date Arrived NS/ Cx < 24 hr >24 hr Cx Visit Comments   18 PV 8/31/2021 X   [employer] MCR-Thirty-five dollar copay per appointment.   Recertification done today   19 PV 9/3/2021 X      20 PV 9/14/2021 X      21 PV 9/17/2021 X      22 PV 9/28/2021 X      23 PV 10/12/2021 X      24 PV 10/26/2021 X 25 PV 10/29/2021 X      26 PV  11/8/2021 X      27 PV 12/07/1044 X   Recertification   28 PV 11/23/2021 X      29 PV 12/7/2021 X      30  PV 12/28/2021 X      31 PV 1/4/2022 X      32 PV 1/7/2022 X      33 PV 1/11/2022 X                          Abbreviations: NS = No Show; CX = cancelled

## 2022-01-18 ENCOUNTER — HOSPITAL ENCOUNTER (OUTPATIENT)
Dept: PHYSICAL THERAPY | Age: 42
Discharge: HOME | End: 2022-01-18
Attending: NURSE PRACTITIONER
Payer: MEDICARE

## 2022-01-18 NOTE — PROGRESS NOTES
[address] [phone] was active with this pt prior to admit. Discharge planner notified. Aware of discharge with home care. Home care orders sent to [facility]. Cherelle August  : 1980  Primary: Autumn Cam Medicare Advantage  Secondary:  [address] at [address] [address] [address] [facility], [address] [place] [address],[address] [facility], [place] [address].  [phone]   [phone]          OUTPATIENT DAILY NOTE    NAME/AGE/GENDER: Cherelle Koch is a 39 y.o. female. DATE: 2022    Patient cancelled (more than 24 hours ago) her appointment for today due to weather concerns. Will plan to follow up on next scheduled visit.     Anaid Hughes, PT    Future Appointments   Date Time Provider Jacquie Smith   2022  9:45 AM Adrian Hernandez POAG POA   2022 11:00 AM Arianna Tanner, BENITA [facility] SFE   2022 10:15 AM Malick Nielsen, BENITA [facility]\""E   2022  1:00 PM Ron Cates MD [facility]   2022 10:15 AM Edenilson Leung MD SSA POAI POA   2022  9:30 AM Luiza Marley MD BSNE BSNE   2022 10:40 AM Sandro Hurley MD [facility]

## 2022-01-21 ENCOUNTER — HOSPITAL ENCOUNTER (OUTPATIENT)
Dept: PHYSICAL THERAPY | Age: 42
Discharge: HOME | End: 2022-01-21
Attending: NURSE PRACTITIONER
Payer: MEDICARE

## 2022-01-21 PROCEDURE — 97112 NEUROMUSCULAR REEDUCATION: CPT

## 2022-01-21 NOTE — PROGRESS NOTES
Roshan Carlie  : 1980  Primary: [place] [employer] Medicare Advantage  Secondary:  [address] at [place] [id number] [facility], [address] [place] [address],[address], [facility] [address].  [phone]   [phone]     OUTPATIENT PHYSICAL THERAPY: Daily Treatment Note 2022  Visit Count:  34    ICD-10: Treatment Diagnosis: Difficulty in walking, not elsewhere classified (R26.2)  Precautions/Allergies:   Juan Councilman antimigraine agents, Valium [diazepam], Imitrex [sumatriptan], Keflex [cephalexin], Sulfa (sulfonamide antibiotics), Theophylline, Topamax [topiramate], Ultram [tramadol], Zomig [zolmitriptan], Adhesive, Adhesive tape-silicones, Kenalog [triamcinolone acetonide], Pneumococcal vaccine, and Verapamil   TREATMENT PLAN:  Effective Dates: 2021 TO 2/10/2022 (90 days). Frequency/Duration: 2 times a week for 90 Days    Pre-treatment Symptoms/Complaints:     Patient reports she is dizzy. Dizziness 3/10. Pain: Initial: 0/10 Post Session:  0/10   Medications Last Reviewed:  2022  Updated Objective Findings:  None Today  TREATMENT:     NEUROMUSCULAR RE-EDUCATION: (45 minutes):  Exercise/activities per grid below to improve balance, coordination, kinesthetic sense and posture. Required moderate verbal cues to promote static and dynamic balance in standing.     Date:  2022 Date:  22 Date:  22   Activity/Exercise Parameters Parameters Parameters   Walking in the clinic With glasses 4 laps, with glasses plus insert in shoes 4 laps In hallway with verbal cues for heel toe walk with glasses With glasses 4 laps, with glasses plus insert in shoes 4 laps   Sidestepping in hallway  With glasses 4 laps 4 laps without assistive device With glasses 4 laps   Marching in hallway  With glasses 4 laps 4 laps without assistive device With glasses 4 laps   Standing gastroc stretch in incline board   2 reps x1 minute   Seated trunk rotation while visual tracking red ball Stepping over half foam Forward 2x5 reps  Forward 10 reps each  Lateral 10 reps each   Walking with vertical head turns with hiking poles      Step tap/ Step ups  6 inches  2x10 reps    Standing semitandem stance       Walking through cones With glasses 4 laps 4 laps without assistive device With glasses 4 laps   Walking with horizontal head turns with hiking poles  2 laps with glasses. 2 laps with glasses. Circles right/circles left      Walking backwards   With glasses 4 laps   Walking up/down ramps  3 ramps With glasses 15 minutes   Tiltboard  Eyes open; weight shifting left. /right and up/down      [employer] Portal  Treatment/Session Summary:    · Response to Treatment:  Patient tolerated additional exercises well. Patient rates slight increase in dizziness (4/10) after treatment. · Communication/Consultation:  None today  · Equipment provided today:  None today  · Recommendations/Intent for next treatment session: Next visit will focus on balance exercises, vestibular exercises, and manual therapy. Total Treatment Billable Duration:  45 minutes  PT Patient Time In/Time Out  Time In: 1100  Time Out: 1010 [facility],     Future Appointments   Date Time Provider Jacquie Smith   1/25/2022 10:15 AM Clint Butler PT [facility] SFE   1/27/2022 10:15 AM Liat Langston PA POAP POA   1/31/2022  1:00 PM Kika Terrazas MD [facility]   2/1/2022 10:15 AM Pedro Terry MD [facility] POAI POA   2/2/2022  9:30 AM Marjorie Gautam MD BSNE BSNE   6/17/2022 10:40 AM Pradip Kohli MD [facility] CAF     Visit # Therapist initials Date Arrived NS/ Cx < 24 hr >24 hr Cx Visit Comments   18 PV 8/31/2021 X   [employer] MCR-Thirty-five dollar copay per appointment.   Recertification done today   19 PV 9/3/2021 X      20 PV 9/14/2021 X      21 PV 9/17/2021 X      22 PV 9/28/2021 X      23 PV 10/12/2021 X      24 PV 10/26/2021 X      25 PV 10/29/2021 X      26 PV  11/8/2021 X      27 PV 67/68/5567 X   Recertification   28 PV 11/23/2021 X      29 PV 12/7/2021 X      30  PV 12/28/2021 X      31 PV 1/4/2022 X      32 PV 1/7/2022 X      33 PV 1/11/2022 X       PV 1/17/2022 X      34 PV 1/21/2022 X                                   Abbreviations: NS = No Show; CX = cancelled

## 2022-01-25 ENCOUNTER — HOSPITAL ENCOUNTER (OUTPATIENT)
Dept: PHYSICAL THERAPY | Age: 42
Discharge: HOME | End: 2022-01-25
Attending: NURSE PRACTITIONER
Payer: MEDICARE

## 2022-01-25 PROCEDURE — 97112 NEUROMUSCULAR REEDUCATION: CPT

## 2022-01-25 NOTE — THERAPY RECERTIFICATION
Enrique Hughes  : 1980  Primary: Rose Cam Medicare Advantage  Secondary:  [address]  at [facility]  [address] [facility], [address] [place] [address] [address], 09[address] [facility]  [phone]   [phone]        OUTPATIENT PHYSICAL [address]    ICD-10: Treatment Diagnosis: Difficulty in walking, not elsewhere classified (R26.2)  Precautions/Allergies:   Renetta Luca antimigraine agents, Valium [diazepam], Imitrex [sumatriptan], Keflex [cephalexin], Sulfa (sulfonamide antibiotics), Theophylline, Topamax [topiramate], Ultram [tramadol], Zomig [zolmitriptan], Adhesive, Adhesive tape-silicones, Cyanocobalamin (vitamin b-12), Kenalog [triamcinolone acetonide], Pneumococcal vaccine, Pollen extracts, and Verapamil   TREATMENT PLAN:  Effective Dates: 2022 TO 2022 (90 days). Frequency/Duration: 1-2 times a week for 90 Days           MEDICAL/REFERRING DIAGNOSIS:  Vertigo [R42]    DATE OF ONSET: Chronic   REFERRING PHYSICIAN: Aravind Reid NP/Bhumika Whittington MD MD Orders: Evaluate and treat  Return MD Appointment: unknown      INITIAL ASSESSMENT:  Ms. Christi Mustafa presents with dizziness, neck pain, imbalance, and difficulty walking. Patient could be experiencing some cervicogenic dizziness. Patient is at higher fall risk based on history of multiple falls and score received on Elizabeth Balance Scale. Patient would benefit from skilled physical therapy to address problems and goals. Thank you for this referral.     Progress note:  Patient has attended thirty-five scheduled physical therapy appointments from 3/2/2021 to 2022. Patient demonstrates improved balance with Elizabeth Balance Scale and improved gait speed with Timed Up and Go Test.  Patient recently began wearing active prism glasses and this has helped her stability. Patient would benefit from continuing skilled physical therapy to address problems and goals.   Thank you for this referral.     PROBLEM LIST (Impacting functional limitations):  1. Decreased Transfer Abilities  2. Decreased Ambulation Ability/Technique  3. Decreased Balance  4. Increased Pain  5. Decreased Flexibility/Joint Mobility  6. Decreased [place] with Home Exercise Program  7. Increased dizziness INTERVENTIONS PLANNED: (Treatment may consist of any combination of the following)  1. Balance Exercise  2. Gait Training  3. Home Exercise Program (HEP)  4. Manual Therapy  5. Range of Motion (ROM)  6. Habituation exercises     GOALS: (Goals have been discussed and agreed upon with patient.)  Short-Term Functional Goals: Time Frame: 30 days   1. Patient will be independent with home exercise program to improve balance. Goal met. 2.  Patient will score greater than or equal to 30/56 on Elizabeth Balance Scale indicating improved balance and decreased fall risk with daily activities. Goal met. Discharge Goals: Time Frame: 90 days   1. Patient will score greater than or equal to 40/56 on Elizabeth Balance Scale indicating improved balance and decreased fall risk with daily activities. Goal met. 2.  Patient will report improved stability at home and at work. Goal ongoing. 3.  Patient will score less than or equal to 22 seconds on Timed Up and Go Test indicating improved gait speed. Goal met. 4.  Patient will score less than or equal to 13 seconds on Timed Up and Go Test indicating improved gait speed. New goal.  5.  Patient will score less than or equal to 48/56 on Elizabeth Balance Scale indicating improved balance and decreased fall risk with daily activities. New goal.     OUTCOME MEASURE:   Tool Used: Elizabeth Balance Scale  Score:  Initial: 26/56 Most Recent: 43/56 (Date: 1-25-22 )   Interpretation of Score: Each section is scored on a 0-4 scale, 0 representing the patients inability to perform the task and 4 representing independence. The scores of each section are added together for a total score of 56.   The higher the patients score, the more independent the patient is. Any score below 45 indicates increased risk for falls. Tool Used: Timed Up and Go (TUG)  Score:  Initial: 26.4 seconds Most Recent: 18.8 seconds (Date: 1-25-22 )   Interpretation of Score: The test measures, in seconds, the time taken by an individual to stand up from a standard arm chair (seat height 46 cm [18 in], arm height 65 cm [25.6 in]), walk a distance of 3 meters (118 in, approx 10 ft), turn, walk back to the chair and sit down. If the individual takes longer than 14 seconds to complete TUG, this indicates risk for falls. MEDICAL NECESSITY:   · Patient is expected to demonstrate progress in balance and dizziness to improve safety during activities of daily living. REASON FOR SERVICES/OTHER COMMENTS:  · Patient continues to require skilled intervention due to higher fall risk with daily activities. Regarding Mayuri Nica Minesh's therapy, I certify that the treatment plan above will be carried out by a therapist or under their direction. Thank you for this referral,  Marla Ramires, PT     Referring Physician Signature: Anastasia Mast MD, MD _______________________________ Date _____________       PAIN/SUBJECTIVE:   Initial: Pain Intensity 1: 0  Post Session:  0/10   HISTORY:   History of Injury/Illness (Reason for Referral):  Patient complains of dizziness, imbalance, and difficulty walking. Patient here in the clinic last year, but stopped therapy when COVID-19 began. Patient reports symptoms have gotten worse. Patient has been falling a lot more frequently. Patient has to use two hiking poles when walking instead of just one hiking pole. Patient rates current dizziness as 3/10 and current pain level in cervical spine as 3/10. Patient tends to lose her balance backwards and to the left. Patient has a history of lead toxicity. Patient reports more recent exposure to mold.    Past Medical History/Comorbidities:   Ms. Asa Chanel  has a past medical history of Adverse effect of anesthesia, Asthma, BMI 36.0-36.9,adult, Chronic pain, Hyperlipidemia, Migraines, Movement disorder, Thyroid disease, Vertigo, and Vestibular nerve disorder (2008). She has no past medical history of Difficult intubation, Malignant hyperthermia due to anesthesia, Nausea & vomiting, or Pseudocholinesterase deficiency. Ms. Kristin Lira  has a past surgical history that includes hx orthopaedic and hx heent. Social History/Living Environment:     Lives alone in an apartment. Patient has four steps to enter her home. Patient has family nearby which helps her when needed. Prior Level of Function/Work/Activity:  Independent. Works as an OT. Dominant Side:         RIGHT  Personal Factors:          Sex:  female        Age:  39 y.o. Ambulatory/Rehab Services H2 Model Falls Risk Assessment   Risk Factors:       (1)  Dizziness/Vertigo       (1)  Any administered benzodiazepines       (5)  History of Recent Falls [w/in 3 months] Ability to Rise from Chair:       (1)  Pushes up, successful in one attempt   Falls Prevention Plan:       Exercise/Equipment Adaptation (specify):  Patient will be supervised in the clinic at all times due to higher fall risk   Total: (5 or greater = High Risk): 8   ©2010 [facility] [employer] [address] [place] [address] States Patent #[id number]. Federal Law prohibits the replication, distribution or use without written permission from [facility] [employer]   Current Medications:       Current Outpatient Medications:     ascorbic acid, vitamin C, (VITAMIN C) 500 mg tablet, Take 500 mg by mouth daily. , Disp: , Rfl:     LACTOBACILLUS ACIDOPHILUS PO, Take 1 Capsule by mouth daily as needed. , Disp: , Rfl:     magnesium glycinate-mag oxide 120 mg magnesium cap, Take 2 Tablets by mouth., Disp: , Rfl:     venlafaxine-SR (EFFEXOR-XR) 75 mg capsule, , Disp: , Rfl:     venlafaxine-SR (EFFEXOR-XR) 75 mg capsule, Take 75 mg by mouth., Disp: , Rfl:     DISABLED PLACARD (DISABLED PLACARD) DMV, Please provide handicap placard for neurologic condition affecting gait and balance., Disp: 1 Each, Rfl: 0    clonazePAM (KlonoPIN) 0.5 mg tablet, Take 1 Tablet by mouth daily as needed (severe vertigo). Take / use AM day of surgery  per anesthesia protocols if needed. (Patient taking differently: Take 0.5 mg by mouth daily. Take / use AM day of surgery  per anesthesia protocols if needed. ), Disp: 30 Tablet, Rfl: 1    ondansetron (ZOFRAN ODT) 4 mg disintegrating tablet, Take 1 Tablet by mouth every eight (8) hours as needed for Nausea., Disp: 24 Tablet, Rfl: 2    ubrogepant (Ubrelvy) 100 mg tablet, Take 1 Tab by mouth daily as needed for Migraine. TAKE ONE AT ONSET OF MIGRAINE, MAY REPEAT X 1 IF NEEDED. NOT TO EXCEED 2 TABS PER 24 HOUR PERIOD., Disp: 10 Tab, Rfl: 11    esomeprazole (NEXIUM) 40 mg capsule, Take 1 Cap by mouth two (2) times a day. Indications: gastroesophageal reflux disease (Patient taking differently: Take 40 mg by mouth two (2) times daily as needed. Indications: gastroesophageal reflux disease), Disp: 60 Cap, Rfl: 6    fexofenadine (ALLEGRA) 180 mg tablet, Take 180 mg by mouth nightly., Disp: , Rfl:     albuterol (PROVENTIL HFA, VENTOLIN HFA, PROAIR HFA) 90 mcg/actuation inhaler, Take 2 Puffs by inhalation every four (4) hours as needed for Wheezing or Shortness of Breath. Take / use AM day of surgery  per anesthesia protocols if needed. Indications: Asthma Attack, Disp: , Rfl:     OTHER, PLACE 1 SHANA UNDER THE TONGUE ONCE A DAY  DO NOT EAT DRINK FOR 15MIN AFTER TAKING, Disp: , Rfl: 5    mometasone (NASONEX) 50 mcg/actuation nasal spray, 1 [place] by Both Nostrils route nightly., Disp: , Rfl:     cholecalciferol, vitamin D3, (VITAMIN D3 PO), Take 10,000 Units by mouth daily. , Disp: , Rfl:     MAGNESIUM PO, Take 2 Tabs by mouth daily. , Disp: , Rfl:     VITAMIN A PO, Take 1 Tab by mouth daily. , Disp: , Rfl:     ARMOUR THYROID 30 mg tablet, TAKE 1 TABLET BY MOUTH EVERY DAY IN THE MORNING, Disp: , Rfl: 5    BUTTERBUR ROOT EXTRACT PO, Take 2 Tabs by mouth daily. , Disp: , Rfl:     multivitamin capsule, Take 2 Caps by mouth daily. , Disp: , Rfl:    Date Last Reviewed:  1/25/2022   Number of Personal Factors/Comorbidities that affect the Plan of Care: 3+: HIGH COMPLEXITY   EXAMINATION:   Observation/Orthostatic Postural Assessment:          Slight forward head rounded shoulders posture. Palpation: Increased tenderness along bilateral upper traps/SCM, left worse than right. Tender throughout C5-C7. Functional Mobility:         Gait/Ambulation:  Patient ambulates with bilateral hiking poles demonstrating decreased gait speed with wider base of support and moderate deviation from straight path. ROM:          Cervical range of motion is as follows: flexion within normal limits, extension within normal limits, right rotation within normal limits, left rotation 75%, right lateral flexion 50%, left lateral flexion 75%. Postural Control & Balance:  · Elizabeth Balance Scale:  43/56.   (A score less than 45/56 indicates high risk of falls). Score improved from 26/56 on initial evaluation. · Dynamic Gait Index:  Not tested. Body Structures Involved:  1. Nerves  2. Eyes and Ears  3. Muscles Body Functions Affected:  1. Neuromusculoskeletal Activities and Participation Affected:  1. General Tasks and Demands  2. Mobility  3.  Community, Social and [place]   Number of elements (examined above) that affect the Plan of Care: 4+: HIGH COMPLEXITY   CLINICAL PRESENTATION:   Presentation: Evolving clinical presentation with changing clinical characteristics: MODERATE COMPLEXITY   CLINICAL DECISION MAKING:   Use of outcome tool(s) and clinical judgement create a POC that gives a: Questionable prediction of patient's progress: MODERATE COMPLEXITY

## 2022-01-25 NOTE — PROGRESS NOTES
Jorge Alberto Roel  : 1980  Primary: Chris Cam Medicare Advantage  Secondary:  [address] at [facility]  [address], [address], [address]  [phone]   [phone]     OUTPATIENT PHYSICAL THERAPY: Daily Treatment Note 2022  Visit Count:  35    ICD-10: Treatment Diagnosis: Difficulty in walking, not elsewhere classified (R26.2)  Precautions/Allergies:   Fredna Ham antimigraine agents, Valium [diazepam], Imitrex [sumatriptan], Keflex [cephalexin], Sulfa (sulfonamide antibiotics), Theophylline, Topamax [topiramate], Ultram [tramadol], Zomig [zolmitriptan], Adhesive, Adhesive tape-silicones, Cyanocobalamin (vitamin b-12), Kenalog [triamcinolone acetonide], Pneumococcal vaccine, Pollen extracts, and Verapamil   TREATMENT PLAN:  Effective Dates: 2022 TO 2022 (90 days). Frequency/Duration: 1-2 times a week for 90 Days    Pre-treatment Symptoms/Complaints:     Patient reports working yesterday from 10-3. \"I am having a hard time verbalizing my thoughts\". Dizziness 4/10. No headache. Pain: Initial: 0/10 Post Session:  0/10   Medications Last Reviewed:  2022  Updated Objective Findings:  See recertification note    TREATMENT:     NEUROMUSCULAR RE-EDUCATION: (45 minutes):  Exercise/activities per grid below to improve balance, coordination, kinesthetic sense and posture. Required moderate verbal cues to promote static and dynamic balance in standing.     Date:  2022 Date:  22 Date:  22   Activity/Exercise Parameters Parameters Parameters   Walking in the clinic  In hallway with verbal cues for heel toe walk with glasses With glasses 4 laps, with glasses plus insert in shoes 4 laps   Sidestepping in hallway   4 laps without assistive device With glasses 4 laps   Marching in hallway  With glasses 4 laps 4 laps without assistive device With glasses 4 laps   Standing gastroc stretch in incline board   2 reps x1 minute Seated trunk rotation while visual tracking red ball      Stepping over half foam   Forward 10 reps each  Lateral 10 reps each         Step tap/ Step ups 6 inches  2x10 reps 6 inches  2x10 reps    Standing semitandem stance  Eyes open     Walking through cones With glasses 4 laps 4 laps without assistive device With glasses 4 laps   Walking with horizontal head turns with hiking poles  2 laps with glasses. 2 laps with glasses. Circles right/circles left      Walking backwards   With glasses 4 laps   Walking up/down ramps  3 ramps With glasses 15 minutes   Standing feet together Eyes open and eyes closed     Tiltboard  Eyes open; weight shifting left. /right and up/down      [employer] Portal  Treatment/Session Summary:    · Response to Treatment:  Patient demonstrates improved balance and improved gait speed since initial evaluation. · Communication/Consultation:  None today  · Equipment provided today:  None today  · Recommendations/Intent for next treatment session: Next visit will focus on balance exercises, vestibular exercises, and manual therapy.       Total Treatment Billable Duration:  45 minutes  PT Patient Time In/Time Out  Time In: 1015  Time Out: Divina 51, PT    Future Appointments   Date Time Provider Jacquie Smith   1/27/2022 10:15 AM SHRUTHI Mccain POANNA POA   1/31/2022  1:00 PM Harriet Mckeon MD [facility]   2/1/2022  9:30 AM María Nielsen Eleele, PT [facility]   2/2/2022  9:30 AM Brinda Ahuja MD [facility] BSNE   2/4/2022 10:15 AM Camila Nielsena Eleele, PT [facility]   2/8/2022 11:00 AM Camila Nielsena Eleele, PT [facility]   2/15/2022 10:15 AM VisCamila johnsona Eleele, PT [facility]   2/18/2022  1:00 PM María Nielsen Eleele, PT [facility]   2/22/2022 10:15 AM Camila Nielsena Eleele, PT [facility]   3/1/2022  9:45 AM Lorenzo Blas MD [facility] POA   6/17/2022 10:40 AM Doris Ceballos MD [facility]     Visit # Therapist initials Date Arrived NS/ Cx < 24 hr >24 hr Cx Visit Comments   18 PV 8/31/2021 X [employer] MCR-Thirty-five dollar copay per appointment.   Recertification done today   19 PV 9/3/2021 X      20 PV 9/14/2021 X      21 PV 9/17/2021 X      22 PV 9/28/2021 X      23 PV 10/12/2021 X      24 PV 10/26/2021 X      25 PV 10/29/2021 X      26 PV  11/8/2021 X      27 PV 63/60/0667 X   Recertification   28 PV 11/23/2021 X      29 PV 12/7/2021 X      30  PV 12/28/2021 X      31 PV 1/4/2022 X      32 PV 1/7/2022 X      33 PV 1/11/2022 X       PV 1/17/2022 X      34 PV 1/21/2022 X      35 PV 1/40/4905 X   Recertification note                       Abbreviations: NS = No Show; CX = cancelled

## 2022-02-01 ENCOUNTER — HOSPITAL ENCOUNTER (OUTPATIENT)
Dept: PHYSICAL THERAPY | Age: 42
Discharge: HOME | End: 2022-02-01
Attending: NURSE PRACTITIONER
Payer: MEDICARE

## 2022-02-01 PROCEDURE — 97112 NEUROMUSCULAR REEDUCATION: CPT

## 2022-02-01 NOTE — PROGRESS NOTES
Angie Cleve  : 1980  Primary: Kaylynn Cam Medicare Advantage  Secondary:  [address]  at [address] [facility], [address] [address], [address] [place] [address]  [phone]   [phone]     OUTPATIENT PHYSICAL THERAPY: Daily Treatment Note 2022  Visit Count:  36    ICD-10: Treatment Diagnosis: Difficulty in walking, not elsewhere classified (R26.2)  Precautions/Allergies:   Jeaneth Creamer antimigraine agents, Valium [diazepam], Imitrex [sumatriptan], Keflex [cephalexin], Sulfa (sulfonamide antibiotics), Theophylline, Topamax [topiramate], Ultram [tramadol], Zomig [zolmitriptan], Adhesive, Adhesive tape-silicones, Cyanocobalamin (vitamin b-12), Kenalog [triamcinolone acetonide], Pneumococcal vaccine, Pollen extracts, and Verapamil   TREATMENT PLAN:  Effective Dates: 2022 TO 2022 (90 days). Frequency/Duration: 1-2 times a week for 90 Days    Pre-treatment Symptoms/Complaints:     \"Back to half a klonopin and then hopefully weaning off. Effexor dropping 75 mg to 150 mg. .  I have a nerve conduction test tomorrow\". Dizziness 2/10. No headache. Pain: Initial: 0/10 Post Session:  0/10   Medications Last Reviewed:  2022  Updated Objective Findings:  None Today    TREATMENT:     NEUROMUSCULAR RE-EDUCATION: (45 minutes):  Exercise/activities per grid below to improve balance, coordination, kinesthetic sense and posture. Required moderate verbal cues to promote static and dynamic balance in standing.     Date:  2022 Date:  22 Date:  22   Activity/Exercise Parameters Parameters Parameters   Walking in the clinic  In hallway with verbal cues for heel toe walk with glasses With glasses 4 laps, with glasses plus insert in shoes 4 laps   Sidestepping in hallway  4 laps without assistive device 4 laps without assistive device With glasses 4 laps   Marching in hallway  With glasses 4 laps 4 laps without assistive device With glasses 4 laps   Standing gastroc stretch in incline board 2 reps x1 minute  2 reps x1 minute   Seated trunk rotation while visual tracking red ball      Stepping over half foam   Forward 10 reps each  Lateral 10 reps each         Step tap/ Step ups  6 inches  2x10 reps    Standing semitandem stance       Walking through cones With glasses 4 laps 4 laps without assistive device With glasses 4 laps   Walking with horizontal head turns with hiking poles  2 laps with glasses. 2 laps with glasses. Circles right/circles left      Pelvis/ leg length discrepancy  5 minutes to align pelvis/correct leg length  With glasses 4 laps   Walking up/down ramps With glasses 15 minutes 3 ramps With glasses 15 minutes   Standing feet together      Tiltboard  Eyes open; weight shifting left. /right and up/down      [employer] Portal  Treatment/Session Summary:    · Response to Treatment:  Patient demonstrates improved balance and stability with gait after correcting pelvic alignment/leg length discrepancy. Patient rates dizziness as 4/10 after treatment. · Communication/Consultation:  None today  · Equipment provided today:  None today  · Recommendations/Intent for next treatment session: Next visit will focus on balance exercises, vestibular exercises, and manual therapy.       Total Treatment Billable Duration:  45 minutes  PT Patient Time In/Time Out  Time In: 0930  Time Out: Lupillo Vale, PT    Future Appointments   Date Time Provider Jacquie Smith   2/2/2022  9:30 AM Alyssa Ray MD [facility]   2/4/2022 10:15 AM Vilma Nielsen Collet, PT [facility]   2/8/2022 11:00 AM Chu Nielsene Collet, PT [facility]   2/15/2022 10:15 AM Chu Nielsene Collet, PT [facility]   2/18/2022  1:00 PM Chu Nielsene Collet, PT [facility]   2/22/2022 10:15 AM Vilma Nielsen Collet, PT [facility]   4/25/2022  2:20 PM Ursula Lay MD [facility]   6/17/2022 10:40 AM Adam Rico MD [facility] CA[facility]     Visit # Therapist initials Date Arrived NS/ Cx < 24 hr >24 hr Cx Visit Comments   18 PV 8/31/2021 X   [employer] MCR-Thirty-five dollar copay per appointment.   Recertification done today   19 PV 9/3/2021 X      20 PV 9/14/2021 X      21 PV 9/17/2021 X      22 PV 9/28/2021 X      23 PV 10/12/2021 X      24 PV 10/26/2021 X      25 PV 10/29/2021 X      26 PV  11/8/2021 X      27 PV 37/44/8182 X   Recertification   28 PV 11/23/2021 X      29 PV 12/7/2021 X      30  PV 12/28/2021 X      31 PV 1/4/2022 X      32 PV 1/7/2022 X      33 PV 1/11/2022 X       PV 1/17/2022 X      34 PV 1/21/2022 X      35 PV 4/33/8227 X   Recertification note   36 PV 2/1/2022 X                 Abbreviations: NS = No Show; CX = cancelled

## 2022-02-04 ENCOUNTER — HOSPITAL ENCOUNTER (OUTPATIENT)
Dept: PHYSICAL THERAPY | Age: 42
Discharge: HOME | End: 2022-02-04
Attending: NURSE PRACTITIONER
Payer: MEDICARE

## 2022-02-04 PROCEDURE — 97140 MANUAL THERAPY 1/> REGIONS: CPT

## 2022-02-04 PROCEDURE — 97112 NEUROMUSCULAR REEDUCATION: CPT

## 2022-02-04 NOTE — PROGRESS NOTES
Juice Zuleta  : 1980  Primary: Ryan Cam Medicare Advantage  Secondary:  [address]  at [place] [id number] [facility], [address] [place] [address],[address]Th Floor [facility], [place] [address].  [phone]   [phone]     OUTPATIENT PHYSICAL THERAPY: Daily Treatment Note 2022  Visit Count:  37    ICD-10: Treatment Diagnosis: Difficulty in walking, not elsewhere classified (R26.2)  Precautions/Allergies:   Laurie Shine antimigraine agents, Valium [diazepam], Imitrex [sumatriptan], Keflex [cephalexin], Sulfa (sulfonamide antibiotics), Theophylline, Topamax [topiramate], Ultram [tramadol], Zomig [zolmitriptan], Adhesive, Adhesive tape-silicones, Cyanocobalamin (vitamin b-12), Kenalog [triamcinolone acetonide], Pneumococcal vaccine, Pollen extracts, and Verapamil   TREATMENT PLAN:  Effective Dates: 2022 TO 2022 (90 days). Frequency/Duration: 1-2 times a week for 90 Days    Pre-treatment Symptoms/Complaints: \"Nerve conduction test on Wednesday. I am still hurting from it. When they hit C7 my back started spasming\". Dizziness 2/10. Headache 6/10. Pain: Initial: 0/10 Post Session:  0/10   Medications Last Reviewed:  2022  Updated Objective Findings:  None Today    TREATMENT:     NEUROMUSCULAR RE-EDUCATION: (30 minutes):  Exercise/activities per grid below to improve balance, coordination, kinesthetic sense and posture. Required moderate verbal cues to promote static and dynamic balance in standing.     Date:  2022 Date:  22 Date:  22   Activity/Exercise Parameters Parameters Parameters   Walking in the clinic   With glasses 4 laps, with glasses plus insert in shoes 4 laps   Sidestepping in hallway  4 laps without assistive device 4 laps without assistive device With glasses 4 laps   Marching in hallway  With glasses 4 laps 4 laps without assistive device With glasses 4 laps   Standing gastroc stretch in incline board 2 reps x1 minute 3 reps x1 minute 2 reps x1 minute   Seated trunk rotation while visual tracking red ball      Stepping over half foam   Forward 10 reps each  Lateral 10 reps each         Step tap/ Step ups      Standing semitandem stance       Walking through cones With glasses 4 laps 4 laps without assistive device With glasses 4 laps   Walking with horizontal head turns with hiking poles   2 laps with glasses. Circles right/circles left      Pelvis/ leg length discrepancy  5 minutes to align pelvis/correct leg length  With glasses 4 laps   Walking up/down ramps With glasses 15 minutes  With glasses 15 minutes   Standing feet together      Tiltboard          MANUAL THERAPY: (15 minutes): Soft tissue mobilization was utilized and necessary because of the patient's painful spasm. In seated, patient received trigger point release along left upper trap/left rhomboids to decrease pain. [employer] [facility]  Treatment/Session Summary:    · Response to Treatment:  Patient reports decrease in headache to 3/10 after manual therapy. Patient needed more rest breaks today due to not feeling well. · Communication/Consultation:  None today  · Equipment provided today:  None today  · Recommendations/Intent for next treatment session: Next visit will focus on balance exercises, vestibular exercises, and manual therapy.       Total Treatment Billable Duration:  45 minutes  PT Patient Time In/Time Out  Time In: 1015  Time Out: Divina 51, PT    Future Appointments   Date Time Provider Jacquie Smith   2/8/2022 11:00 AM Wanda Marte PT [facility] SFE   2/15/2022 10:15 AM Kenneth Nielsen, PT [facility] SFE   2/18/2022  1:00 PM Kenneth Nielsen PT [facility] SFE   2/22/2022 10:15 AM Kenneth Nielsen, PT [facility] SFE   4/25/2022  2:20 PM Sho Smith MD [facility]   6/17/2022 10:40 AM Ramon Naik MD [facility] CA[facility]     Visit # Therapist initials Date Arrived NS/ Cx < 24 hr >24 hr Cx Visit Comments   18 PV 8/31/2021 X   [employer] MCR-Thirty-five dollar copay per appointment.   Recertification done today   19 PV 9/3/2021 X      20 PV 9/14/2021 X      21 PV 9/17/2021 X      22 PV 9/28/2021 X      23 PV 10/12/2021 X      24 PV 10/26/2021 X      25 PV 10/29/2021 X      26 PV  11/8/2021 X      27 PV 40/94/5917 X   Recertification   28 PV 11/23/2021 X      29 PV 12/7/2021 X      30  PV 12/28/2021 X      31 PV 1/4/2022 X      32 PV 1/7/2022 X      33 PV 1/11/2022 X       PV 1/17/2022 X      34 PV 1/21/2022 X      35 PV 7/30/8357 X   Recertification note   36 PV 2/1/2022 X      37 PV 2/4/2022 X        Abbreviations: NS = No Show; CX = cancelled

## 2022-02-06 PROBLEM — G56.03 CARPAL TUNNEL SYNDROME, BILATERAL: Status: ACTIVE | Noted: 2022-02-06

## 2022-02-08 ENCOUNTER — HOSPITAL ENCOUNTER (OUTPATIENT)
Dept: PHYSICAL THERAPY | Age: 42
Discharge: HOME | End: 2022-02-08
Attending: NURSE PRACTITIONER
Payer: MEDICARE

## 2022-02-08 PROCEDURE — 97140 MANUAL THERAPY 1/> REGIONS: CPT

## 2022-02-08 PROCEDURE — 97112 NEUROMUSCULAR REEDUCATION: CPT

## 2022-02-08 NOTE — PROGRESS NOTES
Mili Fails  : 1980  Primary: Prasanna Colon [employer] Medicare Advantage  Secondary:  [address]  at [place] [address] [facility], [address] [place] [address],[address], [address] [facility]  [phone]   [phone]     OUTPATIENT PHYSICAL THERAPY: Daily Treatment Note 2022  Visit Count:  38    ICD-10: Treatment Diagnosis: Difficulty in walking, not elsewhere classified (R26.2)  Precautions/Allergies:   Alix Nicole antimigraine agents, Valium [diazepam], Imitrex [sumatriptan], Keflex [cephalexin], Sulfa (sulfonamide antibiotics), Theophylline, Topamax [topiramate], Ultram [tramadol], Zomig [zolmitriptan], Adhesive, Adhesive tape-silicones, Cyanocobalamin (vitamin b-12), Kenalog [triamcinolone acetonide], Pneumococcal vaccine, Pollen extracts, and Verapamil   TREATMENT PLAN:  Effective Dates: 2022 TO 2022 (90 days). Frequency/Duration: 1-2 times a week for 90 Days    Pre-treatment Symptoms/Complaints:     \"I just left the pain management doctor\". Patient reports feeling more flared up since seeing the doctor. Patient is scheduled to get an epidural shot in her neck in March. Dizziness 2/10. Headache 4/10. Pain: Initial: 4/10- crown of head Post Session:  2/10   Medications Last Reviewed:  2022  Updated Objective Findings:  None Today    TREATMENT:     NEUROMUSCULAR RE-EDUCATION: (20 minutes):  Exercise/activities per grid below to improve balance, coordination, kinesthetic sense and posture. Required moderate verbal cues to promote static and dynamic balance in standing.     Date:  2022 Date:  22 Date:  22   Activity/Exercise Parameters Parameters Parameters   Walking in the clinic      Sidestepping in hallway  4 laps without assistive device 4 laps without assistive device With glasses 4 laps   Marching in hallway  With glasses 4 laps 4 laps without assistive device With glasses 4 laps   Standing gastroc stretch in incline board 2 reps x1 minute 3 reps x1 minute 3 reps x 1 minute   Seated trunk rotation while visual tracking red ball      Stepping over half foam            Step tap/ Step ups      Standing semitandem stance       Walking through cones With glasses 4 laps 4 laps without assistive device    Walking with horizontal head turns with hiking poles      Circles right/circles left      Pelvis/ leg length discrepancy  5 minutes to align pelvis/correct leg length     Walking up/down ramps With glasses 15 minutes     Standing feet together      Tiltboard          MANUAL THERAPY: (25 minutes): Soft tissue mobilization was utilized and necessary because of the patient's painful spasm. In seated, patient received trigger point release along left upper trap/left rhomboids to decrease pain. [employer] Portal  Treatment/Session Summary:    · Response to Treatment:  Patient reports decrease in headache to 2/10 after manual therapy. Focused more on manual therapy today due to patient feeling more pain after seeing her doctor. · Communication/Consultation:  None today  · Equipment provided today:  None today  · Recommendations/Intent for next treatment session: Next visit will focus on balance exercises, vestibular exercises, and manual therapy. Total Treatment Billable Duration:  45 minutes  PT Patient Time In/Time Out  Time In: 1100  Time Out: 1010 [facility], PT    Future Appointments   Date Time Provider Jacquie Smith   2/15/2022 10:15 AM Brenda Merino, PT [facility] SFE   2/18/2022  1:00 PM Brenda Merino, BENITA [facility] SFE   2/22/2022 10:15 AM Herminio Nielsen, PT [facility] SFE   4/25/2022  2:20 PM Yosef Campos MD [facility]   6/17/2022 10:40 AM Nimco Rivera MD [facility] CAF CAFM     Visit # Therapist initials Date Arrived NS/ Cx < 24 hr >24 hr Cx Visit Comments   18 PV 8/31/2021 X   [employer] MCR-Thirty-five dollar copay per appointment.   Recertification done today   19 PV 9/3/2021 X      20 PV 9/14/2021 X      21 PV 9/17/2021 X      22 PV 9/28/2021 X      23 PV 10/12/2021 X      24 PV 10/26/2021 X      25 PV 10/29/2021 X      26 PV  11/8/2021 X      27 PV 56/90/4796 X   Recertification   28 PV 11/23/2021 X      29 PV 12/7/2021 X      30  PV 12/28/2021 X      31 PV 1/4/2022 X      32 PV 1/7/2022 X      33 PV 1/11/2022 X       PV 1/17/2022 X      34 PV 1/21/2022 X      35 PV 8/13/0494 X   Recertification note   36 PV 2/1/2022 X      37 PV 2/4/2022 X      38 PV 2/8/2022 X                                                                       Abbreviations: NS = No Show; CX = cancelled

## 2022-02-15 ENCOUNTER — APPOINTMENT (OUTPATIENT)
Dept: PHYSICAL THERAPY | Age: 42
End: 2022-02-15
Attending: NURSE PRACTITIONER
Payer: MEDICARE

## 2022-02-18 ENCOUNTER — APPOINTMENT (OUTPATIENT)
Dept: PHYSICAL THERAPY | Age: 42
End: 2022-02-18
Attending: NURSE PRACTITIONER
Payer: MEDICARE

## 2022-02-22 ENCOUNTER — HOSPITAL ENCOUNTER (OUTPATIENT)
Dept: PHYSICAL THERAPY | Age: 42
Discharge: HOME | End: 2022-02-22
Attending: NURSE PRACTITIONER
Payer: MEDICARE

## 2022-02-22 PROCEDURE — 97110 THERAPEUTIC EXERCISES: CPT

## 2022-02-22 PROCEDURE — 97140 MANUAL THERAPY 1/> REGIONS: CPT

## 2022-02-22 PROCEDURE — 97112 NEUROMUSCULAR REEDUCATION: CPT

## 2022-02-22 NOTE — PROGRESS NOTES
Lorene Jackson  : 1980  Primary: Moses Cam Medicare Advantage  Secondary:  [address] at [place] [id number] [facility], [address] [place] [address],[address], [address] [facility]  [phone]   [phone]     OUTPATIENT PHYSICAL THERAPY: Daily Treatment Note 2022  Visit Count:  39    ICD-10: Treatment Diagnosis: Difficulty in walking, not elsewhere classified (R26.2)  Precautions/Allergies:   Ying Antionette antimigraine agents, Valium [diazepam], Imitrex [sumatriptan], Keflex [cephalexin], Sulfa (sulfonamide antibiotics), Theophylline, Topamax [topiramate], Ultram [tramadol], Zomig [zolmitriptan], Adhesive, Adhesive tape-silicones, Cyanocobalamin (vitamin b-12), Kenalog [triamcinolone acetonide], Pneumococcal vaccine, Pollen extracts, and Verapamil   TREATMENT PLAN:  Effective Dates: 2022 TO 2022 (90 days). Frequency/Duration: 1-2 times a week for 90 Days    Pre-treatment Symptoms/Complaints:     \"I had a sinus infection/inner ear infection last week. I fell and hit the bookcase because I had vertigo. The doctor put me on augmentin and I had an allergic reaction. I am still having ear pressure and pain. I haven't been moving much over the past week\". Dizziness 4/10. Headache 0/10. Pain: Initial: 0/10 Post Session:  0/10   Medications Last Reviewed:  2022  Updated Objective Findings:  None Today    TREATMENT:     NEUROMUSCULAR RE-EDUCATION: (25 minutes):  Exercise/activities per grid below to improve balance, coordination, kinesthetic sense and posture. Required moderate verbal cues to promote static and dynamic balance in standing.     Date:  2022 Date:  22 Date:  22   Activity/Exercise Parameters Parameters Parameters   Walking in the clinic      Sidestepping in hallway  4 laps without assistive device 4 laps without assistive device With glasses 4 laps   Marching in hallway  With glasses 4 laps 4 laps without assistive device With glasses 4 laps Standing gastroc stretch in incline board 2 reps x1 minute 3 reps x1 minute 3 reps x 1 minute   Seated trunk rotation while visual tracking red ball      Stepping over half foam            Step tap/ Step ups      Standing semitandem stance       Walking through cones With glasses 4 laps 4 laps without assistive device    Walking with horizontal head turns with hiking poles      Circles right/circles left      Pelvis/ leg length discrepancy       Walking up/down ramps      Standing feet together      Tiltboard          MANUAL THERAPY: (20 minutes): Soft tissue mobilization was utilized and necessary because of the patient's painful spasm. In seated, patient received trigger point release along left upper trap/left rhomboids to decrease pain. [email or web address] Portal  Treatment/Session Summary:    · Response to Treatment:  Patient more off balance with exercises. Patient needed increased time to rest due to vertigo. Patient reports decreased muscular tightness after manual therapy. Patient will be gone over the next several weeks due to a vacation in [place] and [place] [facility] · Communication/Consultation:  None today  · Equipment provided today:  None today  · Recommendations/Intent for next treatment session: Next visit will focus on balance exercises, vestibular exercises, and manual therapy.       Total Treatment Billable Duration:  45 minutes  PT Patient Time In/Time Out  Time In: 1015  Time Out: Divina 51, PT    Future Appointments   Date Time Provider Jacquie Smith   3/14/2022 10:45 AM [facility]   3/15/2022 10:15 AM Elliot Jacobo PT [facility] SFE   3/22/2022 11:00 AM Vissage, Lavonda Schlatter, PT [facility] SFE   3/29/2022 10:15 AM Vissage, Lavonda Schlatter, PT [facility] SFE   4/25/2022  2:20 PM Tanvir Gould MD [facility]   6/17/2022 10:40 AM Inderjit Cotto MD [facility] CAF CAFM     Visit # Therapist initials Date Arrived NS/ Cx < 24 hr >24 hr Cx Visit Comments   18 PV 8/31/2021 X   [employer] MCR-Thirty-five dollar copay per appointment.   Recertification done today   19 PV 9/3/2021 X      20 PV 9/14/2021 X      21 PV 9/17/2021 X      22 PV 9/28/2021 X      23 PV 10/12/2021 X      24 PV 10/26/2021 X      25 PV 10/29/2021 X      26 PV  11/8/2021 X      27 PV 79/49/6601 X   Recertification   28 PV 11/23/2021 X      29 PV 12/7/2021 X      30  PV 12/28/2021 X      31 PV 1/4/2022 X      32 PV 1/7/2022 X      33 PV 1/11/2022 X       PV 1/17/2022 X      34 PV 1/21/2022 X      35 PV 9/13/4033 X   Recertification note   36 PV 2/1/2022 X      37 PV 2/4/2022 X      38 PV 2/8/2022 X      39 PV 2/22/2022 X                                                              Abbreviations: NS = No Show; CX = cancelled

## 2022-03-15 ENCOUNTER — HOSPITAL ENCOUNTER (OUTPATIENT)
Dept: PHYSICAL THERAPY | Age: 42
Discharge: HOME | End: 2022-03-15
Attending: NURSE PRACTITIONER
Payer: MEDICARE

## 2022-03-15 PROCEDURE — 97112 NEUROMUSCULAR REEDUCATION: CPT

## 2022-03-15 NOTE — PROGRESS NOTES
Irma [place]  : 1980  Primary: Emerald Cam Medicare Advantage  Secondary:  [facility] [address] [facility],[address], [address]  [phone]   [phone]     OUTPATIENT PHYSICAL THERAPY: Daily Treatment Note 3/15/2022  Visit Count:  40    ICD-10: Treatment Diagnosis: Difficulty in walking, not elsewhere classified (R26.2)  Precautions/Allergies:   Sandrea Postal antimigraine agents, Valium [diazepam], Imitrex [sumatriptan], Keflex [cephalexin], Sulfa (sulfonamide antibiotics), Theophylline, Topamax [topiramate], Ultram [tramadol], Zomig [zolmitriptan], Adhesive, Adhesive tape-silicones, Cyanocobalamin (vitamin b-12), Kenalog [triamcinolone acetonide], Pneumococcal vaccine, Pollen extracts, and Verapamil   TREATMENT PLAN:  Effective Dates: 2022 TO 2022 (90 days). Frequency/Duration: 1-2 times a week for 90 Days    Pre-treatment Symptoms/Complaints:     Patient reports she got back from [place] last Wednesday. \"I have a headache today\". Pain: Initial: 410 Post Session:  0/10   Medications Last Reviewed:  3/15/2022  Updated Objective Findings:  None Today    TREATMENT:     NEUROMUSCULAR RE-EDUCATION: (45 minutes):  Exercise/activities per grid below to improve balance, coordination, kinesthetic sense and posture. Required moderate verbal cues to promote static and dynamic balance in standing.     Date:  2022 Date:  3/15/2022 Date:  22   Activity/Exercise Parameters Parameters Parameters   Walking in the clinic      Sidestepping in hallway  4 laps without assistive device 4 laps without assistive device With glasses 4 laps   Marching in hallway  With glasses 4 laps 4 laps without assistive device With glasses 4 laps   Standing gastroc stretch in incline board 2 reps x1 minute 3 reps x1 minute 3 reps x 1 minute   Seated trunk rotation while visual tracking red ball      Stepping over half foam            Step tap/ Step ups      Standing semitandem stance       Walking through cones With glasses 4 laps 4 laps without assistive device    Walking with horizontal head turns with hiking poles  2 laps    Circles right/circles left      Pelvis/ leg length discrepancy       Walking up/down ramps      Standing feet together      Tiltboard          MANUAL THERAPY: (0 minutes): Soft tissue mobilization was utilized and necessary because of the patient's painful spasm. In seated, patient received trigger point release along left upper trap/left rhomboids to decrease pain. [employer] Portal  Treatment/Session Summary:    · Response to Treatment:  Patient reports no headache after treatment. Patient needed frequent rest breaks due to increased dizziness. Continue to progress to tolerance. · Communication/Consultation:  None today  · Equipment provided today:  None today  · Recommendations/Intent for next treatment session: Next visit will focus on balance exercises, vestibular exercises, and manual therapy. Total Treatment Billable Duration:  45 minutes  PT Patient Time In/Time Out  Time In: 1015  Time Out: Divina 51, PT    Future Appointments   Date Time Provider Jacquie Smith   3/22/2022 11:00 AM Izabel Dunlap PT [facility] SFE   3/29/2022 10:15 AM Vissage, Candyce Collet, PT [facility] SFE   4/1/2022 11:00 AM Vissage, Candyce Collet, PT [facility] SFE   4/5/2022 11:00 AM Vissage, Candyce Collet, PT [facility] SFE   4/12/2022 11:00 AM Vissage, Candyce Collet, PT [facility] SFE   4/19/2022 11:00 AM Vissage, Candyce Collet, PT [facility] SFE   4/25/2022  2:20 PM Ursula Lay MD [facility]   4/26/2022 11:00 AM Vissage, Candyce Collet, PT [facility] SFE   4/29/2022 11:00 AM Vissage, Candyce Collet, PT [facility] SFE   6/17/2022 10:40 AM Adam Rico MD [facility] CAFM CAFM     Visit # Therapist initials Date Arrived NS/ Cx < 24 hr >24 hr Cx Visit Comments   18 PV 8/31/2021 X   [employer] MCR-Thirty-five dollar copay per appointment.   Recertification done today   19 PV 9/3/2021 X 20 PV 9/14/2021 X      21 PV 9/17/2021 X      22 PV 9/28/2021 X      23 PV 10/12/2021 X      24 PV 10/26/2021 X      25 PV 10/29/2021 X      26 PV  11/8/2021 X      27 PV 01/49/9862 X   Recertification   28 PV 11/23/2021 X      29 PV 12/7/2021 X      30  PV 12/28/2021 X      31 PV 1/4/2022 X      32 PV 1/7/2022 X      33 PV 1/11/2022 X       PV 1/17/2022 X      34 PV 1/21/2022 X      35 PV 8/83/0921 X   Recertification note   36 PV 2/1/2022 X      37 PV 2/4/2022 X      38 PV 2/8/2022 X      39 PV 2/22/2022 X      40 PV 3/15/2022 X                                                     Abbreviations: NS = No Show; CX = cancelled

## 2022-03-18 PROBLEM — E03.4 HYPOTHYROIDISM DUE TO ACQUIRED ATROPHY OF THYROID: Status: ACTIVE | Noted: 2019-05-01

## 2022-03-18 PROBLEM — M54.2 NECK PAIN: Status: ACTIVE | Noted: 2021-12-16

## 2022-03-18 PROBLEM — E66.01 SEVERE OBESITY (HCC): Status: ACTIVE | Noted: 2019-04-09

## 2022-03-18 PROBLEM — S46.012A TRAUMATIC TEAR OF LEFT ROTATOR CUFF: Status: ACTIVE | Noted: 2021-12-16

## 2022-03-18 PROBLEM — R13.10 DYSPHAGIA: Status: ACTIVE | Noted: 2019-04-09

## 2022-03-18 PROBLEM — R29.898 LEFT LEG WEAKNESS: Status: ACTIVE | Noted: 2021-06-02

## 2022-03-19 PROBLEM — M54.12 CERVICAL RADICULOPATHY: Status: ACTIVE | Noted: 2019-04-09

## 2022-03-19 PROBLEM — F44.9 CONVERSION DISORDER: Status: ACTIVE | Noted: 2021-08-22

## 2022-03-19 PROBLEM — R20.2 ARM PARESTHESIA, LEFT: Status: ACTIVE | Noted: 2021-12-16

## 2022-03-19 PROBLEM — Z83.719 FAMILY HISTORY OF COLONIC POLYPS: Status: ACTIVE | Noted: 2019-04-09

## 2022-03-19 PROBLEM — G43.809 VESTIBULAR MIGRAINE: Status: ACTIVE | Noted: 2019-12-17

## 2022-03-19 PROBLEM — G43.109 CHRONIC MIGRAINE WITH AURA: Status: ACTIVE | Noted: 2021-02-15

## 2022-03-19 PROBLEM — F32.1 MAJOR DEPRESSIVE DISORDER, SINGLE EPISODE, MODERATE (HCC): Status: ACTIVE | Noted: 2021-08-22

## 2022-03-19 PROBLEM — Z83.71 FAMILY HISTORY OF COLONIC POLYPS: Status: ACTIVE | Noted: 2019-04-09

## 2022-03-19 PROBLEM — F41.1 GENERALIZED ANXIETY DISORDER: Status: ACTIVE | Noted: 2021-08-22

## 2022-03-19 PROBLEM — G50.0 TRIGEMINAL NEURALGIA: Status: ACTIVE | Noted: 2019-04-09

## 2022-03-19 PROBLEM — N94.6 DYSMENORRHEA: Status: ACTIVE | Noted: 2017-03-08

## 2022-03-19 PROBLEM — M53.0 CERVICOCRANIAL SYNDROME: Status: ACTIVE | Noted: 2019-04-09

## 2022-03-19 PROBLEM — M25.512 LEFT SHOULDER PAIN: Status: ACTIVE | Noted: 2021-12-16

## 2022-03-19 PROBLEM — Z90.49 S/P CHOLECYSTECTOMY: Status: ACTIVE | Noted: 2019-09-18

## 2022-03-19 PROBLEM — G56.03 CARPAL TUNNEL SYNDROME, BILATERAL: Status: ACTIVE | Noted: 2022-02-06

## 2022-03-19 PROBLEM — G43.E09 CHRONIC MIGRAINE WITH AURA: Status: ACTIVE | Noted: 2021-02-15

## 2022-03-19 PROBLEM — T50.905A DRUG REACTION: Status: ACTIVE | Noted: 2021-02-15

## 2022-03-19 PROBLEM — R53.83 FATIGUE: Status: ACTIVE | Noted: 2017-03-08

## 2022-03-19 PROBLEM — E55.9 VITAMIN D DEFICIENCY: Status: ACTIVE | Noted: 2017-03-08

## 2022-03-22 ENCOUNTER — HOSPITAL ENCOUNTER (OUTPATIENT)
Dept: PHYSICAL THERAPY | Age: 42
Discharge: HOME | End: 2022-03-22
Attending: NURSE PRACTITIONER
Payer: MEDICARE

## 2022-03-22 PROCEDURE — 97112 NEUROMUSCULAR REEDUCATION: CPT

## 2022-03-22 PROCEDURE — 97140 MANUAL THERAPY 1/> REGIONS: CPT

## 2022-03-22 NOTE — PROGRESS NOTES
Mili Fails  : 1980  Primary: Prasanna Colon [employer] Medicare Advantage  Secondary:  [address] at [place] [address] [facility], [address] [place] [address],[address], [place] [address].  [phone]   [phone]     OUTPATIENT PHYSICAL THERAPY: Daily Treatment Note 3/22/2022  Visit Count:  41    ICD-10: Treatment Diagnosis: Difficulty in walking, not elsewhere classified (R26.2)  Precautions/Allergies:   Alix Nicole antimigraine agents, Valium [diazepam], Imitrex [sumatriptan], Keflex [cephalexin], Sulfa (sulfonamide antibiotics), Theophylline, Topamax [topiramate], Ultram [tramadol], Zomig [zolmitriptan], Adhesive, Adhesive tape-silicones, Cyanocobalamin (vitamin b-12), Kenalog [triamcinolone acetonide], Pneumococcal vaccine, Pollen extracts, and Verapamil   TREATMENT PLAN:  Effective Dates: 2022 TO 2022 (90 days). Frequency/Duration: 1-2 times a week for 90 Days    Pre-treatment Symptoms/Complaints:     \"I had an injection on Friday along left shoulder. I have less numbness down my left arm. No headache and dizziness\". Pain: Initial: 5/10- left shoulder Post Session:  0/10   Medications Last Reviewed:  3/22/2022  Updated Objective Findings:  None Today    TREATMENT:     NEUROMUSCULAR RE-EDUCATION: (30 minutes):  Exercise/activities per grid below to improve balance, coordination, kinesthetic sense and posture. Required moderate verbal cues to promote static and dynamic balance in standing.     Date:  2022 Date:  3/15/2022 Date:  3/22/22   Activity/Exercise Parameters Parameters Parameters   Walking in the clinic      Sidestepping in hallway  4 laps without assistive device 4 laps without assistive device With glasses 4 laps   Marching in hallway  With glasses 4 laps 4 laps without assistive device With glasses 4 laps   Standing gastroc stretch in incline board 2 reps x1 minute 3 reps x1 minute 3 reps x 1 minute   Seated trunk rotation while visual tracking red ball Stepping over half foam            Step tap/ Step ups      Standing semitandem stance       Walking through cones With glasses 4 laps 4 laps without assistive device 4 laps without assistive devic   Walking with horizontal head turns with hiking poles  2 laps    Circles right/circles left      Pelvis/ leg length discrepancy       Walking up/down ramps      Standing feet together      Tiltboard          MANUAL THERAPY: (15 minutes): Soft tissue mobilization was utilized and necessary because of the patient's painful spasm. In seated, patient received trigger point release along left upper trap/left rhomboids/left scapular region to decrease pain. [employer] Portal  Treatment/Session Summary:    · Response to Treatment:  Patient reports improved stability after treatment. Dizziness increased slightly to 1/10. Continue to progress to tolerance. · Communication/Consultation:  None today  · Equipment provided today:  None today  · Recommendations/Intent for next treatment session: Next visit will focus on balance exercises, vestibular exercises, and manual therapy. Total Treatment Billable Duration:  45 minutes  PT Patient Time In/Time Out  Time In: 1100  Time Out: 1010 [facility],     Future Appointments   Date Time Provider Jacquie Smith   3/29/2022 10:15 AM Keeley Carlson PT [facility] SFE   4/1/2022 11:00 AM Tony Nielsen, PT [facility] SFE   4/5/2022 11:00 AM Tony Nielsen, PT [facility] SFE   4/12/2022 11:00 AM Tony Nielsen, PT [facility] SFE   4/19/2022 11:00 AM Tony Nielsen, PT [facility] SFE   4/25/2022  2:20 PM Arnie Velasquez MD [facility]   4/26/2022 11:00 AM Tony Nielsen, PT [facility]PT SFE   4/29/2022 11:00 AM Tony Nielsen, PT [facility] SFE   6/17/2022 10:40 AM Stefania Bingham MD [facility] CAFM CAFM     Visit # Therapist initials Date Arrived NS/ Cx < 24 hr >24 hr Cx Visit Comments   18 PV 8/31/2021 X   [employer] MCR-Thirty-five dollar copay per appointment.   Recertification done today   19 PV 9/3/2021 X      20 PV 9/14/2021 X      21 PV 9/17/2021 X      22 PV 9/28/2021 X      23 PV 10/12/2021 X      24 PV 10/26/2021 X      25 PV 10/29/2021 X      26 PV  11/8/2021 X      27 PV 03/66/3367 X   Recertification   28 PV 11/23/2021 X      29 PV 12/7/2021 X      30  PV 12/28/2021 X      31 PV 1/4/2022 X      32 PV 1/7/2022 X      33 PV 1/11/2022 X       PV 1/17/2022 X      34 PV 1/21/2022 X      35 PV 2/55/6753 X   Recertification note   36 PV 2/1/2022 X      37 PV 2/4/2022 X      38 PV 2/8/2022 X      39 PV 2/22/2022 X      40 PV 3/15/2022 X      41 PV 3/22/2022 X                                            Abbreviations: NS = No Show; CX = cancelled

## 2022-03-29 ENCOUNTER — HOSPITAL ENCOUNTER (OUTPATIENT)
Dept: PHYSICAL THERAPY | Age: 42
Discharge: HOME | End: 2022-03-29
Attending: NURSE PRACTITIONER
Payer: MEDICARE

## 2022-03-29 PROCEDURE — 97140 MANUAL THERAPY 1/> REGIONS: CPT

## 2022-03-29 NOTE — PROGRESS NOTES
Kaci Mckeon  : 1980  Primary: Jag Cam Medicare Advantage  Secondary:  [address] at [facility]  [facility], [address] [place] [address],[address], [place] [address].  [phone]   [phone]     OUTPATIENT PHYSICAL THERAPY: Daily Treatment Note 3/29/2022  Visit Count:  42    ICD-10: Treatment Diagnosis: Difficulty in walking, not elsewhere classified (R26.2)  Precautions/Allergies:   Garnette Rolly antimigraine agents, Valium [diazepam], Imitrex [sumatriptan], Keflex [cephalexin], Sulfa (sulfonamide antibiotics), Theophylline, Topamax [topiramate], Ultram [tramadol], Zomig [zolmitriptan], Adhesive, Adhesive tape-silicones, Cyanocobalamin (vitamin b-12), Kenalog [triamcinolone acetonide], Pneumococcal vaccine, Pollen extracts, and Verapamil   TREATMENT PLAN:  Effective Dates: 2022 TO 2022 (90 days). Frequency/Duration: 1-2 times a week for 90 Days    Pre-treatment Symptoms/Complaints:     \"I choked on a smoothie on Thursday and it caused a muscle spasm from both shoulders/mid thoracic, into my head. 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 I took a muscle relaxer on Friday night and slept most of the day on Saturday. I have a follow up with the doctor on Friday. No dizziness \". Patient reports extreme pain and increased headache today. Pain: Initial: 8/10- Bilateral shoulders/into head Post Session:  4/10   Medications Last Reviewed:  3/29/2022  Updated Objective Findings:  None Today    TREATMENT:     NEUROMUSCULAR RE-EDUCATION: (0 minutes):  Exercise/activities per grid below to improve balance, coordination, kinesthetic sense and posture. Required moderate verbal cues to promote static and dynamic balance in standing.     Date:   Date:  3/15/2022 Date:  3/22/22   Activity/Exercise Parameters Parameters Parameters   Walking in the clinic      Sidestepping in hallway  4 laps without assistive device 4 laps without assistive device With glasses 4 laps   Marching in hallway  With glasses 4 laps 4 laps without assistive device With glasses 4 laps   Standing gastroc stretch in incline board 2 reps x1 minute 3 reps x1 minute 3 reps x 1 minute   Seated trunk rotation while visual tracking red ball      Stepping over half foam            Step tap/ Step ups      Standing semitandem stance       Walking through cones With glasses 4 laps 4 laps without assistive device 4 laps without assistive devic   Walking with horizontal head turns with hiking poles  2 laps    Circles right/circles left      Pelvis/ leg length discrepancy       Walking up/down ramps      Standing feet together      Tiltboard          MANUAL THERAPY: (40 minutes): Soft tissue mobilization was utilized and necessary because of the patient's painful spasm. In seated, patient received trigger point release along bilateral upper traps/ rhomboids/ scapular region to decrease pain. Gentle manual cervical distraction to decreased headache and pain. [facility]  Treatment/Session Summary:    · Response to Treatment:  Patient reports decreased pain to 4/10. Patient demonstrates improved gait stability after manual therapy. · Communication/Consultation:  None today  · Equipment provided today:  None today  · Recommendations/Intent for next treatment session: Next visit will focus on balance exercises, vestibular exercises, and manual therapy.       Total Treatment Billable Duration:  40 minutes  PT Patient Time In/Time Out  Time In: 1656  Time Out: [address], PT    Future Appointments   Date Time Provider Jacquie Smith   4/1/2022 11:00 AM Wilmer Aparicio PT [facility]   4/5/2022 11:00 AM Camille Nielsen PT [facility]   4/12/2022 11:00 AM Camille Nielsen PT [facility]   4/19/2022 11:00 AM Camille Nielsen PT [facility]   4/21/2022 11:15 AM Liat Hall PA POAP POA   4/25/2022  2:20 PM Yomaira Poole MD [facility] 4/26/2022  7:00 PM Camille Nielsen, PT [facility]   4/29/2022  7:00 PM Camille Nielsen, PT [facility]   6/17/2022 10:40 AM Sathish Hahn MD [facility]     Visit # Therapist initials Date Arrived NS/ Cx < 24 hr >24 hr Cx Visit Comments   18 PV 8/31/2021 X   [employer] MCR-Thirty-five dollar copay per appointment.   Recertification done today   19 PV 9/3/2021 X      20 PV 9/14/2021 X      21 PV 9/17/2021 X      22 PV 9/28/2021 X      23 PV 10/12/2021 X      24 PV 10/26/2021 X      25 PV 10/29/2021 X      26 PV  11/8/2021 X      27 PV 50/81/8913 X   Recertification   28 PV 11/23/2021 X      29 PV 12/7/2021 X      30  PV 12/28/2021 X      31 PV 1/4/2022 X      32 PV 1/7/2022 X      33 PV 1/11/2022 X       PV 1/17/2022 X      34 PV 1/21/2022 X      35 PV 1/79/2262 X   Recertification note   36 PV 2/1/2022 X      37 PV 2/4/2022 X      38 PV 2/8/2022 X      39 PV 2/22/2022 X      40 PV 3/15/2022 X      41 PV 3/22/2022 X      42 PV 3/29/2022 X                                   Abbreviations: NS = No Show; CX = cancelled

## 2022-04-01 ENCOUNTER — HOSPITAL ENCOUNTER (OUTPATIENT)
Dept: PHYSICAL THERAPY | Age: 42
Discharge: HOME | End: 2022-04-01
Attending: NURSE PRACTITIONER
Payer: MEDICARE

## 2022-04-01 PROCEDURE — 97140 MANUAL THERAPY 1/> REGIONS: CPT

## 2022-04-01 PROCEDURE — 97110 THERAPEUTIC EXERCISES: CPT

## 2022-04-01 NOTE — PROGRESS NOTES
Marilyn Brown  : 1980  Primary: Armida Cam Medicare Advantage  Secondary:  [address] at [place] [address] [facility], [address] [place] [address],[address], [address] [facility]  [phone]   [phone]     OUTPATIENT PHYSICAL THERAPY: Daily Treatment Note 2022  Visit Count:  43    ICD-10: Treatment Diagnosis: Difficulty in walking, not elsewhere classified (R26.2)  Precautions/Allergies:   Redgie Dunk antimigraine agents, Valium [diazepam], Imitrex [sumatriptan], Keflex [cephalexin], Sulfa (sulfonamide antibiotics), Theophylline, Topamax [topiramate], Ultram [tramadol], Zomig [zolmitriptan], Adhesive, Adhesive tape-silicones, Cyanocobalamin (vitamin b-12), Kenalog [triamcinolone acetonide], Pneumococcal vaccine, Pollen extracts, and Verapamil   TREATMENT PLAN:  Effective Dates: 2022 TO 2022 (90 days). Frequency/Duration: 1-2 times a week for 90 Days    Pre-treatment Symptoms/Complaints: \"The nurse practitioner thinks it may be more arthritis. They are going to do a medial nerve block\". No headache. Dizziness 2-3/10  Pain: Initial: 3/10- Bilateral shoulders/into head Post Session:  2/10   Medications Last Reviewed:  2022  Updated Objective Findings:  None Today    TREATMENT:     NEUROMUSCULAR RE-EDUCATION: (30 minutes):  Exercise/activities per grid below to improve balance, coordination, kinesthetic sense and posture. Required moderate verbal cues to promote static and dynamic balance in standing.     Date:  2022 Date:  3/15/2022 Date:  3/22/22   Activity/Exercise Parameters Parameters Parameters   Walking in the clinic      Sidestepping in hallway  4 laps without assistive device 4 laps without assistive device With glasses 4 laps   Marching in hallway  4 laps without assistive device 4 laps without assistive device With glasses 4 laps   Standing gastroc stretch in incline board 3 reps x 1 minute 3 reps x1 minute 3 reps x 1 minute   Seated trunk rotation while visual tracking red ball      Stepping over half foam Forward 2x10 reps           Step tap/ Step ups      Standing semitandem stance       Walking through cones 4 laps without assistive devices 4 laps without assistive device 4 laps without assistive devic   Walking with horizontal head turns with hiking poles  2 laps    Circles right/circles left      Pelvis/ leg length discrepancy       Walking up/down ramps      Standing feet together      Tiltboard          MANUAL THERAPY: (15 minutes): Soft tissue mobilization was utilized and necessary because of the patient's painful spasm. In seated, patient received trigger point release along bilateral upper traps/ rhomboids/ scapular region to decrease pain. Gentle manual cervical distraction to decreased headache and pain. [facility]  Treatment/Session Summary:    · Response to Treatment:  Patient reports decreased numbness in left upper extremity after manual therapy. Patient needed several rest breaks during treatment. · Communication/Consultation:  None today  · Equipment provided today:  None today  · Recommendations/Intent for next treatment session: Next visit will focus on balance exercises, vestibular exercises, and manual therapy. Will reassess next appointment.        Total Treatment Billable Duration:  45 minutes  PT Patient Time In/Time Out  Time In: 1100  Time Out: 1010 [facility],     Future Appointments   Date Time Provider Jacquie Sarah   4/5/2022 11:00 AM Yuliya Barajas PT [facility]   4/12/2022 11:00 AM Ayaz Nielsen, PT [facility]   4/19/2022 11:00 AM Ayaz Nielsen, PT [facility]   4/21/2022 11:15 AM Liat Ruff PA POAP POA   4/25/2022  2:20 PM Adonay Keith MD [id number] [place]   4/26/2022  7:00 PM Ayaz Nielsen, PT [facility]   4/29/2022  7:00 PM Ayaz Nielsen, PT [facility]   6/17/2022 10:40 AM Kyra Bhatt MD [facility]     Visit # Therapist initials Date Arrived NS/ Cx < 24 hr >24 hr Cx Visit Comments   18 PV 8/31/2021 X   [employer] MCR-Thirty-five dollar copay per appointment.   Recertification done today   19 PV 9/3/2021 X      20 PV 9/14/2021 X      21 PV 9/17/2021 X      22 PV 9/28/2021 X      23 PV 10/12/2021 X      24 PV 10/26/2021 X      25 PV 10/29/2021 X      26 PV  11/8/2021 X      27 PV 33/77/7735 X   Recertification   28 PV 11/23/2021 X      29 PV 12/7/2021 X      30  PV 12/28/2021 X      31 PV 1/4/2022 X      32 PV 1/7/2022 X      33 PV 1/11/2022 X       PV 1/17/2022 X      34 PV 1/21/2022 X      35 PV 0/41/6406 X   Recertification note   36 PV 2/1/2022 X      37 PV 2/4/2022 X      38 PV 2/8/2022 X      39 PV 2/22/2022 X      40 PV 3/15/2022 X      41 PV 3/22/2022 X      42 PV 3/29/2022 X      43 PV 4/1/2022 X                          Abbreviations: NS = No Show; CX = cancelled

## 2022-04-05 ENCOUNTER — HOSPITAL ENCOUNTER (OUTPATIENT)
Dept: PHYSICAL THERAPY | Age: 42
Discharge: HOME | End: 2022-04-05
Attending: NURSE PRACTITIONER
Payer: MEDICARE

## 2022-04-05 PROCEDURE — 97140 MANUAL THERAPY 1/> REGIONS: CPT

## 2022-04-05 PROCEDURE — 97112 NEUROMUSCULAR REEDUCATION: CPT

## 2022-04-05 NOTE — THERAPY RECERTIFICATION
Becca Melendez  : 1980  Primary: Cm Cma Medicare Advantage  Secondary:  [address] at [facility]  [address], [address], [facility] [address]  [phone]   [phone]        OUTPATIENT PHYSICAL [address] [address]   ICD-10: Treatment Diagnosis: Difficulty in walking, not elsewhere classified (R26.2)  Precautions/Allergies:   Monty Coca antimigraine agents, Valium [diazepam], Imitrex [sumatriptan], Keflex [cephalexin], Sulfa (sulfonamide antibiotics), Theophylline, Topamax [topiramate], Ultram [tramadol], Zomig [zolmitriptan], Adhesive, Adhesive tape-silicones, Cyanocobalamin (vitamin b-12), Kenalog [triamcinolone acetonide], Pneumococcal vaccine, Pollen extracts, and Verapamil   TREATMENT PLAN:  Effective Dates: 2022 TO 2022 (90 days). Frequency/Duration: 1-2 times a week for 90 Days           MEDICAL/REFERRING DIAGNOSIS:  Vertigo [R42]    DATE OF ONSET: Chronic   REFERRING PHYSICIAN: Mary Christianson MD MD Orders: Evaluate and treat  Return MD Appointment: unknown      INITIAL ASSESSMENT:  Ms. Anahi Henry presents with dizziness, neck pain, imbalance, and difficulty walking. Patient could be experiencing some cervicogenic dizziness. Patient is at higher fall risk based on history of multiple falls and score received on Elizabeth Balance Scale. Patient would benefit from skilled physical therapy to address problems and goals. Thank you for this referral.     Recertification note:  Patient has attended forty-four scheduled physical therapy appointments from 3/2/2021 to 2022. Patient demonstrates improved gait speed with Timed Up and Go Test.     Patient would benefit from continuing skilled physical therapy to address problems and goals. Thank you for this referral.     PROBLEM LIST (Impacting functional limitations):  1. Decreased Transfer Abilities  2. Decreased Ambulation Ability/Technique  3.  Decreased Balance  4. Increased Pain  5. Decreased Flexibility/Joint Mobility  6. Decreased [place] with Home Exercise Program  7. Increased dizziness INTERVENTIONS PLANNED: (Treatment may consist of any combination of the following)  1. Balance Exercise  2. Gait Training  3. Home Exercise Program (HEP)  4. Manual Therapy  5. Range of Motion (ROM)  6. Habituation exercises     GOALS: (Goals have been discussed and agreed upon with patient.)  Short-Term Functional Goals: Time Frame: 30 days   1. Patient will be independent with home exercise program to improve balance. Goal met. 2.  Patient will score greater than or equal to 30/56 on Elizabeth Balance Scale indicating improved balance and decreased fall risk with daily activities. Goal met. Discharge Goals: Time Frame: 90 days   1. Patient will score greater than or equal to 40/56 on Elizabeth Balance Scale indicating improved balance and decreased fall risk with daily activities. Goal met. 2.  Patient will report improved stability at home and at work. Goal ongoing. 3.  Patient will score less than or equal to 22 seconds on Timed Up and Go Test indicating improved gait speed. Goal met. 4.  Patient will score less than or equal to 13 seconds on Timed Up and Go Test indicating improved gait speed. Goal met. 5.  Patient will score less than or equal to 48/56 on Elizabeth Balance Scale indicating improved balance and decreased fall risk with daily activities. Goal ongoing. 6.  Patient will score less than or equal to 10 seconds on Timed Up and Go Test indicating improved gait speed. New goal.     OUTCOME MEASURE:   Tool Used: Elizabeth Balance Scale  Score:  Initial: 26/56 Most Recent: 45/56 (Date: 4-5-22 )   Interpretation of Score: Each section is scored on a 0-4 scale, 0 representing the patients inability to perform the task and 4 representing independence. The scores of each section are added together for a total score of 56.   The higher the patients score, the more independent the patient is. Any score below 45 indicates increased risk for falls. Tool Used: Timed Up and Go (TUG)  Score:  Initial: 26.4 seconds Most Recent: 11.9 seconds (Date: 4-5-22 )   Interpretation of Score: The test measures, in seconds, the time taken by an individual to stand up from a standard arm chair (seat height 46 cm [18 in], arm height 65 cm [25.6 in]), walk a distance of 3 meters (118 in, approx 10 ft), turn, walk back to the chair and sit down. If the individual takes longer than 14 seconds to complete TUG, this indicates risk for falls. MEDICAL NECESSITY:   · Patient is expected to demonstrate progress in balance and dizziness to improve safety during activities of daily living. REASON FOR SERVICES/OTHER COMMENTS:  · Patient continues to require skilled intervention due to higher fall risk with daily activities. Regarding Eli Edge's therapy, I certify that the treatment plan above will be carried out by a therapist or under their direction. Thank you for this referral,  Taco Narayanan PT     Referring Physician Signature: Rakesh Jackson MD, MD _______________________________ Date _____________       PAIN/SUBJECTIVE:   Initial: Pain Intensity 1: 0  Post Session:  0/10   HISTORY:   History of Injury/Illness (Reason for Referral):  Patient complains of dizziness, imbalance, and difficulty walking. Patient here in the clinic last year, but stopped therapy when COVID-19 began. Patient reports symptoms have gotten worse. Patient has been falling a lot more frequently. Patient has to use two hiking poles when walking instead of just one hiking pole. Patient rates current dizziness as 3/10 and current pain level in cervical spine as 3/10. Patient tends to lose her balance backwards and to the left. Patient has a history of lead toxicity. Patient reports more recent exposure to mold.    Past Medical History/Comorbidities:   Ms. Kathie Negrete  has a past medical history of Adverse effect of anesthesia, Asthma, BMI 36.0-36.9,adult, Chronic pain, Hyperlipidemia, Migraines, Movement disorder, Thyroid disease, Vertigo, and Vestibular nerve disorder (2008). She has no past medical history of Difficult intubation, Malignant hyperthermia due to anesthesia, Nausea & vomiting, or Pseudocholinesterase deficiency. Ms. Inna Waters  has a past surgical history that includes hx orthopaedic and hx heent. Social History/Living Environment:     Lives alone in an apartment. Patient has four steps to enter her home. Patient has family nearby which helps her when needed. Prior Level of Function/Work/Activity:  Independent. Works as an OT. Dominant Side:         RIGHT  Personal Factors:          Sex:  female        Age:  43 y.o. Ambulatory/Rehab Services H2 Model Falls Risk Assessment   Risk Factors:       (1)  Dizziness/Vertigo       (1)  Any administered benzodiazepines       (5)  History of Recent Falls [w/in 3 months] Ability to Rise from Chair:       (1)  Pushes up, successful in one attempt   Falls Prevention Plan:       Exercise/Equipment Adaptation (specify):  Patient will be supervised in the clinic at all times due to higher fall risk   Total: (5 or greater = High Risk): 8   ©2010 [facility] [employer] . [facility] States Patent #[id number].  Federal Law prohibits the replication, distribution or use without written permission from [facility] [employer]   Current Medications:       Current Outpatient Medications:     betahistine HCl (BETAHISTINE, BULK,), TAKE ONE CAPSULE BY MOUTH TWICE DAILY, Disp: , Rfl:     indomethacin (INDOCIN) 25 mg capsule, TAKE 1-2 TABLETS BY MOUTH 3 TIMES DAILY AS NEEDED FOR MIGRAINE. LIMIT 2 DAYS/WEEK. TAKE WITH FOOD, Disp: , Rfl:     predniSONE (DELTASONE) 20 mg tablet, Take 3 tablets once a day for 4 days, Disp: 12 Tablet, Rfl: 0    amoxicillin-clavulanate (AUGMENTIN) 500-125 mg per tablet, Take 1 Tablet by mouth two (2) times a day., Disp: 20 Tablet, Rfl: 0    venlafaxine-SR (EFFEXOR-XR) 150 mg capsule, Take 1 Capsule by mouth daily. , Disp: 30 Capsule, Rfl: 3    lamoTRIgine (LaMICtal) 25 mg tablet, Take 25 mg once daily for 2 weeks then 25 mg twice daily, Disp: 60 Tablet, Rfl: 3    ascorbic acid, vitamin C, (VITAMIN C) 500 mg tablet, Take 500 mg by mouth daily. , Disp: , Rfl:     LACTOBACILLUS ACIDOPHILUS PO, Take 1 Capsule by mouth daily as needed. , Disp: , Rfl:     magnesium glycinate-mag oxide 120 mg magnesium cap, Take 2 Tablets by mouth., Disp: , Rfl:     DISABLED PLACARD (DISABLED PLACARD) DMV, Please provide handicap placard for neurologic condition affecting gait and balance., Disp: 1 Each, Rfl: 0    clonazePAM (KlonoPIN) 0.5 mg tablet, Take 1 Tablet by mouth daily as needed (severe vertigo). Take / use AM day of surgery  per anesthesia protocols if needed. (Patient taking differently: Take 0.5 mg by mouth daily. Take / use AM day of surgery  per anesthesia protocols if needed. ), Disp: 30 Tablet, Rfl: 1    ondansetron (ZOFRAN ODT) 4 mg disintegrating tablet, Take 1 Tablet by mouth every eight (8) hours as needed for Nausea., Disp: 24 Tablet, Rfl: 2    ubrogepant (Ubrelvy) 100 mg tablet, Take 1 Tab by mouth daily as needed for Migraine. TAKE ONE AT ONSET OF MIGRAINE, MAY REPEAT X 1 IF NEEDED. NOT TO EXCEED 2 TABS PER 24 HOUR PERIOD., Disp: 10 Tab, Rfl: 11    esomeprazole (NEXIUM) 40 mg capsule, Take 1 Cap by mouth two (2) times a day. Indications: gastroesophageal reflux disease (Patient taking differently: Take 40 mg by mouth two (2) times daily as needed. Indications: gastroesophageal reflux disease), Disp: 60 Cap, Rfl: 6    fexofenadine (ALLEGRA) 180 mg tablet, Take 180 mg by mouth nightly., Disp: , Rfl:     albuterol (PROVENTIL HFA, VENTOLIN HFA, PROAIR HFA) 90 mcg/actuation inhaler, Take 2 Puffs by inhalation every four (4) hours as needed for Wheezing or Shortness of Breath.  Take / use AM day of surgery per anesthesia protocols if needed. Indications: Asthma Attack, Disp: , Rfl:     OTHER, PLACE 1 SHANA UNDER THE TONGUE ONCE A DAY  DO NOT EAT DRINK FOR 15MIN AFTER TAKING, Disp: , Rfl: 5    mometasone (NASONEX) 50 mcg/actuation nasal spray, 1 [place] by Both Nostrils route nightly., Disp: , Rfl:     cholecalciferol, vitamin D3, (VITAMIN D3 PO), Take 10,000 Units by mouth daily. , Disp: , Rfl:     MAGNESIUM PO, Take 2 Tabs by mouth daily. , Disp: , Rfl:     VITAMIN A PO, Take 1 Tab by mouth daily. , Disp: , Rfl:     ARMOUR THYROID 30 mg tablet, TAKE 1 TABLET BY MOUTH EVERY DAY IN THE MORNING, Disp: , Rfl: 5    BUTTERBUR ROOT EXTRACT PO, Take 2 Tabs by mouth daily. , Disp: , Rfl:     multivitamin capsule, Take 2 Caps by mouth daily. , Disp: , Rfl:    Date Last Reviewed:  4/5/2022   Number of Personal Factors/Comorbidities that affect the Plan of Care: 3+: HIGH COMPLEXITY   EXAMINATION:   Observation/Orthostatic Postural Assessment:          Slight forward head rounded shoulders posture. Palpation: Increased tenderness along bilateral upper traps/SCM, left worse than right. Tender throughout C5-C7. Functional Mobility:         Gait/Ambulation:  Patient ambulates with bilateral hiking poles demonstrating decreased gait speed with wider base of support and moderate deviation from straight path. ROM:          Cervical range of motion is as follows: flexion within normal limits, extension within normal limits, right rotation within normal limits, left rotation 75%, right lateral flexion 50%, left lateral flexion 75%. Postural Control & Balance:  · Elizabeth Balance Scale:  45/56.   (A score less than 45/56 indicates high risk of falls). Score improved from 26/56 on initial evaluation. · Dynamic Gait Index:  Not tested. Body Structures Involved:  1. Nerves  2. Eyes and Ears  3. Muscles Body Functions Affected:  1. Neuromusculoskeletal Activities and Participation Affected:  1.  General Tasks and Demands  2. Mobility  3.  Community, Social and [place]   Number of elements (examined above) that affect the Plan of Care: 4+: HIGH COMPLEXITY   CLINICAL PRESENTATION:   Presentation: Evolving clinical presentation with changing clinical characteristics: MODERATE COMPLEXITY   CLINICAL DECISION MAKING:   Use of outcome tool(s) and clinical judgement create a POC that gives a: Questionable prediction of patient's progress: MODERATE COMPLEXITY

## 2022-04-05 NOTE — PROGRESS NOTES
Sintia Villafana  : 1980  Primary: Kandis Cam Medicare Advantage  Secondary:  [address] at [facility]  2700 [facility], [address] [place] [address],[address], 8133 [facility]  [phone]   [phone]     OUTPATIENT PHYSICAL THERAPY: Daily Treatment Note 2022  Visit Count:  44    ICD-10: Treatment Diagnosis: Difficulty in walking, not elsewhere classified (R26.2)  Precautions/Allergies:   Lizz Olga Lidia antimigraine agents, Valium [diazepam], Imitrex [sumatriptan], Keflex [cephalexin], Sulfa (sulfonamide antibiotics), Theophylline, Topamax [topiramate], Ultram [tramadol], Zomig [zolmitriptan], Adhesive, Adhesive tape-silicones, Cyanocobalamin (vitamin b-12), Kenalog [triamcinolone acetonide], Pneumococcal vaccine, Pollen extracts, and Verapamil   TREATMENT PLAN:  Effective Dates: 2022 TO 2022 (90 days). Frequency/Duration: 1-2 times a week for 90 Days  Pre-treatment Symptoms/Complaints:     \"I picked up my muscle relaxer yesterday. I was hurting so bad I took two muscle relaxers yesterday. I see the oral surgeon tomorrow for my jaw for a second opinion\". 3/10 left shoulder, slight headache. Dizziness 1/10  Pain: Initial: 310- Bilateral shoulders/into head Post Session:  2/10   Medications Last Reviewed:  2022  Updated Objective Findings:  See recertification note    TREATMENT:     NEUROMUSCULAR RE-EDUCATION: (30 minutes):  Exercise/activities per grid below to improve balance, coordination, kinesthetic sense and posture. Required moderate verbal cues to promote static and dynamic balance in standing.     Date:  2022 Date:  2022 Date:  3/22/22   Activity/Exercise Parameters Parameters Parameters   Walking in the clinic      Sidestepping in hallway  4 laps without assistive device 4 laps without assistive device With glasses 4 laps   Marching in hallway  4 laps without assistive device 4 laps without assistive device With glasses 4 laps   Standing gastroc stretch in incline board 3 reps x 1 minute 3 reps x1 minute 3 reps x 1 minute   Seated trunk rotation while visual tracking red ball      Stepping over half foam Forward 2x10 reps Forward 2x10 reps          Step tap/ Step ups      Standing semitandem stance       Walking through cones 4 laps without assistive devices 4 laps without assistive device 4 laps without assistive devic   Walking with horizontal head turns with hiking poles      Circles right/circles left      Pelvis/ leg length discrepancy       Walking up/down ramps      Standing feet together      Tiltboard          MANUAL THERAPY: (15 minutes): Soft tissue mobilization was utilized and necessary because of the patient's painful spasm. In seated, patient received trigger point release along bilateral upper traps/ rhomboids/ scapular region to decrease pain. Gentle manual cervical distraction to decreased headache and pain. [employer] Portal  Treatment/Session Summary:    · Response to Treatment:  Patient much more stable with balance exercises compared to previous sessions. Patient demonstrates improved gait speed since last progress note. · Communication/Consultation:  None today  · Equipment provided today:  None today  · Recommendations/Intent for next treatment session: Next visit will focus on balance exercises, vestibular exercises, and manual therapy.          Total Treatment Billable Duration:  45 minutes  PT Patient Time In/Time Out  Time In: 1100  Time Out: 1010 [facility], PT    Future Appointments   Date Time Provider Jacquie Sarah   4/12/2022 11:00 AM Albertina Churchill PT [facility] SFE   4/19/2022 11:00 AM Rahel Nielsen, PT SFEORPT SFE   4/21/2022 11:15 AM Liat Thomas PA POAP POA   4/25/2022  2:20 PM Soha Garza MD [id number] [place]   4/26/2022  7:00 PM Rahel Nielsen, PT SFEORPT SFE   4/29/2022  7:00 PM Rahel Nielsen, PT SFEORPT SFE   6/17/2022 10:40 AM Norman Chambers MD [facility] CAF     Visit # Therapist initials Date Arrived NS/ Cx < 24 hr >24 hr Cx Visit Comments   18 PV 8/31/2021 X   [employer] MCR-Thirty-five dollar copay per appointment.   Recertification done today   19 PV 9/3/2021 X      20 PV 9/14/2021 X      21 PV 9/17/2021 X      22 PV 9/28/2021 X      23 PV 10/12/2021 X      24 PV 10/26/2021 X      25 PV 10/29/2021 X      26 PV  11/8/2021 X      27 PV 93/58/9687 X   Recertification   28 PV 11/23/2021 X      29 PV 12/7/2021 X      30  PV 12/28/2021 X      31 PV 1/4/2022 X      32 PV 1/7/2022 X      33 PV 1/11/2022 X       PV 1/17/2022 X      34 PV 1/21/2022 X      35 PV 6/14/7490 X   Recertification note   36 PV 2/1/2022 X      37 PV 2/4/2022 X      38 PV 2/8/2022 X      39 PV 2/22/2022 X      40 PV 3/15/2022 X      41 PV 3/22/2022 X      42 PV 3/29/2022 X      43 PV 4/1/2022 X      44 PV 6/5/7317 X   Recertification note              Abbreviations: NS = No Show; CX = cancelled

## 2022-04-12 ENCOUNTER — HOSPITAL ENCOUNTER (OUTPATIENT)
Dept: PHYSICAL THERAPY | Age: 42
Discharge: HOME | End: 2022-04-12
Attending: NURSE PRACTITIONER
Payer: MEDICARE

## 2022-04-12 PROCEDURE — 97112 NEUROMUSCULAR REEDUCATION: CPT

## 2022-04-12 PROCEDURE — 97140 MANUAL THERAPY 1/> REGIONS: CPT

## 2022-04-12 NOTE — PROGRESS NOTES
Darnell Esquivel  : 1980  Primary: Jessica Cam Medicare Advantage  Secondary:  [address]  at [address], [address] [place] [address],[address] [place] [address].  [phone]   [phone]     OUTPATIENT PHYSICAL THERAPY: Daily Treatment Note 2022  Visit Count:  45    ICD-10: Treatment Diagnosis: Difficulty in walking, not elsewhere classified (R26.2)  Precautions/Allergies:   Naoma Endow antimigraine agents, Valium [diazepam], Imitrex [sumatriptan], Keflex [cephalexin], Sulfa (sulfonamide antibiotics), Theophylline, Topamax [topiramate], Ultram [tramadol], Zomig [zolmitriptan], Adhesive, Adhesive tape-silicones, Cyanocobalamin (vitamin b-12), Kenalog [triamcinolone acetonide], Pneumococcal vaccine, Pollen extracts, and Verapamil   TREATMENT PLAN:  Effective Dates: 2022 TO 2022 (90 days). Frequency/Duration: 1-2 times a week for 90 Days  Pre-treatment Symptoms/Complaints: \"My left wrist popped on Wednesday. I went to Urgent Care and they gave me a brace and I am scheduled to see an OT\". 4/10 left shoulder left wrist 6/10. Dizziness 3/10. No headache. Pain: Initial: 4/10- Bilateral shoulders/into head Post Session:  4/10   Medications Last Reviewed:  2022  Updated Objective Findings:  None Today    TREATMENT:     NEUROMUSCULAR RE-EDUCATION: (30 minutes):  Exercise/activities per grid below to improve balance, coordination, kinesthetic sense and posture. Required moderate verbal cues to promote static and dynamic balance in standing.     Date:  2022 Date:  2022 Date:  22   Activity/Exercise Parameters Parameters Parameters   Walking in the clinic      Sidestepping in hallway  4 laps without assistive device 4 laps without assistive device With glasses 4 laps   Marching in hallway  4 laps without assistive device 4 laps without assistive device With glasses 4 laps   Standing gastroc stretch in incline board 3 reps x 1 minute 3 reps x1 minute X   Seated trunk rotation while visual tracking red ball   X   Stepping over half foam Forward 2x10 reps Forward 2x10 reps Forward 2x10 reps  Lateral 2x10 reps         Step tap/ Step ups      Standing semitandem stance       Walking through cones 4 laps without assistive devices 4 laps without assistive device 4 laps without assistive device   Walking with horizontal head turns with hiking poles      Circles right/circles left      Pelvis/ leg length discrepancy       Walking up/down ramps      Standing feet together      Tiltboard          MANUAL THERAPY: (15 minutes): Soft tissue mobilization was utilized and necessary because of the patient's painful spasm. In seated, patient received trigger point release along bilateral upper traps/ rhomboids/ scapular region to decrease pain. Gentle manual cervical distraction to decreased headache and pain. [employer] Portal  Treatment/Session Summary:    · Response to Treatment:  Patient tolerated treatment well. · Communication/Consultation:  None today  · Equipment provided today:  None today  · Recommendations/Intent for next treatment session: Next visit will focus on balance exercises, vestibular exercises, and manual therapy. Total Treatment Billable Duration:  45 minutes  PT Patient Time In/Time Out  Time In: 1100  Time Out: 1010 [facility],     Future Appointments   Date Time Provider Jacquie Sarah   4/19/2022 11:00 AM Albertina Churchill, PT [facility] SFE   4/21/2022 11:15 AM Liat Hall PA POAP POA   4/25/2022  2:20 PM Soha Garza MD [id number] [place]   4/26/2022  7:00 PM Rahel Nielsen, PT [facility]   4/29/2022  7:00 [facility]   5/10/2022 11:15 AM Hi Hollis OT [facility] POA   6/17/2022 10:40 AM Norman Chambers MD SSA CAFM CAFM     Visit # Therapist initials Date Arrived NS/ Cx < 24 hr >24 hr Cx Visit Comments   18 PV 8/31/2021 X   [employer] MCR-Thirty-five dollar copay per appointment.   Recertification done today   19 PV 9/3/2021 X      20 PV 9/14/2021 X      21 PV 9/17/2021 X      22 PV 9/28/2021 X      23 PV 10/12/2021 X      24 PV 10/26/2021 X      25 PV 10/29/2021 X      26 PV  11/8/2021 X      27 PV 35/03/6638 X   Recertification   28 PV 11/23/2021 X      29 PV 12/7/2021 X      30  PV 12/28/2021 X      31 PV 1/4/2022 X      32 PV 1/7/2022 X      33 PV 1/11/2022 X       PV 1/17/2022 X      34 PV 1/21/2022 X      35 PV 2/26/0046 X   Recertification note   36 PV 2/1/2022 X      37 PV 2/4/2022 X      38 PV 2/8/2022 X      39 PV 2/22/2022 X      40 PV 3/15/2022 X      41 PV 3/22/2022 X      42 PV 3/29/2022 X      43 PV 4/1/2022 X      44 PV 6/5/2028 X   Recertification note   45  PV 4/12/2022 X                                            Abbreviations: NS = No Show; CX = cancelled

## 2022-04-19 ENCOUNTER — HOSPITAL ENCOUNTER (OUTPATIENT)
Dept: PHYSICAL THERAPY | Age: 42
Discharge: HOME | End: 2022-04-19
Attending: NURSE PRACTITIONER
Payer: MEDICARE

## 2022-04-19 PROCEDURE — 97112 NEUROMUSCULAR REEDUCATION: CPT

## 2022-04-19 PROCEDURE — 97140 MANUAL THERAPY 1/> REGIONS: CPT

## 2022-04-19 NOTE — PROGRESS NOTES
Sadi Madison  : 1980  Primary: Anmol Cam Medicare Advantage  Secondary:  [address]  at [facility]  2700 [facility], [address] [place] [address],[address], [place] [address].  [phone]   [phone]     OUTPATIENT PHYSICAL THERAPY: Daily Treatment Note 2022  Visit Count:  55    ICD-10: Treatment Diagnosis: Difficulty in walking, not elsewhere classified (R26.2)  Precautions/Allergies:   Zaina Roth antimigraine agents, Valium [diazepam], Imitrex [sumatriptan], Keflex [cephalexin], Sulfa (sulfonamide antibiotics), Theophylline, Topamax [topiramate], Ultram [tramadol], Zomig [zolmitriptan], Adhesive, Adhesive tape-silicones, Cyanocobalamin (vitamin b-12), Kenalog [triamcinolone acetonide], Pneumococcal vaccine, Pollen extracts, and Verapamil   TREATMENT PLAN:  Effective Dates: 2022 TO 2022 (90 days). Frequency/Duration: 1-2 times a week for 90 Days  Pre-treatment Symptoms/Complaints:     Patient reports receiving C5-C7 nerve block last week. Patient feels it has helped her pain, but she has noticed more dizziness and decreased balance. 4/10 left shoulder left wrist 4/10. Dizziness /10. Patient scheduled to see spine doctor on Thursday. Pain: Initial: 4/10- Bilateral shoulders/into head Post Session:  4/10   Medications Last Reviewed:  2022  Updated Objective Findings:  None Today    TREATMENT:     NEUROMUSCULAR RE-EDUCATION: (30 minutes):  Exercise/activities per grid below to improve balance, coordination, kinesthetic sense and posture. Required moderate verbal cues to promote static and dynamic balance in standing.     Date:  2022 Date:  2022 Date:  22   Activity/Exercise Parameters Parameters Parameters   Walking in the clinic      Sidestepping in hallway  4 laps without assistive device 4 laps without assistive device With glasses 4 laps   Marching in hallway  4 laps without assistive device 4 laps without assistive device With glasses 4 laps   Standing gastroc stretch in incline board  3 reps x1 minute X   Seated trunk rotation while visual tracking red ball   X   Stepping over half foam  Forward 2x10 reps Forward 2x10 reps  Lateral 2x10 reps         Step tap/ Step ups      Standing semitandem stance       Walking through cones  4 laps without assistive device 4 laps without assistive device   Walking with horizontal head turns with hiking poles      Circles right/circles left      Pelvis/ leg length discrepancy       Walking up/down ramps      Standing feet together Blue foam eyes open     Tiltboard          MANUAL THERAPY: (15 minutes): Soft tissue mobilization was utilized and necessary because of the patient's painful spasm. In seated, patient received trigger point release along bilateral upper traps/ rhomboids/ scapular region to decrease pain. Gentle manual cervical distraction to decreased headache and pain. [employer] Portal  Treatment/Session Summary:    · Response to Treatment:  Patient has decreased proprioception in bilateral lower extremity during balance exercises. Patient reports increased dizziness to 6/10 with exercises. Patient needed extended rest breaks due to increased dizziness. Dizziness decreased back down to 4/10 after treatment. · Communication/Consultation:  None today  · Equipment provided today:  None today  · Recommendations/Intent for next treatment session: Next visit will focus on balance exercises, vestibular exercises, and manual therapy.          Total Treatment Billable Duration:  45 minutes  PT Patient Time In/Time Out  Time In: 1100  Time Out: 1010 [facility], PT    Future Appointments   Date Time Provider Jacquie Smith   4/21/2022 11:15 AM SHRUTHI Daly   4/25/2022  2:20 PM Philip Wilde MD [facility]   4/26/2022  7:00 PM Yun Nielsen, PT [facility]Y [facility]   4/29/2022  7:00 PM Yun Nielsen, PT SFEORPT SFE   5/9/2022  2:30 PM Yun Nielsen PT [facility] SFE   5/10/2022 11:15 AM Edwin [place] POAG POA   5/13/2022 11:00 AM Eyad Hansen, PT [facility] SFE   5/17/2022 11:00 AM Helder Nielsen, PT [facility] SFE   6/17/2022 10:40 AM Diane Alonso MD SSA CAFM CAFM     Visit # Therapist initials Date Arrived NS/ Cx < 24 hr >24 hr Cx Visit Comments   18 PV 8/31/2021 X   [employer] MCR-Thirty-five dollar copay per appointment.   Recertification done today   19 PV 9/3/2021 X      20 PV 9/14/2021 X      21 PV 9/17/2021 X      22 PV 9/28/2021 X      23 PV 10/12/2021 X      24 PV 10/26/2021 X      25 PV 10/29/2021 X      26 PV  11/8/2021 X      27 PV 11/51/5775 X   Recertification   28 PV 11/23/2021 X      29 PV 12/7/2021 X      30  PV 12/28/2021 X      31 PV 1/4/2022 X      32 PV 1/7/2022 X      33 PV 1/11/2022 X       PV 1/17/2022 X      34 PV 1/21/2022 X      35 PV 9/46/7633 X   Recertification note   36 PV 2/1/2022 X      37 PV 2/4/2022 X      38 PV 2/8/2022 X      39 PV 2/22/2022 X      40 PV 3/15/2022 X      41 PV 3/22/2022 X      42 PV 3/29/2022 X      43 PV 4/1/2022 X      44 PV 3/6/6049 X   Recertification note   45  PV 4/12/2022 X      46 PV 4/19/2022 X                                   Abbreviations: NS = No Show; CX = cancelled

## 2022-04-26 ENCOUNTER — APPOINTMENT (OUTPATIENT)
Dept: PHYSICAL THERAPY | Age: 42
End: 2022-04-26
Attending: NURSE PRACTITIONER
Payer: MEDICARE

## 2022-04-29 ENCOUNTER — APPOINTMENT (OUTPATIENT)
Dept: PHYSICAL THERAPY | Age: 42
End: 2022-04-29
Attending: NURSE PRACTITIONER
Payer: MEDICARE

## 2022-05-02 ENCOUNTER — HOSPITAL ENCOUNTER (OUTPATIENT)
Dept: PHYSICAL THERAPY | Age: 42
Setting detail: RECURRING SERIES
Discharge: HOME | End: 2022-05-05
Payer: MEDICARE

## 2022-05-09 ENCOUNTER — HOSPITAL ENCOUNTER (OUTPATIENT)
Dept: PHYSICAL THERAPY | Age: 42
Discharge: HOME | End: 2022-05-09
Attending: NURSE PRACTITIONER
Payer: MEDICARE

## 2022-05-09 PROCEDURE — 97140 MANUAL THERAPY 1/> REGIONS: CPT

## 2022-05-09 PROCEDURE — 97112 NEUROMUSCULAR REEDUCATION: CPT

## 2022-05-09 NOTE — PROGRESS NOTES
Christoph Madison  : 1980  Primary: Olivia Cam Medicare Advantage  Secondary:  [address] at [facility]  2700 [facility], [address] [place] [address],[address], [address] [facility]  [phone]   [phone]     OUTPATIENT PHYSICAL THERAPY: Daily Treatment Note 2022  Visit Count:  52    ICD-10: Treatment Diagnosis: Difficulty in walking, not elsewhere classified (R26.2)  Precautions/Allergies:   Basia Lezama antimigraine agents, Valium [diazepam], Imitrex [sumatriptan], Keflex [cephalexin], Sulfa (sulfonamide antibiotics), Theophylline, Topamax [topiramate], Ultram [tramadol], Zomig [zolmitriptan], Adhesive, Adhesive tape-silicones, Cyanocobalamin (vitamin b-12), Kenalog [triamcinolone acetonide], Pneumococcal vaccine, Pollen extracts, and Verapamil   TREATMENT PLAN:  Effective Dates: 2022 TO 2022 (90 days). Frequency/Duration: 1-2 times a week for 90 Days  Pre-treatment Symptoms/Complaints:     \"3/10 dizziness left shoulder 5/10  Nerve pain zaps down my right arm\". Pain: Initial: 5/10- Bilateral shoulders/into head Post Session:  4/10   Medications Last Reviewed:  2022  Updated Objective Findings:  None Today    TREATMENT:     NEUROMUSCULAR RE-EDUCATION: (30 minutes):  Exercise/activities per grid below to improve balance, coordination, kinesthetic sense and posture. Required moderate verbal cues to promote static and dynamic balance in standing.     Date:  2022 Date:  2022 Date:  22   Activity/Exercise Parameters Parameters Parameters   Walking in the clinic      Sidestepping in hallway  4 laps without assistive device 4 laps without assistive device With glasses 4 laps   Marching in hallway  4 laps without assistive device 4 laps without assistive device With glasses 4 laps   Standing gastroc stretch in incline board  3 reps x1 minute X   Seated trunk rotation while visual tracking red ball   X   Stepping over half foam   Forward 2x10 reps  Lateral 2x10 reps         Step tap/ Step ups  6 inch  15 reps bilateral    Standing semitandem stance       Walking through cones  4 laps without assistive device 4 laps without assistive device   Walking with horizontal head turns with hiking poles      Circles right/circles left      Pelvis/ leg length discrepancy       Walking up/down ramps      Standing feet together Blue foam eyes open     Tiltboard          MANUAL THERAPY: (15 minutes): Soft tissue mobilization was utilized and necessary because of the patient's painful spasm. In seated, patient received trigger point release along bilateral upper traps/ rhomboids/ scapular region to decrease pain. Gentle manual cervical distraction to decreased headache and pain. [employer] Portal  Treatment/Session Summary:    · Response to Treatment:  Patient tolerated treatment well. · Communication/Consultation:  None today  · Equipment provided today:  None today  · Recommendations/Intent for next treatment session: Next visit will focus on balance exercises, vestibular exercises, and manual therapy. Total Treatment Billable Duration:  45 minutes  PT Patient Time In/Time Out  Time In: 1430  Time Out: [address], PT    Future Appointments   Date Time Provider Jacquie Smith   5/10/2022 11:15 AM JIGNESHUS [facility] POA   5/13/2022 11:00 AM Kenneth Martines, PT [facility] SFE   5/17/2022 11:00 AM Gala Nielsen, PT [facility] SFE     Visit # Therapist initials Date Arrived NS/ Cx < 24 hr >24 hr Cx Visit Comments   18 PV 8/31/2021 X   [employer] MCR-Thirty-five dollar copay per appointment.   Recertification done today   19 PV 9/3/2021 X      20 PV 9/14/2021 X      21 PV 9/17/2021 X      22 PV 9/28/2021 X      23 PV 10/12/2021 X      24 PV 10/26/2021 X      25 PV 10/29/2021 X      26 PV  11/8/2021 X      27 PV 53/34/9721 X   Recertification   28 PV 11/23/2021 X      29 PV 12/7/2021 X      30  PV 12/28/2021 X      31 PV 1/4/2022 X      32 PV 1/7/2022 X      33 PV 1/11/2022 X       PV 1/17/2022 X      34 PV 1/21/2022 X      35 PV 5/23/3840 X   Recertification note   36 PV 2/1/2022 X      37 PV 2/4/2022 X      38 PV 2/8/2022 X      39 PV 2/22/2022 X      40 PV 3/15/2022 X      41 PV 3/22/2022 X      42 PV 3/29/2022 X      43 PV 4/1/2022 X      44 PV 6/9/3298 X   Recertification note   45  PV 4/12/2022 X      46 PV 4/19/2022 X      47 PV 5/9/2022 X                          Abbreviations: NS = No Show; CX = cancelled

## 2022-05-13 ENCOUNTER — HOSPITAL ENCOUNTER (OUTPATIENT)
Dept: PHYSICAL THERAPY | Age: 42
Discharge: HOME | End: 2022-05-13
Attending: NURSE PRACTITIONER
Payer: MEDICARE

## 2022-05-13 PROCEDURE — 97112 NEUROMUSCULAR REEDUCATION: CPT

## 2022-05-13 PROCEDURE — 97140 MANUAL THERAPY 1/> REGIONS: CPT

## 2022-05-13 NOTE — PROGRESS NOTES
Brooks Mims  : 1980  Primary: Jose Cam Medicare Advantage  Secondary:  [address] at [place] [id number] [facility], [address] [place] [address],[address], [place] [address].  [phone]   [phone]     OUTPATIENT PHYSICAL THERAPY: Daily Treatment Note 2022  Visit Count:  48    ICD-10: Treatment Diagnosis: Difficulty in walking, not elsewhere classified (R26.2)  Precautions/Allergies:   Jeimy Juan antimigraine agents, Valium [diazepam], Imitrex [sumatriptan], Keflex [cephalexin], Sulfa (sulfonamide antibiotics), Theophylline, Topamax [topiramate], Ultram [tramadol], Zomig [zolmitriptan], Adhesive, Adhesive tape-silicones, Cyanocobalamin (vitamin b-12), Kenalog [triamcinolone acetonide], Pneumococcal vaccine, Pollen extracts, and Verapamil   TREATMENT PLAN:  Effective Dates: 2022 TO 2022 (90 days). Frequency/Duration: 1-2 times a week for 90 Days  Pre-treatment Symptoms/Complaints: \"My left ear released yesterday and I had a instant headache. The headache was going into my teeth. Dizziness 3/10. No headache right now\". Pain: Initial: 4/10- Bilateral shoulders/into head Post Session:  4/10   Medications Last Reviewed:  2022  Updated Objective Findings:  None Today    TREATMENT:     NEUROMUSCULAR RE-EDUCATION: (30 minutes):  Exercise/activities per grid below to improve balance, coordination, kinesthetic sense and posture. Required moderate verbal cues to promote static and dynamic balance in standing.     Date:  2022 Date:  2022 Date:  22   Activity/Exercise Parameters Parameters Parameters   Walking in the clinic      Sidestepping in hallway  4 laps without assistive device 4 laps without assistive device With glasses 4 laps   Marching in hallway  4 laps without assistive device 4 laps without assistive device With glasses 4 laps   Standing gastroc stretch in incline board  3 reps x1 minute X   Seated trunk rotation while visual tracking red ball   X   Stepping over half foam   Forward 2x10 reps  Lateral 2x10 reps         Step tap/ Step ups  6 inch  15 reps bilateral 6 inch  15 reps bilateral   Standing semitandem stance       Walking through cones  4 laps without assistive device 4 laps without assistive device   Walking with horizontal head turns with hiking poles      Circles right/circles left      Pelvis/ leg length discrepancy       Walking up/down ramps      Standing feet together Blue foam eyes open     Tiltboard          MANUAL THERAPY: (15 minutes): Soft tissue mobilization was utilized and necessary because of the patient's painful spasm. In seated, patient received trigger point release along bilateral upper traps/ rhomboids/ scapular region to decrease pain. Gentle manual cervical distraction to decreased headache and pain. [employer] Portal  Treatment/Session Summary:    · Response to Treatment:  Patient tolerated treatment with some complaints of increased dizziness after treatment. · Communication/Consultation:  None today  · Equipment provided today:  None today  · Recommendations/Intent for next treatment session: Next visit will focus on balance exercises, vestibular exercises, and manual therapy. Total Treatment Billable Duration:  45 minutes  PT Patient Time In/Time Out  Time In: 1100  Time Out: 1010 [facility],     Future Appointments   Date Time Provider Jacquie Smith   5/17/2022 11:00 AM Wendy Haney PT [facility] SFE   5/18/2022 11:15 AM Lauren Zamora OT [facility] POA   5/20/2022  9:[facility]     Visit # Therapist initials Date Arrived NS/ Cx < 24 hr >24 hr Cx Visit Comments   18 PV 8/31/2021 X   [employer] MCR-Thirty-five dollar copay per appointment.   Recertification done today   19 PV 9/3/2021 X      20 PV 9/14/2021 X      21 PV 9/17/2021 X      22 PV 9/28/2021 X      23 PV 10/12/2021 X      24 PV 10/26/2021 X      25 PV 10/29/2021 X      26 PV  11/8/2021 X      27 PV 54/58/5864 X   Recertification   28 PV 11/23/2021 X      29 PV 12/7/2021 X      30  PV 12/28/2021 X      31 PV 1/4/2022 X      32 PV 1/7/2022 X      33 PV 1/11/2022 X       PV 1/17/2022 X      34 PV 1/21/2022 X      35 PV 7/47/2066 X   Recertification note   36 PV 2/1/2022 X      37 PV 2/4/2022 X      38 PV 2/8/2022 X      39 PV 2/22/2022 X      40 PV 3/15/2022 X      41 PV 3/22/2022 X      42 PV 3/29/2022 X      43 PV 4/1/2022 X      44 PV 0/6/7028 X   Recertification note   45  PV 4/12/2022 X      46 PV 4/19/2022 X      47 PV 5/9/2022 X      48 PV 5/13/2022 X                 Abbreviations: NS = No Show; CX = cancelled

## 2022-05-17 ENCOUNTER — HOSPITAL ENCOUNTER (OUTPATIENT)
Dept: PHYSICAL THERAPY | Age: 42
Discharge: HOME | End: 2022-05-17
Attending: NURSE PRACTITIONER
Payer: MEDICARE

## 2022-05-17 PROCEDURE — 97112 NEUROMUSCULAR REEDUCATION: CPT

## 2022-05-17 PROCEDURE — 97140 MANUAL THERAPY 1/> REGIONS: CPT

## 2022-05-17 NOTE — PROGRESS NOTES
Lili Carballo  : 1980  Primary: D[employer] Medicare Advantage  Secondary:  [address] at [facility]  [address], [address], [address]  [phone]   [phone]     OUTPATIENT PHYSICAL THERAPY: Daily Treatment Note 2022  Visit Count:  52    ICD-10: Treatment Diagnosis: Difficulty in walking, not elsewhere classified (R26.2)  Precautions/Allergies:   Rheta Ailin antimigraine agents, Valium [diazepam], Imitrex [sumatriptan], Keflex [cephalexin], Sulfa (sulfonamide antibiotics), Theophylline, Topamax [topiramate], Ultram [tramadol], Zomig [zolmitriptan], Adhesive, Adhesive tape-silicones, Cyanocobalamin (vitamin b-12), Kenalog [triamcinolone acetonide], Pneumococcal vaccine, Pollen extracts, and Verapamil   TREATMENT PLAN:  Effective Dates: 2022 TO 2022 (90 days). Frequency/Duration: 1-2 times a week for 90 Days  Pre-treatment Symptoms/Complaints: \"Yesterday was a good day. Dizzy today. Dizziness 4/10. The shot helped. I think they are going to do the ablation\". Pain: Initial: 1/10- Bilateral shoulders/into head Post Session:  1/10   Medications Last Reviewed:  2022  Updated Objective Findings:  None Today    TREATMENT:     NEUROMUSCULAR RE-EDUCATION: (30 minutes):  Exercise/activities per grid below to improve balance, coordination, kinesthetic sense and posture. Required moderate verbal cues to promote static and dynamic balance in standing.     Date:  2022 Date:  2022 Date:  22   Activity/Exercise Parameters Parameters Parameters   Walking in the clinic      Sidestepping in hallway  4 laps without assistive device 4 laps without assistive device With glasses 4 laps   Marching in hallway  4 laps without assistive device 4 laps without assistive device With glasses 4 laps   Standing gastroc stretch in incline board 5 reps x 1 minute each 3 reps x1 minute X   Seated trunk rotation while visual tracking red ball   X   Stepping over half foam   Forward 2x10 reps  Lateral 2x10 reps         Step tap/ Step ups  6 inch  15 reps bilateral 6 inch  15 reps bilateral   Standing semitandem stance       Walking through cones 4 laps without assistive device 4 laps without assistive device 4 laps without assistive device   Walking with horizontal head turns with hiking poles      Circles right/circles left      Pelvis/ leg length discrepancy       Walking up/down ramps      Standing feet together Blue foam eyes open and eyes closed     Tiltboard          MANUAL THERAPY: (15 minutes): Soft tissue mobilization was utilized and necessary because of the patient's painful spasm. In seated, patient received trigger point release along bilateral upper traps/ rhomboids/ scapular region to decrease pain. Gentle manual cervical distraction to decreased headache and pain. [employer] Portal  Treatment/Session Summary:    · Response to Treatment:  Patient reports increased dizziness with exercises. Dizziness decreased with rest.    · Communication/Consultation:  None today  · Equipment provided today:  None today  · Recommendations/Intent for next treatment session: Next visit will focus on balance exercises, vestibular exercises, and manual therapy. Total Treatment Billable Duration:  45 minutes  PT Patient Time In/Time Out  Time In: 1100  Time Out: 1010 [facility], PT    Future Appointments   Date Time Provider Jacquie Smith   [facility]   [facility]2022  9[facility]00 [facility]     Visit # Therapist initials Date Arrived NS/ Cx < 24 hr >24 hr Cx Visit Comments   18 PV 8/31/2021 X   [employer] MCR-Thirty-five dollar copay per appointment.   Recertification done today   19 PV 9/3/2021 X      20 PV 9/14/2021 X      21 PV 9/17/2021 X      22 PV 9/28/2021 X      23 PV 10/12/2021 X      24 PV 10/26/2021 X      25 PV 10/29/2021 X      26 PV  11/8/2021 X      27 PV 11/12/2021 X Recertification   28 PV 11/23/2021 X      29 PV 12/7/2021 X      30  PV 12/28/2021 X      31 PV 1/4/2022 X      32 PV 1/7/2022 X      33 PV 1/11/2022 X       PV 1/17/2022 X      34 PV 1/21/2022 X      35 PV 5/03/3126 X   Recertification note   36 PV 2/1/2022 X      37 PV 2/4/2022 X      38 PV 2/8/2022 X      39 PV 2/22/2022 X      40 PV 3/15/2022 X      41 PV 3/22/2022 X      42 PV 3/29/2022 X      43 PV 4/1/2022 X      44 PV 2/0/1681 X   Recertification note   45  PV 4/12/2022 X      46 PV 4/19/2022 X      47 PV 5/9/2022 X      48 PV 5/13/2022 X      49 PV 5/17/2022 X                                                     Abbreviations: NS = No Show; CX = cancelled

## 2022-05-20 NOTE — PROGRESS NOTES
[address]  [phone]                 Occupational Therapy Daily Note       Referring MD: Luis M Martinez MD            ICD-10-CM    1. Effusion of left wrist  M25.432    2. Pain of right hand  M79.641    3.  Hand muscle weakness  M62.81         Therapy precautions: None     Total Direct Treatment Time: 40  min                       Total In Office Time: 40 min     Preferred Name:  Cecilio Mayo     SUBJECTIVE      Pt reports Pain in left wrist crease volar and dorsal.     OBJECTIVE      Left wrist pain     AROM Measures :    Wrist:                          Left                  Right     Pronation 88 80   Supination 65 70   Wrist Flexion 54 55   Wrist Ext 60 60   Ulnar deviation          35    30   Radial deviation 15 20         Strength/MMT (0-5 Scale) :      Strength (psi): RIGHT LEFT       : 67 42       Lat Pinch: 23 16       3pt pinch: 18 14       2pt pinch: 6 10          Treatment:     Ultrasound (08568) x 10 min left volar wrist (3 mhz, 1.0wcm2, continuous)          Manual treatment (31474) x 10 min left               STM              Trigger point pressure/hold/release volar and dorsal forearm              K-tape for wrist support      Therapeutic exercise (38186) x 20 min: Left         PROM of wrist and forearm        Jt mobs and distractions left wrist                         AROM wrist flex/ext and forearm sup/pro        Wrist Maze exerciser        Wrist flex/ext, RD/UD with 1 lb wt 2 sets x 10        Theraputty, Flexbar presses, 2 sets x 10        Theraputty, gentle gripping and rolling and pinching        Theraputty, Puttycise tools, L-bar and large knob         Yellow Flexbar, pronation, supination 20 each        ASSESSMENT     Complaints of pain with gripping and twisting left wrist.      PLAN OF CARE      Continue to add strengthening.        GOALS         Long term goals:  (4 weeks, 6/10/22)  · Pt will be able to use her left UE for all ADL's without difficulty  · Pt will have adequate  strength left hand to improve ability to grasp and hold an object and open containers  · No swelling left hand/wrist/forearm  · Pt will be able to lift and carry items (ie grocery bags, laundry basket etc) with left UE pain 2 of 10 or less. · Pt will be able to sleep through the night with no pain in left UE. · Pt will be compliant with splint use at night and report decreased numbness and tingling in median nerve distribution. · Pts Quick DASH functional assessment score will be less than 20% functional deficit (from 50%)        [employer] Portal   Access Code: [email or web address]  URL: [email or web address]  Date: 05/10/2022  Prepared by: Valentino Nose     Exercises  Seated Forearm Pronation and Supination AROM - 3 x daily - 7 x weekly - 1 sets - 10 reps - 5 hold  Wrist Extension AROM - 3 x daily - 7 x weekly - 1 sets - 10 reps - 5 hold  Digit Flexion Extension - 3 x daily - 7 x weekly - 1 sets - 10 reps - 5 hold  Wrist Prayer Stretch - 3 x daily - 7 x weekly - 1 sets - 5 reps - 10 hold  Seated Wrist Extension PROM - 3 x daily - 7 x weekly - 1 sets - 5 reps - 10 hold

## 2022-05-23 ENCOUNTER — OFFICE VISIT (OUTPATIENT)
Dept: ORTHOPEDIC SURGERY | Age: 42
End: 2022-05-23
Payer: MEDICARE

## 2022-05-23 DIAGNOSIS — M48.02 CERVICAL SPINAL STENOSIS: ICD-10-CM

## 2022-05-23 DIAGNOSIS — M54.12 CERVICAL RADICULOPATHY: Primary | ICD-10-CM

## 2022-05-23 PROCEDURE — 99214 OFFICE O/P EST MOD 30 MIN: CPT | Performed by: ORTHOPAEDIC SURGERY

## 2022-05-23 NOTE — PROGRESS NOTES
(INDOCIN) 25 MG capsule TAKE 1-2 TABLETS BY MOUTH 3 TIMES DAILY AS NEEDED FOR MIGRAINE. LIMIT 2 DAYS/WEEK. TAKE WITH FOOD  Ar Automatic Reconciliation   lamoTRIgine (LAMICTAL) 25 MG tablet Take 25 mg once daily for 2 weeks then 25 mg twice daily  Ar Automatic Reconciliation   methylPREDNISolone (MEDROL DOSEPACK) 4 MG tablet TAKE 6 TABLETS ON DAY 1 AS DIRECTED ON PACKAGE AND DECREASE BY 1 TAB EACH DAY FOR A TOTAL OF 6 DAYS  Ar Automatic Reconciliation   mometasone (NASONEX) 50 MCG/ACT nasal spray 1 spray by Nasal route  Ar Automatic Reconciliation   ondansetron (ZOFRAN-ODT) 4 MG disintegrating tablet Take 4 mg by mouth every 8 hours as needed  Ar Automatic Reconciliation   predniSONE (DELTASONE) 20 MG tablet Take 3 tablets once a day for 4 days  Ar Automatic Reconciliation   thyroid (ARMOUR THYROID) 30 MG tablet TAKE 1 TABLET BY MOUTH EVERY DAY IN THE MORNING  Ar Automatic Reconciliation   tiZANidine (ZANAFLEX) 4 MG tablet TAKE 1 TABLET BY MOUTH EVERY 8 HOURS AS NEEDED  Ar Automatic Reconciliation   Ubrogepant 100 MG TABS Take 100 mg by mouth daily as needed  Ar Automatic Reconciliation   venlafaxine (EFFEXOR XR) 150 MG extended release capsule Take 150 mg by mouth daily  Ar Automatic Reconciliation       Allergies: Allergies   Allergen Reactions    Diazepam Other (See Comments)     Suicidal thoughts   Other reaction(s): Other (See Comments)  Suicidal ideation    Macadamia Nut Oil Shortness Of Breath and Swelling     Lips swell    Any stone fruits, for ex peaches, cherries, plums, Mangoes are ok    Penicillins Anaphylaxis     Has not been able to tolerate cephalosporins     Pineapple Shortness Of Breath     wheezing    Sumatriptan Shortness Of Breath    Theophylline Shortness Of Breath    Cephalexin Other (See Comments)     Other reaction(s): Dizziness    Sulfa Antibiotics Rash     Other reaction(s): Vomiting    Topiramate Other (See Comments)     Other reaction(s):  Other (See Comments)  Caused disorientation, loss of feeling in left leg (numbness)    Tramadol Rash    Verapamil Other (See Comments)     Lowers blood pressure  Other reaction(s): Other (See Comments)  causes hypotension    Pneumococcal Vaccine Swelling    Triamcinolone Acetonide Hives    Vitamin B12 Other (See Comments)     Injection Only  swelling        Physical Exam:     This is a well developed well nourished adult female in no acute distress. Oriented to person, place and time. Mood and affect are appropriate. Respirations are unlabored and there is no evidence of cyanosis. Sensory testing reveals intact sensation to light touch and in the distribution of the C5-T1 dermatomes bilaterally, except for decreased sensation over the C6 and C7 distributions including the lateral neck, anterior and posterior arm, lateral elbow, radial forearm, and thumb, index and middle fingers. Reflexes     Right Left   Biceps (C5) 2 2   Brachio radialis (C6) 2 2    Triceps (C7) 2 2       Hernandez's is positive       Tinel's and Ruperto testing over the cubital and carpal tunnels do not reproduce the symptoms. Shoulder examination is not consistent with adhesive capsulitis or acute rotator cuff tendinitis. Strength testing in the upper extremity reveals the following based on the 5 point grading scale:     Delt(C5) Bicep(C6) WE(C6) Tricep (C7) WF(C7) (C8) Int (T1)   Right 5 5 5 5 5 5 5   Left 5 5 5 4 5 4 5       Radiographic Studies:    Radiographs:    AP and lateral views of the cervical spine, reveal spondylotic changes predominantly from C5-C7    MRI Cervical spine, report and images reviewed and interpreted and reveals pronounced left-sided foraminal disc herniations and spurring at C5-C6 and C6-C7 resulting in left-sided foraminal stenosis and neural impingement    Course and size of the vertebral arteries: Normal      Diagnosis:      ICD-10-CM    1. Cervical radiculopathy  M54.12    2.  Cervical spinal stenosis  M48.02 Assessment/Plan: This patient's clinical history and physical exam is consistent with cervical radiculopathy involving primarily the left C6 and C7 nerve root. She has had a prolonged course with the symptoms and has had varying responses to conservative efforts. At this point, we discussed that it would be reasonable to consider ACDF surgery. -ACDF. We discussed the details of the surgery including an incision over the left side of the front of the neck. The windpipe and foodpipe would be retracted to the side to expose the underlying spine. The appropriate disc would be identified with an X-ray and the disc would be removed. The nerves would be freed by trimming any impinging structures such as bone spurs and disc material.   The disc space would then be filled with an interbody spacer and bone graft from a cadaver. A drain may be placed, and then the wound would be closed with suture and covered with sterile dressings. She would expect to either be discharged same day or stay in the hospital until overnight depending on how quickly she recovers. Follow-up would be scheduled for 2-3 weeks and she would have restrictions including no driving for 2 weeks, no lifting greater than 15 lbs. We also discussed the potential risks of the surgery including, but not limited to infection, spinal fluid leak, compressive hematoma; injury to spinal cord or peripheral nerve root resulting in paralysis, or loss of use of an extremity; persistent neck or arm symptoms or pain at the bone graft site; failure of the bone graft to heal or failure of the hardware resulting in the possibility of needing additional surgery; postoperative hoarseness or dysphagia; injury to an artery or vein resulting in significant blood loss; and the risks of anesthesia including, but not limited heart attack, stroke, blood clot or death. The patient was also given a brochure on anterior cervical discectomy and fusion.  The patient voiced an understanding of these issues outlined. The procedure that I think may be beneficial here is an anterior cervical discectomy and fusion with interbody spacer, allograft and instrumentation from C5-C7.           Electronically Signed By Miguelina Morel MD   2:56 PM

## 2022-05-24 ENCOUNTER — HOSPITAL ENCOUNTER (OUTPATIENT)
Dept: PHYSICAL THERAPY | Age: 42
Setting detail: RECURRING SERIES
Discharge: HOME | End: 2022-05-27
Payer: MEDICARE

## 2022-05-24 PROCEDURE — 97112 NEUROMUSCULAR REEDUCATION: CPT

## 2022-05-24 PROCEDURE — 97140 MANUAL THERAPY 1/> REGIONS: CPT

## 2022-05-24 ASSESSMENT — PAIN SCALES - GENERAL: PAINLEVEL_OUTOF10: 5

## 2022-05-24 NOTE — PROGRESS NOTES
Patrick Amos  : 1980  Primary: Mallie Skains Ppo  Secondary:  Khushboo Keys  [address] [facility] [address]  Phone: [phone]  Fax: [phone] No data recorded  Plan of Care/Certification Expiration Date: 22      PT Visit Info: Total # of Visits to Date: 48      OUTPATIENT PHYSICAL THERAPY:OP NOTE TYPE: Treatment Note 2022       Episode   Appt Desk       Treatment Diagnosis:  Difficulty in walking, Not elsewhere classified (R26.2)  Other abnormalities of gait and mobility (R26.89)  Difficulty in walking, Not elsewhere classified (R26.2)  Medical/Referring Diagnosis:  No admission diagnoses are documented for this encounter. Referring Physician:  Davina Funes MD MD Orders:  PT Eval and Treat   Date of Onset:  No data recorded   Allergies:  Diazepam, Macadamia nut oil, Penicillins, Pineapple, Sumatriptan, Theophylline, Cephalexin, Sulfa antibiotics, Topiramate, Tramadol, Verapamil, Pneumococcal vaccine, Triamcinolone acetonide, and Vitamin b12  Restrictions/Precautions:    No data recordedNo data recorded   Interventions Planned (Treatment may consist of any combination of the following):    No data recorded   Subjective Comments:  I took a muscle relaxer on Friday and Saturday. I saw the doctor yesterday. He saw a bone spur on MRI. Surgery is scheduled for . Pain level is 5/10 and no dizziness. Mild headache. Initial:     5/10 Post Session:     3/10  Medications Last Reviewed:  2022  Updated Objective Findings:  None Today  Treatment   NEUROMUSCULAR RE-EDUCATION: (30 minutes):    Exercise/activities per grid below to improve balance, coordination, kinesthetic sense, posture and proprioception.   Required moderate verbal cues to promote static and dynamic balance in standing.      Date:  2022 Date:  2022 Date:  2022   Activity/Exercise Parameters Parameters Parameters   Walking in the clinic         Sidestepping in hallway  4 laps without assistive device 4 laps without assistive device With glasses 4 laps   Marching in hallway  4 laps without assistive device 4 laps without assistive device With glasses 4 laps   Standing gastroc stretch in incline board 3 reps x 1 minute 3 reps x1 minute    Seated trunk rotation while visual tracking red ball         Stepping over half foam Forward 2x10 reps Forward 2x10 reps  Forward 2x10 reps   Lateral 2x10 reps             Step tap/ Step ups         Standing semitandem stance          Walking through cones 4 laps without assistive devices 4 laps without assistive device 4 laps without assistive devic   Walking with horizontal head turns with hiking poles         Circles right/circles left         Pelvis/ leg length discrepancy          Walking up/down ramps         Standing feet together         Tiltboard             MANUAL THERAPY: (15 minutes): Soft tissue mobilization was utilized and necessary because of the patient's painful spasm. In seated, patient received trigger point release along bilateral upper traps/ rhomboids/ scapular region to decrease pain. Gentle manual cervical distraction to decreased headache and pain        Treatment/Session Summary:    · Treatment Assessment:   Patient reports decreased muscle tightness after manual therapy. Patient reports increased dizziness with turns. Dizziness decreased with rest  · Communication/Consultation:  None today  · Equipment provided today:  None  · Recommendations/Intent for next treatment session: Next visit will focus on balance exercises, vestibular exercises, and manual therapy.     Total Treatment Billable Duration:  45 minutes  Time In: 1100  Time Out: 9192    [facility] PT       Post Session Pain  Charge Capture  [employer] Portal  MD Guidelines  Scanned Media  Benefits  MyChart

## 2022-05-25 ENCOUNTER — OFFICE VISIT (OUTPATIENT)
Dept: ORTHOPEDIC SURGERY | Age: 42
End: 2022-05-25
Payer: MEDICARE

## 2022-05-25 DIAGNOSIS — M25.432 EFFUSION OF LEFT WRIST: Primary | ICD-10-CM

## 2022-05-25 DIAGNOSIS — M79.641 PAIN OF RIGHT HAND: ICD-10-CM

## 2022-05-25 DIAGNOSIS — M62.81 HAND MUSCLE WEAKNESS: ICD-10-CM

## 2022-05-25 PROCEDURE — 97140 MANUAL THERAPY 1/> REGIONS: CPT | Performed by: OCCUPATIONAL THERAPIST

## 2022-05-25 PROCEDURE — 97110 THERAPEUTIC EXERCISES: CPT | Performed by: OCCUPATIONAL THERAPIST

## 2022-05-25 PROCEDURE — 97035 APP MDLTY 1+ULTRASOUND EA 15: CPT | Performed by: OCCUPATIONAL THERAPIST

## 2022-05-27 ENCOUNTER — APPOINTMENT (OUTPATIENT)
Dept: PHYSICAL THERAPY | Age: 42
End: 2022-05-27
Payer: MEDICARE

## 2022-05-31 ENCOUNTER — OFFICE VISIT (OUTPATIENT)
Dept: ORTHOPEDIC SURGERY | Age: 42
End: 2022-05-31
Payer: MEDICARE

## 2022-05-31 DIAGNOSIS — M79.641 PAIN OF RIGHT HAND: ICD-10-CM

## 2022-05-31 DIAGNOSIS — M62.81 HAND MUSCLE WEAKNESS: ICD-10-CM

## 2022-05-31 DIAGNOSIS — M25.432 EFFUSION OF LEFT WRIST: Primary | ICD-10-CM

## 2022-05-31 PROCEDURE — 97110 THERAPEUTIC EXERCISES: CPT | Performed by: OCCUPATIONAL THERAPIST

## 2022-05-31 PROCEDURE — 97022 WHIRLPOOL THERAPY: CPT | Performed by: OCCUPATIONAL THERAPIST

## 2022-05-31 PROCEDURE — 97140 MANUAL THERAPY 1/> REGIONS: CPT | Performed by: OCCUPATIONAL THERAPIST

## 2022-05-31 NOTE — PROGRESS NOTES
[address] [address] Zoraida radha Morejon  [phone]                 Occupational Therapy Daily Note       Referring MD: Steffen Velasquez MD            ICD-10-CM    1. Effusion of left wrist  M25.432    2. Pain of right hand  M79.641    3.  Hand muscle weakness  M62.81         Therapy precautions: None     Total Direct Treatment Time: 30  min                       Total In Office Time: 45 min     Preferred Name:  [facility]     SUBJECTIVE      Pt reports she took the K-tape off her wrist the next day as it was irritating her skin.     OBJECTIVE      Left wrist pain     AROM Measures :    Wrist:                  Left                  Right     Pronation 88 80   Supination 65 70   Wrist Flexion 54 55   Wrist Ext 60 60   Ulnar deviation          35    30   Radial deviation 15 20         Strength/MMT (0-5 Scale) :      Strength (psi): RIGHT LEFT       : 67 42       Lat Pinch: 23 16       3pt pinch: 18 14       2pt pinch: 6 10          Treatment:          Fluidotherapy (10876) x 15 min for heat and preparation for manual tx left UE     Manual treatment (25331) x 10 min left               STM              Trigger point pressure/hold/release volar and dorsal forearm                 Therapeutic exercise (42267) x 20 min: for left wrist        PROM of wrist and forearm        Jt mobs and distractions left wrist                         AROM wrist flex/ext and forearm sup/pro            Wrist flex/ext, RD/UD with 2 lb wt 2 sets x 10        Theraputty, Flexbar presses, 2 sets x 10        Theraputty, gentle gripping and rolling and pinching        Theraputty, Puttycise tools, L-bar, large knob, small knob,  20 each         Yellow Flexbar, pronation, supination 20 each        ASSESSMENT     Pt continues to c/o pain in left wrist. Frequent rest breaks needed.     PLAN OF CARE      Continue to add strengthening.        GOALS         Long term goals:  (4 weeks, 6/10/22)  · Pt will be able to use her left UE for all ADL's without difficulty  · Pt will have adequate  strength left hand to improve ability to grasp and hold an object and open containers  · No swelling left hand/wrist/forearm  · Pt will be able to lift and carry items (ie grocery bags, laundry basket etc) with left UE pain 2 of 10 or less. · Pt will be able to sleep through the night with no pain in left UE. · Pt will be compliant with splint use at night and report decreased numbness and tingling in median nerve distribution. · Pts Quick DASH functional assessment score will be less than 20% functional deficit (from 50%)        [employer] Portal   Access Code: [email or web address]  URL: [email or web address]  Date: 05/10/2022  Prepared by: Wolf Roberts     Exercises  Seated Forearm Pronation and Supination AROM - 3 x daily - 7 x weekly - 1 sets - 10 reps - 5 hold  Wrist Extension AROM - 3 x daily - 7 x weekly - 1 sets - 10 reps - 5 hold  Digit Flexion Extension - 3 x daily - 7 x weekly - 1 sets - 10 reps - 5 hold  Wrist Prayer Stretch - 3 x daily - 7 x weekly - 1 sets - 5 reps - 10 hold  Seated Wrist Extension PROM - 3 x daily - 7 x weekly - 1 sets - 5 reps - 10 hold

## 2022-06-01 ENCOUNTER — NURSE ONLY (OUTPATIENT)
Dept: INTERNAL MEDICINE CLINIC | Facility: CLINIC | Age: 42
End: 2022-06-01

## 2022-06-01 DIAGNOSIS — E55.9 VITAMIN D DEFICIENCY: ICD-10-CM

## 2022-06-01 DIAGNOSIS — E78.5 HYPERLIPIDEMIA, UNSPECIFIED HYPERLIPIDEMIA TYPE: Primary | ICD-10-CM

## 2022-06-01 LAB
25(OH)D3 SERPL-MCNC: 32.3 NG/ML (ref 30–100)
ALBUMIN SERPL-MCNC: 3.9 G/DL (ref 3.5–5)
ALBUMIN/GLOB SERPL: 1.2 {RATIO} (ref 1.2–3.5)
ALP SERPL-CCNC: 78 U/L (ref 50–136)
ALT SERPL-CCNC: 36 U/L (ref 12–65)
ANION GAP SERPL CALC-SCNC: 8 MMOL/L (ref 7–16)
AST SERPL-CCNC: 16 U/L (ref 15–37)
BILIRUB SERPL-MCNC: 0.4 MG/DL (ref 0.2–1.1)
BUN SERPL-MCNC: 8 MG/DL (ref 6–23)
CALCIUM SERPL-MCNC: 9.3 MG/DL (ref 8.3–10.4)
CHLORIDE SERPL-SCNC: 107 MMOL/L (ref 98–107)
CHOLEST SERPL-MCNC: 224 MG/DL
CO2 SERPL-SCNC: 24 MMOL/L (ref 21–32)
CREAT SERPL-MCNC: 0.7 MG/DL (ref 0.6–1)
ERYTHROCYTE [DISTWIDTH] IN BLOOD BY AUTOMATED COUNT: 12.7 % (ref 11.9–14.6)
GLOBULIN SER CALC-MCNC: 3.3 G/DL (ref 2.3–3.5)
GLUCOSE SERPL-MCNC: 94 MG/DL (ref 65–100)
HCT VFR BLD AUTO: 45.1 % (ref 35.8–46.3)
HDLC SERPL-MCNC: 48 MG/DL (ref 40–60)
HDLC SERPL: 4.7 {RATIO}
HGB BLD-MCNC: 14.7 G/DL (ref 11.7–15.4)
LDLC SERPL CALC-MCNC: 145.6 MG/DL
MCH RBC QN AUTO: 29.1 PG (ref 26.1–32.9)
MCHC RBC AUTO-ENTMCNC: 32.6 G/DL (ref 31.4–35)
MCV RBC AUTO: 89.1 FL (ref 79.6–97.8)
NRBC # BLD: 0 K/UL (ref 0–0.2)
PLATELET # BLD AUTO: 252 K/UL (ref 150–450)
PMV BLD AUTO: 9.8 FL (ref 9.4–12.3)
POTASSIUM SERPL-SCNC: 4.4 MMOL/L (ref 3.5–5.1)
PROT SERPL-MCNC: 7.2 G/DL (ref 6.3–8.2)
RBC # BLD AUTO: 5.06 M/UL (ref 4.05–5.2)
SODIUM SERPL-SCNC: 139 MMOL/L (ref 136–145)
TRIGL SERPL-MCNC: 152 MG/DL (ref 35–150)
VLDLC SERPL CALC-MCNC: 30.4 MG/DL (ref 6–23)
WBC # BLD AUTO: 9.1 K/UL (ref 4.3–11.1)

## 2022-06-02 ENCOUNTER — OFFICE VISIT (OUTPATIENT)
Dept: ORTHOPEDIC SURGERY | Age: 42
End: 2022-06-02
Payer: MEDICARE

## 2022-06-02 DIAGNOSIS — M79.641 PAIN OF RIGHT HAND: ICD-10-CM

## 2022-06-02 DIAGNOSIS — M62.81 HAND MUSCLE WEAKNESS: ICD-10-CM

## 2022-06-02 DIAGNOSIS — M25.432 EFFUSION OF LEFT WRIST: Primary | ICD-10-CM

## 2022-06-02 PROCEDURE — 97110 THERAPEUTIC EXERCISES: CPT | Performed by: OCCUPATIONAL THERAPIST

## 2022-06-02 PROCEDURE — 97035 APP MDLTY 1+ULTRASOUND EA 15: CPT | Performed by: OCCUPATIONAL THERAPIST

## 2022-06-02 PROCEDURE — 97140 MANUAL THERAPY 1/> REGIONS: CPT | Performed by: OCCUPATIONAL THERAPIST

## 2022-06-02 NOTE — PROGRESS NOTES
[address] [facility] [address]  [phone]                 Occupational Therapy Daily Note       Referring MD: Danuta Jimenez MD            ICD-10-CM    1. Effusion of left wrist  M25.432    2. Pain of right hand  M79.641    3.  Hand muscle weakness  M62.81         Therapy precautions: None     Total Direct Treatment Time: 40  min                       Total In Office Time: 40 min     Preferred Name:  [facility]     SUBJECTIVE      Pt reports \"My wrist is better, I'm able to open my sticks easier\"     OBJECTIVE      Left wrist pain     AROM Measures :    Wrist:                  Left                  Right     Pronation 88 80   Supination 65 70   Wrist Flexion 54 55   Wrist Ext 60 60   Ulnar deviation          35    30   Radial deviation 15 20         Strength/MMT (0-5 Scale) :      Strength (psi): RIGHT LEFT       : 67 42       Lat Pinch: 23 16       3pt pinch: 18 14       2pt pinch: 6 10          Treatment:     Ultrasound (53066) x 10 min volar wrist (3 mhz, 1.2wcm2, continuous)        Manual treatment (77470) x 10 min left               STM              Trigger point pressure/hold/release volar and dorsal forearm                 Therapeutic exercise (33041) x 20 min: for left wrist        PROM of wrist and forearm        Jt mobs and distractions left wrist                         AROM wrist flex/ext and forearm sup/pro            Wrist flex/ext, RD/UD with 2 lb wt 2 sets x 10        Theraputty, Flexbar presses, 2 sets x 10        Theraputty, gentle gripping and rolling and pinching        Theraputty, Puttycise tools, L-bar, large knob, small knob,  20 each         Yellow Flexbar, pronation, supination 20 each        ASSESSMENT     Decreased pain complaints left wrist.     PLAN OF CARE      Continue to add strengthening.        GOALS         Long term goals:  (4 weeks, 6/10/22)  · Pt will be able to use her left UE for all ADL's without difficulty  · Pt will have adequate  strength left hand to improve ability to grasp and hold an object and open containers  · No swelling left hand/wrist/forearm  · Pt will be able to lift and carry items (ie grocery bags, laundry basket etc) with left UE pain 2 of 10 or less. · Pt will be able to sleep through the night with no pain in left UE. · Pt will be compliant with splint use at night and report decreased numbness and tingling in median nerve distribution. · Pts Quick DASH functional assessment score will be less than 20% functional deficit (from 50%)        [employer] Portal   Access Code: [email or web address]  URL: [email or web address]  Date: 05/10/2022  Prepared by: Adrien Ruiz     Exercises  Seated Forearm Pronation and Supination AROM - 3 x daily - 7 x weekly - 1 sets - 10 reps - 5 hold  Wrist Extension AROM - 3 x daily - 7 x weekly - 1 sets - 10 reps - 5 hold  Digit Flexion Extension - 3 x daily - 7 x weekly - 1 sets - 10 reps - 5 hold  Wrist Prayer Stretch - 3 x daily - 7 x weekly - 1 sets - 5 reps - 10 hold  Seated Wrist Extension PROM - 3 x daily - 7 x weekly - 1 sets - 5 reps - 10 hold

## 2022-06-06 ENCOUNTER — OFFICE VISIT (OUTPATIENT)
Dept: ORTHOPEDIC SURGERY | Age: 42
End: 2022-06-06
Payer: MEDICARE

## 2022-06-06 DIAGNOSIS — M79.641 PAIN OF RIGHT HAND: ICD-10-CM

## 2022-06-06 DIAGNOSIS — M62.81 HAND MUSCLE WEAKNESS: ICD-10-CM

## 2022-06-06 DIAGNOSIS — M25.432 EFFUSION OF LEFT WRIST: Primary | ICD-10-CM

## 2022-06-06 PROCEDURE — 97035 APP MDLTY 1+ULTRASOUND EA 15: CPT | Performed by: OCCUPATIONAL THERAPIST

## 2022-06-06 PROCEDURE — 97110 THERAPEUTIC EXERCISES: CPT | Performed by: OCCUPATIONAL THERAPIST

## 2022-06-06 PROCEDURE — 97140 MANUAL THERAPY 1/> REGIONS: CPT | Performed by: OCCUPATIONAL THERAPIST

## 2022-06-06 NOTE — PROGRESS NOTES
[address] [address] [address]  [phone]                 Occupational Therapy Daily Note       Referring MD: Mitali Burciaga MD            ICD-10-CM    1. Effusion of left wrist  M25.432    2. Pain of right hand  M79.641    3. Hand muscle weakness  M62.81         Therapy precautions: None     Total Direct Treatment Time: 40  min                       Total In Office Time: 40 min     Preferred Name:  Tatyana Henning     SUBJECTIVE      Pt reports she continues to have difficulty opening some containers but is able to open doors better.     OBJECTIVE      Left wrist pain     AROM Measures :    Wrist:                Left          Right   Left  6/6  Pronation 88 80    Supination 65 70 65   Wrist Flexion 54 55 55   Wrist Ext 60 60 60   Ulnar deviation          35    30    Radial deviation 15 20          Strength/MMT (0-5 Scale) :      Strength (psi): RIGHT LEFT Right 6/6  Left 6/6    : 67 42  78 45    Lat Pinch: 23 16       3pt pinch: 18 14       2pt pinch: 6 10          Treatment:     Ultrasound (23639) x 10 min volar wrist (3 mhz, 1.2wcm2, continuous)        Manual treatment (07949) x 10 min left               STM              Trigger point pressure/hold/release volar and dorsal forearm                 Therapeutic exercise (94750) x 20 min: for left wrist        PROM of wrist and forearm        Jt mobs and distractions left wrist                         AROM wrist flex/ext and forearm sup/pro            Wrist flex/ext, RD/UD with 2 lb wt 2 sets x 10        Theraputty, Flexbar presses, 2 sets x 10        Theraputty, gentle gripping and rolling and pinching        Theraputty, Puttycise tools, L-bar, large knob, small knob,  20 each         Yellow Flexbar, pronation, supination 20 each        ASSESSMENT     Pt made some progress towards goals.  She has decreased pain and swelling in her left wrist. She rates herself as having a 25% functional deficit per the Quick DASH assessment (from 50%). She continues to have mild pain in her left wrist. We reviewed her HEP. She will continue on her own.       PLAN OF CARE      Discharge.        GOALS         Long term goals:  (4 weeks, 6/10/22)  · Pt will be able to use her left UE for all ADL's without difficulty (met)  · Pt will have adequate  strength left hand to improve ability to grasp and hold an object and open containers (not met)  · No swelling left hand/wrist/forearm (met)  · Pt will be able to lift and carry items (ie grocery bags, laundry basket etc) with left UE pain 2 of 10 or less. (met)  · Pt will be able to sleep through the night with no pain in left UE. (met)  · Pt will be compliant with splint use at night and report decreased numbness and tingling in median nerve distribution. (met)        Pts Quick DASH functional assessment score will be less than 20% functional deficit (from 50%) (not met, 25%)      [employer] Portal   Access Code: [email or web address]  URL: [email or web address]  Date: 05/10/2022  Prepared by: Titus Dent     Exercises  Seated Forearm Pronation and Supination AROM - 3 x daily - 7 x weekly - 1 sets - 10 reps - 5 hold  Wrist Extension AROM - 3 x daily - 7 x weekly - 1 sets - 10 reps - 5 hold  Digit Flexion Extension - 3 x daily - 7 x weekly - 1 sets - 10 reps - 5 hold  Wrist Prayer Stretch - 3 x daily - 7 x weekly - 1 sets - 5 reps - 10 hold  Seated Wrist Extension PROM - 3 x daily - 7 x weekly - 1 sets - 5 reps - 10 hold

## 2022-06-14 ENCOUNTER — PREP FOR PROCEDURE (OUTPATIENT)
Dept: ORTHOPEDIC SURGERY | Age: 42
End: 2022-06-14

## 2022-06-14 ENCOUNTER — HOSPITAL ENCOUNTER (OUTPATIENT)
Dept: PHYSICAL THERAPY | Age: 42
Setting detail: RECURRING SERIES
Discharge: HOME | End: 2022-06-17
Payer: MEDICARE

## 2022-06-14 DIAGNOSIS — M48.02 CERVICAL SPINAL STENOSIS: ICD-10-CM

## 2022-06-14 DIAGNOSIS — M54.12 CERVICAL RADICULOPATHY: Primary | ICD-10-CM

## 2022-06-14 PROCEDURE — 97112 NEUROMUSCULAR REEDUCATION: CPT

## 2022-06-14 PROCEDURE — 97140 MANUAL THERAPY 1/> REGIONS: CPT

## 2022-06-14 SDOH — HEALTH STABILITY: PHYSICAL HEALTH: ON AVERAGE, HOW MANY DAYS PER WEEK DO YOU ENGAGE IN MODERATE TO STRENUOUS EXERCISE (LIKE A BRISK WALK)?: 3 DAYS

## 2022-06-14 SDOH — HEALTH STABILITY: PHYSICAL HEALTH: ON AVERAGE, HOW MANY MINUTES DO YOU ENGAGE IN EXERCISE AT THIS LEVEL?: 10 MIN

## 2022-06-14 ASSESSMENT — LIFESTYLE VARIABLES
HOW OFTEN DO YOU HAVE SIX OR MORE DRINKS ON ONE OCCASION: 1
HOW MANY STANDARD DRINKS CONTAINING ALCOHOL DO YOU HAVE ON A TYPICAL DAY: 1 OR 2
HOW OFTEN DO YOU HAVE A DRINK CONTAINING ALCOHOL: 2
HOW OFTEN DO YOU HAVE A DRINK CONTAINING ALCOHOL: MONTHLY OR LESS
HOW MANY STANDARD DRINKS CONTAINING ALCOHOL DO YOU HAVE ON A TYPICAL DAY: 1

## 2022-06-14 ASSESSMENT — PAIN DESCRIPTION - DESCRIPTORS: DESCRIPTORS: SHARP;SHOOTING

## 2022-06-14 ASSESSMENT — PAIN SCALES - GENERAL: PAINLEVEL_OUTOF10: 7

## 2022-06-14 ASSESSMENT — PATIENT HEALTH QUESTIONNAIRE - PHQ9
SUM OF ALL RESPONSES TO PHQ QUESTIONS 1-9: 0
2. FEELING DOWN, DEPRESSED OR HOPELESS: 0
SUM OF ALL RESPONSES TO PHQ QUESTIONS 1-9: 0
SUM OF ALL RESPONSES TO PHQ QUESTIONS 1-9: 0
1. LITTLE INTEREST OR PLEASURE IN DOING THINGS: 0
SUM OF ALL RESPONSES TO PHQ9 QUESTIONS 1 & 2: 0
SUM OF ALL RESPONSES TO PHQ QUESTIONS 1-9: 0

## 2022-06-14 ASSESSMENT — PAIN DESCRIPTION - LOCATION: LOCATION: HEAD

## 2022-06-14 NOTE — THERAPY RECERTIFICATION
Christella Sever  : 1980  Primary: Sulaiman Morel Ppo  Secondary:  Nataliia Coyle [address]  [address] [place] [address] Way [address]  Phone: [phone]  Fax: [phone] Plan Frequency: 2 times a week for 90 days    Plan of Care/Certification Expiration Date: 22      PT Visit Info: Total # of Visits to Date: 46   Treatment plan effective dates: 2022 TO 2022   OUTPATIENT PHYSICAL THERAPY:OP NOTE TYPE: Recertification 3/62/0401               Episode  Appt Desk         Treatment Diagnosis:  Difficulty in walking, Not elsewhere classified (R26.2)  Other abnormalities of gait and mobility (R26.89)  * No diagnoses found *  Medical/Referring Diagnosis:  No admission diagnoses are documented for this encounter. Referring Physician:  Lee Castaneda MD/Dyana Bryan MD MD Orders:  PT Eval and Treat   Return MD Appt:  Unknown   Date of Onset:  No data recorded   Allergies:  Diazepam, Macadamia nut oil, Penicillins, Pineapple, Sumatriptan, Theophylline, Cephalexin, Sulfa antibiotics, Topiramate, Tramadol, Verapamil, Pneumococcal vaccine, Triamcinolone acetonide, and Vitamin b12  Restrictions/Precautions:    No data recordedNo data recorded   Medications Last Reviewed:  2022     SUBJECTIVE   History of Injury/Illness (Reason for Referral):  Patient complains of dizziness, imbalance, and difficulty walking. Patient here in the clinic last year, but stopped therapy when COVID-19 began. Patient reports symptoms have gotten worse. Patient has been falling a lot more frequently. Patient has to use two hiking poles when walking instead of just one hiking pole. Patient rates current dizziness as 3/10 and current pain level in cervical spine as 3/10. Patient tends to lose her balance backwards and to the left. Patient has a history of lead toxicity. Patient reports more recent exposure to mold.    Initial:   Head 7/10 Post Session:   Head 3/10  Past Medical History/Comorbidities:   Ms. Talha Jurado  has a past medical history of Adverse effect of anesthesia, Asthma, BMI 36.0-36.9,adult, Chronic pain, Hyperlipidemia, Migraines, Movement disorder, Thyroid disease, Vertigo, and Vestibular nerve disorder. Ms. Talha Jurado  has a past surgical history that includes orthopedic surgery and heent. Social History/Living Environment:   Lives With: Alone  Type of Home: Apartment     Prior Level of Function/Work/Activity:   Prior level of function: Independent  Occupation: Part time employment  No data recordedNo data recorded   Learning:   No data recorded   Fall Risk Scale: Total Score: 75  [place] Fall Risk: High (45 and higher)     Dominant Side:  right handed    Personal Factors:        Sex:  female        Age:  43 y.o.        OBJECTIVE   Observation/Orthostatic Postural Assessment:          Slight forward head rounded shoulders posture. Palpation: Increased tenderness along bilateral upper traps/SCM, left worse than right. Tender throughout C5-C7. Functional Mobility:         Gait/Ambulation:  Patient ambulates with bilateral hiking poles demonstrating decreased gait speed with wider base of support and moderate deviation from straight path. ROM:          Cervical range of motion is as follows: flexion within normal limits, extension within normal limits, right rotation within normal limits, left rotation 75%, right lateral flexion 50%, left lateral flexion 75%. Postural Control & Balance:  · Barrera Balance Scale:  46/56.   (A score less than 45/56 indicates high risk of falls). Score improved from 26/56 on initial evaluation. · Dynamic Gait Index:  Not tested. ASSESSMENT   Initial Assessment:  Ms. Talha Jurado presents with dizziness, neck pain, imbalance, and difficulty walking. Patient could be experiencing some cervicogenic dizziness. Patient is at higher fall risk based on history of multiple falls and score received on Barrera Balance Scale. Patient would benefit from skilled physical therapy to address problems and goals. Thank you for this referral.       Recertification note:  Patient has attended fifty-one scheduled physical therapy appointments from 3/2/2021 to 6/14/2022. Patient demonstrates improved gait speed with Timed Up and Go Test and improved balance with Barrera Balance Scale. Patient would benefit from continuing skilled physical therapy to address problems and goals. Thank you for this referral.    Problem List: (Impacting functional limitations):    1. Decreased Transfer Abilities  2. Decreased Ambulation Ability/Technique  3. Decreased Balance  4. Increased Pain  5. Decreased Flexibility/Joint Mobility  6. Decreased [employer] with Home Exercise Program  7. Increased dizziness  Therapy Prognosis:   Therapy Prognosis: Good     Assessment Complexity:   High Complexity  PLAN   Effective Dates: 6/14/2022 TO Plan of Care/Certification Expiration Date: 08/13/22     Frequency/Duration: Plan Frequency: 2 times a week for 90 days     Interventions Planned (Treatment may consist of any combination of the following):    1. Balance Exercise  2. Gait Training  3. Home Exercise Program (HEP)  4. Manual Therapy  5. Range of Motion (ROM)  6. Habituation exercises  Goals: (Goals have been discussed and agreed upon with patient.)  Short-Term Functional Goals: Time Frame: 30 days   1. Patient will be independent with home exercise program to improve balance. Goal met. 2.  Patient will score greater than or equal to 30/56 on Barrera Balance Scale indicating improved balance and decreased fall risk with daily activities. Goal met. Discharge Goals: Time Frame: 90 days  1. Patient will score greater than or equal to 40/56 on Barrera Balance Scale indicating improved balance and decreased fall risk with daily activities. Goal met. 2.  Patient will report improved stability at home and at work. Goal ongoing.   3.  Patient will score less than or equal to 22 seconds on Timed Up and Go Test indicating improved gait speed. Goal met. 4.  Patient will score less than or equal to 13 seconds on Timed Up and Go Test indicating improved gait speed. Goal met. 5.  Patient will score less than or equal to 48/56 on Barrera Balance Scale indicating improved balance and decreased fall risk with daily activities. Goal ongoing. 6.  Patient will score less than or equal to 10 seconds on Timed Up and Go Test indicating improved gait speed. Goal ongoing. Outcome Measure: Tool Used: Barrera Balance Scale  Score:  Initial: 26/56 Most Recent: 46/56 (Date: 6- )   Interpretation of Score: Each section is scored on a 0-4 scale, 0 representing the patients inability to perform the task and 4 representing independence. The scores of each section are added together for a total score of 56. The higher the patients score, the more independent the patient is. Any score below 45 indicates increased risk for falls. Tool Used: Timed Up and Go (TUG)  Score:  Initial: 26.4 seconds Most Recent: 10.4 seconds (Date: 6- )   Interpretation of Score: The test measures, in seconds, the time taken by an individual to stand up from a standard arm chair (seat height 46 cm [18 in], arm height 65 cm [25.6 in]), walk a distance of 3 meters (118 in, approx 10 ft), turn, walk back to the chair and sit down. If the individual takes longer than 14 seconds to complete TUG, this indicates risk for falls. MEDICAL NECESSITY:   · Patient is expected to demonstrate progress in balance and dizziness to improve safety during activities of daily living. REASON FOR SERVICES/OTHER COMMENTS:  · Patient continues to require skilled intervention due to higher fall risk with daily activities.    Total Duration:  Time In: 1100  Time Out: 9399    Regarding Stacy Celaya's therapy, I certify that the treatment plan above will be carried out by a therapist or under their direction.   Thank you for this referral,  Aurea Alberts, PT     Referring Physician Signature: Caleb Tompkins MD _______________________________ Date _____________        Post Session Pain  Charge Capture  PT Visit Info  POC Link  Treatment Note Link  MD Guidelines  [facility]

## 2022-06-14 NOTE — PROGRESS NOTES
Todd Martins  : 1980  Primary: Stephan Beck Ppo  Secondary:  Brant Coyle [address] [place] [address] Way [address]  Phone: [phone]  Fax: [phone] Plan Frequency: 2 times a week for 90 days    Plan of Care/Certification Expiration Date: 22      PT Visit Info: Total # of Visits to Date: 46    Treatment plan effective dates: 2022 TO 2022   OUTPATIENT PHYSICAL THERAPY:OP NOTE TYPE: Treatment Note 2022       Episode   Appt Desk       Treatment Diagnosis:  Difficulty in walking, Not elsewhere classified (R26.2)  Other abnormalities of gait and mobility (R26.89)  Difficulty in walking, Not elsewhere classified (R26.2)  Medical/Referring Diagnosis:  No admission diagnoses are documented for this encounter. Referring Physician:  Maritza Black MD MD Orders:  PT Eval and Treat   Date of Onset:  No data recorded   Allergies:  Diazepam, Macadamia nut oil, Penicillins, Pineapple, Sumatriptan, Theophylline, Cephalexin, Sulfa antibiotics, Topiramate, Tramadol, Verapamil, Pneumococcal vaccine, Triamcinolone acetonide, and Vitamin b12  Restrictions/Precautions:    No data recordedNo data recorded   Interventions Planned (Treatment may consist of any combination of the following):    No data recorded   Subjective Comments:  I had a fall on . I looked up and I went to turn and lost my balance. \"No dizziness, more of a headache. The neurologist put me on blood pressure medication and decreased the Effexor. Theora Fragmin is my cervical surgery\". Initial:   Head 7/10 Post Session:   Head 3/10  Medications Last Reviewed:  2022  Updated Objective Findings:  See recertification note   Treatment   NEUROMUSCULAR RE-EDUCATION: (30 minutes):    Exercise/activities per grid below to improve balance, coordination, kinesthetic sense, posture and proprioception.   Required moderate verbal cues to promote static and dynamic balance in standing.      Date:  6/14/2022 Date:  4/5/2022 Date:  5/24/2022   Activity/Exercise Parameters Parameters Parameters   Walking in the clinic         Sidestepping in hallway  4 laps without assistive device 4 laps without assistive device With glasses 4 laps   Marching in hallway  4 laps without assistive device 4 laps without assistive device With glasses 4 laps   Standing gastroc stretch in incline board  3 reps x1 minute    Seated trunk rotation while visual tracking red ball         Stepping over half foam Forward 2x10 reps Forward 2x10 reps  Forward 2x10 reps   Lateral 2x10 reps             Step tap/ Step ups         Standing semitandem stance          Walking through cones 4 laps without assistive devices 4 laps without assistive device 4 laps without assistive devic   Walking with horizontal head turns with hiking poles         Circles right/circles left         Pelvis/ leg length discrepancy          Walking up/down ramps         Standing feet together         Tiltboard             MANUAL THERAPY: (15 minutes): Soft tissue mobilization was utilized and necessary because of the patient's painful spasm. In seated, patient received trigger point release along bilateral upper traps/ rhomboids/ scapular region to decrease pain. Gentle manual cervical distraction to decreased headache and pain        Treatment/Session Summary:    · Treatment Assessment:   Patient demonstrates improved balance and improved gait speed since initial evaluation. · Communication/Consultation:  None today  · Equipment provided today:  None  · Recommendations/Intent for next treatment session: Next visit will focus on balance exercises, vestibular exercises, and manual therapy.     Total Treatment Billable Duration:  45 minutes  Time In: 1100  Time Out: 0157    [facility] PT       Post Session Pain  Charge Capture  [employer] Portal  MD Guidelines  Scanned Media  Benefits  MyChart

## 2022-06-17 ENCOUNTER — OFFICE VISIT (OUTPATIENT)
Dept: INTERNAL MEDICINE CLINIC | Facility: CLINIC | Age: 42
End: 2022-06-17
Payer: MEDICARE

## 2022-06-17 VITALS
WEIGHT: 231 LBS | BODY MASS INDEX: 42.51 KG/M2 | HEART RATE: 103 BPM | HEIGHT: 62 IN | RESPIRATION RATE: 16 BRPM | DIASTOLIC BLOOD PRESSURE: 70 MMHG | SYSTOLIC BLOOD PRESSURE: 120 MMHG | OXYGEN SATURATION: 98 %

## 2022-06-17 DIAGNOSIS — Z12.31 ENCOUNTER FOR SCREENING MAMMOGRAM FOR MALIGNANT NEOPLASM OF BREAST: ICD-10-CM

## 2022-06-17 DIAGNOSIS — J45.20 MILD INTERMITTENT ASTHMA WITHOUT COMPLICATION: ICD-10-CM

## 2022-06-17 DIAGNOSIS — G25.9 MOVEMENT DISORDER: ICD-10-CM

## 2022-06-17 DIAGNOSIS — E78.5 HYPERLIPIDEMIA, UNSPECIFIED HYPERLIPIDEMIA TYPE: ICD-10-CM

## 2022-06-17 DIAGNOSIS — M54.12 CERVICAL RADICULOPATHY: ICD-10-CM

## 2022-06-17 DIAGNOSIS — E55.9 VITAMIN D DEFICIENCY: ICD-10-CM

## 2022-06-17 DIAGNOSIS — Z00.00 MEDICARE ANNUAL WELLNESS VISIT, SUBSEQUENT: Primary | ICD-10-CM

## 2022-06-17 DIAGNOSIS — E03.4 HYPOTHYROIDISM DUE TO ACQUIRED ATROPHY OF THYROID: ICD-10-CM

## 2022-06-17 DIAGNOSIS — E78.1 PURE HYPERTRIGLYCERIDEMIA: ICD-10-CM

## 2022-06-17 PROCEDURE — G0439 PPPS, SUBSEQ VISIT: HCPCS | Performed by: FAMILY MEDICINE

## 2022-06-17 PROCEDURE — 99214 OFFICE O/P EST MOD 30 MIN: CPT | Performed by: FAMILY MEDICINE

## 2022-06-17 RX ORDER — VENLAFAXINE HYDROCHLORIDE 75 MG/1
75 CAPSULE, EXTENDED RELEASE ORAL DAILY
COMMUNITY
End: 2022-08-03 | Stop reason: SDUPTHER

## 2022-06-17 NOTE — PROGRESS NOTES
Medicare Annual Wellness Visit    Giovana Blackmon is here for Medicare AWV (w/o pap) and Results    Assessment & Plan       Diagnosis Orders   1. Medicare annual wellness visit, subsequent     2. Encounter for screening mammogram for malignant neoplasm of breast  BRAD DIGITAL SCREEN W OR WO CAD BILATERAL   3. Hyperlipidemia, unspecified hyperlipidemia type  [place] OFFICE OUTPATIENT VISIT 25 MINUTES [90892]    CBC with Auto Differential    Comprehensive Metabolic Panel    Lipid Panel   4. Vitamin D deficiency  [place] OFFICE OUTPATIENT VISIT 25 MINUTES [99836]    Vitamin D 25 Hydroxy   5. Hypothyroidism due to acquired atrophy of thyroid  IA OFFICE OUTPATIENT VISIT 25 MINUTES [[id number]]    TSH   6. Mild intermittent asthma without complication  IA OFFICE OUTPATIENT VISIT 25 MINUTES [[id number]]   7. Movement disorder  IA OFFICE OUTPATIENT VISIT 25 MINUTES [[id number]]   8. Cervical radiculopathy  IA OFFICE OUTPATIENT VISIT 25 MINUTES [[id number]]   9. Pure hypertriglyceridemia  IA OFFICE OUTPATIENT VISIT 25 MINUTES [[id number]]     AWV today HM reviewed. Mammo ordered. HLD stable, continue lifestyle changes. Continue Vit Dsupplement  Thyroid levels are stable. Asthma well controlled. Keep appt with Ortho for planned surgery. Continue use of walker for safety. Recommendations for Preventive Services Due: see orders and patient instructions/AVS.  Recommended screening schedule for the next 5-10 years is provided to the patient in written form: see Patient Instructions/AVS.     Return in about 3 months (around 9/17/2022) for Hyperlipidemia, lab prior to visit. Subjective   Last Physical 12/2020  Last PAP 5/1/19  Last Mammogram 5/1/19 due  Last colonoscopy no  Last bone density no  Last eye exam 2022 [facility]  Last dental exam 2022  Exercise yes. Walk 2, 3 times/week.  Yoga QOW  ACP no    Immunizations  Pneumonia - Prevnar no Pneumovax no  Tdap 2020 TD no  Shingles no  Flu vaccine 11/2020  Covid vaccine 1/2021 2/2021 11/2021    The patient's past medical history, social history and family history was reviewed. Hyperlipidemia  The patient is following a low fat diet and is not exercising regularly. Patient is not on any medication at this time. Patient is not having associated symptoms of shortness of breath, chest pain, rapid heart rate, irregular heart rate and dizziness    Patient labs are YOUR LAST LIPID PROFILE:   Lab Results   Component Value Date    CHOL 224 06/01/2022    HDL 48 06/01/2022       Vitamin D deficiency  Taking supplement on a regular basis. Current level: No components found for:   Lab Results   Component Value Date    VITD25 32.3 06/01/2022        Hypothyroidism  The patient is a 43 y.o. female who is seen for follow up of hypothyroidism. Current symptoms: none. Patient denies change in energy level, diarrhea, heat / cold intolerance, nervousness, palpitations and weight changes. Symptoms are stable and well controlled. The patient is compliant. Vitamin D deficiency  Taking supplement on a regular basis. Current level:   Lab Results   Component Value Date    VITD25 32.3 06/01/2022        Vestibular migraine  Right now doing well. Loss of balance is on and off. Asthma  Doing well. Interim history:  Cervical C-5 C-7 spinal fusion scheduled for July 12 with Dr Brooks guy. Patient's complete Health Risk Assessment and screening values have been reviewed and are found in Flowsheets. The following problems were reviewed today and where indicated follow up appointments were made and/or referrals ordered.     Positive Risk Factor Screenings with Interventions:    Fall Risk:  Do you feel unsteady or are you worried about falling? : (!) yes  2 or more falls in past year?: (!) yes  Fall with injury in past year?: (!) yes     Fall Risk Interventions:    · Home safety tips provided              General Health and ACP:  General  In general, how would you say your health is?: Fair  In the past 7 days, have you experienced any of the following: New or Increased Pain, New or Increased Fatigue, Loneliness, Social Isolation, Stress or Anger?: No  Do you get the social and emotional support that you need?: Yes  Do you have a Living Will?: (!) No    Advance Directives     Power of James Pena Will ACP-Advance Directive ACP-Power of     Not on File Not on File Not on File Not on File      General Health Risk Interventions:  · Poor self-assessment of health status: patient advised to follow-up in this office for further evaluation and treatment of other medical issues within 6 month(s)    Health Habits/Nutrition:     Physical Activity: Insufficiently Active    Days of Exercise per Week: 3 days    Minutes of Exercise per Session: 10 min     Have you lost any weight without trying in the past 3 months?: No  Body mass index: (!) 42.25  Have you seen the dentist within the past year?: Yes    Health Habits/Nutrition Interventions:  · Nutritional issues:  educational materials for healthy, well-balanced diet provided      ADLs:  In the past 7 days, did you need help from others to perform any of the following everyday activities: Eating, dressing, grooming, bathing, toileting, or walking/balance?: No  In the past 7 days, did you need help from others to take care of any of the following: Laundry, housekeeping, banking/finances, shopping, telephone use, food preparation, transportation, or taking medications?: (!) Yes  Select all that apply: (!) Housekeeping,Shopping,Food Preparation,Transportation    ADL Interventions:  · Patient declines any further evaluation/treatment for this issue          Objective   Vitals:    06/17/22 1029   BP: 120/70   Site: Left Upper Arm   Position: Sitting   Pulse: (!) 103   Resp: 16   SpO2: 98%   Weight: 231 lb (104.8 kg)   Height: 5' 2\" (1.575 m)      Body mass index is 42.25 kg/m². Physical Exam  HENT:      Head: Normocephalic. Cardiovascular:      Rate and Rhythm: Normal rate. Pulmonary:      Effort: Pulmonary effort is normal.   Skin:     Findings: No rash. Neurological:      General: No focal deficit present. Mental Status: She is alert. Cranial Nerves: Cranial nerves are intact. Sensory: Sensation is intact. Coordination: Coordination abnormal.      Gait: Gait abnormal.   Psychiatric:         Mood and Affect: Mood normal.         Behavior: Behavior normal.         Thought Content: Thought content normal.         Judgment: Judgment normal.              Allergies   Allergen Reactions    Diazepam Other (See Comments)     Suicidal thoughts   Other reaction(s): Other (See Comments)  Suicidal ideation    Macadamia Nut Oil Shortness Of Breath and Swelling     Lips swell    Any stone fruits, for ex peaches, cherries, plums, Mangoes are ok    Penicillins Anaphylaxis     Has not been able to tolerate cephalosporins     Pineapple Shortness Of Breath     wheezing    Sumatriptan Shortness Of Breath    Theophylline Shortness Of Breath    Cephalexin Other (See Comments)     Other reaction(s): Dizziness    Sulfa Antibiotics Rash     Other reaction(s): Vomiting    Topiramate Other (See Comments)     Other reaction(s): Other (See Comments)  Caused disorientation, loss of feeling in left leg (numbness)    Tramadol Rash    Verapamil Other (See Comments)     Lowers blood pressure  Other reaction(s): Other (See Comments)  causes hypotension    Pneumococcal Vaccine Swelling    Triamcinolone Acetonide Hives    Vitamin B12 Other (See Comments)     Injection Only  swelling     Prior to Visit Medications    Medication Sig Taking?  Authorizing Provider   venlafaxine (EFFEXOR XR) 75 MG extended release capsule Take 75 mg by mouth daily Yes Historical Provider, MD   MAGNESIUM PO Take 2 tablets by mouth daily Yes Ar Automatic Reconciliation   albuterol sulfate  (90 Base) MCG/ACT inhaler Inhale 2 puffs into the lungs every 4 hours as needed Yes Ar Automatic Reconciliation ascorbic acid (VITAMIN C) 500 MG tablet Take 500 mg by mouth daily Yes Ar Automatic Reconciliation   clonazePAM (KLONOPIN) 0.5 MG tablet Take 0.5 mg by mouth daily as needed.  Yes Ar Automatic Reconciliation   fexofenadine (ALLEGRA) 180 MG tablet Take 180 mg by mouth Yes Ar Automatic Reconciliation   indomethacin (INDOCIN) 25 MG capsule TAKE 1-2 TABLETS BY MOUTH 3 TIMES DAILY AS NEEDED FOR MIGRAINE. LIMIT 2 DAYS/WEEK. TAKE WITH FOOD Yes Ar Automatic Reconciliation   mometasone (NASONEX) 50 MCG/ACT nasal spray 1 spray by Nasal route Yes Ar Automatic Reconciliation   ondansetron (ZOFRAN-ODT) 4 MG disintegrating tablet Take 4 mg by mouth every 8 hours as needed Yes Ar Automatic Reconciliation   thyroid (ARMOUR THYROID) 30 MG tablet TAKE 1 TABLET BY MOUTH EVERY DAY IN THE MORNING Yes Ar Automatic Reconciliation   tiZANidine (ZANAFLEX) 4 MG tablet TAKE 1 TABLET BY MOUTH EVERY 8 HOURS AS NEEDED Yes Ar Automatic Reconciliation       CareTeam (Including outside providers/suppliers regularly involved in providing care):   Patient Care Team:  Jorje Teran MD as PCP - Terry Duong MD as PCP - [facility] Empaneled Provider  Niraj Pearson DO as Physician     Reviewed and updated this visit:  Tobacco  Allergies  Meds  Problems  Med Hx  Surg Hx  Soc Hx  Fam Hx

## 2022-06-21 ENCOUNTER — HOSPITAL ENCOUNTER (OUTPATIENT)
Dept: PHYSICAL THERAPY | Age: 42
Setting detail: RECURRING SERIES
Discharge: HOME | End: 2022-06-24
Payer: MEDICARE

## 2022-06-21 PROCEDURE — 97140 MANUAL THERAPY 1/> REGIONS: CPT

## 2022-06-21 PROCEDURE — 97112 NEUROMUSCULAR REEDUCATION: CPT

## 2022-06-21 ASSESSMENT — PAIN DESCRIPTION - LOCATION: LOCATION: SHOULDER

## 2022-06-21 ASSESSMENT — PAIN SCALES - GENERAL: PAINLEVEL_OUTOF10: 4

## 2022-06-21 ASSESSMENT — PAIN DESCRIPTION - DESCRIPTORS: DESCRIPTORS: ACHING;SHARP;SHOOTING

## 2022-06-21 NOTE — PROGRESS NOTES
Dontrell Scales  : 1980  Primary: Susan Basurto Ppo  Secondary:  Isidro Coyle 81  [address] [address]  Phone: [phone]  Fax: [phone] Plan Frequency: 2 times a week for 90 days    Plan of Care/Certification Expiration Date: 22      PT Visit Info: Total # of Visits to Date: 46    Treatment plan effective dates: 2022 TO 2022   OUTPATIENT PHYSICAL THERAPY:OP NOTE TYPE: Treatment Note 2022       Episode   Appt Desk       Treatment Diagnosis:  Difficulty in walking, Not elsewhere classified (R26.2)  Other abnormalities of gait and mobility (R26.89)  Difficulty in walking, Not elsewhere classified (R26.2)  Medical/Referring Diagnosis:  No admission diagnoses are documented for this encounter. Referring Physician:  Tung Ruiz MD MD Orders:  PT Eval and Treat   Date of Onset:  No data recorded   Allergies:  Diazepam, Macadamia nut oil, Penicillins, Pineapple, Sumatriptan, Theophylline, Cephalexin, Sulfa antibiotics, Topiramate, Tramadol, Verapamil, Pneumococcal vaccine, Triamcinolone acetonide, and Vitamin b12  Restrictions/Precautions:    No data recordedNo data recorded   Interventions Planned (Treatment may consist of any combination of the following):    No data recorded   Subjective Comments:  \"I am a little dizzy. Around a 3/10. I am hurting more\". Initial:   Shoulder 4/10 Post Session:   Shoulder 3/10  Medications Last Reviewed:  2022  Updated Objective Findings:  None Today   Treatment   NEUROMUSCULAR RE-EDUCATION: (30 minutes):    Exercise/activities per grid below to improve balance, coordination, kinesthetic sense, posture and proprioception.   Required moderate verbal cues to promote static and dynamic balance in standing.      Date:  2022 Date:  2022 Date:  2022   Activity/Exercise Parameters Parameters Parameters   Walking in the clinic         Sidestepping in hallway  4 laps without assistive device 4 laps without assistive device With glasses 4 laps   Marching in hallway  4 laps without assistive device 4 laps without assistive device With glasses 4 laps   Standing gastroc stretch in incline board 3 reps x1 minute 3 reps x1 minute    Seated trunk rotation while visual tracking red ball         Stepping over half foam Forward 2x10 reps  Lateral 2x10 reps Forward 2x10 reps  Forward 2x10 reps   Lateral 2x10 reps             Step tap/ Step ups         Standing semitandem stance          Walking through cones 4 laps without assistive devices 4 laps without assistive device 4 laps without assistive devic   Walking with horizontal head turns with hiking poles         Circles right/circles left         Pelvis/ leg length discrepancy          Walking up/down ramps         Standing feet together         Tiltboard             MANUAL THERAPY: (15 minutes): Soft tissue mobilization was utilized and necessary because of the patient's painful spasm. In seated, patient received trigger point release along bilateral upper traps/ rhomboids/ scapular region to decrease pain. Gentle manual cervical distraction to decreased headache and pain        Treatment/Session Summary:    · Treatment Assessment:   Patient lost her balance with right turns several times during treatment. Patient tolerated treatment without complaints. · Communication/Consultation:  None today  · Equipment provided today:  None  · Recommendations/Intent for next treatment session: Next visit will focus on balance exercises, vestibular exercises, and manual therapy.     Total Treatment Billable Duration:  45 minutes  Time In: 1100  Time Out: 5602    [facility] PT       Post Session Pain  Charge Capture  [employer] Portal  MD Guidelines  Scanned Media  Benefits  MyChart

## 2022-06-22 NOTE — PATIENT INSTRUCTIONS
Personalized Preventive Plan for Leslie Abreu - 6/17/2022  Medicare offers a range of preventive health benefits. Some of the tests and screenings are paid in full while other may be subject to a deductible, co-insurance, and/or copay. Some of these benefits include a comprehensive review of your medical history including lifestyle, illnesses that may run in your family, and various assessments and screenings as appropriate. After reviewing your medical record and screening and assessments performed today your provider may have ordered immunizations, labs, imaging, and/or referrals for you. A list of these orders (if applicable) as well as your Preventive Care list are included within your After Visit Summary for your review. Other Preventive Recommendations:    · A preventive eye exam performed by an eye specialist is recommended every 1-2 years to screen for glaucoma; cataracts, macular degeneration, and other eye disorders. · A preventive dental visit is recommended every 6 months. · Try to get at least 150 minutes of exercise per week or 10,000 steps per day on a pedometer . · Order or download the FREE \"Exercise & Physical Activity: Your Everyday Guide\" from [employer]. Call 6-996.897.6984 or search [employer] online. · You need 9801-3653 mg of calcium and 5258-5464 IU of vitamin D per day. It is possible to meet your calcium requirement with diet alone, but a vitamin D supplement is usually necessary to meet this goal.  · When exposed to the sun, use a sunscreen that protects against both UVA and UVB radiation with an SPF of 30 or greater. Reapply every 2 to 3 hours or after sweating, drying off with a towel, or swimming. · Always wear a seat belt when traveling in a car. Always wear a helmet when riding a bicycle or motorcycle.

## 2022-06-24 ENCOUNTER — HOSPITAL ENCOUNTER (OUTPATIENT)
Dept: PHYSICAL THERAPY | Age: 42
Setting detail: RECURRING SERIES
Discharge: HOME | End: 2022-06-27
Payer: MEDICARE

## 2022-06-24 PROCEDURE — 97140 MANUAL THERAPY 1/> REGIONS: CPT

## 2022-06-24 PROCEDURE — 97112 NEUROMUSCULAR REEDUCATION: CPT

## 2022-06-24 ASSESSMENT — PAIN DESCRIPTION - LOCATION: LOCATION: HEAD

## 2022-06-24 ASSESSMENT — PAIN DESCRIPTION - DESCRIPTORS: DESCRIPTORS: ACHING

## 2022-06-24 NOTE — PROGRESS NOTES
Anu Hilton  : 1980  Primary: Yovany Gabriel Ppo  Secondary:  Spenser Kramer [address]  Phone: [phone]  Fax: [phone] Plan Frequency: 2 times a week for 90 days    Plan of Care/Certification Expiration Date: 22      PT Visit Info: Total # of Visits to Date: 48    Treatment plan effective dates: 2022 TO 2022   OUTPATIENT PHYSICAL THERAPY:OP NOTE TYPE: Treatment Note 2022       Episode   Appt Desk       Treatment Diagnosis:  Difficulty in walking, Not elsewhere classified (R26.2)  Other abnormalities of gait and mobility (R26.89)  Difficulty in walking, Not elsewhere classified (R26.2)  Medical/Referring Diagnosis:  No admission diagnoses are documented for this encounter. Referring Physician:  Nico Dos Santos MD MD Orders:  PT Eval and Treat   Date of Onset:  No data recorded   Allergies:  Diazepam, Macadamia nut oil, Penicillins, Pineapple, Sumatriptan, Theophylline, Cephalexin, Sulfa antibiotics, Topiramate, Tramadol, Verapamil, Pneumococcal vaccine, Triamcinolone acetonide, and Vitamin b12  Restrictions/Precautions:    No data recordedNo data recorded   Interventions Planned (Treatment may consist of any combination of the following):    No data recorded   Subjective Comments:  \"I did alot of paper work on Wednesday. I have been really dizzy yesterday and today. 5/10 dizziness. 2/10 headache. Initial:   Head  /10 Post Session:   Head 2/10  Medications Last Reviewed:  2022  Updated Objective Findings:  None Today   Treatment   NEUROMUSCULAR RE-EDUCATION: (15 minutes):    Exercise/activities per grid below to improve balance, coordination, kinesthetic sense, posture and proprioception.   Required moderate verbal cues to promote static and dynamic balance in standing.      Date:  2022 Date:  2022 Date:  2022   Activity/Exercise Parameters Parameters Parameters

## 2022-06-28 ENCOUNTER — HOSPITAL ENCOUNTER (OUTPATIENT)
Dept: PHYSICAL THERAPY | Age: 42
Setting detail: RECURRING SERIES
Discharge: HOME | End: 2022-07-01
Payer: MEDICARE

## 2022-06-28 PROCEDURE — 97140 MANUAL THERAPY 1/> REGIONS: CPT

## 2022-06-28 PROCEDURE — 97112 NEUROMUSCULAR REEDUCATION: CPT

## 2022-06-28 ASSESSMENT — PAIN SCALES - GENERAL: PAINLEVEL_OUTOF10: 0

## 2022-06-28 NOTE — THERAPY DISCHARGE
Marcella Pike  : 1980  Primary: Tahmina Hager Ppo  Secondary:  Richardine Severs [address] [place] [address] [address] [address]  Phone: [phone]  Fax: [phone] Plan Frequency: 2 times a week for 90 days    Plan of Care/Certification Expiration Date: 22      PT Visit Info: Total # of Visits to Date: 47   Treatment plan effective dates: 2022 TO 2022   OUTPATIENT PHYSICAL THERAPY:OP NOTE TYPE: Discharge Summary 2022               Episode  Appt Desk         Treatment Diagnosis:  Difficulty in walking, Not elsewhere classified (R26.2)  Other abnormalities of gait and mobility (R26.89)  * No diagnoses found *  Medical/Referring Diagnosis:  No admission diagnoses are documented for this encounter. Referring Physician:  Delilah Hodgkin, MD/Kay Bryan MD MD Orders:  PT Eval and Treat   Return MD Appt:  Unknown   Date of Onset:  No data recorded   Allergies:  Diazepam, Macadamia nut oil, Penicillins, Pineapple, Sumatriptan, Theophylline, Cephalexin, Sulfa antibiotics, Topiramate, Tramadol, Verapamil, Pneumococcal vaccine, Triamcinolone acetonide, and Vitamin b12  Restrictions/Precautions:    No data recordedNo data recorded   Medications Last Reviewed:  2022     SUBJECTIVE   History of Injury/Illness (Reason for Referral):  Patient complains of dizziness, imbalance, and difficulty walking. Patient here in the clinic last year, but stopped therapy when COVID-19 began. Patient reports symptoms have gotten worse. Patient has been falling a lot more frequently. Patient has to use two hiking poles when walking instead of just one hiking pole. Patient rates current dizziness as 3/10 and current pain level in cervical spine as 3/10. Patient tends to lose her balance backwards and to the left. Patient has a history of lead toxicity. Patient reports more recent exposure to mold.    Initial:     0/10 Post Session:     0/10  Past Medical History/Comorbidities:   Ms. Ish Rivera  has a past medical history of Adverse effect of anesthesia, Asthma, BMI 36.0-36.9,adult, Chronic pain, Hyperlipidemia, Migraines, Movement disorder, Thyroid disease, Vertigo, and Vestibular nerve disorder. Ms. Ish Rivera  has a past surgical history that includes orthopedic surgery and heent. Social History/Living Environment:   Lives With: Alone  Type of Home: Apartment     Prior Level of Function/Work/Activity:   Prior level of function: Independent  Occupation: Part time employment  No data recordedNo data recorded   Learning:   No data recorded   Fall Risk Scale: Total Score: 75  [place] Fall Risk: High (45 and higher)     Dominant Side:  right handed    Personal Factors:        Sex:  female        Age:  43 y.o.        OBJECTIVE   Observation/Orthostatic Postural Assessment:          Slight forward head rounded shoulders posture. Palpation: Increased tenderness along bilateral upper traps/SCM, left worse than right. Tender throughout C5-C7. Functional Mobility:         Gait/Ambulation:  Patient ambulates with bilateral hiking poles demonstrating decreased gait speed with wider base of support and moderate deviation from straight path. ROM:          Cervical range of motion is as follows: flexion within normal limits, extension within normal limits, right rotation within normal limits, left rotation 75%, right lateral flexion 50%, left lateral flexion 75%. Postural Control & Balance:  · Barrera Balance Scale:  46/56.   (A score less than 45/56 indicates high risk of falls). Score improved from 26/56 on initial evaluation. · Dynamic Gait Index:  Not tested. ASSESSMENT   Initial Assessment:  Ms. Ish Rivera presents with dizziness, neck pain, imbalance, and difficulty walking. Patient could be experiencing some cervicogenic dizziness. Patient is at higher fall risk based on history of multiple falls and score received on Barrera Balance Scale.    Patient would benefit from skilled physical therapy to address problems and goals. Thank you for this referral.       Discharge note:  Patient attended fifty-four scheduled physical therapy appointments from 3/2/2021 to 6/28/2022. Patient demonstrates improved gait speed with Timed Up and Go Test and improved balance with Barrera Balance Scale. Patient has cervical surgery scheduled for July 12th. Patient discharged from physical therapy at this time. Thank you for this referral.    Problem List: (Impacting functional limitations):    1. Decreased Transfer Abilities  2. Decreased Ambulation Ability/Technique  3. Decreased Balance  4. Increased Pain  5. Decreased Flexibility/Joint Mobility  6. Decreased [place] with Home Exercise Program  7. Increased dizziness  Therapy Prognosis:   Therapy Prognosis: Good     Assessment Complexity:      PLAN   Effective Dates: 6/14/2022 TO Plan of Care/Certification Expiration Date: 08/13/22     Frequency/Duration: Plan Frequency: 2 times a week for 90 days     Interventions Planned (Treatment may consist of any combination of the following):    1. Balance Exercise  2. Gait Training  3. Home Exercise Program (HEP)  4. Manual Therapy  5. Range of Motion (ROM)  6. Habituation exercises  Goals: (Goals have been discussed and agreed upon with patient.)  Short-Term Functional Goals: Time Frame: 30 days   1. Patient will be independent with home exercise program to improve balance. Goal met. 2.  Patient will score greater than or equal to 30/56 on Barrera Balance Scale indicating improved balance and decreased fall risk with daily activities. Goal met. Discharge Goals: Time Frame: 90 days  1. Patient will score greater than or equal to 40/56 on Barrera Balance Scale indicating improved balance and decreased fall risk with daily activities. Goal met. 2.  Patient will report improved stability at home and at work. Goal not met  3.   Patient will score less than or equal to 22 seconds on Timed Up and Go Test indicating improved gait speed. Goal met. 4.  Patient will score less than or equal to 13 seconds on Timed Up and Go Test indicating improved gait speed. Goal met. 5.  Patient will score less than or equal to 48/56 on Barrera Balance Scale indicating improved balance and decreased fall risk with daily activities. Goal not met. 6.  Patient will score less than or equal to 10 seconds on Timed Up and Go Test indicating improved gait speed. Goal not met. Outcome Measure: Tool Used: Barrera Balance Scale  Score:  Initial: 26/56 Most Recent: 46/56 (Date: 6- )   Interpretation of Score: Each section is scored on a 0-4 scale, 0 representing the patients inability to perform the task and 4 representing independence. The scores of each section are added together for a total score of 56. The higher the patients score, the more independent the patient is. Any score below 45 indicates increased risk for falls. Tool Used: Timed Up and Go (TUG)  Score:  Initial: 26.4 seconds Most Recent: 10.4 seconds (Date: 6- )   Interpretation of Score: The test measures, in seconds, the time taken by an individual to stand up from a standard arm chair (seat height 46 cm [18 in], arm height 65 cm [25.6 in]), walk a distance of 3 meters (118 in, approx 10 ft), turn, walk back to the chair and sit down. If the individual takes longer than 14 seconds to complete TUG, this indicates risk for falls.           Shae Tony, PT            Post Session Pain  Charge Capture  PT Visit Info  POC Link  Treatment Note Link  MD Guidelines  Ashley

## 2022-06-28 NOTE — PROGRESS NOTES
Faby Mata  : 1980  Primary: Maria Luisa Vail Ppo  Secondary:  Jeronimo Coyle [address] [place] [address] Way [address]  Phone: [phone]  Fax: [phone] Plan Frequency: 2 times a week for 90 days    Plan of Care/Certification Expiration Date: 22      PT Visit Info: Total # of Visits to Date: 47    Treatment plan effective dates: 2022 TO 2022   OUTPATIENT PHYSICAL THERAPY:OP NOTE TYPE: Treatment Note 2022       Episode   Appt Desk       Treatment Diagnosis:  Difficulty in walking, Not elsewhere classified (R26.2)  Other abnormalities of gait and mobility (R26.89)  Difficulty in walking, Not elsewhere classified (R26.2)  Medical/Referring Diagnosis:  No admission diagnoses are documented for this encounter. Referring Physician:  Kayden Honeycutt MD MD Orders:  PT Eval and Treat   Date of Onset:  No data recorded   Allergies:  Diazepam, Macadamia nut oil, Penicillins, Pineapple, Sumatriptan, Theophylline, Cephalexin, Sulfa antibiotics, Topiramate, Tramadol, Verapamil, Pneumococcal vaccine, Triamcinolone acetonide, and Vitamin b12  Restrictions/Precautions:    No data recordedNo data recorded   Interventions Planned (Treatment may consist of any combination of the following):    No data recorded   Subjective Comments:  5/10 dizziness. \"Some spins and sensation of dropping. Slight nausea. No headache\". Initial:     0/10 Post Session:     0/10  Medications Last Reviewed:  2022  Updated Objective Findings:  See discharge summary   Treatment   NEUROMUSCULAR RE-EDUCATION: (25 minutes):    Exercise/activities per grid below to improve balance, coordination, kinesthetic sense, posture and proprioception.   Required moderate verbal cues to promote static and dynamic balance in standing.      Date:  2022 Date:  2022 Date:  2022   Activity/Exercise Parameters Parameters Parameters   Walking in the clinic       Sidestepping in hallway  4 laps without assistive device 4 laps with hiking poles 4 laps with hiking poles   Marching in hallway  4 laps without assistive device 4 laps with hiking poles 4 laps with hiking poles   Standing gastroc stretch in incline board 3 reps x1 minute 3 reps x2 minutes    Seated trunk rotation while visual tracking red ball         Stepping over half foam Forward 2x10 reps  Lateral 2x10 reps   Forward 2x10 reps   Lateral 2x10 reps            Step tap/ Step ups        Standing semitandem stance         Walking through cones 4 laps without assistive devices  4 laps with hiking poles   Walking with horizontal head turns with hiking poles         Circles right/circles left         Pelvis/ leg length discrepancy          Walking up/down ramps         Standing feet together         Tiltboard             MANUAL THERAPY: (20 minutes): Soft tissue mobilization was utilized and necessary because of the patient's painful spasm. In seated, patient received trigger point release along bilateral upper traps/ rhomboids/ scapular region to decrease pain. Gentle manual cervical distraction to decreased headache and pain        Treatment/Session Summary:    · Treatment Assessment:   Patient more off balance during treatment. Patient needs more rest breaks due to increased dizziness. · Communication/Consultation:  None today  · Equipment provided today:  None  · Recommendations/Intent for next treatment session:   Patient discharged from physical therapy due to upcoming cervical surgery.     Total Treatment Billable Duration:  45 minutes  Time In: 1100  Time Out: 4831    [facility] PT       Post Session Pain  Charge Capture  MedBridge Portal  MD Guidelines  Scanned Media  Benefits  MyChart

## 2022-06-30 ENCOUNTER — HOSPITAL ENCOUNTER (OUTPATIENT)
Dept: PREADMISSION TESTING | Age: 42
Discharge: HOME | End: 2022-07-03
Payer: MEDICARE

## 2022-06-30 VITALS
SYSTOLIC BLOOD PRESSURE: 130 MMHG | OXYGEN SATURATION: 97 % | DIASTOLIC BLOOD PRESSURE: 64 MMHG | TEMPERATURE: 98.1 F | HEIGHT: 61 IN | WEIGHT: 230.3 LBS | BODY MASS INDEX: 43.48 KG/M2 | RESPIRATION RATE: 20 BRPM | HEART RATE: 101 BPM

## 2022-06-30 LAB
ANION GAP SERPL CALC-SCNC: 6 MMOL/L (ref 7–16)
APPEARANCE UR: CLEAR
BACTERIA SPEC CULT: NORMAL
BILIRUB UR QL: NEGATIVE
BUN SERPL-MCNC: 13 MG/DL (ref 6–23)
CALCIUM SERPL-MCNC: 9.4 MG/DL (ref 8.3–10.4)
CHLORIDE SERPL-SCNC: 106 MMOL/L (ref 98–107)
CO2 SERPL-SCNC: 25 MMOL/L (ref 21–32)
COLOR UR: YELLOW
CREAT SERPL-MCNC: 0.75 MG/DL (ref 0.6–1)
ERYTHROCYTE [DISTWIDTH] IN BLOOD BY AUTOMATED COUNT: 12.2 % (ref 11.9–14.6)
GLUCOSE SERPL-MCNC: 134 MG/DL (ref 65–100)
GLUCOSE UR STRIP.AUTO-MCNC: NEGATIVE MG/DL
HCT VFR BLD AUTO: 40.7 % (ref 35.8–46.3)
HGB BLD-MCNC: 14.2 G/DL (ref 11.7–15.4)
HGB UR QL STRIP: NEGATIVE
KETONES UR QL STRIP.AUTO: NEGATIVE MG/DL
LEUKOCYTE ESTERASE UR QL STRIP.AUTO: NEGATIVE
MCH RBC QN AUTO: 29.6 PG (ref 26.1–32.9)
MCHC RBC AUTO-ENTMCNC: 34.9 G/DL (ref 31.4–35)
MCV RBC AUTO: 84.8 FL (ref 79.6–97.8)
NITRITE UR QL STRIP.AUTO: NEGATIVE
NRBC # BLD: 0 K/UL (ref 0–0.2)
PH UR STRIP: 5.5 [PH] (ref 5–9)
PLATELET # BLD AUTO: 267 K/UL (ref 150–450)
PMV BLD AUTO: 9.1 FL (ref 9.4–12.3)
POTASSIUM SERPL-SCNC: 3.9 MMOL/L (ref 3.5–5.1)
PROT UR STRIP-MCNC: NEGATIVE MG/DL
RBC # BLD AUTO: 4.8 M/UL (ref 4.05–5.2)
SERVICE CMNT-IMP: NORMAL
SODIUM SERPL-SCNC: 137 MMOL/L (ref 136–145)
SP GR UR REFRACTOMETRY: 1.02 (ref 1–1.02)
UROBILINOGEN UR QL STRIP.AUTO: 0.2 EU/DL (ref 0.2–1)
WBC # BLD AUTO: 10.3 K/UL (ref 4.3–11.1)

## 2022-06-30 PROCEDURE — 85027 COMPLETE CBC AUTOMATED: CPT

## 2022-06-30 PROCEDURE — 87641 MR-STAPH DNA AMP PROBE: CPT

## 2022-06-30 PROCEDURE — 80048 BASIC METABOLIC PNL TOTAL CA: CPT

## 2022-06-30 PROCEDURE — 81003 URINALYSIS AUTO W/O SCOPE: CPT

## 2022-06-30 RX ORDER — CANDESARTAN 16 MG/1
16 TABLET ORAL NIGHTLY
COMMUNITY

## 2022-06-30 RX ORDER — LAMOTRIGINE 100 MG/1
100 TABLET ORAL DAILY
COMMUNITY
End: 2022-08-03 | Stop reason: SDUPTHER

## 2022-06-30 RX ORDER — BETAHISTINE HCL 100 %
POWDER (GRAM) MISCELLANEOUS 2 TIMES DAILY
COMMUNITY
End: 2022-09-13 | Stop reason: SDUPTHER

## 2022-06-30 ASSESSMENT — PAIN DESCRIPTION - ONSET: ONSET: ON-GOING

## 2022-06-30 ASSESSMENT — PAIN DESCRIPTION - ORIENTATION: ORIENTATION: RIGHT;LEFT

## 2022-06-30 ASSESSMENT — PAIN DESCRIPTION - PAIN TYPE: TYPE: CHRONIC PAIN

## 2022-06-30 ASSESSMENT — PAIN SCALES - GENERAL: PAINLEVEL_OUTOF10: 4

## 2022-06-30 ASSESSMENT — PAIN DESCRIPTION - DIRECTION: RADIATING_TOWARDS: ARMS

## 2022-06-30 ASSESSMENT — PAIN DESCRIPTION - FREQUENCY: FREQUENCY: CONTINUOUS

## 2022-06-30 ASSESSMENT — PAIN DESCRIPTION - LOCATION: LOCATION: ARM;NECK

## 2022-06-30 NOTE — PERIOP NOTE
PLEASE CONTINUE TAKING ALL PRESCRIPTION MEDICATIONS UP TO THE DAY OF SURGERY UNLESS OTHERWISE DIRECTED BELOW. DISCONTINUE all vitamins and supplements 7 days prior to surgery. DISCONTINUE Non-Steriodal Anti-Inflammatory (NSAIDS) such as Advil and Aleve 5 days prior to surgery. Home Medications to take  the day of surgery   Bring inhaler day of surgery  Clonazepam/Klonopin  Lamotrigine/Lamictal  [place] Thyroid  Venlafaxine/Effexor  Tizanidine/Zanaflex If needed  Betahistine           Home Medications   to Hold   DISCONTINUE all vitamins and supplements 7 days prior to surgery. DISCONTINUE Non-Steriodal Anti-Inflammatory (NSAIDS) such as Advil and Aleve 5 days prior to surgery. Comments       Monday 7/11/2022: On the day before surgery please take Acetaminophen 1000mg in the morning and then again before bed. You may substitute for Tylenol 650 mg. Please do not bring home medications with you on the day of surgery unless otherwise directed by your nurse. If you are instructed to bring home medications, please give them to your nurse as they will be administered by the nursing staff. If you have any questions, please call [facility] [phone] or [facility] [phone]. A copy of this note was provided to the patient for reference. How to Use Your Incentive Spirometer       About Your Incentive Spirometer  An incentive spirometer is a device that will expand your lungs by helping you to breathe more deeply and fully. The parts of your incentive spirometer are labeled in Figure 1. Using your incentive spirometer  When you're using your incentive spirometer, make sure to breathe through your mouth. If you breathe through your nose, the incentive spirometer won't work properly. You can hold your nose if you have trouble. DO NOT BLOW INTO THE DEVICE. If you feel dizzy at any time, stop and rest. Try again at a later time.    Sit upright in a chair or in bed. Hold the incentive spirometer at eye level.  Put the mouthpiece in your mouth and close your lips tightly around it. Slowly breathe out (exhale) completely.  Breathe in (inhale) slowly through your mouth as deeply as you can. As you take the breath, you will see the piston rise inside the large column. While the piston rises, the indicator on the right should move upwards. It should stay in between the 2 arrows (see Figure 1).  Try to get the piston as high as you can, while keeping the indicator between the arrows. If the indicator doesn't stay between the arrows, you're breathing either too fast or too slow.  When you get it as high as you can, hold your breath for 10 seconds, or as long as possible. While you're holding your breath, the piston will slowly fall to the base of the spirometer.  Once the piston reaches the bottom of the spirometer, breathe out slowly through your mouth. Rest for a few seconds.  Repeat 10 times. Try to get the piston to the same level with each breath.  After each set of 10 breaths, try to cough as coughing will help loosen or clear any mucus in your lungs.  Put the marker at the level the piston reached on your incentive spirometer. This will be your goal next time. Repeat these steps every hour that you're awake.   Cover the mouthpiece of the incentive spirometer when you aren't using it

## 2022-06-30 NOTE — PERIOP NOTE
Patient verified name, , and surgery as listed in [facility]. Patient provided medical/health information and PTA medications to the best of their ability. TYPE  CASE: 3  Orders per surgeon: NOT received  Labs per surgeon: MRSA/MSSA per protocol. Results: pending  Labs per anesthesia protocol: CBC, BMP, UA. Results: pending  EKG:  Not indicated per anesthesia protocol     Nasal Swab collected per MD order. Patient provided with and instructed on education handouts including Guide to Surgery, blood transfusions, pain management, and hand hygiene for the family and community, and [facility] [place]. Road to Recovery Spine surgery patient guide given. Instructed on incentive spirometry with return demonstration. Patient viewed spine prehab video. Hibiclens and instructions given per hospital policy. Original medication prescription list reconciled with patient during patient appointment. Patient teach back successful and patient demonstrates knowledge of instruction.

## 2022-07-08 ENCOUNTER — TELEPHONE (OUTPATIENT)
Dept: ORTHOPEDIC SURGERY | Age: 42
End: 2022-07-08

## 2022-07-08 NOTE — TELEPHONE ENCOUNTER
Ms Carley Murders your surgery for Tuesday has been approved with Juanita Todd. Everything is good to go. I hope you have a great weekend.

## 2022-07-08 NOTE — TELEPHONE ENCOUNTER
She is scheduled for surgery on Tuesday. She wants to know if her insurance has approved this. Please give her a call.

## 2022-07-11 ENCOUNTER — ANESTHESIA EVENT (OUTPATIENT)
Dept: SURGERY | Age: 42
End: 2022-07-11
Payer: MEDICARE

## 2022-07-11 NOTE — PERIOP NOTE
Directly informed patient and or family member of pre op arrival time 0 on 7/12/22. All questions answered. Pre op instructions reviewed. Left contact information for any additional questions or needs.

## 2022-07-11 NOTE — ANESTHESIA PRE PROCEDURE
Department of Anesthesiology  Preprocedure Note       Name:  Brent Puente   Age:  43 y.o.  :  1980                                          MRN:  [id number]         Date:  2022      Surgeon: Raquel Pérez):  Vivien Gan MD    Procedure: Procedure(s):  C5-C7 anterior cervical discectomy and fusion with interbody spacer/allograft and instrumentation    Medications prior to admission:   Prior to Admission medications    Medication Sig Start Date End Date Taking? Authorizing Provider   lamoTRIgine (LAMICTAL) 100 MG tablet Take 100 mg by mouth daily    Historical Provider, MD   candesartan (ATACAND) 16 MG tablet Take 16 mg by mouth at bedtime    Historical Provider, MD   Betahistine HCl POWD Take by mouth in the morning and at bedtime Oral pill; vestibular migraine    Historical Provider, MD   PROGESTERONE PO Take by mouth at bedtime Progesterone with testosterone denny (dissolvable pill)    Historical Provider, MD   venlafaxine (EFFEXOR XR) 75 MG extended release capsule Take 75 mg by mouth daily    Historical Provider, MD   MAGNESIUM PO Take 2 tablets by mouth daily    Ar Automatic Reconciliation   albuterol sulfate  (90 Base) MCG/ACT inhaler Inhale 2 puffs into the lungs every 4 hours as needed    Ar Automatic Reconciliation   ascorbic acid (VITAMIN C) 500 MG tablet Take 500 mg by mouth daily    Ar Automatic Reconciliation   clonazePAM (KLONOPIN) 0.5 MG tablet Take 0.5 mg by mouth daily as needed.  6/15/21   Ar Automatic Reconciliation   fexofenadine (ALLEGRA) 180 MG tablet Take 180 mg by mouth    Ar Automatic Reconciliation   indomethacin (INDOCIN) 25 MG capsule TAKE 1-2 TABLETS BY MOUTH 3 TIMES DAILY AS NEEDED FOR MIGRAINE. LIMIT 2 DAYS/WEEK. TAKE WITH FOOD 3/8/22   Ar Automatic Reconciliation   mometasone (NASONEX) 50 MCG/ACT nasal spray 1 spray by Nasal route at bedtime     Ar Automatic Reconciliation   ondansetron (ZOFRAN-ODT) 4 MG disintegrating tablet Take 4 mg by mouth every 8 hours as needed 6/15/21   Ar Automatic Reconciliation   thyroid (ARMOUR THYROID) 30 MG tablet TAKE 1 TABLET BY MOUTH EVERY DAY IN THE MORNING 1/26/18   Ar Automatic Reconciliation   tiZANidine (ZANAFLEX) 4 MG tablet TAKE 1 TABLET BY MOUTH EVERY 8 HOURS AS NEEDED 4/4/22   Ar Automatic Reconciliation       Current medications:    No current facility-administered medications for this encounter. Current Outpatient Medications   Medication Sig Dispense Refill    lamoTRIgine (LAMICTAL) 100 MG tablet Take 100 mg by mouth daily      candesartan (ATACAND) 16 MG tablet Take 16 mg by mouth at bedtime      Betahistine HCl POWD Take by mouth in the morning and at bedtime Oral pill; vestibular migraine      PROGESTERONE PO Take by mouth at bedtime Progesterone with testosterone denny (dissolvable pill)      venlafaxine (EFFEXOR XR) 75 MG extended release capsule Take 75 mg by mouth daily      MAGNESIUM PO Take 2 tablets by mouth daily      albuterol sulfate  (90 Base) MCG/ACT inhaler Inhale 2 puffs into the lungs every 4 hours as needed      ascorbic acid (VITAMIN C) 500 MG tablet Take 500 mg by mouth daily      clonazePAM (KLONOPIN) 0.5 MG tablet Take 0.5 mg by mouth daily as needed.  fexofenadine (ALLEGRA) 180 MG tablet Take 180 mg by mouth      indomethacin (INDOCIN) 25 MG capsule TAKE 1-2 TABLETS BY MOUTH 3 TIMES DAILY AS NEEDED FOR MIGRAINE. LIMIT 2 DAYS/WEEK. TAKE WITH FOOD      mometasone (NASONEX) 50 MCG/ACT nasal spray 1 spray by Nasal route at bedtime       ondansetron (ZOFRAN-ODT) 4 MG disintegrating tablet Take 4 mg by mouth every 8 hours as needed      thyroid (ARMOUR THYROID) 30 MG tablet TAKE 1 TABLET BY MOUTH EVERY DAY IN THE MORNING      tiZANidine (ZANAFLEX) 4 MG tablet TAKE 1 TABLET BY MOUTH EVERY 8 HOURS AS NEEDED         Allergies:     Allergies   Allergen Reactions    Diazepam Other (See Comments)     Suicidal thoughts       Macadamia Nut Oil Shortness Of Breath and Swelling Lips swell    Any stone fruits, for ex peaches, cherries, plums    Penicillins Anaphylaxis     Has not been able to tolerate cephalosporins     Pineapple Shortness Of Breath     wheezing    Sumatriptan Shortness Of Breath    Theophylline Shortness Of Breath    Cephalexin Other (See Comments)     Other reaction(s): Dizziness    Sulfa Antibiotics Rash     Other reaction(s): Vomiting    Topiramate Other (See Comments)     Caused disorientation, loss of feeling in left leg (numbness)    Tramadol Rash    Verapamil Other (See Comments)     Lowers blood pressure significantly beyond normal hypotension      Friendship Oil Swelling     Causes lips to swell       Pneumococcal Vaccine Swelling    Triamcinolone Acetonide Hives     Kenalog injection caused hives     Vitamin B12 Other (See Comments)     Injection Only  swelling       Problem List:    Patient Active Problem List   Diagnosis Code    Left leg weakness R29.898    Dysphagia R13.10    Neck pain M54.2    Severe obesity (HCC) E66.01    Hypothyroidism due to acquired atrophy of thyroid E03.4    Traumatic tear of left rotator cuff S46.012A    Fatigue R53.83    Hyperlipidemia E78.5    Migraine variant G43.809    Trigeminal neuralgia G50.0    Chronic migraine with aura G43. 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    Family history of colonic polyps Z83.71    Vertigo R42    Movement disorder G25.9    Vitamin D deficiency E55.9    Cervical spinal stenosis M48.02       Past Medical History:        Diagnosis Date    Adverse effect of anesthesia Vicki Luz leana goes a long way with me\"     Arthritis     spine and hands     Asthma     last attack 05/2019; inhaler prn     BMI 36.0-36.9,adult     Chronic pain     chronic headache  beta histidine for this    Hyperlipidemia     no meds     Migraines     candesartan for this - takes at night    Movement disorder     loses where body parts are at times    PONV (postoperative nausea and vomiting)     nauseous upon waking in PACU     Thyroid disease     hypo-on med    Vertigo     Vestibular nerve disorder 2008    migraines - and nerve damage as well       Past Surgical History:        Procedure Laterality Date    CHOLECYSTECTOMY      possibly open patient unsure    HEENT      oral, bilateral eye muscle    ORTHOPEDIC SURGERY      right wrist x 2, right shoulder        Social History:    Social History     Tobacco Use    Smoking status: Never Smoker    Smokeless tobacco: Never Used   Substance Use Topics    Alcohol use: Not Currently                                Counseling given: Not Answered      Vital Signs (Current): There were no vitals filed for this visit.                                            BP Readings from Last 3 Encounters:   06/30/22 130/64   06/17/22 120/70   03/14/22 136/84       NPO Status:                                                                                 BMI:   Wt Readings from Last 3 Encounters:   06/30/22 230 lb 4.8 oz (104.5 kg)   06/17/22 231 lb (104.8 kg)   03/14/22 223 lb 9.6 oz (101.4 kg)     There is no height or weight on file to calculate BMI.    CBC:   Lab Results   Component Value Date/Time    WBC 10.3 06/30/2022 01:39 PM    RBC 4.80 06/30/2022 01:39 PM    HGB 14.2 06/30/2022 01:39 PM    HCT 40.7 06/30/2022 01:39 PM    MCV 84.8 06/30/2022 01:39 PM    RDW 12.2 06/30/2022 01:39 PM     06/30/2022 01:39 PM       CMP:   Lab Results   Component Value Date/Time     06/30/2022 01:39 PM    K 3.9 06/30/2022 01:39 PM     06/30/2022 01:39 PM    CO2 25 06/30/2022 01:39 PM    BUN 13 06/30/2022 01:39 PM    CREATININE 0.75 06/30/2022 01:39 PM    GFRAA >60 06/30/2022 01:39 PM    AGRATIO 1.0 09/08/2019 11:07 AM    LABGLOM >60 06/30/2022 01:39 PM    GLUCOSE 134 06/30/2022 01:39 PM    PROT 7.2 06/01/2022 01:01 PM    CALCIUM 9.4 06/30/2022 01:39 PM    BILITOT 0.4 06/01/2022 01:01 PM    ALKPHOS 78 06/01/2022 01:01 PM    ALKPHOS 88 09/08/2019 11:07 AM    AST 16 06/01/2022 01:01 PM    ALT 36 06/01/2022 01:01 PM       POC Tests: No results for input(s): POCGLU, POCNA, POCK, POCCL, POCBUN, POCHEMO, POCHCT in the last 72 hours. Coags: No results found for: PROTIME, INR, APTT    HCG (If Applicable): No results found for: PREGTESTUR, PREGSERUM, HCG, HCGQUANT     ABGs: No results found for: PHART, PO2ART, JPM4RWD, ZDB2MMA, BEART, W6PVQURO     Type & Screen (If Applicable):  No results found for: LABABO, LABRH    Drug/Infectious Status (If Applicable):  No results found for: HIV, HEPCAB    COVID-19 Screening (If Applicable):   Lab Results   Component Value Date/Time    COVID19 Performed 02/11/2022 11:13 AM    COVID19 Not Detected 02/11/2022 11:13 AM           Anesthesia Evaluation  Patient summary reviewed and Nursing notes reviewed   history of anesthetic complications: PONV.   Airway: Mallampati: I  TM distance: >3 FB   Neck ROM: full  Mouth opening: > = 3 FB   Dental: normal exam         Pulmonary: breath sounds clear to auscultation  (+) asthma: seasonal asthma,                            Cardiovascular:  Exercise tolerance: good (>4 METS),   (+) hyperlipidemia        Rhythm: regular  Rate: normal                    Neuro/Psych:   (+) headaches: migraine headaches, psychiatric history (Conversion disorder):depression/anxiety              ROS comment: Left leg weakness  Dysphagia  Neck pain-Cervical radiculopathy    Trigeminal neuralgia  Chronic migraine with aura     GI/Hepatic/Renal:   (+) morbid obesity          Endo/Other:                     Abdominal: Vascular: Other Findings:           Anesthesia Plan      general     ASA 3     (GETA)  Induction: intravenous. MIPS: Postoperative opioids intended and Prophylactic antiemetics administered. Anesthetic plan and risks discussed with patient. Plan discussed with CRNA.                     Lior Mchugh MD   7/11/2022

## 2022-07-12 ENCOUNTER — APPOINTMENT (OUTPATIENT)
Dept: GENERAL RADIOLOGY | Age: 42
End: 2022-07-12
Attending: ORTHOPAEDIC SURGERY
Payer: MEDICARE

## 2022-07-12 ENCOUNTER — ANESTHESIA (OUTPATIENT)
Dept: SURGERY | Age: 42
End: 2022-07-12
Payer: MEDICARE

## 2022-07-12 ENCOUNTER — HOSPITAL ENCOUNTER (OUTPATIENT)
Age: 42
Setting detail: OUTPATIENT SURGERY
Discharge: HOME | End: 2022-07-12
Attending: ORTHOPAEDIC SURGERY | Admitting: ORTHOPAEDIC SURGERY
Payer: MEDICARE

## 2022-07-12 VITALS
HEART RATE: 84 BPM | SYSTOLIC BLOOD PRESSURE: 129 MMHG | BODY MASS INDEX: 43.43 KG/M2 | RESPIRATION RATE: 17 BRPM | TEMPERATURE: 97.7 F | HEIGHT: 61 IN | OXYGEN SATURATION: 96 % | DIASTOLIC BLOOD PRESSURE: 55 MMHG | WEIGHT: 230 LBS

## 2022-07-12 DIAGNOSIS — M48.02 CERVICAL SPINAL STENOSIS: ICD-10-CM

## 2022-07-12 PROBLEM — M54.12 CERVICAL RADICULOPATHY: Status: ACTIVE | Noted: 2019-04-09

## 2022-07-12 LAB
ABO + RH BLD: NORMAL
BLOOD GROUP ANTIBODIES SERPL: NORMAL
HCG UR QL: NEGATIVE
SPECIMEN EXP DATE BLD: NORMAL

## 2022-07-12 PROCEDURE — A4217 STERILE WATER/SALINE, 500 ML: HCPCS | Performed by: ORTHOPAEDIC SURGERY

## 2022-07-12 PROCEDURE — 2500000003 HC RX 250 WO HCPCS

## 2022-07-12 PROCEDURE — 22845 INSERT SPINE FIXATION DEVICE: CPT | Performed by: ORTHOPAEDIC SURGERY

## 2022-07-12 PROCEDURE — 2580000003 HC RX 258

## 2022-07-12 PROCEDURE — 2709999900 HC NON-CHARGEABLE SUPPLY: Performed by: ORTHOPAEDIC SURGERY

## 2022-07-12 PROCEDURE — 2500000003 HC RX 250 WO HCPCS: Performed by: ORTHOPAEDIC SURGERY

## 2022-07-12 PROCEDURE — 22551 ARTHRD ANT NTRBDY CERVICAL: CPT | Performed by: ORTHOPAEDIC SURGERY

## 2022-07-12 PROCEDURE — 3600000004 HC SURGERY LEVEL 4 BASE: Performed by: ORTHOPAEDIC SURGERY

## 2022-07-12 PROCEDURE — 22552 ARTHRD ANT NTRBD CERVICAL EA: CPT | Performed by: ORTHOPAEDIC SURGERY

## 2022-07-12 PROCEDURE — 3600000014 HC SURGERY LEVEL 4 ADDTL 15MIN: Performed by: ORTHOPAEDIC SURGERY

## 2022-07-12 PROCEDURE — C1889 IMPLANT/INSERT DEVICE, NOC: HCPCS | Performed by: ORTHOPAEDIC SURGERY

## 2022-07-12 PROCEDURE — 6370000000 HC RX 637 (ALT 250 FOR IP): Performed by: ANESTHESIOLOGY

## 2022-07-12 PROCEDURE — 3700000001 HC ADD 15 MINUTES (ANESTHESIA): Performed by: ORTHOPAEDIC SURGERY

## 2022-07-12 PROCEDURE — 3700000000 HC ANESTHESIA ATTENDED CARE: Performed by: ORTHOPAEDIC SURGERY

## 2022-07-12 PROCEDURE — 6370000000 HC RX 637 (ALT 250 FOR IP): Performed by: ORTHOPAEDIC SURGERY

## 2022-07-12 PROCEDURE — 7100000010 HC PHASE II RECOVERY - FIRST 15 MIN: Performed by: ORTHOPAEDIC SURGERY

## 2022-07-12 PROCEDURE — 6360000002 HC RX W HCPCS

## 2022-07-12 PROCEDURE — 86901 BLOOD TYPING SEROLOGIC RH(D): CPT

## 2022-07-12 PROCEDURE — 2720000010 HC SURG SUPPLY STERILE: Performed by: ORTHOPAEDIC SURGERY

## 2022-07-12 PROCEDURE — 72020 X-RAY EXAM OF SPINE 1 VIEW: CPT

## 2022-07-12 PROCEDURE — 6360000002 HC RX W HCPCS: Performed by: ORTHOPAEDIC SURGERY

## 2022-07-12 PROCEDURE — C1713 ANCHOR/SCREW BN/BN,TIS/BN: HCPCS | Performed by: ORTHOPAEDIC SURGERY

## 2022-07-12 PROCEDURE — 81025 URINE PREGNANCY TEST: CPT

## 2022-07-12 PROCEDURE — 7100000011 HC PHASE II RECOVERY - ADDTL 15 MIN: Performed by: ORTHOPAEDIC SURGERY

## 2022-07-12 PROCEDURE — 7100000001 HC PACU RECOVERY - ADDTL 15 MIN: Performed by: ORTHOPAEDIC SURGERY

## 2022-07-12 PROCEDURE — 6360000002 HC RX W HCPCS: Performed by: ANESTHESIOLOGY

## 2022-07-12 PROCEDURE — 7100000000 HC PACU RECOVERY - FIRST 15 MIN: Performed by: ORTHOPAEDIC SURGERY

## 2022-07-12 PROCEDURE — 22853 INSJ BIOMECHANICAL DEVICE: CPT | Performed by: ORTHOPAEDIC SURGERY

## 2022-07-12 PROCEDURE — 2580000003 HC RX 258: Performed by: ANESTHESIOLOGY

## 2022-07-12 PROCEDURE — 2580000003 HC RX 258: Performed by: ORTHOPAEDIC SURGERY

## 2022-07-12 DEVICE — ANTERIOR CERVICAL CAGE
Type: IMPLANTABLE DEVICE | Site: SPINE CERVICAL | Status: FUNCTIONAL
Brand: TRITANIUM C

## 2022-07-12 DEVICE — GRAFT BNE XS: Type: IMPLANTABLE DEVICE | Site: SPINE CERVICAL | Status: FUNCTIONAL

## 2022-07-12 DEVICE — SCREW SPNL L14MM DIA4MM SELF STARTING VAR ANT CERV TI OZARK: Type: IMPLANTABLE DEVICE | Site: SPINE CERVICAL | Status: FUNCTIONAL

## 2022-07-12 DEVICE — PLATE ANT CERV CONSTRND 2 LVL 38MM OZARK VIEW: Type: IMPLANTABLE DEVICE | Site: SPINE CERVICAL | Status: FUNCTIONAL

## 2022-07-12 DEVICE — BIO DBM PUTTY
Type: IMPLANTABLE DEVICE | Site: SPINE CERVICAL | Status: FUNCTIONAL
Brand: BIO DBM

## 2022-07-12 RX ORDER — GLUCAGON 1 MG/ML
1 KIT INJECTION PRN
Status: DISCONTINUED | OUTPATIENT
Start: 2022-07-12 | End: 2022-07-12 | Stop reason: HOSPADM

## 2022-07-12 RX ORDER — OXYCODONE HYDROCHLORIDE 5 MG/1
10 TABLET ORAL PRN
Status: COMPLETED | OUTPATIENT
Start: 2022-07-12 | End: 2022-07-12

## 2022-07-12 RX ORDER — OXYCODONE HYDROCHLORIDE 5 MG/1
5 TABLET ORAL EVERY 6 HOURS PRN
Qty: 20 TABLET | Refills: 0 | Status: SHIPPED | OUTPATIENT
Start: 2022-07-12 | End: 2022-07-17

## 2022-07-12 RX ORDER — ACETAMINOPHEN 500 MG
1000 TABLET ORAL EVERY 6 HOURS PRN
COMMUNITY

## 2022-07-12 RX ORDER — HYDROMORPHONE HYDROCHLORIDE 2 MG/ML
0.5 INJECTION, SOLUTION INTRAMUSCULAR; INTRAVENOUS; SUBCUTANEOUS EVERY 5 MIN PRN
Status: COMPLETED | OUTPATIENT
Start: 2022-07-12 | End: 2022-07-12

## 2022-07-12 RX ORDER — SUCCINYLCHOLINE CHLORIDE 20 MG/ML
INJECTION INTRAMUSCULAR; INTRAVENOUS PRN
Status: DISCONTINUED | OUTPATIENT
Start: 2022-07-12 | End: 2022-07-12 | Stop reason: SDUPTHER

## 2022-07-12 RX ORDER — KETAMINE HCL IN NACL, ISO-OSM 20 MG/2 ML
10 SYRINGE (ML) INJECTION EVERY 10 MIN PRN
Status: DISCONTINUED | OUTPATIENT
Start: 2022-07-12 | End: 2022-07-12 | Stop reason: HOSPADM

## 2022-07-12 RX ORDER — LABETALOL HYDROCHLORIDE 5 MG/ML
10 INJECTION, SOLUTION INTRAVENOUS
Status: DISCONTINUED | OUTPATIENT
Start: 2022-07-12 | End: 2022-07-12 | Stop reason: HOSPADM

## 2022-07-12 RX ORDER — ONDANSETRON 2 MG/ML
4 INJECTION INTRAMUSCULAR; INTRAVENOUS
Status: DISCONTINUED | OUTPATIENT
Start: 2022-07-12 | End: 2022-07-12 | Stop reason: HOSPADM

## 2022-07-12 RX ORDER — SODIUM CHLORIDE 0.9 % (FLUSH) 0.9 %
5-40 SYRINGE (ML) INJECTION PRN
Status: DISCONTINUED | OUTPATIENT
Start: 2022-07-12 | End: 2022-07-12 | Stop reason: HOSPADM

## 2022-07-12 RX ORDER — ACETAMINOPHEN 500 MG
1000 TABLET ORAL ONCE
Status: COMPLETED | OUTPATIENT
Start: 2022-07-12 | End: 2022-07-12

## 2022-07-12 RX ORDER — HYDROMORPHONE HYDROCHLORIDE 2 MG/ML
0.5 INJECTION, SOLUTION INTRAMUSCULAR; INTRAVENOUS; SUBCUTANEOUS
Status: DISCONTINUED | OUTPATIENT
Start: 2022-07-12 | End: 2022-07-12 | Stop reason: HOSPADM

## 2022-07-12 RX ORDER — LIDOCAINE HYDROCHLORIDE 20 MG/ML
INJECTION, SOLUTION EPIDURAL; INFILTRATION; INTRACAUDAL; PERINEURAL PRN
Status: DISCONTINUED | OUTPATIENT
Start: 2022-07-12 | End: 2022-07-12 | Stop reason: SDUPTHER

## 2022-07-12 RX ORDER — LIDOCAINE HYDROCHLORIDE 10 MG/ML
1 INJECTION, SOLUTION INFILTRATION; PERINEURAL
Status: DISCONTINUED | OUTPATIENT
Start: 2022-07-12 | End: 2022-07-12 | Stop reason: HOSPADM

## 2022-07-12 RX ORDER — SODIUM CHLORIDE 9 MG/ML
INJECTION, SOLUTION INTRAVENOUS PRN
Status: DISCONTINUED | OUTPATIENT
Start: 2022-07-12 | End: 2022-07-12 | Stop reason: HOSPADM

## 2022-07-12 RX ORDER — OXYCODONE HYDROCHLORIDE 5 MG/1
5 TABLET ORAL PRN
Status: COMPLETED | OUTPATIENT
Start: 2022-07-12 | End: 2022-07-12

## 2022-07-12 RX ORDER — NEOSTIGMINE METHYLSULFATE 1 MG/ML
INJECTION, SOLUTION INTRAVENOUS PRN
Status: DISCONTINUED | OUTPATIENT
Start: 2022-07-12 | End: 2022-07-12 | Stop reason: SDUPTHER

## 2022-07-12 RX ORDER — SODIUM CHLORIDE 0.9 % (FLUSH) 0.9 %
5-40 SYRINGE (ML) INJECTION EVERY 12 HOURS SCHEDULED
Status: DISCONTINUED | OUTPATIENT
Start: 2022-07-12 | End: 2022-07-12 | Stop reason: HOSPADM

## 2022-07-12 RX ORDER — HYDROMORPHONE HYDROCHLORIDE 2 MG/ML
0.25 INJECTION, SOLUTION INTRAMUSCULAR; INTRAVENOUS; SUBCUTANEOUS
Status: DISCONTINUED | OUTPATIENT
Start: 2022-07-12 | End: 2022-07-12 | Stop reason: HOSPADM

## 2022-07-12 RX ORDER — SODIUM CHLORIDE, SODIUM LACTATE, POTASSIUM CHLORIDE, CALCIUM CHLORIDE 600; 310; 30; 20 MG/100ML; MG/100ML; MG/100ML; MG/100ML
INJECTION, SOLUTION INTRAVENOUS CONTINUOUS
Status: DISCONTINUED | OUTPATIENT
Start: 2022-07-12 | End: 2022-07-12 | Stop reason: HOSPADM

## 2022-07-12 RX ORDER — ROCURONIUM BROMIDE 10 MG/ML
INJECTION, SOLUTION INTRAVENOUS PRN
Status: DISCONTINUED | OUTPATIENT
Start: 2022-07-12 | End: 2022-07-12 | Stop reason: SDUPTHER

## 2022-07-12 RX ORDER — HYDROMORPHONE HYDROCHLORIDE 2 MG/ML
0.25 INJECTION, SOLUTION INTRAMUSCULAR; INTRAVENOUS; SUBCUTANEOUS EVERY 5 MIN PRN
Status: DISCONTINUED | OUTPATIENT
Start: 2022-07-12 | End: 2022-07-12 | Stop reason: HOSPADM

## 2022-07-12 RX ORDER — PROPOFOL 10 MG/ML
INJECTION, EMULSION INTRAVENOUS PRN
Status: DISCONTINUED | OUTPATIENT
Start: 2022-07-12 | End: 2022-07-12 | Stop reason: SDUPTHER

## 2022-07-12 RX ORDER — EPHEDRINE SULFATE/0.9% NACL/PF 50 MG/5 ML
SYRINGE (ML) INTRAVENOUS PRN
Status: DISCONTINUED | OUTPATIENT
Start: 2022-07-12 | End: 2022-07-12 | Stop reason: SDUPTHER

## 2022-07-12 RX ORDER — ONDANSETRON 2 MG/ML
INJECTION INTRAMUSCULAR; INTRAVENOUS PRN
Status: DISCONTINUED | OUTPATIENT
Start: 2022-07-12 | End: 2022-07-12 | Stop reason: SDUPTHER

## 2022-07-12 RX ORDER — DEXTROSE MONOHYDRATE 50 MG/ML
100 INJECTION, SOLUTION INTRAVENOUS PRN
Status: DISCONTINUED | OUTPATIENT
Start: 2022-07-12 | End: 2022-07-12 | Stop reason: HOSPADM

## 2022-07-12 RX ORDER — DEXAMETHASONE SODIUM PHOSPHATE 4 MG/ML
INJECTION, SOLUTION INTRA-ARTICULAR; INTRALESIONAL; INTRAMUSCULAR; INTRAVENOUS; SOFT TISSUE PRN
Status: DISCONTINUED | OUTPATIENT
Start: 2022-07-12 | End: 2022-07-12 | Stop reason: SDUPTHER

## 2022-07-12 RX ORDER — GLYCOPYRROLATE 0.2 MG/ML
INJECTION INTRAMUSCULAR; INTRAVENOUS PRN
Status: DISCONTINUED | OUTPATIENT
Start: 2022-07-12 | End: 2022-07-12 | Stop reason: SDUPTHER

## 2022-07-12 RX ADMIN — PROPOFOL 180 MG: 10 INJECTION, EMULSION INTRAVENOUS at 07:26

## 2022-07-12 RX ADMIN — FENTANYL CITRATE 25 MCG: 50 INJECTION INTRAMUSCULAR; INTRAVENOUS at 08:39

## 2022-07-12 RX ADMIN — GLYCOPYRROLATE 0.8 MG: 0.2 INJECTION INTRAMUSCULAR; INTRAVENOUS at 08:37

## 2022-07-12 RX ADMIN — ROCURONIUM BROMIDE 30 MG: 50 INJECTION, SOLUTION INTRAVENOUS at 07:40

## 2022-07-12 RX ADMIN — Medication 10 MG: at 07:58

## 2022-07-12 RX ADMIN — HYDROMORPHONE HYDROCHLORIDE 0.5 MG: 2 INJECTION INTRAMUSCULAR; INTRAVENOUS; SUBCUTANEOUS at 09:03

## 2022-07-12 RX ADMIN — HYDROMORPHONE HYDROCHLORIDE 0.5 MG: 2 INJECTION INTRAMUSCULAR; INTRAVENOUS; SUBCUTANEOUS at 08:58

## 2022-07-12 RX ADMIN — ONDANSETRON 4 MG: 2 INJECTION INTRAMUSCULAR; INTRAVENOUS at 07:45

## 2022-07-12 RX ADMIN — HYDROMORPHONE HYDROCHLORIDE 0.5 MG: 2 INJECTION, SOLUTION INTRAMUSCULAR; INTRAVENOUS; SUBCUTANEOUS at 09:15

## 2022-07-12 RX ADMIN — ACETAMINOPHEN 1000 MG: 500 TABLET, FILM COATED ORAL at 06:20

## 2022-07-12 RX ADMIN — FENTANYL CITRATE 25 MCG: 50 INJECTION INTRAMUSCULAR; INTRAVENOUS at 08:40

## 2022-07-12 RX ADMIN — FENTANYL CITRATE 50 MCG: 50 INJECTION INTRAMUSCULAR; INTRAVENOUS at 07:40

## 2022-07-12 RX ADMIN — DEXAMETHASONE SODIUM PHOSPHATE 8 MG: 4 INJECTION, SOLUTION INTRAMUSCULAR; INTRAVENOUS at 07:45

## 2022-07-12 RX ADMIN — VANCOMYCIN HYDROCHLORIDE 1500 MG: 10 INJECTION, POWDER, LYOPHILIZED, FOR SOLUTION INTRAVENOUS at 06:22

## 2022-07-12 RX ADMIN — SODIUM CHLORIDE, SODIUM LACTATE, POTASSIUM CHLORIDE, AND CALCIUM CHLORIDE: 600; 310; 30; 20 INJECTION, SOLUTION INTRAVENOUS at 07:20

## 2022-07-12 RX ADMIN — Medication 5 MG: at 08:37

## 2022-07-12 RX ADMIN — HYDROMORPHONE HYDROCHLORIDE 0.5 MG: 2 INJECTION, SOLUTION INTRAMUSCULAR; INTRAVENOUS; SUBCUTANEOUS at 09:10

## 2022-07-12 RX ADMIN — Medication 3 AMPULE: at 06:21

## 2022-07-12 RX ADMIN — PHENYLEPHRINE HYDROCHLORIDE 100 MCG: 10 INJECTION INTRAVENOUS at 08:28

## 2022-07-12 RX ADMIN — Medication 10 MG: at 07:55

## 2022-07-12 RX ADMIN — Medication 160 MG: at 07:27

## 2022-07-12 RX ADMIN — LIDOCAINE HYDROCHLORIDE 100 MG: 20 INJECTION, SOLUTION EPIDURAL; INFILTRATION; INTRACAUDAL; PERINEURAL at 07:25

## 2022-07-12 RX ADMIN — OXYCODONE 10 MG: 5 TABLET ORAL at 09:26

## 2022-07-12 ASSESSMENT — PAIN SCALES - GENERAL
PAINLEVEL_OUTOF10: 6
PAINLEVEL_OUTOF10: 6
PAINLEVEL_OUTOF10: 7

## 2022-07-12 ASSESSMENT — PAIN - FUNCTIONAL ASSESSMENT
PAIN_FUNCTIONAL_ASSESSMENT: 0-10

## 2022-07-12 NOTE — ANESTHESIA PROCEDURE NOTES
Airway  Date/Time: 7/12/2022 7:28 AM  Urgency: elective    Airway not difficult    General Information and Staff    Patient location during procedure: OR  Performed: resident/CRNA     Indications and Patient Condition  Indications for airway management: anesthesia  Spontaneous Ventilation: absent  Sedation level: deep  Preoxygenated: yes  Patient position: sniffing  MILS not maintained throughout  Mask difficulty assessment: vent by bag mask    Final Airway Details  Final airway type: endotracheal airway      Successful airway: ETT  Cuffed: yes   Successful intubation technique: video laryngoscopy  Facilitating devices/methods: intubating stylet  Endotracheal tube insertion site: oral  Blade: Ryan  Blade size: #3  ETT size (mm): 7.5  Cormack-Lehane Classification: grade I - full view of glottis  Placement verified by: chest auscultation and capnometry   Measured from: lips  Number of attempts at approach: 1  Ventilation between attempts: bag mask  Number of other approaches attempted: 0    Additional Comments  Glidescope used with neck neutral and no complications  no

## 2022-07-12 NOTE — OP NOTE
[address] [address]   [phone]    OPERATIVE REPORT  Patient Radha Mac  [id number]  1980  43 y.o. DATE OF SURGERY: 7/12/2022    SURGEON: Radhika Stephenson M.D. PREOPERATIVE DIAGNOSIS:  C5 - C7 stenosis. POSTOPERATIVE DIAGNOSIS:  C5 - C7 stenosis. PROCEDURE:     1. Anterior cervical diskectomy and fusion C5-6 and C6-7.  (CPT 38206, 95003)     2. Anterior cervical instrumentation  C5 - C7.  (CPT 53501)     3. Insertion tritanium biomechanical device  C5-6 and C6-7 (CPT 22853 X 2)    ANESTHESIA:  General.    ESTIMATED BLOOD LOSS:  10 ml    INTRAOPERATIVE COMPLICATIONS:  None. POSTOPERATIVE CONDITION:  Stable. IMPLANTS:   Implant Name Type Inv.  Item Serial No.  Lot No. LRB No. Used Action   GRAFT BNE XS - [id number]  GRAFT BNE XS [id number] [employer]  N/A 1 Implanted   SERVICE FEE 1CC BIO DBM PTTY - [id number]  SERVICE FEE 1CC BIO DBM PTTY  [employer] [facility] [id number] N/A 1 Implanted   SERVICE FEE 1CC BIO DBM PTTY - [id number]  SERVICE FEE 1CC BIO DBM PTTY  [employer] [facility] [id number] N/A 1 Implanted   CAGE SPNL [id number] 6DEG ANT CERV Lindajo Isiah LORD - [id number]  CAGE SPNL [id number] 6DEG ANT CERV TRITANIUM C LORD  [employer] [facility]  N/A 1 Implanted   CAGE SPNL [id number] 6DEG ANT CERV TRITANIUM C LORD - [id number]  CAGE SPNL [id number] 6DEG ANT CERV TRITANIUM C LORD  [employer] [facility] [id number] N/A 1 Implanted   SCREW SPNL L14MM DIA4MM SELF STARTING KEVEN ANT CERV TI OZARK - [id number]  SCREW SPNL L14MM DIA4MM SELF STARTING KEVEN ANT CERV TI OZARK  [employer] [facility] [id number] N/A 6 Implanted   PLATE ANT CERV CONSTRND 2 LVL 38MM OZARK VIEW - [id number]  PLATE ANT CERV CONSTRND 2 LVL 38MM OZARK VIEW  [facility] [id number] N/A 1 Implanted       INDICATIONS FOR PROCEDURE:  The patient has had persistent symptoms of cervical radiculopathy despite conservative treatment. The preoperative studies confirmed a concordant stenotic lesion resulting in neural inpingement. The risks, benefits and potential complications of the above listed procedures were discussed with the patient in detail and an informed consent was obtained. DESCRIPTION OF PROCEDURE:  After adequate induction of general anesthesia the patient was positioned supine on the operating table. A shoulder roll was placed and the shoulders were taped caudally to facilitate intraoperative radiographic imaging. The neck was kept in a neutral position. Care was taken to pad all bony prominences. Preoperative antibiotics were given. The neck was prepped and draped in the usual sterile fashion. A time-out was called to confirm appropriate patient, proposed procedure and proposed incision site. With this confirmation an incision was created over the left anterior lateral aspect of the neck centered near the cricoid cartilage. Dissection was carried down through the platysma using electrocautery. A Burger-Ayala approach was then performed down to the anterior cervical spine. A spinal needle was inserted in an interspace and a cross-table lateral fluoroscopic image was obtained. The appropriate level was marked with electrocautery and the spinal needle was removed. At this point the longus colli was elevated around the periphery of the appropriate disk space and [place] retractors were  inserted beneath the longus colli. [place] pins were then inserted in the C5 and C6 vertebral bodies and distraction applied across the annulus fibrosus. The operating microscope was draped and brought to the sterile field. An annulotomy was performed with a 15 blade and a complete diskectomy was performed using pituitary and 3 mm Kerrison. The diskectomy was carried out to the lateral border of the uncovertebral joints bilaterally.   A 4 mm bur was then used to trim the anterior osteophytes as well as flatten the vertebral endplates in preparation for arthrodesis. The anterior aspect of the uncovertebral joints were also resected using the bur. The posterior portions of the uncovertebral joints were taken down with a 2 mm Kerrison. An interval was developed in the posterior longitudinal ligament and annulus fibrosus with a micro nerve hook. A 2 mm Kerrison was then used to resect these structures out to the lateral border of the uncovertebral joints bilaterally. A ball tipped nerve hook was used to palpate laterally and confirm no residual nerve root or spinal cord impingement. This was felt to be satisfactory bilaterally. The interbody sizing system was brought to the field and a size 6  lordotic interbody cage device fit very nicely. The appropriate size cage was selected and filled with allograft and impacted with a tamp and mallet after the wound was liberally irrigated. [place] pins were then inserted in the C6 and C7 vertebral bodies and distraction applied across the annulus fibrosus. The operating microscope was draped and brought to the sterile field. An annulotomy was performed with a 15 blade and a complete diskectomy was performed using pituitary and 3 mm Kerrison. The diskectomy was carried out to the lateral border of the uncovertebral joints bilaterally. A 4 mm bur was then used to trim the anterior osteophytes as well as flatten the vertebral endplates in preparation for arthrodesis. The anterior aspect of the uncovertebral joints were also resected using the bur. The posterior portions of the uncovertebral joints were taken down with a 2 mm Kerrison. An interval was developed in the posterior longitudinal ligament and annulus fibrosus with a micro nerve hook. A 2 mm Kerrison was then used to resect these structures out to the lateral border of the uncovertebral joints bilaterally. A ball tipped nerve hook was used to palpate laterally and confirm no residual nerve root or spinal cord impingement. This was felt to be satisfactory bilaterally. The interbody sizing system was brought to the field and a size 6  lordotic interbody cage device fit very nicely. The appropriate size cage was selected filled with allograft and impacted with a bone tamp and mallet after the wound was liberally irrigated. The anterior cervical plating system was brought to the field and an appropriate size plate was selected and applied across  C5 - C7. The peripheral screw holes were drilled and filled appropriate length screws. The locking mechanism was tightened. C-arm fluoroscopy was brought in and used to obtain AP and lateral images, both of which were felt to be satisfactory for appropriate spinal level, graft and hardware placement. The wound was liberally irrigated. The incision and the incision was closed in a layered fashion. Dermabond was applied. Sterile dressings were applied. The patient tolerated the procedure well and was returned to the post anesthesia care unit in stable condition.      Joao Morgan MD

## 2022-07-12 NOTE — H&P
Name: Daysi Minor  YOB: 1980  Gender: female  MRN: [id number]  Age: 43 y.o.     Chief Complaint: Neck and radiating upper extremity pain.     History of present illness:     This is a very pleasant 43 y.o. 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 She also has cervical radicular symptoms which ultimately ended up cycling back and triggering her vestibular headaches. She has had an exhaustive work-up by several neurologists at different locations. Most recently, she has had electrodiagnostic testing suggesting C7 radiculopathy. She also has a cervical MRI that shows significant left-sided C5-C6 and C6-C7 foraminal stenosis and impingement. She has had physical therapy including cervical traction. She is also had cervical epidural steroid injections and cervical facet injections. Since her most recent cervical injection, she has had some new right-sided symptoms.         Physical Exam:      This is a well developed well nourished adult female in no acute distress.      Oriented to person, place and time.     Mood and affect are appropriate. Heart is regular rate and rhythm     Respirations are unlabored and there is no evidence of cyanosis.  Chest clear.     Sensory testing reveals intact sensation to light touch and in the distribution of the C5-T1 dermatomes bilaterally, except for decreased sensation over the C6 and C7 distributions including the lateral neck, anterior and posterior arm, lateral elbow, radial forearm, and thumb, index and middle fingers.     Reflexes       Right Left   Biceps (C5) 2 2   Brachio radialis (C6) 2 2    Triceps (C7) 2 2         Hernandez's is positive        Tinel's and Ruperto testing over the cubital and carpal tunnels do not reproduce the symptoms.     Shoulder examination is not consistent with adhesive capsulitis or acute rotator cuff tendinitis.     Strength testing in the upper extremity reveals the following based on the 5 point grading scale:       Delt(C5) Bicep(C6) WE(C6) Tricep (C7) WF(C7) (C8) Int (T1)   Right 5 5 5 5 5 5 5   Left 5 5 5 4 5 4 5         Radiographic Studies:     Radiographs:               AP and lateral views of the cervical spine, reveal spondylotic changes predominantly from C5-C7     MRI Cervical spine, report and images reviewed and interpreted and reveals pronounced left-sided foraminal disc herniations and spurring at C5-C6 and C6-C7 resulting in left-sided foraminal stenosis and neural impingement     Course and size of the vertebral arteries: Normal        Diagnosis:         ICD-10-CM     1. Cervical radiculopathy  M54.12     2. Cervical spinal stenosis  M48.02           Assessment/Plan: This patient's clinical history and physical exam is consistent with cervical radiculopathy involving primarily the left C6 and C7 nerve root. She has had a prolonged course with the symptoms and has had varying responses to conservative efforts.       We discussed the details of the surgery including an incision over the left side of the front of the neck. The windpipe and foodpipe would be retracted to the side to expose the underlying spine. The appropriate disc would be identified with an X-ray and the disc would be removed. The nerves would be freed by trimming any impinging structures such as bone spurs and disc material.   The disc space would then be filled with an interbody spacer and bone graft from a cadaver. A drain may be placed, and then the wound would be closed with suture and covered with sterile dressings. She would expect to either be discharged same day or stay in the hospital until overnight depending on how quickly she recovers. Follow-up would be scheduled for 2-3 weeks and she would have restrictions including no driving for 2 weeks, no lifting greater than 15 lbs.   We also discussed the potential risks of the surgery including, but not limited to infection, spinal fluid leak, compressive hematoma; injury to spinal cord or peripheral nerve root resulting in paralysis, or loss of use of an extremity; persistent neck or arm symptoms or pain at the bone graft site; failure of the bone graft to heal or failure of the hardware resulting in the possibility of needing additional surgery; postoperative hoarseness or dysphagia; injury to an artery or vein resulting in significant blood loss; and the risks of anesthesia including, but not limited heart attack, stroke, blood clot or death. The patient was also given a brochure on anterior cervical discectomy and fusion. The patient voiced an understanding of these issues outlined. The procedure that I think may be beneficial here is an anterior cervical discectomy and fusion with interbody spacer, allograft and instrumentation from C5-C7.

## 2022-07-12 NOTE — PERIOP NOTE
Discharge instructions given to Jessica Underwood, patient's mother. They verbalized understanding and was given opportunity for questions.

## 2022-07-12 NOTE — ANESTHESIA POSTPROCEDURE EVALUATION
Department of Anesthesiology  Postprocedure Note    Patient: Ana Paula Hutson  MRN: [id number]  YOB: 1980  Date of evaluation: 7/12/2022      Procedure Summary     Date: 07/12/22 Room / Location: [facility] MAIN OR 09 / [facility] MAIN OR    Anesthesia Start: 0720 Anesthesia Stop: 5348    Procedure: C5-C7 anterior cervical discectomy and fusion with interbody spacer/allograft and instrumentation (N/A Spine Cervical) Diagnosis:       Cervical spinal stenosis      Cervical radiculopathy      (Cervical spinal stenosis [M48.02])      (Cervical radiculopathy [M54.12])    Providers: Chapo Major MD Responsible Provider: Tahira Bagley MD    Anesthesia Type: general ASA Status: 3          Anesthesia Type: No value filed.     Chiqui Phase I: Chiqui Score: 8    Chiqui Phase II:        Anesthesia Post Evaluation    Patient location during evaluation: PACU  Patient participation: complete - patient participated  Level of consciousness: awake and alert  Pain score: 3  Airway patency: patent  Nausea & Vomiting: no nausea and no vomiting  Complications: no  Cardiovascular status: hemodynamically stable  Respiratory status: acceptable, nonlabored ventilation and spontaneous ventilation  Hydration status: euvolemic  Comments: BP (!) 118/53   Pulse 77   Temp 97.6 °F (36.4 °C) (Skin)   Resp 17   Ht 5' 1\" (1.549 m)   Wt 230 lb (104.3 kg)   LMP 06/16/2022 (Exact Date)   SpO2 97%   BMI 43.46 kg/m²     Multimodal analgesia pain management approach

## 2022-07-15 ENCOUNTER — HOSPITAL ENCOUNTER (EMERGENCY)
Age: 42
Discharge: HOME | End: 2022-07-15
Attending: EMERGENCY MEDICINE
Payer: MEDICARE

## 2022-07-15 VITALS
RESPIRATION RATE: 16 BRPM | DIASTOLIC BLOOD PRESSURE: 101 MMHG | HEIGHT: 61 IN | WEIGHT: 230 LBS | TEMPERATURE: 99.1 F | BODY MASS INDEX: 43.43 KG/M2 | SYSTOLIC BLOOD PRESSURE: 175 MMHG | HEART RATE: 101 BPM | OXYGEN SATURATION: 98 %

## 2022-07-15 DIAGNOSIS — L76.33 POSTOPERATIVE SEROMA OF SKIN AFTER DERMATOLOGIC PROCEDURE: ICD-10-CM

## 2022-07-15 DIAGNOSIS — G89.18 POST-OP PAIN: Primary | ICD-10-CM

## 2022-07-15 PROCEDURE — 99283 EMERGENCY DEPT VISIT LOW MDM: CPT

## 2022-07-15 RX ORDER — CLONAZEPAM 0.5 MG/1
0.5 TABLET ORAL 2 TIMES DAILY PRN
COMMUNITY
End: 2022-09-13 | Stop reason: SDUPTHER

## 2022-07-15 RX ORDER — VENLAFAXINE 75 MG/1
75 TABLET ORAL 3 TIMES DAILY
COMMUNITY
End: 2022-09-13 | Stop reason: SDUPTHER

## 2022-07-15 RX ORDER — OXYCODONE HYDROCHLORIDE 5 MG/1
5 TABLET ORAL EVERY 6 HOURS PRN
COMMUNITY
End: 2022-09-13 | Stop reason: ALTCHOICE

## 2022-07-15 RX ORDER — ONDANSETRON 4 MG/1
4 TABLET, FILM COATED ORAL EVERY 8 HOURS PRN
COMMUNITY

## 2022-07-15 RX ORDER — ALBUTEROL SULFATE 90 UG/1
2 INHALANT RESPIRATORY (INHALATION) 4 TIMES DAILY PRN
COMMUNITY

## 2022-07-15 RX ORDER — CANDESARTAN 16 MG/1
16 TABLET ORAL DAILY
COMMUNITY
End: 2022-09-13 | Stop reason: SDUPTHER

## 2022-07-15 RX ORDER — LAMOTRIGINE 100 MG/1
100 TABLET ORAL DAILY
COMMUNITY
End: 2022-09-13 | Stop reason: SDUPTHER

## 2022-07-15 RX ORDER — LEVOTHYROXINE AND LIOTHYRONINE 19; 4.5 UG/1; UG/1
30 TABLET ORAL DAILY
COMMUNITY

## 2022-07-15 RX ORDER — FLUTICASONE PROPIONATE 50 MCG
1 SPRAY, SUSPENSION (ML) NASAL DAILY
COMMUNITY

## 2022-07-15 RX ORDER — FEXOFENADINE HCL 180 MG/1
180 TABLET ORAL DAILY
COMMUNITY

## 2022-07-15 RX ORDER — TIZANIDINE HYDROCHLORIDE 4 MG/1
4 CAPSULE, GELATIN COATED ORAL 3 TIMES DAILY PRN
COMMUNITY

## 2022-07-15 RX ORDER — DOXYCYCLINE HYCLATE 100 MG
100 TABLET ORAL 2 TIMES DAILY
Qty: 20 TABLET | Refills: 0 | Status: SHIPPED | OUTPATIENT
Start: 2022-07-15 | End: 2022-07-25

## 2022-07-15 RX ORDER — BETAHISTINE HCL 100 %
16 POWDER (GRAM) MISCELLANEOUS 2 TIMES DAILY
COMMUNITY

## 2022-07-15 RX ORDER — INDOMETHACIN 25 MG/1
25 CAPSULE ORAL
COMMUNITY
End: 2022-09-13 | Stop reason: SDUPTHER

## 2022-07-15 ASSESSMENT — ENCOUNTER SYMPTOMS
NAUSEA: 0
VOMITING: 0
RHINORRHEA: 0
ABDOMINAL PAIN: 0
COUGH: 0
COLOR CHANGE: 0
VOICE CHANGE: 1
CONSTIPATION: 0
SORE THROAT: 0
DIARRHEA: 0
BACK PAIN: 0
SHORTNESS OF BREATH: 0

## 2022-07-15 ASSESSMENT — PAIN SCALES - GENERAL: PAINLEVEL_OUTOF10: 4

## 2022-07-15 ASSESSMENT — PAIN DESCRIPTION - DESCRIPTORS: DESCRIPTORS: SORE

## 2022-07-15 ASSESSMENT — PAIN DESCRIPTION - LOCATION: LOCATION: NECK

## 2022-07-15 ASSESSMENT — PAIN - FUNCTIONAL ASSESSMENT
PAIN_FUNCTIONAL_ASSESSMENT: ACTIVITIES ARE NOT PREVENTED
PAIN_FUNCTIONAL_ASSESSMENT: 0-10

## 2022-07-15 ASSESSMENT — PAIN DESCRIPTION - ORIENTATION: ORIENTATION: LEFT

## 2022-07-15 NOTE — ED TRIAGE NOTES
Pt had cervical spinal fusion on Tuesday. States that she woke up this afternoon from a nap and noticed serous drainage from the site.   Masked on arrival

## 2022-07-16 NOTE — ED PROVIDER NOTES
[facility] Emergency Department Provider Note                   PCP:                No primary care provider on file. Age: 43 y.o. Sex: female       ICD-10-CM    1. Post-op pain  G89.18       2. Postoperative seroma of skin after dermatologic procedure  L76.33           DISPOSITION Decision To Discharge 07/15/2022 09:06:09 PM       MDM  Number of Diagnoses or Management Options  Diagnosis management comments: Another layer of Dermabond was placed over the lateral portion of the neck incision. No active drainage. Will start patient on antibiotics and refer back to Jose Miguel Lugo for further treatment and care. Likely seroma drainage. Patient Progress  Patient progress: stable       No orders of the defined types were placed in this encounter. Jose Monterroso is a 43 y.o. female who presents to the Emergency Department with chief complaint of    Chief Complaint   Patient presents with    Post-op Problem      Patient had surgery with Dr. Jose Miguel Lugo on Tuesday. Had anterior approach cervical spine fusion. Has had some swelling and mild erythema to the wound ever since surgery. No change in appearance. Woke with some drainage from the wound today. Dermabond in place. No active drainage currently. Patient here for evaluation. The history is provided by the patient. No  was used. Neck Pain  Pain location:  L side  Quality:  Aching  Pain radiates to:  Does not radiate  Pain severity:  Mild  Duration:  4 days  Timing:  Constant  Progression:  Improving  Context comment:  Surgery  Worsened by:  Nothing  Associated symptoms: no chest pain, no fever, no headaches and no numbness      All other systems reviewed and are negative. Review of Systems   Constitutional:  Negative for chills and fever. HENT:  Positive for voice change. Negative for rhinorrhea and sore throat. Respiratory:  Negative for cough and shortness of breath.     Cardiovascular:  Negative for chest pain and palpitations. Gastrointestinal:  Negative for abdominal pain, constipation, diarrhea, nausea and vomiting. Genitourinary:  Negative for dysuria and hematuria. Musculoskeletal:  Positive for neck pain. Negative for back pain. Skin:  Positive for wound. Negative for color change and rash. Neurological:  Negative for numbness and headaches. All other systems reviewed and are negative. Past Medical History:   Diagnosis Date    Arthritis     Depression     Hypertension         Past Surgical History:   Procedure Laterality Date    CERVICAL DISC ARTHROPLASTY      CHOLECYSTECTOMY      EYE MUSCLE SURGERY      JOINT REPLACEMENT          History reviewed. No pertinent family history. Social Connections: Not on file        Allergies   Allergen Reactions    Sumatriptan Shortness Of Breath    Theophyllines Shortness Of Breath    Augmentin [Amoxicillin-Pot Clavulanate] Hives    Keflex [Cephalexin] Hives    Kenalog [Triamcinolone Acetonide] Hives    Nuts [Peanut-Containing Drug Products] Hives    Sulfa Antibiotics Hives    Topiramate Other (See Comments) and Myalgia    Ultram [Tramadol] Hives    Valium [Diazepam] Other (See Comments)        Vitals signs and nursing note reviewed. Patient Vitals for the past 4 hrs:   Temp Pulse Resp BP SpO2   07/15/22 1940 99.1 °F (37.3 °C) (!) 101 16 (!) 175/101 98 %          Physical Exam  Vitals and nursing note reviewed. Constitutional:       Appearance: Normal appearance. HENT:      Head: Normocephalic and atraumatic. Neck:     Cardiovascular:      Rate and Rhythm: Normal rate and regular rhythm. Pulmonary:      Effort: Pulmonary effort is normal.      Breath sounds: Normal breath sounds. No wheezing. Abdominal:      General: Bowel sounds are normal.      Palpations: Abdomen is soft. Tenderness: There is no abdominal tenderness. Musculoskeletal:         General: No swelling. Normal range of motion. Cervical back: Normal range of motion. Tenderness present. Skin:     General: Skin is warm and dry. Neurological:      Mental Status: She is alert. Procedures    Labs Reviewed - No data to display     No orders to display                            Voice dictation software was used during the making of this note. This software is not perfect and grammatical and other typographical errors may be present. This note has not been completely proofread for errors.      Jessica Palacios III, MD  07/15/22 [id number]

## 2022-07-18 ENCOUNTER — TELEPHONE (OUTPATIENT)
Dept: ORTHOPEDIC SURGERY | Age: 42
End: 2022-07-18

## 2022-07-18 ENCOUNTER — PATIENT MESSAGE (OUTPATIENT)
Dept: ORTHOPEDIC SURGERY | Age: 42
End: 2022-07-18

## 2022-07-18 NOTE — TELEPHONE ENCOUNTER
She had surgery on the 12th. She went to the ER Friday evening because she had drainage. It seems to have stopped now but that area is still red and irritated. They put her on Doxycycline and told her to call and make an appt to be seen. She has not had fever.

## 2022-07-19 ENCOUNTER — OFFICE VISIT (OUTPATIENT)
Dept: ORTHOPEDIC SURGERY | Age: 42
End: 2022-07-19

## 2022-07-19 DIAGNOSIS — Z48.89 ENCOUNTER FOR POSTOPERATIVE WOUND CHECK: Primary | ICD-10-CM

## 2022-07-19 DIAGNOSIS — M48.02 CERVICAL SPINAL STENOSIS: ICD-10-CM

## 2022-07-19 DIAGNOSIS — Z98.1 STATUS POST CERVICAL ARTHRODESIS: ICD-10-CM

## 2022-07-19 PROCEDURE — 99024 POSTOP FOLLOW-UP VISIT: CPT | Performed by: PHYSICIAN ASSISTANT

## 2022-07-19 RX ORDER — CLINDAMYCIN HYDROCHLORIDE 300 MG/1
300 CAPSULE ORAL 2 TIMES DAILY
Qty: 14 CAPSULE | Refills: 0 | Status: SHIPPED | OUTPATIENT
Start: 2022-07-19 | End: 2022-07-26

## 2022-07-19 RX ORDER — OXYCODONE HYDROCHLORIDE 5 MG/1
5 TABLET ORAL EVERY 6 HOURS PRN
Qty: 28 TABLET | Refills: 0 | Status: SHIPPED | OUTPATIENT
Start: 2022-07-19 | End: 2022-07-26

## 2022-07-19 NOTE — PROGRESS NOTES
Cervical Post-op  Name: Ranjith Mercado  YOB: 1980  Gender: female  MRN: [id number]    CC: Follow up neck surgery    HPI: Ranjith Mercado is here for one week follow up of her  anterior cervical discectomy and fusion surgery. She  reports some relief of preoperative upper extremity pain, weakness and parasthesias. There is the expected residual neck pain. Neck pain is worse with range of motion and better with rest.  There has been issues with the wound. She went to the emergency department on Saturday because her incision began draining. She reports that they applied Dermabond. She was placed on doxycycline. She has not really felt any improvement with her symptoms since the doxycycline. She has an itchiness around the incision. It has continued to drain. She is had some low-grade fever around 99 but not above 100. She does not feel great. She is eating and swallowing soft foods and liquids. Current Outpatient Medications:     clindamycin (CLEOCIN) 300 MG capsule, Take 1 capsule by mouth in the morning and 1 capsule before bedtime. Do all this for 7 days. , Disp: 14 capsule, Rfl: 0    oxyCODONE (ROXICODONE) 5 MG immediate release tablet, Take 1 tablet by mouth every 6 hours as needed for Pain for up to 7 days. Intended supply: 7 days.  Take lowest dose possible to manage pain, Disp: 28 tablet, Rfl: 0    acetaminophen (TYLENOL) 500 MG tablet, Take 1,000 mg by mouth every 6 hours as needed for Pain, Disp: , Rfl:     lamoTRIgine (LAMICTAL) 100 MG tablet, Take 100 mg by mouth daily, Disp: , Rfl:     candesartan (ATACAND) 16 MG tablet, Take 16 mg by mouth at bedtime, Disp: , Rfl:     Betahistine HCl POWD, Take by mouth in the morning and at bedtime Oral pill; vestibular migraine, Disp: , Rfl:     PROGESTERONE PO, Take by mouth at bedtime Progesterone with testosterone denny (dissolvable pill), Disp: , Rfl:     venlafaxine (EFFEXOR XR) 75 MG extended release capsule, Take 75 mg by mouth daily, Disp: , Rfl:     MAGNESIUM PO, Take 2 tablets by mouth daily, Disp: , Rfl:     albuterol sulfate  (90 Base) MCG/ACT inhaler, Inhale 2 puffs into the lungs every 4 hours as needed, Disp: , Rfl:     ascorbic acid (VITAMIN C) 500 MG tablet, Take 500 mg by mouth daily, Disp: , Rfl:     clonazePAM (KLONOPIN) 0.5 MG tablet, Take 0.5 mg by mouth daily as needed. , Disp: , Rfl:     fexofenadine (ALLEGRA) 180 MG tablet, Take 180 mg by mouth, Disp: , Rfl:     indomethacin (INDOCIN) 25 MG capsule, TAKE 1-2 TABLETS BY MOUTH 3 TIMES DAILY AS NEEDED FOR MIGRAINE. LIMIT 2 DAYS/WEEK. TAKE WITH FOOD, Disp: , Rfl:     mometasone (NASONEX) 50 MCG/ACT nasal spray, 1 spray by Nasal route at bedtime , Disp: , Rfl:     ondansetron (ZOFRAN-ODT) 4 MG disintegrating tablet, Take 4 mg by mouth every 8 hours as needed, Disp: , Rfl:     thyroid (ARMOUR THYROID) 30 MG tablet, TAKE 1 TABLET BY MOUTH EVERY DAY IN THE MORNING, Disp: , Rfl:     tiZANidine (ZANAFLEX) 4 MG tablet, TAKE 1 TABLET BY MOUTH EVERY 8 HOURS AS NEEDED, Disp: , Rfl:     Allergies   Allergen Reactions    Diazepam Other (See Comments)     Suicidal thoughts       Macadamia Nut Oil Shortness Of Breath and Swelling     Lips swell    Any stone fruits, for ex peaches, cherries, plums    Penicillins Anaphylaxis     Has not been able to tolerate cephalosporins     Pineapple Shortness Of Breath     wheezing    Sumatriptan Shortness Of Breath    Theophylline Shortness Of Breath    Cephalexin Other (See Comments)     Other reaction(s): Dizziness    Sulfa Antibiotics Rash     Other reaction(s): Vomiting    Topiramate Other (See Comments)     Caused disorientation, loss of feeling in left leg (numbness)    Tramadol Rash    Verapamil Other (See Comments)     Lowers blood pressure significantly beyond normal hypotension      [place] Oil Swelling     Causes lips to swell       Pneumococcal Vaccine Swelling    Triamcinolone Acetonide Hives     Kenalog injection caused hives Vitamin B12 Other (See Comments)     Injection Only  swelling     Past Medical History:   Diagnosis Date    Adverse effect of anesthesia     \"a little goes a long way with me\"     Arthritis     spine and hands     Asthma     last attack 05/2019; inhaler prn     BMI 36.0-36.9,adult     Chronic pain     chronic headache  beta histidine for this    Hyperlipidemia     no meds     Migraines     candesartan for this - takes at night    Movement disorder     loses where body parts are at times    PONV (postoperative nausea and vomiting)     nauseous upon waking in PACU     Thyroid disease     hypo-on med    Vertigo     Vestibular nerve disorder 2008    migraines - and nerve damage as well       Review of Systems: Mild fever, no chills, chest pain or SOB      OBJECTIVE:    Alert and oriented. Mood and affect are appropriate. Oriented to person, place, and time. Respirations are unlabored    Sensory testing reveals intact sensation to light touch in the distribution of the C5-T1 dermatomes bilaterally. Strength testing in the upper extremity reveals the following based on the 5 point grading scale:     Delt(C5) Bicep(C6) WE(C6) Tricep (C7) WF(C7) (C8) Int (T1)   Right 5 5 5 5 5 5 5   Left 5 5 5 5 5 5 5       Wound is completely covered with Dermabond. This was removed. The leftward side of the incision has about 1cm that is slightly opened and draining serous fluid. There is erythema about the incision. There is mild firmness under the incision. I was able to drain off fluid. Patient felt more comfortable. We applied Aquacel dressing with a Tegaderm. Gait is normal.      ASSESSMENT/PLAN:      ICD-10-CM    1. Encounter for postoperative wound check  Z48.89 oxyCODONE (ROXICODONE) 5 MG immediate release tablet     CANCELED: XR CERVICAL SPINE (2-3 VIEWS)      2. Cervical spinal stenosis  M48.02 oxyCODONE (ROXICODONE) 5 MG immediate release tablet      3.  Status post cervical arthrodesis  Z98.1 oxyCODONE (ROXICODONE) 5 MG immediate release tablet           Patient is having postoperative drainage from incision. This is serous fluid. It does not appear purulent. I am recommending keeping this clean and dry with dressing changes. Patient was provided with appropriate dressing changes. We will see her back in 2 days. We are going to change her antibiotic from doxycycline to clindamycin. If symptoms worsen, she will give us a call. We will reassess on Thursday to see if any anterior incision and drainage needs to be done. Orders Placed This Encounter   Medications    clindamycin (CLEOCIN) 300 MG capsule     Sig: Take 1 capsule by mouth in the morning and 1 capsule before bedtime. Do all this for 7 days. Dispense:  14 capsule     Refill:  0    oxyCODONE (ROXICODONE) 5 MG immediate release tablet     Sig: Take 1 tablet by mouth every 6 hours as needed for Pain for up to 7 days. Intended supply: 7 days. Take lowest dose possible to manage pain     Dispense:  28 tablet     Refill:  0     Reduce doses taken as pain becomes manageable        No orders of the defined types were placed in this encounter. Return in about 2 days (around 7/21/2022). Haja Duenas PA-C  07/19/22      Elements of this note were created using speech recognition software. As such, errors of speech recognition may be present.

## 2022-07-21 ENCOUNTER — OFFICE VISIT (OUTPATIENT)
Dept: ORTHOPEDIC SURGERY | Age: 42
End: 2022-07-21

## 2022-07-21 DIAGNOSIS — Z09 SURGERY FOLLOW-UP EXAMINATION: ICD-10-CM

## 2022-07-21 DIAGNOSIS — Z98.1 STATUS POST CERVICAL ARTHRODESIS: Primary | ICD-10-CM

## 2022-07-21 PROCEDURE — 99024 POSTOP FOLLOW-UP VISIT: CPT | Performed by: PHYSICIAN ASSISTANT

## 2022-07-21 RX ORDER — METHYLPREDNISOLONE 4 MG/1
TABLET ORAL
Qty: 1 KIT | Refills: 0 | Status: SHIPPED | OUTPATIENT
Start: 2022-07-21 | End: 2022-09-13 | Stop reason: ALTCHOICE

## 2022-07-21 NOTE — PROGRESS NOTES
Cervical Post-op  Name: [place]  YOB: 1980  Gender: female  MRN: [id number]    CC: Follow up neck surgery    HPI: [place] is 1 1/2 week post op from,  anterior cervical discectomy and fusion surgery. She is here for wound check. She went to the emergency department on Saturday because her incision began draining. She reports that they applied Dermabond. She was placed on doxycycline. She did not really felt any improvement with her symptoms since the doxycycline. She had an itchiness around the incision. It  continued to drain. She is had some low-grade fever around 99 but not above 100. She does not feel great. She is eating and swallowing soft foods and liquids. We change her antibiotics to clindamycin. We redressed her incision and had her come in today for a follow-up. She reports the drainage has significantly diminished. She is swallowing well. Fevers have resolved. She is feeling better however she has itching all around the incision. Current Outpatient Medications:     methylPREDNISolone (MEDROL DOSEPACK) 4 MG tablet, Take by mouth as directed., Disp: 1 kit, Rfl: 0    clindamycin (CLEOCIN) 300 MG capsule, Take 1 capsule by mouth in the morning and 1 capsule before bedtime. Do all this for 7 days. , Disp: 14 capsule, Rfl: 0    oxyCODONE (ROXICODONE) 5 MG immediate release tablet, Take 1 tablet by mouth every 6 hours as needed for Pain for up to 7 days. Intended supply: 7 days.  Take lowest dose possible to manage pain, Disp: 28 tablet, Rfl: 0    acetaminophen (TYLENOL) 500 MG tablet, Take 1,000 mg by mouth every 6 hours as needed for Pain, Disp: , Rfl:     lamoTRIgine (LAMICTAL) 100 MG tablet, Take 100 mg by mouth daily, Disp: , Rfl:     candesartan (ATACAND) 16 MG tablet, Take 16 mg by mouth at bedtime, Disp: , Rfl:     Betahistine HCl POWD, Take by mouth in the morning and at bedtime Oral pill; vestibular migraine, Disp: , Rfl: PROGESTERONE PO, Take by mouth at bedtime Progesterone with testosterone denny (dissolvable pill), Disp: , Rfl:     venlafaxine (EFFEXOR XR) 75 MG extended release capsule, Take 75 mg by mouth daily, Disp: , Rfl:     MAGNESIUM PO, Take 2 tablets by mouth daily, Disp: , Rfl:     albuterol sulfate  (90 Base) MCG/ACT inhaler, Inhale 2 puffs into the lungs every 4 hours as needed, Disp: , Rfl:     ascorbic acid (VITAMIN C) 500 MG tablet, Take 500 mg by mouth daily, Disp: , Rfl:     clonazePAM (KLONOPIN) 0.5 MG tablet, Take 0.5 mg by mouth daily as needed. , Disp: , Rfl:     fexofenadine (ALLEGRA) 180 MG tablet, Take 180 mg by mouth, Disp: , Rfl:     indomethacin (INDOCIN) 25 MG capsule, TAKE 1-2 TABLETS BY MOUTH 3 TIMES DAILY AS NEEDED FOR MIGRAINE. LIMIT 2 DAYS/WEEK. TAKE WITH FOOD, Disp: , Rfl:     mometasone (NASONEX) 50 MCG/ACT nasal spray, 1 spray by Nasal route at bedtime , Disp: , Rfl:     ondansetron (ZOFRAN-ODT) 4 MG disintegrating tablet, Take 4 mg by mouth every 8 hours as needed, Disp: , Rfl:     thyroid (ARMOUR THYROID) 30 MG tablet, TAKE 1 TABLET BY MOUTH EVERY DAY IN THE MORNING, Disp: , Rfl:     tiZANidine (ZANAFLEX) 4 MG tablet, TAKE 1 TABLET BY MOUTH EVERY 8 HOURS AS NEEDED, Disp: , Rfl:     Allergies   Allergen Reactions    Diazepam Other (See Comments)     Suicidal thoughts       Macadamia Nut Oil Shortness Of Breath and Swelling     Lips swell    Any stone fruits, for ex peaches, cherries, plums    Penicillins Anaphylaxis     Has not been able to tolerate cephalosporins     Pineapple Shortness Of Breath     wheezing    Sumatriptan Shortness Of Breath    Theophylline Shortness Of Breath    Cephalexin Other (See Comments)     Other reaction(s): Dizziness    Sulfa Antibiotics Rash     Other reaction(s): Vomiting    Topiramate Other (See Comments)     Caused disorientation, loss of feeling in left leg (numbness)    Tramadol Rash    Verapamil Other (See Comments)     Lowers blood pressure significantly beyond normal hypotension      [place] Oil Swelling     Causes lips to swell       Pneumococcal Vaccine Swelling    Triamcinolone Acetonide Hives     Kenalog injection caused hives     Vitamin B12 Other (See Comments)     Injection Only  swelling     Past Medical History:   Diagnosis Date    Adverse effect of anesthesia     \"a little goes a long way with me\"     Arthritis     spine and hands     Asthma     last attack 05/2019; inhaler prn     BMI 36.0-36.9,adult     Chronic pain     chronic headache  beta histidine for this    Hyperlipidemia     no meds     Migraines     candesartan for this - takes at night    Movement disorder     loses where body parts are at times    PONV (postoperative nausea and vomiting)     nauseous upon waking in PACU     Thyroid disease     hypo-on med    Vertigo     Vestibular nerve disorder 2008    migraines - and nerve damage as well       Review of Systems: Mild fever, no chills, chest pain or SOB      OBJECTIVE:    Alert and oriented. Mood and affect are appropriate. Oriented to person, place, and time. Respirations are unlabored    Sensory testing reveals intact sensation to light touch in the distribution of the C5-T1 dermatomes bilaterally. Strength testing in the upper extremity reveals the following based on the 5 point grading scale:     Delt(C5) Bicep(C6) WE(C6) Tricep (C7) WF(C7) (C8) Int (T1)   Right 5 5 5 5 5 5 5   Left 5 5 5 5 5 5 5     The Tegaderm was removed. She has some blisters from the area of the Tegaderm. She still has small blisters where the Dermabond was. The overall erythema has improved. The dressing had very minimal drainage after 24 hours. I cannot express any drainage. Gait is normal.        ASSESSMENT/PLAN:      ICD-10-CM    1. Status post cervical arthrodesis  Z98.1       2. Surgery follow-up examination  Z09         Her incision looks much better.   She does have reaction from the Dermabond placement and from the Tegaderm. I am recommending that she leave the dressing off unless she is to shower and that we provided her with Medipore tape to secure only for showering. Does not look infected but more of an irritant from Tegaderm and Dermabond. She will follow-up with Patti Leija next week and then she has her follow-up with Dr. Daniel Ken on August 1. Orders Placed This Encounter   Medications    methylPREDNISolone (MEDROL DOSEPACK) 4 MG tablet     Sig: Take by mouth as directed. Dispense:  1 kit     Refill:  0        No orders of the defined types were placed in this encounter. Return in about 1 week (around 7/28/2022) for Wound check with Patti Leija. Radha Mac PA-C  07/21/22      Elements of this note were created using speech recognition software. As such, errors of speech recognition may be present.

## 2022-07-25 ENCOUNTER — OFFICE VISIT (OUTPATIENT)
Dept: ORTHOPEDIC SURGERY | Age: 42
End: 2022-07-25

## 2022-07-25 DIAGNOSIS — Z09 FOLLOW-UP EXAMINATION FOLLOWING SURGERY: Primary | ICD-10-CM

## 2022-07-25 PROCEDURE — 99024 POSTOP FOLLOW-UP VISIT: CPT | Performed by: NURSE PRACTITIONER

## 2022-07-25 NOTE — PROGRESS NOTES
Incision check:     Patient comes in today for a quick ACDF incision check. She has been evaluated twice by LUISA Arenas status post ACDF surgery by Dr. Anila Walker. She had a significant reaction to Dermabond and increased pain with a low-grade fever. She was initially treated in the emergency room placed on doxycycline which provided no benefit. Aletha removed the Dermabond and treated her with a steroid pack and clindamycin. She states she is had no fevers. She is having no pain. No abnormalities in swallowing. On exam today her incision looks much better than it did last week. Has very minimal irritation where the Dermabond was. Incision is well approximated and healed and closed. There is no drainage there is no significant redness. I told her to complete her last 2 days of clindamycin and follow-up with Dr. Anila Walker as scheduled.

## 2022-08-01 ENCOUNTER — OFFICE VISIT (OUTPATIENT)
Dept: ORTHOPEDIC SURGERY | Age: 42
End: 2022-08-01

## 2022-08-01 DIAGNOSIS — Z98.1 STATUS POST CERVICAL ARTHRODESIS: Primary | ICD-10-CM

## 2022-08-01 PROCEDURE — 99024 POSTOP FOLLOW-UP VISIT: CPT | Performed by: ORTHOPAEDIC SURGERY

## 2022-08-01 NOTE — PROGRESS NOTES
Name: Everrett Boeck  YOB: 1980  Gender: female  MRN: [id number]  Age: 43 y.o. Chief Complaint: Neck surgery follow up. History of present illness:    Everrett Boeck is here for 3-week follow up of her  anterior cervical discectomy and fusion surgery. She reports significant relief of preoperative upper extremity pain, weakness and parasthesias. The wound is benign. However, she did have significant reaction to the Dermabond. Medications:   Current Outpatient Medications   Medication Sig    methylPREDNISolone (MEDROL DOSEPACK) 4 MG tablet Take by mouth as directed. acetaminophen (TYLENOL) 500 MG tablet Take 1,000 mg by mouth every 6 hours as needed for Pain    lamoTRIgine (LAMICTAL) 100 MG tablet Take 100 mg by mouth daily    candesartan (ATACAND) 16 MG tablet Take 16 mg by mouth at bedtime    Betahistine HCl POWD Take by mouth in the morning and at bedtime Oral pill; vestibular migraine    PROGESTERONE PO Take by mouth at bedtime Progesterone with testosterone denny (dissolvable pill)    venlafaxine (EFFEXOR XR) 75 MG extended release capsule Take 75 mg by mouth daily    MAGNESIUM PO Take 2 tablets by mouth daily    albuterol sulfate  (90 Base) MCG/ACT inhaler Inhale 2 puffs into the lungs every 4 hours as needed    ascorbic acid (VITAMIN C) 500 MG tablet Take 500 mg by mouth daily    clonazePAM (KLONOPIN) 0.5 MG tablet Take 0.5 mg by mouth daily as needed.     fexofenadine (ALLEGRA) 180 MG tablet Take 180 mg by mouth    indomethacin (INDOCIN) 25 MG capsule TAKE 1-2 TABLETS BY MOUTH 3 TIMES DAILY AS NEEDED FOR MIGRAINE. LIMIT 2 DAYS/WEEK. TAKE WITH FOOD    mometasone (NASONEX) 50 MCG/ACT nasal spray 1 spray by Nasal route at bedtime     ondansetron (ZOFRAN-ODT) 4 MG disintegrating tablet Take 4 mg by mouth every 8 hours as needed    thyroid (ARMOUR THYROID) 30 MG tablet TAKE 1 TABLET BY MOUTH EVERY DAY IN THE MORNING    tiZANidine (ZANAFLEX) 4 MG tablet TAKE 1 appearance, status post cervical fusion. Diagnosis:      ICD-10-CM    1. Status post cervical arthrodesis  Z98.1           Assessment/Plan: This patient is following the expected course following cervical surgery. Once she is comfortable with neck ROM, she can begin driving. I encouraged her to walk as much as possible for exercise. A stationary bike, elliptical machine, or other low impact aerobic exercise is also encouraged. I will have her return for follow up in 2 months with repeat radiographs of the cervical spine.         Electronically Signed By Balaji Biggs MD     11:37 AM

## 2022-08-03 ENCOUNTER — TELEMEDICINE (OUTPATIENT)
Dept: BEHAVIORAL/MENTAL HEALTH CLINIC | Age: 42
End: 2022-08-03
Payer: MEDICARE

## 2022-08-03 DIAGNOSIS — F41.1 GENERALIZED ANXIETY DISORDER: ICD-10-CM

## 2022-08-03 DIAGNOSIS — F33.41 RECURRENT MAJOR DEPRESSIVE DISORDER, IN PARTIAL REMISSION (HCC): Primary | ICD-10-CM

## 2022-08-03 PROCEDURE — 99214 OFFICE O/P EST MOD 30 MIN: CPT | Performed by: PSYCHIATRY & NEUROLOGY

## 2022-08-03 RX ORDER — VENLAFAXINE HYDROCHLORIDE 75 MG/1
75 CAPSULE, EXTENDED RELEASE ORAL DAILY
Qty: 30 CAPSULE | Refills: 2 | Status: SHIPPED | OUTPATIENT
Start: 2022-08-03 | End: 2022-10-25 | Stop reason: SDUPTHER

## 2022-08-03 RX ORDER — LAMOTRIGINE 100 MG/1
100 TABLET ORAL DAILY
Qty: 30 TABLET | Refills: 2 | Status: SHIPPED | OUTPATIENT
Start: 2022-08-03 | End: 2022-10-25 | Stop reason: SDUPTHER

## 2022-08-03 RX ORDER — CLONAZEPAM 0.5 MG/1
0.5 TABLET ORAL DAILY PRN
Qty: 30 TABLET | Refills: 2 | Status: SHIPPED | OUTPATIENT
Start: 2022-08-03 | End: 2022-11-01

## 2022-08-03 ASSESSMENT — ANXIETY QUESTIONNAIRES
5. BEING SO RESTLESS THAT IT IS HARD TO SIT STILL: 0
2. NOT BEING ABLE TO STOP OR CONTROL WORRYING: 0
3. WORRYING TOO MUCH ABOUT DIFFERENT THINGS: NOT AT ALL
5. BEING SO RESTLESS THAT IT IS HARD TO SIT STILL: NOT AT ALL
6. BECOMING EASILY ANNOYED OR IRRITABLE: NOT AT ALL
3. WORRYING TOO MUCH ABOUT DIFFERENT THINGS: 0
1. FEELING NERVOUS, ANXIOUS, OR ON EDGE: SEVERAL DAYS
GAD7 TOTAL SCORE: 1
7. FEELING AFRAID AS IF SOMETHING AWFUL MIGHT HAPPEN: 0
IF YOU CHECKED OFF ANY PROBLEMS ON THIS QUESTIONNAIRE, HOW DIFFICULT HAVE THESE PROBLEMS MADE IT FOR YOU TO DO YOUR WORK, TAKE CARE OF THINGS AT HOME, OR GET ALONG WITH OTHER PEOPLE: NOT DIFFICULT AT ALL
7. FEELING AFRAID AS IF SOMETHING AWFUL MIGHT HAPPEN: NOT AT ALL
6. BECOMING EASILY ANNOYED OR IRRITABLE: 0
4. TROUBLE RELAXING: 0
4. TROUBLE RELAXING: NOT AT ALL
IF YOU CHECKED OFF ANY PROBLEMS ON THIS QUESTIONNAIRE, HOW DIFFICULT HAVE THESE PROBLEMS MADE IT FOR YOU TO DO YOUR WORK, TAKE CARE OF THINGS AT HOME, OR GET ALONG WITH OTHER PEOPLE: NOT DIFFICULT AT ALL
1. FEELING NERVOUS, ANXIOUS, OR ON EDGE: 1
2. NOT BEING ABLE TO STOP OR CONTROL WORRYING: NOT AT ALL

## 2022-08-03 ASSESSMENT — PATIENT HEALTH QUESTIONNAIRE - PHQ9
SUM OF ALL RESPONSES TO PHQ QUESTIONS 1-9: 7
SUM OF ALL RESPONSES TO PHQ QUESTIONS 1-9: 7
1. LITTLE INTEREST OR PLEASURE IN DOING THINGS: 0
6. FEELING BAD ABOUT YOURSELF - OR THAT YOU ARE A FAILURE OR HAVE LET YOURSELF OR YOUR FAMILY DOWN: 0
2. FEELING DOWN, DEPRESSED OR HOPELESS: 0
SUM OF ALL RESPONSES TO PHQ9 QUESTIONS 1 & 2: 0
5. POOR APPETITE OR OVEREATING: 0
8. MOVING OR SPEAKING SO SLOWLY THAT OTHER PEOPLE COULD HAVE NOTICED. OR THE OPPOSITE, BEING SO FIGETY OR RESTLESS THAT YOU HAVE BEEN MOVING AROUND A LOT MORE THAN USUAL: 1
3. TROUBLE FALLING OR STAYING ASLEEP: 0
1. LITTLE INTEREST OR PLEASURE IN DOING THINGS: NOT AT ALL
SUM OF ALL RESPONSES TO PHQ9 QUESTIONS 1 & 2: 0
7. TROUBLE CONCENTRATING ON THINGS, SUCH AS READING THE NEWSPAPER OR WATCHING TELEVISION: 3
9. THOUGHTS THAT YOU WOULD BE BETTER OFF DEAD, OR OF HURTING YOURSELF: 0
2. FEELING DOWN, DEPRESSED OR HOPELESS: NOT AT ALL
10. IF YOU CHECKED OFF ANY PROBLEMS, HOW DIFFICULT HAVE THESE PROBLEMS MADE IT FOR YOU TO DO YOUR WORK, TAKE CARE OF THINGS AT HOME, OR GET ALONG WITH OTHER PEOPLE: 2
SUM OF ALL RESPONSES TO PHQ QUESTIONS 1-9: 7
4. FEELING TIRED OR HAVING LITTLE ENERGY: 3
SUM OF ALL RESPONSES TO PHQ QUESTIONS 1-9: 7

## 2022-08-03 ASSESSMENT — LIFESTYLE VARIABLES
PAST THREE MONTHS WHAT IS THE LARGEST AMOUNT OF ALCOHOLIC DRINKS YOU HAVE CONSUMED IN ONE DAY: 1
HAVE YOU EVER RECEIVED ALCOHOL OR OTHER DRUG ABUSE TREATMENT: NO
ALCOHOL_DAYS_PER_WEEK: 0
HISTORY_ALCOHOL_USE: NO

## 2022-08-03 NOTE — PROGRESS NOTES
Patient:  Demetrius Klinefelter  Age:  43 y.o.  :  1980     SEX:  female MRN:  [id number]     RACE: White (non-)     SEEN:  [x]  PATIENT  []  SPOUSE []  OTHER:              PHQ-9  8/3/2022 2022 2022   Little interest or pleasure in doing things 0 0 -   Little interest or pleasure in doing things - - 1   Feeling down, depressed, or hopeless 0 0 -   Trouble falling or staying asleep, or sleeping too much 0 - -   Trouble falling or staying asleep, or sleeping too much - - 1   Feeling tired or having little energy 3 - -   Feeling tired or having little energy - - 3   Poor appetite or overeating 0 - -   Poor appetite, weight loss, or overeating - - 0   Feeling bad about yourself - or that you are a failure or have let yourself or your family down 0 - -   Feeling bad about yourself - or that you are a failure or have let yourself or your family down - - 0   Trouble concentrating on things, such as reading the newspaper or watching television 3 - -   Trouble concentrating on things such as school, work, reading, or watching TV - - 3   Moving or speaking so slowly that other people could have noticed. Or the opposite - being so fidgety or restless that you have been moving around a lot more than usual 1 - -   Moving or speaking so slowly that other people could have noticed; or the opposite being so fidgety that others notice - - 0   Thoughts that you would be better off dead, or of hurting yourself in some way 0 - -   Thoughts of being better off dead, or hurting yourself in some way - - 0   PHQ-2 Score 0 0 -   Total Score PHQ 2 - - 1   PHQ-9 Total Score 7 0 -   PHQ 9 Score - - 8   If you checked off any problems, how difficult have these problems made it for you to do your work, take care of things at home, or get along with other people?  2 - -   How difficult have these problems made it for you to do your work, take care of your home and get along with others - - Very difficult BRENDA-7 SCREENING 8/3/2022 1/31/2022   Feeling nervous, anxious, or on edge Several days -   Not being able to stop or control worrying Not at all -   Worrying too much about different things Not at all -   Trouble relaxing Not at all -   Being so restless that it is hard to sit still Not at all -   Becoming easily annoyed or irritable Not at all -   Feeling afraid as if something awful might happen Not at all -   BRENDA-7 Total Score 1 -   How difficult have these problems made it for you to do your work, take care of things at home, or get along with other people? Not difficult at all Very difficult   Feeling nervous, anxious, or on edge - Not at all   BRENDA-7 Total Score - 1        I was at home while conducting this encounter. Consent:  She and/or her healthcare decision maker is aware that this patient-initiated Telehealth encounter is a billable service, with coverage as determined by her insurance carrier. She is aware that she may receive a bill and has provided verbal consent to proceed: YesPatient identification was verified, and a caregiver was present when appropriate. The patient was located in a state where the provider was credentialed to provide care. This virtual visit was conducted via [employer]. Pursuant to the emergency declaration under the [facility] [place] waiver authority and the [employer] Act, this Virtual  Visit was conducted to reduce the patient's risk of exposure to COVID-19 and provide continuity of care for an established patient. Services were provided through a video synchronous discussion virtually to substitute for in-person clinic visit. Due to this being a TeleHealth evaluation, many elements of the physical examination are unable to be assessed. Total Time: minutes: 11-20 minutes. Chief complaint:  Pt says she had a cervical spinal fusion in July.   Recovering from it.    Subjective:  Seen virtually for follow-up.  had a cervical spinal fusion on July 12. Using Tylenol and icing for pain. Also uses muscle relaxant. Not taking any opioids.  not going numb on the left side and feels much better. He is able to hold her left arm without pain. Right arm still has some nerve prolapse.  has brain fog and concentration issues. Does not know if that is from the medications. She is vaccinated and boosted and did not have COVID.  is off from work.  surgery setback her vestibular symptoms. Has cut down the dose of Effexor and it is working better for her. She did have some hot flashes and vivid dreams after she decreased the dose. But it has subsided. She lives with her parents and they are helping her after surgery. Supportive psychotherapy provided. She denies suicidal ideation/homicidal ideations. Denies symptoms psychosis    Patient Active Problem List   Diagnosis    Left leg weakness    Dysphagia    Neck pain    Severe obesity (HCC)    Hypothyroidism due to acquired atrophy of thyroid    Traumatic tear of left rotator cuff    Fatigue    Hyperlipidemia    Migraine variant    Trigeminal neuralgia    Chronic migraine with aura    Drug reaction    Cervical radiculopathy    Left shoulder pain    Carpal tunnel syndrome, bilateral    Asthma    Vestibular migraine    Major depressive disorder, single episode, moderate (HCC)    S/P cholecystectomy    Arm paresthesia, left    Dysmenorrhea    Conversion disorder    Generalized anxiety disorder    Cervicocranial syndrome    Family history of colonic polyps    Vertigo    Movement disorder    Vitamin D deficiency    Cervical spinal stenosis     LMP 7/18/22, Birth control,   Not pregnant    Denies palpitation,SOB, Chest pain, headaches. In no acute distress.        MEDICATION REVIEW:    Current Medications:    Current Outpatient Medications   Medication Sig    lamoTRIgine (LAMICTAL) 100 MG tablet Take 1 tablet by mouth in the morning. venlafaxine (EFFEXOR XR) 75 MG extended release capsule Take 1 capsule by mouth in the morning. clonazePAM (KLONOPIN) 0.5 MG tablet Take 1 tablet by mouth daily as needed for Anxiety for up to 90 days. acetaminophen (TYLENOL) 500 MG tablet Take 1,000 mg by mouth every 6 hours as needed for Pain    candesartan (ATACAND) 16 MG tablet Take 16 mg by mouth at bedtime    Betahistine HCl POWD Take by mouth in the morning and at bedtime Oral pill; vestibular migraine    PROGESTERONE PO Take by mouth at bedtime Progesterone with testosterone denny (dissolvable pill)    MAGNESIUM PO Take 2 tablets by mouth daily    albuterol sulfate  (90 Base) MCG/ACT inhaler Inhale 2 puffs into the lungs every 4 hours as needed    ascorbic acid (VITAMIN C) 500 MG tablet Take 500 mg by mouth daily    fexofenadine (ALLEGRA) 180 MG tablet Take 180 mg by mouth    indomethacin (INDOCIN) 25 MG capsule TAKE 1-2 TABLETS BY MOUTH 3 TIMES DAILY AS NEEDED FOR MIGRAINE. LIMIT 2 DAYS/WEEK. TAKE WITH FOOD    mometasone (NASONEX) 50 MCG/ACT nasal spray 1 spray by Nasal route at bedtime     ondansetron (ZOFRAN-ODT) 4 MG disintegrating tablet Take 4 mg by mouth every 8 hours as needed    thyroid (ARMOUR) 30 MG tablet TAKE 1 TABLET BY MOUTH EVERY DAY IN THE MORNING    tiZANidine (ZANAFLEX) 4 MG tablet TAKE 1 TABLET BY MOUTH EVERY 8 HOURS AS NEEDED    methylPREDNISolone (MEDROL DOSEPACK) 4 MG tablet Take by mouth as directed. (Patient not taking: Reported on 8/3/2022)     No current facility-administered medications for this visit.        Allergies   Allergen Reactions    Diazepam Other (See Comments)     Suicidal thoughts       Macadamia Nut Oil Shortness Of Breath and Swelling     Lips swell    Any stone fruits, for ex peaches, cherries, plums    Penicillins Anaphylaxis     Has not been able to tolerate cephalosporins     Pineapple Shortness Of Breath     wheezing    Sumatriptan Shortness Of Breath Theophylline Shortness Of Breath    Cephalexin Other (See Comments)     Other reaction(s): Dizziness    Sulfa Antibiotics Rash     Other reaction(s): Vomiting    Topiramate Other (See Comments)     Caused disorientation, loss of feeling in left leg (numbness)    Tramadol Rash    Verapamil Other (See Comments)     Lowers blood pressure significantly beyond normal hypotension      [place] Oil Swelling     Causes lips to swell       Dermabond Hives    Pneumococcal Vaccine Swelling    Tegaderm Ag Mesh [Silver] Hives    Triamcinolone Acetonide Hives     Kenalog injection caused hives     Vitamin B12 Other (See Comments)     Injection Only  swelling       Past Medical History, Past Surgical History, Family history, Social History, and Medications were all reviewed with the patient today and updated as necessary.      Compliant with medication: Yes   Side effects from medications:  No     EXAMINATION  Musculoskeletal    GAIT AND STATION   [x] WNL   [] RESTRICTED   [] UNSTEADY WALK        [] ABNORMAL   [] UNBALANCED         PSYCHIATRIC     GENERAL APPEARANCE:   [x]  WELL GROOMED []     DISHEVELED   []  UNKEMPT      []  UNUSUAL/BIZZARE    [] WNL       ATTITUDE:   [x] COOPERATIVE   [] GUARDED   [] SUSPICIOUS      [] HOSTILE                            BEHAVIOR:   [x] CALM   [] HYPERACTIVE   [] MANNERISMS      [] BIZZARE         SPEECH:   [x] NORMAL FOR CLIENT   [] SPONTANEOUS   [] SLURRED   [] WHISPERING      [] LOUD   [] PRESSURED   [] ARTICULATE       EYE CONTACT:   [x] WNL   [] BLANK STARE   [] INTENSE      [] AVOIDANT         MOOD:   [x] EUTHYMIC   [] ANXIOUS   [] DEPRESSED      [] IRRITABLE   [] ANGRY   [] APATHETIC     AFFECT:   [x] CONGRUENT WITH MOOD   [] FLAT   [] CONSTRICTED      [] INAPPROPRIATE   [] LABILE           THOUGHT PROCESS:   [x] LOGICAL/GOAL-DIRECTED   [] FOI   [] CIRCUMSANTIAL      [] INCOHERENT   [] TANGENTIAL   [] CONCRETE      [] PERSEVERATION           THOUGHT CONTENT:                DELUSIONS  [x] DENIES  [] GRANDIOSE  [] PERSECUTORY  [] Mormonism  [] REFERENCE   HALLUCINATIONS  [x] DENIES  [] AUDITORY  [] VISUAL  [] OLFACTORY  [] TACTILE     [] GUSTATORY  [] SOMATIC         OBSESSIONS  [x] DENIES  [] PRESENT         SUICIDAL IDEATION  [x] DENIES  [] PRESENT W/O PLAN  [] PRESENT W/ PLAN       HOMICIDAL IDEATION  [x] DENIES  [] PRESENT W/O PLAN  [] PRESENT W/ PLAN           JUDGEMENT:   [x] GOOD   [] FAIR   [] POOR   INSIGHT:   [x] GOOD   [] FAIR   [] POOR     COGNITION:           SENSORIUM:   [x] ALERT   [] CLOUDED   [] DROWSY     ORIENTATION:   [x] INTACT   [] TIME:  PLACE  PERSON   RECENT & REMOTE MEMORY:   [] NORMAL   [x] OTHER:                  ATTENTION:   [] INTACT   [x] MILD IMPAIRMENT   [] SEVERE IMPAIRMENT     CONCENTRATION:   [] INTACT   [x] MILD IMPAIRMENT   [] SEVERE IMPAIRMENT     LANGUAGE:   [x] AVERAGE   [] ABOVE AVERAGE   [] BELOW AVERAGE     FUND OF KNOWLEDGE:   [] UNABLE TO ASSESS AT THIS TIME   [x] AVERAGE   [] ABOVE AVERAGE   [] BELOW AVERAGE      [] GOOD TO EXCELLENT KNOWLEDGE OF CURRENT EVENTS   [] POOR TO NO KNLEDGE OF CURRENT EVENTS           ABNORMAL MOVEMENTS:   [x] NONE   [] TICS   [] TREMORS   [] BIZZARE      [] FACE   [] TRUNK   [] EXTREMETIES   [] GESTURES        SLEEP:   [x] GOOD   [] FAIR   [] POOR      MUSCLE STRENGTH AND TONE   [] WNL   [] ATROPHY   [] SPASTIC        [] FLACCID   [] COGWHEEL         Diagnoses/Impressions:    ICD-10-CM    1. Recurrent major depressive disorder, in partial remission ([facility] [address]  F33.41       2.  Generalized anxiety disorder  F41.1 clonazePAM (KLONOPIN) 0.5 MG tablet          TREATMENT GOALS:  Symptom reduction, Medication adherence, maintain therapeutic gains    LABS/IMAGING:    []  Ordered [x]  Reviewed []  New Labs Ordered:     LAB  WBC   Date/Time Value Ref Range Status   06/30/2022 01:39 PM 10.3 4.3 - 11.1 K/uL Final     Hemoglobin   Date/Time Value Ref Range Status   06/30/2022 01:39 PM 14.2 11.7 - 15.4 g/dL Final     Hematocrit   Date/Time Value Ref Range Status   06/30/2022 01:39 PM 40.7 35.8 - 46.3 % Final     Platelets   Date/Time Value Ref Range Status   06/30/2022 01:39  150 - 450 K/uL Final     Sodium   Date/Time Value Ref Range Status   06/30/2022 01:39  136 - 145 mmol/L Final     Potassium   Date/Time Value Ref Range Status   06/30/2022 01:39 PM 3.9 3.5 - 5.1 mmol/L Final     Chloride   Date/Time Value Ref Range Status   06/30/2022 01:39  98 - 107 mmol/L Final     CO2   Date/Time Value Ref Range Status   06/30/2022 01:39 PM 25 21 - 32 mmol/L Final     BUN   Date/Time Value Ref Range Status   06/30/2022 01:39 PM 13 6 - 23 MG/DL Final     ALT   Date/Time Value Ref Range Status   06/01/2022 01:01 PM 36 12 - 65 U/L Final     AST   Date/Time Value Ref Range Status   06/01/2022 01:01 PM 16 15 - 37 U/L Final       Please refer to the lab tab in the epic and care everywhere for the most recent lab results. Plan:     [x]  Medication ordered: Klonopin, Lamictal, Effexor to target depression, anxiety, mood stabilization, insomnia    [x]  Medication education/counseling provided  Medication dosage and time to take, purpose/expected benefits/risks, common side effects, lab monitoring required and reason, expected length of treatment, risk of no treatment, effects on pregnancy/nursing, financial availability. Educated patient on  side effects/risks/benefits of meds including cardiac arrhythmias, suicidal ideations,  orthostatic hypotension, serotonin syndrome, risk of nicole/hypomania from antidepressants, withdrawals from abrupt discontinuation of meds, risk of rash, risk of infection, risk of bleeding, risk of seizures, addiction potential, memory impairment with long term use of benzos, respiratory depression, high blood pressure, dizziness, drowsiness, sedation , risk of falls, Risk & benefits discussed: including but not limited to possible off-label medication usage.        [x] Follow MSE for sxs improvement     I have reviewed the patients controlled substance prescription history, as maintained in the [place] prescription monitoring program, so that the prescription(s) for a  controlled substance can be given. Recommendations and Referrals: Follow up with : MD, requires monitoring of response to medication, requires monitoring of medication side effects. Time until next PMA:     Follow up with Mental Health Clinicians: psychotherapy interventions, improve level of functioning, monitoring to prevent decompensation /hospitalization, monitoring to maintain therapeutic gains, symptoms (resolving and controlled)           Psychotherapy note:                                __10_ Minutes of psychotherapy     [x]  Supportive psychotherapy, Patient discussed certain situational and personal stressors ongoing in her life at this time, weight management d/w the patient. Sleep hygiene d/w patient. Patient allowed to vent out her emotions. Scenarios were reviewed using role playing and CBT techniques in order to increase insight and decrease anxiety. []  Disposition planning      []  Dangerous and will not contract for safety in the community    **Pateint has been notified: They are to call 911 or go to their nearest E.R. if they are experiencing a medical emergency or suicidal ideations/homicidal ideations. **  All ancillary documentation entered reviewed by provider. PLEASE NOTE:  This document has been produced in part or whole using voice recognition software. Proofread however unrecognized errors in transcription may be present.         Armida Capps MD            Had cervical spinal fusion on 7/12/22

## 2022-08-19 ENCOUNTER — HOSPITAL ENCOUNTER (OUTPATIENT)
Dept: PHYSICAL THERAPY | Age: 42
Setting detail: RECURRING SERIES
Discharge: HOME | End: 2022-08-22
Payer: MEDICARE

## 2022-08-19 PROCEDURE — 97162 PT EVAL MOD COMPLEX 30 MIN: CPT

## 2022-08-19 ASSESSMENT — PAIN DESCRIPTION - LOCATION: LOCATION: HEAD

## 2022-08-19 ASSESSMENT — PAIN SCALES - GENERAL: PAINLEVEL_OUTOF10: 2

## 2022-08-19 ASSESSMENT — PAIN DESCRIPTION - DESCRIPTORS: DESCRIPTORS: ACHING

## 2022-08-19 NOTE — THERAPY EVALUATION
Cristin Minus  : 1980  Primary: Nimishaamado Mcduffie Ppo  Secondary:  Mckay Coyle [address] [place] [address] Way [address]  Phone: [phone]  Fax: [phone] Plan Frequency: 2 times a week for 90 days    Plan of Care/Certification Expiration Date: 22      PT Visit Info: Total # of Visits to Date: 1      Visit Count:  1    OUTPATIENT PHYSICAL THERAPY:OP NOTE TYPE: Initial Assessment 2022               Episode  Appt Desk         Treatment Diagnosis:  Difficulty in walking, Not elsewhere classified (R26.2)  Generalized Muscle Weakness (M62.81)  Other abnormalities of gait and mobility (R26.89)  Medical/Referring Diagnosis:  No admission diagnoses are documented for this encounter. Referring Physician:  Roberta Carl MD Orders:  PT Eval and Treat   Return MD Appt:  unknown   Date of Onset:  Onset Date:  (Chronic)     Allergies:  Diazepam, Macadamia nut oil, Penicillins, Pineapple, Sumatriptan, Theophylline, Theophyllines, Cephalexin, Sulfa antibiotics, Topiramate, Tramadol, Verapamil, [place] oil, Augmentin [amoxicillin-pot clavulanate], Dermabond, Keflex [cephalexin], Kenalog [triamcinolone acetonide], Nuts [peanut-containing drug products], Pneumococcal vaccine, Sulfa antibiotics, Tegaderm ag mesh [silver], Topiramate, Triamcinolone acetonide, Ultram [tramadol], Valium [diazepam], and Vitamin b12  Restrictions/Precautions:    No data recordedNo data recorded   Medications Last Reviewed:  2022     SUBJECTIVE   History of Injury/Illness (Reason for Referral):  Patient reports more spinning dizziness, imbalance, and difficulty walking. Aggravating factors are head turns and body turns. Patient has had no falls recently. Patient rates dizziness as 4/10 and pain level as 2/10. Patient  had anterior discectomy and cervical fusion on 2022.   Patient reports needing more assistance with laundry, cooking, and cleaning. Patient Stated Goal(s): \"To decrease spinning dizziness with movements\". Initial:   Head 2/10 Post Session:   Head 2/10  Past Medical History/Comorbidities:   Ms. Miguelina Jeronimo  has a past medical history of Adverse effect of anesthesia, Arthritis, Arthritis, Asthma, BMI 36.0-36.9,adult, Chronic pain, Depression, Hyperlipidemia, Hypertension, Migraines, Movement disorder, PONV (postoperative nausea and vomiting), Thyroid disease, Vertigo, and Vestibular nerve disorder. Ms. Miguelina Jeronimo  has a past surgical history that includes Cholecystectomy; eye muscle surgery; joint replacement; Cervical disc arthroplasty; orthopedic surgery; heent; Cholecystectomy; and cervical fusion (N/A, 7/12/2022). Social History/Living Environment:   Lives With: Alone  Type of Home: House     Prior Level of Function/Work/Activity:   Prior level of function: Independent  Occupation: Part time employment  No data recordedNo data recorded   Learning:   Does the patient/guardian have any barriers to learning?: No barriers  Will there be a co-learner?: No  What is the preferred language of the patient/guardian?: English  Is an  required?: No  How does the patient/guardian prefer to learn new concepts?: Listening; Reading; Demonstration   Fall Risk Scale: Total Score: 65  [place] Fall Risk: High (45 and higher)     Dominant Side:  right handed      OBJECTIVE   Balance / Vestibular:   Sensation:   Bilateral lower extremity within normal limits. Strength:  Hip flexion 4/5, hip abduction 4+/5, hip adduction 4/5, quadriceps 5/5, hamstrings 5/5, ankle dorsiflexion 5/5, ankle plantarflexion 5/5. Balance Outcome Measures:  Barrera Balance Scale:  22 /56  (A score less than 45/56 indicates high risk of falls)  ASSESSMENT   Initial Assessment:  Patient presents with imbalance, dizziness, difficulty walking, and decreased mobility.   Patient is at higher fall risk based on scores received on Barrera Balance Scale and Timed Up and Go Test. Patient would benefit from skilled physical therapy to address problems and goals. Thank you for this referral.   Problem List: (Impacting functional limitations): Body Structures, Functions, Activity Limitations Requiring Skilled Therapeutic Intervention: Decreased functional mobility ; Decreased strength; Decreased balance; Vestibular Impairment ,  manual therapy  Therapy Prognosis:   Therapy Prognosis: Good     Assessment Complexity:   Medium Complexity  PLAN   Effective Dates: 8/19/2022 TO Plan of Care/Certification Expiration Date: 11/17/22     Frequency/Duration: Plan Frequency: 2 times a week for 90 days     Interventions Planned (Treatment may consist of any combination of the following):    Current Treatment Recommendations: Strengthening; Balance training; Functional mobility training; Gait training; Home exercise program; Vestibular rehab manual therapy  Goals: (Goals have been discussed and agreed upon with patient.)  Short-Term Functional Goals: Time Frame: 30 days   Patient will be independent with home exercise program to improve balance and decrease dizziness. Patient will score less than or equal to 18 seconds on Timed Up and Go Test indicating improved gait speed. Discharge Goals: Time Frame: 90 days   Patient will score less than or equal to 13 seconds on Timed Up and Go Test indicating improved gait speed. Patient will increase score on Barrera Balance Scale greater than or equal to 30/56 demonstrating improved balance and decreased fall risk with daily activities. Patient will report needing less assistance with laundry and cooking. Outcome Measure: Tool Used: Barrera Balance Scale  Score:  Initial: 22/56 Most Recent: X/56 (Date: -- )   Interpretation of Score: Each section is scored on a 0-4 scale, 0 representing the patients inability to perform the task and 4 representing independence. The scores of each section are added together for a total score of 56.   The higher the patients score, the more independent the patient is. Any score below 45 indicates increased risk for falls. Tool Used: Timed Up and Go (TUG)  Score:  Initial: 21.5 seconds Most Recent: X seconds (Date: -- )   Interpretation of Score: The test measures, in seconds, the time taken by an individual to stand up from a standard arm chair (seat height 46 cm [18 in], arm height 65 cm [25.6 in]), walk a distance of 3 meters (118 in, approx 10 ft), turn, walk back to the chair and sit down. If the individual takes longer than 14 seconds to complete TUG, this indicates risk for falls. Medical Necessity:   > Patient is expected to demonstrate progress in strength and balance to improve safety during activities of daily living. Reason For Services/Other Comments:  > Patient continues to require skilled intervention due to higher fall risk with daily activities. Total Duration:  Time In: 1345  Time Out: 1420    Regarding Amanda Celaya's therapy, I certify that the treatment plan above will be carried out by a therapist or under their direction.   Thank you for this referral,  Chang Box, PT     Referring Physician Signature: Christel Quach _______________________________ Date _____________        Post Session Pain  Charge Capture  PT Visit Info MD Олег Ramirez

## 2022-08-23 ENCOUNTER — HOSPITAL ENCOUNTER (OUTPATIENT)
Dept: PHYSICAL THERAPY | Age: 42
Setting detail: RECURRING SERIES
Discharge: HOME | End: 2022-08-26
Payer: MEDICARE

## 2022-08-23 PROCEDURE — 97112 NEUROMUSCULAR REEDUCATION: CPT

## 2022-08-23 ASSESSMENT — PAIN SCALES - GENERAL: PAINLEVEL_OUTOF10: 2

## 2022-08-23 ASSESSMENT — PAIN DESCRIPTION - LOCATION: LOCATION: NECK

## 2022-08-23 NOTE — PROGRESS NOTES
Tierney Arboleda  : 1980  Primary: Bonita Kinney Ppo  Secondary:  Lashay Cedillo  [address] [address]  Phone: [phone]  Fax: [phone] Plan Frequency: 2 times a week for 90 days    Plan of Care/Certification Expiration Date: 22      PT Visit Info: Total # of Visits to Date: 2  Progress Note Counter: 2     Visit Count:  2   OUTPATIENT PHYSICAL THERAPY:OP NOTE TYPE: Treatment Note 2022       Episode  }Appt Desk             Treatment Diagnosis:  Difficulty in walking, Not elsewhere classified (R26.2)  Generalized Muscle Weakness (M62.81)  Other abnormalities of gait and mobility (R26.89)  Medical/Referring Diagnosis:  No admission diagnoses are documented for this encounter. Referring Physician:  Armani Styles MD Orders:  PT Eval and Treat   Date of Onset:  Onset Date:  (Chronic)     Allergies:   Diazepam, Macadamia nut oil, Penicillins, Pineapple, Sumatriptan, Theophylline, Theophyllines, Cephalexin, Sulfa antibiotics, Topiramate, Tramadol, Verapamil, [place] oil, Augmentin [amoxicillin-pot clavulanate], Dermabond, Keflex [cephalexin], Kenalog [triamcinolone acetonide], Nuts [peanut-containing drug products], Pneumococcal vaccine, Sulfa antibiotics, Tegaderm ag mesh [silver], Topiramate, Triamcinolone acetonide, Ultram [tramadol], Valium [diazepam], and Vitamin b12  Restrictions/Precautions:  No data recordedNo data recorded   Interventions Planned (Treatment may consist of any combination of the following):    Current Treatment Recommendations: Strengthening; Balance training; Functional mobility training; Gait training; Home exercise program; Vestibular rehab     Subjective Comments:  Patient reports 4/10 dizziness.   Initial:}  Neck 210Post Session:     Neck  /10  Medications Last Reviewed:  2022  Updated Objective Findings:  None Today  Treatment   NEUROMUSCULAR RE-EDUCATION: (45 minutes): Exercise/activities per grid below to improve balance and coordination. Required minimal verbal cues to promote static and dynamic balance in standing. Date:  8/23/2022 Date:   Date:     Activity/Exercise Parameters Parameters Parameters   Marching in hallway with hiking pole 4 laps     Sidestepping in hallway with hiking pole 4 laps     Walking through cones with hiking pole 4 laps     Supine to sit from table 2 reps                           Treatment/Session Summary:    Treatment Assessment:  Patient reports increased dizziness right supine to sit. Patient needed frequent rest breaks due to vertigo. Dizziness 5/10 and 2/10 pain level after treatment. Communication/Consultation:  None today  Equipment provided today:  None  Recommendations/Intent for next treatment session: Next visit will focus on balance, gait, and vestibular.     Total Treatment Billable Duration:  45 minutes  Time In: 0086  Time Out: Erwin Rodriges PT       Charge Capture  }Post Session Pain  PT Visit Info  [employer] Portal  MD Guidelines  Scanned Media  Benefits  MyChart    Future Appointments   Date Time Provider [place]   8/25/2022  9:30 AM Cassgabrielea Pace, PT [facility] SFE   8/29/2022  2:30 PM Cassandria Pace, PT [facility] SFE   9/2/2022  1:00 PM Cassandria Pace, PT [facility] SFE   9/7/2022  9:00 AM CA[facility] LAB [facility] AMB   9/8/2022  1:00 PM Cassandria Pace, PT [facility] SFE   9/12/2022  1:00 PM Cassandria Pace, PT [facility] SFE   9/13/2022  9:20 AM Pedro Canales MD [facility] AMB   9/15/2022  1:00 PM Rip Al Vissage, PT [facility] SFE   9/28/2022  1:30 PM SFE [facility] BI ROOM 4 [facility]ER[facility]AM SFE   10/3/2022 10:15 AM MD BERNARD Ashraf GVL AMB   10/25/2022 11:00 AM Quinten Marcial MD [id number] [facility] AMB

## 2022-08-25 ENCOUNTER — HOSPITAL ENCOUNTER (OUTPATIENT)
Dept: PHYSICAL THERAPY | Age: 42
Setting detail: RECURRING SERIES
Discharge: HOME | End: 2022-08-28
Payer: MEDICARE

## 2022-08-25 PROCEDURE — 97112 NEUROMUSCULAR REEDUCATION: CPT

## 2022-08-25 ASSESSMENT — PAIN DESCRIPTION - LOCATION: LOCATION: BACK;NECK

## 2022-08-25 ASSESSMENT — PAIN SCALES - GENERAL: PAINLEVEL_OUTOF10: 3

## 2022-08-25 NOTE — PROGRESS NOTES
Jonas Torres  : 1980  Primary: Norma Vale Ppo  Secondary:  Nhan File  [address] [address]  Phone: [phone]  Fax: [phone] Plan Frequency: 2 times a week for 90 days    Plan of Care/Certification Expiration Date: 22      PT Visit Info: Total # of Visits to Date: 3  Progress Note Counter: 3     Visit Count:  3   OUTPATIENT PHYSICAL THERAPY:OP NOTE TYPE: Treatment Note 2022       Episode  }Appt Desk             Treatment Diagnosis:  Difficulty in walking, Not elsewhere classified (R26.2)  Generalized Muscle Weakness (M62.81)  Other abnormalities of gait and mobility (R26.89)  Medical/Referring Diagnosis:  No admission diagnoses are documented for this encounter. Referring Physician:  Corey Zuluaga MD Orders:  PT Eval and Treat   Date of Onset:  Onset Date:  (Chronic)     Allergies:   Diazepam, Macadamia nut oil, Penicillins, Pineapple, Sumatriptan, Theophylline, Theophyllines, Cephalexin, Sulfa antibiotics, Topiramate, Tramadol, Verapamil, [place] oil, Augmentin [amoxicillin-pot clavulanate], Dermabond, Keflex [cephalexin], Kenalog [triamcinolone acetonide], Nuts [peanut-containing drug products], Pneumococcal vaccine, Sulfa antibiotics, Tegaderm ag mesh [silver], Topiramate, Triamcinolone acetonide, Ultram [tramadol], Valium [diazepam], and Vitamin b12  Restrictions/Precautions:  No data recordedNo data recorded   Interventions Planned (Treatment may consist of any combination of the following):    Current Treatment Recommendations: Strengthening; Balance training; Functional mobility training; Gait training; Home exercise program; Vestibular rehab     Subjective Comments:  Dizziness 4/10, pain level 3/10.   I went shopping yesterday and it kicked my tail  Initial:}  Back, Neck 3/10Post Session:     Back, Neck 3/10  Medications Last Reviewed:  2022  Updated Objective Findings:  None Today  Treatment   NEUROMUSCULAR RE-EDUCATION: (45 minutes):    Exercise/activities per grid below to improve balance and coordination. Required minimal verbal cues to promote static and dynamic balance in standing. Date:  8/23/2022 Date:  8/25/2022 Date:     Activity/Exercise Parameters Parameters Parameters   Marching in hallway with hiking pole 4 laps 4 laps    Sidestepping in hallway with hiking pole 4 laps 4 laps    Walking through cones with hiking pole 4 laps 4 laps    Supine to sit from table 2 reps X    Stepping over half foam with hiking pole  Forward 10 reps bilateral  Lateral 10 reps bilateral    Step taps with hiking pole  6 inch  2x10 reps              Treatment/Session Summary:    Treatment Assessment:  Patient tolerated additional exercises well. Patient rated dizziness as 4/10 after treatment. Communication/Consultation:  None today  Equipment provided today:  None  Recommendations/Intent for next treatment session: Next visit will focus on balance, gait, and vestibular.     Total Treatment Billable Duration:  45 minutes  Time In: 0930  Time Out: 8244    Palma Mayfield, PT       Charge Capture  }Post Session Pain  PT Visit Info  [employer] Portal  MD Guidelines  Scanned Media  Benefits  MyChart    Future Appointments   Date Time Provider Aye Caputo   8/29/2022  2:30 PM Victor Hugo Chloes, PT [facility] SFE   9/2/2022  1:00 PM Victor Hugo Coronas, PT [facility] SFE   9/7/2022  9:00 AM CA[facility] LAB [facility] AMB   9/8/2022  1:00 PM Victor Hugo Biswas, PT [facility] SFE   9/12/2022  1:00 PM Victor Hugoana Coronas, PT [facility] SFE   9/13/2022  9:20 AM Emmy Matamoros MD CA[facility] AMB   9/15/2022  1:00 PM Lety Easton, PT [facility] SFE   9/28/2022  1:30 PM SFE [facility] ROOM 4 SFERMAM SFE   10/3/2022 10:15 AM Dali Knight MD POAP [facility] AMB   10/25/2022 11:00 AM Barbara Burrell MD [id number] [facility] AMB

## 2022-08-29 ENCOUNTER — HOSPITAL ENCOUNTER (OUTPATIENT)
Dept: PHYSICAL THERAPY | Age: 42
Setting detail: RECURRING SERIES
Discharge: HOME | End: 2022-09-01
Payer: MEDICARE

## 2022-08-29 PROCEDURE — 97112 NEUROMUSCULAR REEDUCATION: CPT

## 2022-08-29 ASSESSMENT — PAIN SCALES - GENERAL: PAINLEVEL_OUTOF10: 3

## 2022-08-29 NOTE — PROGRESS NOTES
Suresh Celaya  : 1980  Primary: Arnulfo Lynch Ppo  Secondary:  Nancy Coyle [address] [place] [address] Way [address]  Phone: [phone]  Fax: [phone] Plan Frequency: 2 times a week for 90 days    Plan of Care/Certification Expiration Date: 22      PT Visit Info: Total # of Visits to Date: 4  Progress Note Counter: 4     Visit Count:  4   OUTPATIENT PHYSICAL THERAPY:OP NOTE TYPE: Treatment Note 2022       Episode  }Appt Desk             Treatment Diagnosis:  Difficulty in walking, Not elsewhere classified (R26.2)  Generalized Muscle Weakness (M62.81)  Other abnormalities of gait and mobility (R26.89)  Medical/Referring Diagnosis:  No admission diagnoses are documented for this encounter. Referring Physician:  Trisha Rincon MD Orders:  PT Eval and Treat   Date of Onset:  Onset Date:  (Chronic)     Allergies:   Diazepam, Macadamia nut oil, Penicillins, Pineapple, Sumatriptan, Theophylline, Theophyllines, Cephalexin, Sulfa antibiotics, Topiramate, Tramadol, Verapamil, [place] oil, Augmentin [amoxicillin-pot clavulanate], Dermabond, Keflex [cephalexin], Kenalog [triamcinolone acetonide], Nuts [peanut-containing drug products], Pneumococcal vaccine, Sulfa antibiotics, Tegaderm ag mesh [silver], Topiramate, Triamcinolone acetonide, Ultram [tramadol], Valium [diazepam], and Vitamin b12  Restrictions/Precautions:  No data recordedNo data recorded   Interventions Planned (Treatment may consist of any combination of the following):    Current Treatment Recommendations: Strengthening; Balance training; Functional mobility training; Gait training; Home exercise program; Vestibular rehab     Subjective Comments:  Dizziness 4/10. \"The next was rough after therapy. I almost fell three times. No falls\".   Initial:}    3/10Post Session:       3/10  Medications Last Reviewed:  2022  Updated Objective Findings:  None Today  Treatment NEUROMUSCULAR RE-EDUCATION: (45 minutes):    Exercise/activities per grid below to improve balance and coordination. Required minimal verbal cues to promote static and dynamic balance in standing. Date:  8/23/2022 Date:  8/25/2022 Date:  8/29/2022   Activity/Exercise Parameters Parameters Parameters   Marching in hallway with hiking pole 4 laps 4 laps 4 laps   Sidestepping in hallway with hiking pole 4 laps 4 laps 4 laps   Walking through cones with hiking pole 4 laps 4 laps 4 laps   Supine to sit from table 2 reps X X   Stepping over half foam with hiking pole  Forward 10 reps bilateral  Lateral 10 reps bilateral Forward 10 reps bilateral  Lateral 10 reps bilateral   Step taps with hiking pole  6 inch  2x10 reps 6 inch  2x10 reps             Treatment/Session Summary:    Treatment Assessment:  Patient reports dizziness as 6/10 after treatment. Patient needed rest breaks due to increased dizziness. Communication/Consultation:  None today  Equipment provided today:  None  Recommendations/Intent for next treatment session: Next visit will focus on balance, gait, and vestibular.     Total Treatment Billable Duration:  45 minutes  Time In: 1430  Time Out: 0470    CHIRAG Leo, PT       Charge Capture  }Post Session Pain  PT Visit Info  [employer] Portal  MD Guidelines  Scanned Media  Benefits  MyChart    Future Appointments   Date Time Provider Aye Caputo   9/2/2022  1:00 PM Timur Mendoza PT [facility] SFE   9/7/2022  9:00 AM CA[facility] LAB CA[facility] AMB   9/8/2022  1:00 PM Micha Stephensonge, PT [facility] SFE   9/12/2022  1:00 PM Timur Mendoza PT [facility] SFE   9/13/2022  9:20 AM Shalom Smith MD [facility] AMB   9/15/2022  1:00 PM Micha Eatson, PT [facility] SFE   9/28/2022  1:30 PM SFCARINA SALINAS BI ROOM 4 SFERMAM SFE   10/3/2022 10:15 AM Juju Gerard MD POAP GVL AMB   10/25/2022 11:00 AM Domonique Martinez MD [id number] [facility] AMB

## 2022-09-02 ENCOUNTER — HOSPITAL ENCOUNTER (OUTPATIENT)
Dept: PHYSICAL THERAPY | Age: 42
Setting detail: RECURRING SERIES
Discharge: HOME | End: 2022-09-05
Payer: MEDICARE

## 2022-09-02 PROCEDURE — 97112 NEUROMUSCULAR REEDUCATION: CPT

## 2022-09-02 ASSESSMENT — PAIN SCALES - GENERAL: PAINLEVEL_OUTOF10: 2

## 2022-09-02 NOTE — PROGRESS NOTES
Chandler Rm  : 1980  Primary: Arabella Sandhoff Ppo  [facility]  [facility]  [address] [place] [address] Way [address]  Phone: [phone]  Fax: [phone] Plan Frequency: 2 times a week for 90 days    Plan of Care/Certification Expiration Date: 22      PT Visit Info: Total # of Visits to Date: 5  Progress Note Counter: 5     Visit Count:  5   OUTPATIENT PHYSICAL THERAPY:OP NOTE TYPE: Treatment Note 2022       Episode  }Appt Desk             Treatment Diagnosis:  Difficulty in walking, Not elsewhere classified (R26.2)  Generalized Muscle Weakness (M62.81)  Other abnormalities of gait and mobility (R26.89)  Medical/Referring Diagnosis:  No admission diagnoses are documented for this encounter. Referring Physician:  Viraj Palacios MD Orders:  PT Eval and Treat   Date of Onset:  Onset Date:  (Chronic)     Allergies:   Diazepam, Macadamia nut oil, Penicillins, Pineapple, Sumatriptan, Theophylline, Theophyllines, Cephalexin, Sulfa antibiotics, Topiramate, Tramadol, Verapamil, [place] oil, Augmentin [amoxicillin-pot clavulanate], Dermabond, Keflex [cephalexin], Kenalog [triamcinolone acetonide], Nuts [peanut-containing drug products], Pneumococcal vaccine, Sulfa antibiotics, Tegaderm ag mesh [silver], Topiramate, Triamcinolone acetonide, Ultram [tramadol], Valium [diazepam], and Vitamin b12  Restrictions/Precautions:  No data recordedNo data recorded   Interventions Planned (Treatment may consist of any combination of the following):    Current Treatment Recommendations: Strengthening; Balance training; Functional mobility training; Gait training; Home exercise program; Vestibular rehab     Subjective Comments:  Patient reports falling yesterday when she was trying to open the blinds. Right knee is hurting today. Diziness 4/10. \"I need to practice getting in and out of booths in restaurants\".   Initial:}    2/10Post Session: 2/10  Medications Last Reviewed:  9/2/2022  Updated Objective Findings:  None Today  Treatment   NEUROMUSCULAR RE-EDUCATION: (45 minutes):    Exercise/activities per grid below to improve balance and coordination. Required minimal verbal cues to promote static and dynamic balance in standing. Date:  9/2/2022 Date:  8/25/2022 Date:  8/29/2022   Activity/Exercise Parameters Parameters Parameters   Marching in hallway with hiking pole  4 laps 4 laps   Sidestepping in hallway with hiking pole  4 laps 4 laps   Walking through cones with hiking pole  4 laps 4 laps   Supine to sit from table  X X   Stepping over half foam with hiking pole  Forward 10 reps bilateral  Lateral 10 reps bilateral Forward 10 reps bilateral  Lateral 10 reps bilateral   Step taps with hiking pole  6 inch  2x10 reps 6 inch  2x10 reps   Sit to stand from hilo mat 5 reps     Sliding right/Sliding left on hilo mat 20 minutes     Seated turns right/left on hilo mat 20 minutes               Treatment/Session Summary:    Treatment Assessment:  Patient has more difficulty sliding to the left. Patient needs verbal cues to take more weight on right hip to avoid losing her balance to the left with left sliding on hilo mat. Patient reports increased dizziness to 6/10 after treatment. Communication/Consultation:  None today  Equipment provided today:  None  Recommendations/Intent for next treatment session: Next visit will focus on balance, gait, and vestibular.     Total Treatment Billable Duration:  45 minutes  Time In: 1300  Time Out: 2616    CHIRAG Aguilar, PT       Charge Capture  }Post Session Pain  PT Visit Info  [employer] Portal  MD Guidelines  Scanned Media  Benefits  MyChart    Future Appointments   Date Time Provider Aye Caputo   9/7/2022  9:00 AM CA[facility] LAB CA[facility] AMB   9/8/2022  1:00 PM Arun Martin, PT [facility] SFE   9/12/2022  1:00 PM Dipti Easton, PT [facility] SFE   9/13/2022  9:20 AM Wellington Feldman MD CA[facility] AMB 9/15/2022  1:00 PM Eugene Easton, PT [facility] SFE   9/28/2022  1:30 PM SFE [facility] ROOM 4 SFERMAM E   10/3/2022 10:15 AM Neelima Powell MD POAP L AMB   10/25/2022 11:00 AM Josemanuel Viramontes MD [id number] [facility] AMB

## 2022-09-07 ENCOUNTER — NURSE ONLY (OUTPATIENT)
Dept: INTERNAL MEDICINE CLINIC | Facility: CLINIC | Age: 42
End: 2022-09-07

## 2022-09-07 DIAGNOSIS — E03.4 HYPOTHYROIDISM DUE TO ACQUIRED ATROPHY OF THYROID: ICD-10-CM

## 2022-09-07 DIAGNOSIS — E78.5 HYPERLIPIDEMIA, UNSPECIFIED HYPERLIPIDEMIA TYPE: ICD-10-CM

## 2022-09-07 DIAGNOSIS — E55.9 VITAMIN D DEFICIENCY: ICD-10-CM

## 2022-09-07 LAB
25(OH)D3 SERPL-MCNC: 36.9 NG/ML (ref 30–100)
ALBUMIN SERPL-MCNC: 3.7 G/DL (ref 3.5–5)
ALBUMIN/GLOB SERPL: 1.1 {RATIO} (ref 1.2–3.5)
ALP SERPL-CCNC: 76 U/L (ref 50–136)
ALT SERPL-CCNC: 44 U/L (ref 12–65)
ANION GAP SERPL CALC-SCNC: 7 MMOL/L (ref 4–13)
AST SERPL-CCNC: 18 U/L (ref 15–37)
BASOPHILS # BLD: 0.1 K/UL (ref 0–0.2)
BASOPHILS NFR BLD: 1 % (ref 0–2)
BILIRUB SERPL-MCNC: 0.4 MG/DL (ref 0.2–1.1)
BUN SERPL-MCNC: 9 MG/DL (ref 6–23)
CALCIUM SERPL-MCNC: 8.9 MG/DL (ref 8.3–10.4)
CHLORIDE SERPL-SCNC: 110 MMOL/L (ref 101–110)
CHOLEST SERPL-MCNC: 217 MG/DL
CO2 SERPL-SCNC: 22 MMOL/L (ref 21–32)
CREAT SERPL-MCNC: 0.8 MG/DL (ref 0.6–1)
DIFFERENTIAL METHOD BLD: NORMAL
EOSINOPHIL # BLD: 0.3 K/UL (ref 0–0.8)
EOSINOPHIL NFR BLD: 4 % (ref 0.5–7.8)
ERYTHROCYTE [DISTWIDTH] IN BLOOD BY AUTOMATED COUNT: 12.7 % (ref 11.9–14.6)
GLOBULIN SER CALC-MCNC: 3.3 G/DL (ref 2.3–3.5)
GLUCOSE SERPL-MCNC: 110 MG/DL (ref 65–100)
HCT VFR BLD AUTO: 42.2 % (ref 35.8–46.3)
HDLC SERPL-MCNC: 44 MG/DL (ref 40–60)
HDLC SERPL: 4.9 {RATIO}
HGB BLD-MCNC: 14 G/DL (ref 11.7–15.4)
IMM GRANULOCYTES # BLD AUTO: 0.1 K/UL (ref 0–0.5)
IMM GRANULOCYTES NFR BLD AUTO: 1 % (ref 0–5)
LDLC SERPL CALC-MCNC: 141.8 MG/DL
LYMPHOCYTES # BLD: 2.3 K/UL (ref 0.5–4.6)
LYMPHOCYTES NFR BLD: 26 % (ref 13–44)
MCH RBC QN AUTO: 29.7 PG (ref 26.1–32.9)
MCHC RBC AUTO-ENTMCNC: 33.2 G/DL (ref 31.4–35)
MCV RBC AUTO: 89.4 FL (ref 79.6–97.8)
MONOCYTES # BLD: 0.7 K/UL (ref 0.1–1.3)
MONOCYTES NFR BLD: 8 % (ref 4–12)
NEUTS SEG # BLD: 5.4 K/UL (ref 1.7–8.2)
NEUTS SEG NFR BLD: 60 % (ref 43–78)
NRBC # BLD: 0 K/UL (ref 0–0.2)
PLATELET # BLD AUTO: 240 K/UL (ref 150–450)
PMV BLD AUTO: 9.7 FL (ref 9.4–12.3)
POTASSIUM SERPL-SCNC: 4.1 MMOL/L (ref 3.5–5.1)
PROT SERPL-MCNC: 7 G/DL (ref 6.3–8.2)
RBC # BLD AUTO: 4.72 M/UL (ref 4.05–5.2)
SODIUM SERPL-SCNC: 139 MMOL/L (ref 136–145)
TRIGL SERPL-MCNC: 156 MG/DL (ref 35–150)
TSH, 3RD GENERATION: 2.23 UIU/ML (ref 0.36–3.74)
VLDLC SERPL CALC-MCNC: 31.2 MG/DL (ref 6–23)
WBC # BLD AUTO: 8.8 K/UL (ref 4.3–11.1)

## 2022-09-08 ENCOUNTER — HOSPITAL ENCOUNTER (OUTPATIENT)
Dept: PHYSICAL THERAPY | Age: 42
Setting detail: RECURRING SERIES
Discharge: HOME | End: 2022-09-11
Payer: MEDICARE

## 2022-09-08 PROCEDURE — 97112 NEUROMUSCULAR REEDUCATION: CPT

## 2022-09-08 PROCEDURE — 97140 MANUAL THERAPY 1/> REGIONS: CPT

## 2022-09-08 ASSESSMENT — PAIN SCALES - GENERAL: PAINLEVEL_OUTOF10: 6

## 2022-09-08 NOTE — PROGRESS NOTES
Darryle Blazing  : 1980  Primary: Irma Smith Ppo  Secondary:  Urvashi Nguyen  [address] [address]  Phone: [phone]  Fax: [phone] Plan Frequency: 2 times a week for 90 days    Plan of Care/Certification Expiration Date: 22      PT Visit Info: Total # of Visits to Date: 6  Progress Note Counter: 6     Visit Count:  6   OUTPATIENT PHYSICAL THERAPY:OP NOTE TYPE: Treatment Note 2022       Episode  }Appt Desk             Treatment Diagnosis:  Difficulty in walking, Not elsewhere classified (R26.2)  Generalized Muscle Weakness (M62.81)  Other abnormalities of gait and mobility (R26.89)  Medical/Referring Diagnosis:  No admission diagnoses are documented for this encounter. Referring Physician:  Jeff Handley MD Orders:  PT Eval and Treat   Date of Onset:  Onset Date:  (Chronic)     Allergies:   Diazepam, Macadamia nut oil, Penicillins, Pineapple, Sumatriptan, Theophylline, Theophyllines, Cephalexin, Sulfa antibiotics, Topiramate, Tramadol, Verapamil, [place] oil, Augmentin [amoxicillin-pot clavulanate], Dermabond, Keflex [cephalexin], Kenalog [triamcinolone acetonide], Nuts [peanut-containing drug products], Pneumococcal vaccine, Sulfa antibiotics, Tegaderm ag mesh [silver], Topiramate, Triamcinolone acetonide, Ultram [tramadol], Valium [diazepam], and Vitamin b12  Restrictions/Precautions:  No data recordedNo data recorded   Interventions Planned (Treatment may consist of any combination of the following):    Current Treatment Recommendations: Strengthening; Balance training; Functional mobility training; Gait training; Home exercise program; Vestibular rehab     Subjective Comments:  \"I saw Paulina Rowan yesteday. She tested me for three and a half hours with my eyes. Dizziness 4/10. I took a muscle relaxer last night. My neck is spasming. I am hurting today\".   Patient walked into the clinic using her reverse walker due to her imbalance. Initial:}    6/10Post Session:       2/10  Medications Last Reviewed:  9/8/2022  Updated Objective Findings:  None Today  Treatment   NEUROMUSCULAR RE-EDUCATION: (15 minutes):    Exercise/activities per grid below to improve balance and coordination. Required minimal verbal cues to promote static and dynamic balance in standing. Date:  9/8/2022 Date:  8/25/2022 Date:  8/29/2022   Activity/Exercise Parameters Parameters Parameters   Marching in hallway with hiking pole  4 laps 4 laps   Sidestepping in hallway with hiking pole 4 laps 4 laps 4 laps   Walking through cones with hiking pole 4 laps 4 laps 4 laps   Supine to sit from table  X X   Stepping over half foam with hiking pole  Forward 10 reps bilateral  Lateral 10 reps bilateral Forward 10 reps bilateral  Lateral 10 reps bilateral   Step taps with hiking pole  6 inch  2x10 reps 6 inch  2x10 reps   Sit to stand from chair 3 reps     Sliding right/Sliding left on hilo mat      Seated turns right/left on hilo mat              MANUAL THERAPY: (30 minutes): Soft tissue mobilization was utilized and necessary because of the patient's painful spasm. Patient received trigger point release along bilateral upper traps/scapular region to decrease pain. Skin intact after treatment. Treatment/Session Summary:    Treatment Assessment:  Patient reports decreased pain and improved stability after treatment. Communication/Consultation:  None today  Equipment provided today:  None  Recommendations/Intent for next treatment session: Next visit will focus on balance, gait, and vestibular.     Total Treatment Billable Duration:  45 minutes  Time In: 1300  Time Out: 9059    CHIRAG Benz PT       Charge Capture  }Post Session Pain  PT Visit Info  [employer] Portal  MD Guidelines  Scanned Media  Benefits  MyChart    Future Appointments   Date Time Provider Aye Caputo   9/12/2022  1:00 PM Carry Sauce, PT [facility]   9/13/2022 9:20 AM Moses Nguyen MD CA[facility] AMB   9/15/2022  1:00 PM Leandra Leventhal Vissage, PT [facility] SFE   9/28/2022  1:30 PM SFE [facility] ROOM 4 SFERMAM SFE   10/3/2022 10:15 AM Martha Renee MD POAP GVL AMB   10/25/2022 11:00 AM Lan Ace MD [id number] GVL AMB

## 2022-09-12 ENCOUNTER — HOSPITAL ENCOUNTER (OUTPATIENT)
Dept: PHYSICAL THERAPY | Age: 42
Setting detail: RECURRING SERIES
Discharge: HOME | End: 2022-09-15
Payer: MEDICARE

## 2022-09-12 PROCEDURE — 97112 NEUROMUSCULAR REEDUCATION: CPT

## 2022-09-12 PROCEDURE — 97140 MANUAL THERAPY 1/> REGIONS: CPT

## 2022-09-12 ASSESSMENT — PAIN SCALES - GENERAL: PAINLEVEL_OUTOF10: 2

## 2022-09-12 NOTE — PROGRESS NOTES
Ramesh Villegas  : 1980  Primary: Alek Gaitan Ppo  Secondary:  Chepe Hadley  [address] [address]  Phone: [phone]  Fax: [phone] Plan Frequency: 2 times a week for 90 days    Plan of Care/Certification Expiration Date: 22      PT Visit Info: Total # of Visits to Date: 7  Progress Note Counter: 7     Visit Count:  7   OUTPATIENT PHYSICAL THERAPY:OP NOTE TYPE: Treatment Note 2022       Episode  }Appt Desk             Treatment Diagnosis:  Difficulty in walking, Not elsewhere classified (R26.2)  Generalized Muscle Weakness (M62.81)  Other abnormalities of gait and mobility (R26.89)  Medical/Referring Diagnosis:  No admission diagnoses are documented for this encounter. Referring Physician:  Isa Keys MD Orders:  PT Eval and Treat   Date of Onset:  Onset Date:  (Chronic)     Allergies:   Diazepam, Macadamia nut oil, Penicillins, Pineapple, Sumatriptan, Theophylline, Theophyllines, Cephalexin, Sulfa antibiotics, Topiramate, Tramadol, Verapamil, [place] oil, Augmentin [amoxicillin-pot clavulanate], Dermabond, Keflex [cephalexin], Kenalog [triamcinolone acetonide], Nuts [peanut-containing drug products], Pneumococcal vaccine, Sulfa antibiotics, Tegaderm ag mesh [silver], Topiramate, Triamcinolone acetonide, Ultram [tramadol], Valium [diazepam], and Vitamin b12  Restrictions/Precautions:  No data recordedNo data recorded   Interventions Planned (Treatment may consist of any combination of the following):    Current Treatment Recommendations: Strengthening; Balance training; Functional mobility training; Gait training; Home exercise program; Vestibular rehab     Subjective Comments:  \"Rough weekend. No falls over the weekend. Dizziness 6/10\".   Initial:}    2/10Post Session:       1/10  Medications Last Reviewed:  2022  Updated Objective Findings:  None Today  Treatment   NEUROMUSCULAR RE-EDUCATION: (73 minutes):    Exercise/activities per grid below to improve balance and coordination. Required minimal verbal cues to promote static and dynamic balance in standing. Date:  9/8/2022 Date:  9/12/2022 Date:  8/29/2022   Activity/Exercise Parameters Parameters Parameters   Marching in hallway with hiking pole  4 laps 4 laps   Sidestepping in hallway with hiking pole 4 laps 4 laps 4 laps   Walking through cones with hiking pole 4 laps 4 laps 4 laps   Supine to sit from table   X   Stepping over half foam with hiking pole   Forward 10 reps bilateral  Lateral 10 reps bilateral   Step taps with hiking pole   6 inch  2x10 reps   Sit to stand from chair 3 reps     Sliding right/Sliding left      Seated turns right/left on hilo mat      Standing on blue foam  Eyes open and eyes open with head turns    Standing on blue foam  Eyes open weight shifting left and right. Eyes open weight shifting up and down. MANUAL THERAPY: (15 minutes): Soft tissue mobilization was utilized and necessary because of the patient's painful spasm. Patient received trigger point release along bilateral upper traps/scapular region to decrease pain. Skin intact after treatment. Treatment/Session Summary:    Treatment Assessment:  Patient reports decreased dizziness to 3/10 after manual therapy. Patient demonstrates improved stability after treatment. Communication/Consultation:  None today  Equipment provided today:  None  Recommendations/Intent for next treatment session: Next visit will focus on balance, gait, and vestibular.     Total Treatment Billable Duration:  45 minutes  Time In: 1300  Time Out: 2005    CHIRAG Grewal, PT       Charge Capture  }Post Session Pain  PT Visit Info  [employer] Portal  MD Guidelines  Scanned Media  Benefits  MyChart    Future Appointments   Date Time Provider Aye Caputo   9/13/2022  9:20 AM Heather Boyce MD CAF GVL AMB   9/15/2022  1:00 PM Latoya Canseco, LIVIA [facility] SFE   9/20/2022 11:00 AM Zadie Juanis, PT [facility] SFE   9/22/2022  1:45 PM Zadie Juanis, PT [facility] SFE   9/27/2022  9:30 AM Zadie Juanis, PT [facility] SFE   9/28/2022  1:30 PM SFE BRAD BI ROOM 4 SFERMAM SFE   9/29/2022  1:00 PM Zadie Juanis, PT [facility] SFE   10/3/2022 10:15 AM Balaji Biggs MD POAP GVL AMB   10/3/2022  1:00 PM Zadie Juanis, PT [facility] SFE   10/6/2022 11:00 AM Zadie Juanis, PT [facility] SFE   10/10/2022 11:00 AM Zadie Juanis, PT [facility] SFE   10/13/2022 11:00 AM Zadie Juanis, PT [facility] SFE   10/17/2022 11:00 AM Zadie Juanis, PT [facility] SFE   10/20/2022 11:00 AM Zadie Juanis, PT [facility] SFE   10/24/2022 11:00 AM Zadie Juanis, PT [facility] SFE   10/25/2022 11:00 AM Maxwell Wynn MD [id number] GVL AMB   10/27/2022 11:00 AM Zadie Juanis, PT [facility] SFE

## 2022-09-13 ENCOUNTER — OFFICE VISIT (OUTPATIENT)
Dept: INTERNAL MEDICINE CLINIC | Facility: CLINIC | Age: 42
End: 2022-09-13
Payer: MEDICARE

## 2022-09-13 VITALS
HEIGHT: 61 IN | OXYGEN SATURATION: 97 % | DIASTOLIC BLOOD PRESSURE: 60 MMHG | SYSTOLIC BLOOD PRESSURE: 118 MMHG | WEIGHT: 228.5 LBS | RESPIRATION RATE: 16 BRPM | HEART RATE: 85 BPM | BODY MASS INDEX: 43.14 KG/M2

## 2022-09-13 DIAGNOSIS — E78.00 PURE HYPERCHOLESTEROLEMIA: ICD-10-CM

## 2022-09-13 DIAGNOSIS — Z11.4 SCREENING FOR HIV (HUMAN IMMUNODEFICIENCY VIRUS): ICD-10-CM

## 2022-09-13 DIAGNOSIS — E55.9 VITAMIN D DEFICIENCY: Primary | ICD-10-CM

## 2022-09-13 DIAGNOSIS — G25.9 MOVEMENT DISORDER: ICD-10-CM

## 2022-09-13 DIAGNOSIS — J45.20 MILD INTERMITTENT ASTHMA WITHOUT COMPLICATION: ICD-10-CM

## 2022-09-13 DIAGNOSIS — G43.809 MIGRAINE VARIANT: ICD-10-CM

## 2022-09-13 DIAGNOSIS — R29.6 REPEATED FALLS: ICD-10-CM

## 2022-09-13 DIAGNOSIS — M54.12 CERVICAL RADICULOPATHY: ICD-10-CM

## 2022-09-13 DIAGNOSIS — Z11.59 NEED FOR HEPATITIS C SCREENING TEST: ICD-10-CM

## 2022-09-13 DIAGNOSIS — E03.4 HYPOTHYROIDISM DUE TO ACQUIRED ATROPHY OF THYROID: ICD-10-CM

## 2022-09-13 PROCEDURE — 99214 OFFICE O/P EST MOD 30 MIN: CPT | Performed by: FAMILY MEDICINE

## 2022-09-13 ASSESSMENT — ENCOUNTER SYMPTOMS
ABDOMINAL PAIN: 0
BACK PAIN: 0
EYE PAIN: 0
SHORTNESS OF BREATH: 0
SORE THROAT: 0
BLOOD IN STOOL: 0
CONSTIPATION: 0
WHEEZING: 0
DIARRHEA: 0
NAUSEA: 0
VOMITING: 0

## 2022-09-13 NOTE — PROGRESS NOTES
9/13/2022     Subjective:     Chief Complaint   Patient presents with    Follow-up Chronic Condition     Hyperlipidemia f/u currently trying with healthy diet     Rash     Tiny blister, stinging on inner side of lower extremity and also inner side on left lower extremity x 2 weeks, unknown reason        HPI:   Hyperlipidemia  The patient is following a low fat diet and is not exercising regularly. Patient is not on any medication at this time. Patient is not having associated symptoms of shortness of breath, chest pain, rapid heart rate, irregular heart rate, and dizziness    Patient labs are YOUR LAST LIPID PROFILE:   Lab Results   Component Value Date    CHOL 217 (H) 09/07/2022    CHOL 224 (H) 06/01/2022     Lab Results   Component Value Date    TRIG 156 (H) 09/07/2022    TRIG 152 (H) 06/01/2022     Lab Results   Component Value Date    HDL 44 09/07/2022    HDL 48 06/01/2022     Lab Results   Component Value Date    LDLCALC 141.8 (H) 09/07/2022    LDLCALC 145.6 (H) 06/01/2022     Lab Results   Component Value Date    LABVLDL 31.2 (H) 09/07/2022    LABVLDL 30.4 (H) 06/01/2022     Lab Results   Component Value Date    CHOLHDLRATIO 4.9 09/07/2022    CHOLHDLRATIO 4.7 06/01/2022        Fell September while trying to adjust her blinds. Hemorrhoids - occasional bleeding. Interim History:   C5-6-7 spinal fusion doing better  Still doing vestibular therapy     Review of Systems   Constitutional:  Negative for appetite change, fatigue and unexpected weight change. HENT:  Negative for congestion, ear pain and sore throat. Eyes:  Negative for pain and visual disturbance. Respiratory:  Negative for shortness of breath and wheezing. Cardiovascular:  Negative for chest pain, palpitations and leg swelling. Gastrointestinal:  Negative for abdominal pain, blood in stool, constipation, diarrhea, nausea and vomiting. Endocrine: Negative for cold intolerance and heat intolerance.    Genitourinary:  Negative for difficulty urinating, menstrual problem, pelvic pain, urgency and vaginal discharge. Musculoskeletal:  Negative for back pain and neck pain. Skin:  Negative for rash. Neurological:  Positive for dizziness. Negative for numbness and headaches. Psychiatric/Behavioral:  Negative for behavioral problems, dysphoric mood and sleep disturbance. The patient is not nervous/anxious.         Past Medical History:   Diagnosis Date    Adverse effect of anesthesia     \"a little goes a long way with me\"     Arthritis     Arthritis     spine and hands     Asthma     last attack 05/2019; inhaler prn     BMI 36.0-36.9,adult     Chronic pain     chronic headache  beta histidine for this    Depression     Hyperlipidemia     no meds     Hypertension     Migraines     candesartan for this - takes at night    Movement disorder     loses where body parts are at times    PONV (postoperative nausea and vomiting)     nauseous upon waking in PACU     Thyroid disease     hypo-on med    Vertigo     Vestibular nerve disorder 2008    migraines - and nerve damage as well     Past Surgical History:   Procedure Laterality Date    CERVICAL DISC ARTHROPLASTY      CERVICAL FUSION N/A 7/12/2022    C5-C7 anterior cervical discectomy and fusion with interbody spacer/allograft and instrumentation performed by Artemio Coyne MD at [address] [place] [address] Road      possibly open patient unsure    EYE MUSCLE SURGERY      HEENT      oral, bilateral eye muscle    JOINT REPLACEMENT      ORTHOPEDIC SURGERY      right wrist x 2, right shoulder      Family History   Problem Relation Age of Onset    Parkinson's Disease Paternal Grandfather     Macular Degen Paternal Grandfather     Cancer Paternal Grandmother         brain tumor    Stroke Maternal Grandfather     Heart Disease Maternal Grandfather     Cancer Maternal Grandmother         lymphoma    Osteoarthritis Father     Diabetes Father     Thyroid Disease Mother Diabetes Mother     Asthma Mother     Stroke Mother         11/2020     Social History     Socioeconomic History    Marital status: Single     Spouse name: None    Number of children: None    Years of education: None    Highest education level: None   Tobacco Use    Smoking status: Never    Smokeless tobacco: Never   Vaping Use    Vaping Use: Never used   Substance and Sexual Activity    Alcohol use: Not Currently    Drug use: No    Sexual activity: Yes     Partners: Male   Social History Narrative    ** Merged History Encounter **          Social Determinants of Health     Physical Activity: Insufficiently Active    Days of Exercise per Week: 3 days    Minutes of Exercise per Session: 10 min      Current Outpatient Medications   Medication Sig Dispense Refill    lamoTRIgine (LAMICTAL) 100 MG tablet Take 1 tablet by mouth in the morning. 30 tablet 2    venlafaxine (EFFEXOR XR) 75 MG extended release capsule Take 1 capsule by mouth in the morning. 30 capsule 2    clonazePAM (KLONOPIN) 0.5 MG tablet Take 1 tablet by mouth daily as needed for Anxiety for up to 90 days. 30 tablet 2    thyroid (ARMOUR) 30 MG tablet Take 30 mg by mouth in the morning. Betahistine HCl (BETAHISTINE DIHYDROCHLORIDE) POWD 16 mg by Does not apply route 2 times daily      fexofenadine (ALLEGRA) 180 MG tablet Take 180 mg by mouth in the morning.       fluticasone (FLONASE) 50 MCG/ACT nasal spray 1 spray by Each Nostril route daily      tiZANidine (ZANAFLEX) 4 MG capsule Take 4 mg by mouth 3 times daily as needed for Muscle spasms      ondansetron (ZOFRAN) 4 MG tablet Take 4 mg by mouth every 8 hours as needed for Nausea or Vomiting      Albuterol Sulfate, sensor, (PROAIR DIGIHALER) 108 (90 Base) MCG/ACT AEPB Inhale 2 puffs into the lungs 4 times daily as needed      acetaminophen (TYLENOL) 500 MG tablet Take 1,000 mg by mouth every 6 hours as needed for Pain      candesartan (ATACAND) 16 MG tablet Take 16 mg by mouth at bedtime PROGESTERONE PO Take by mouth at bedtime Progesterone with testosterone denny (dissolvable pill)      MAGNESIUM PO Take 2 tablets by mouth daily      albuterol sulfate  (90 Base) MCG/ACT inhaler Inhale 2 puffs into the lungs every 4 hours as needed      ascorbic acid (VITAMIN C) 500 MG tablet Take 500 mg by mouth daily      indomethacin (INDOCIN) 25 MG capsule TAKE 1-2 TABLETS BY MOUTH 3 TIMES DAILY AS NEEDED FOR MIGRAINE. LIMIT 2 DAYS/WEEK. TAKE WITH FOOD      mometasone (NASONEX) 50 MCG/ACT nasal spray 1 spray by Nasal route at bedtime        No current facility-administered medications for this visit.      Allergies   Allergen Reactions    Diazepam Other (See Comments)     Suicidal thoughts       Penicillins Anaphylaxis     Has not been able to tolerate cephalosporins     Pineapple Shortness Of Breath     wheezing    Sumatriptan Shortness Of Breath    Theophylline Shortness Of Breath    Theophyllines Shortness Of Breath    Cephalexin Other (See Comments)     Other reaction(s): Dizziness    Sulfa Antibiotics Rash     Other reaction(s): Vomiting    Topiramate Other (See Comments)     Caused disorientation, loss of feeling in left leg (numbness)    Tramadol Rash    Verapamil Other (See Comments)     Lowers blood pressure significantly beyond normal hypotension      [place] Oil Swelling     Causes lips to swell       Augmentin [Amoxicillin-Pot Clavulanate] Hives    Dermabond Hives    Keflex [Cephalexin] Hives    Kenalog [Triamcinolone Acetonide] Hives    Nuts [Peanut-Containing Drug Products] Hives    Pneumococcal Vaccine Swelling    Sulfa Antibiotics Hives    Topiramate Other (See Comments) and Myalgia    Triamcinolone Acetonide Hives     Kenalog injection caused hives     Ultram [Tramadol] Hives    Valium [Diazepam] Other (See Comments)    Vitamin B12 Other (See Comments)     Injection Only  swelling       Objective:   /60 (Site: Left Upper Arm, Position: Sitting)   Pulse 85   Resp 16   Ht 5' 1\" (1.549 m)   Wt 228 lb 8 oz (103.6 kg)   LMP 09/11/2022   SpO2 97%   BMI 43.17 kg/m²     Physical Exam  Constitutional:       Appearance: Normal appearance. HENT:      Head: Normocephalic. Right Ear: Tympanic membrane, ear canal and external ear normal.      Left Ear: Tympanic membrane, ear canal and external ear normal.      Nose: Nose normal.      Mouth/Throat:      Mouth: Mucous membranes are dry. Pharynx: Oropharynx is clear. Eyes:      Conjunctiva/sclera: Conjunctivae normal.      Pupils: Pupils are equal, round, and reactive to light. Cardiovascular:      Rate and Rhythm: Normal rate and regular rhythm. Pulses: Normal pulses. Heart sounds: No murmur heard. Pulmonary:      Effort: Pulmonary effort is normal. No respiratory distress. Breath sounds: No wheezing. Abdominal:      General: Abdomen is flat. There is no distension. Palpations: There is no mass. Tenderness: There is no abdominal tenderness. There is no guarding. Musculoskeletal:         General: No deformity. Cervical back: Normal range of motion and neck supple. Lymphadenopathy:      Cervical: No cervical adenopathy. Skin:     General: Skin is warm and dry. Neurological:      General: No focal deficit present. Mental Status: She is alert. Psychiatric:         Mood and Affect: Mood normal.         Behavior: Behavior normal.         Thought Content: Thought content normal.         Judgment: Judgment normal.       Assessment and Plan:      Diagnosis Orders   1. Vitamin D deficiency  Vitamin D 25 Hydroxy      2. Hypothyroidism due to acquired atrophy of thyroid        3. Pure hypercholesterolemia  CBC with Auto Differential    Comprehensive Metabolic Panel    Lipid Panel      4. Mild intermittent asthma without complication        5. Movement disorder        6. Cervical radiculopathy        7. Repeated falls        8. Migraine variant        9. BMI 40.0-44.9, adult ([address]        10.  Need

## 2022-09-15 ENCOUNTER — HOSPITAL ENCOUNTER (OUTPATIENT)
Dept: PHYSICAL THERAPY | Age: 42
Setting detail: RECURRING SERIES
Discharge: HOME | End: 2022-09-18
Payer: MEDICARE

## 2022-09-15 PROCEDURE — 97112 NEUROMUSCULAR REEDUCATION: CPT

## 2022-09-15 PROCEDURE — 97140 MANUAL THERAPY 1/> REGIONS: CPT

## 2022-09-15 ASSESSMENT — PAIN SCALES - GENERAL: PAINLEVEL_OUTOF10: 4

## 2022-09-15 NOTE — PROGRESS NOTES
Tierney Arboleda  : 1980  Primary: Bonita Kinney Ppo  Secondary:  Lashay Cedillo  [address] [address]  Phone: [phone]  Fax: [phone] Plan Frequency: 2 times a week for 90 days    Plan of Care/Certification Expiration Date: 22      PT Visit Info: Total # of Visits to Date: 8  Progress Note Counter: 8     Visit Count:  8   OUTPATIENT PHYSICAL THERAPY:OP NOTE TYPE: Treatment Note 9/15/2022       Episode  }Appt Desk             Treatment Diagnosis:  Difficulty in walking, Not elsewhere classified (R26.2)  Generalized Muscle Weakness (M62.81)  Other abnormalities of gait and mobility (R26.89)  Medical/Referring Diagnosis:  No admission diagnoses are documented for this encounter. Referring Physician:  Armani Styles MD Orders:  PT Eval and Treat   Date of Onset:  Onset Date:  (Chronic)     Allergies:   Diazepam, Penicillins, Pineapple, Sumatriptan, Theophylline, Theophyllines, Cephalexin, Sulfa antibiotics, Topiramate, Tramadol, Verapamil, [place] oil, Augmentin [amoxicillin-pot clavulanate], Dermabond, Keflex [cephalexin], Kenalog [triamcinolone acetonide], Nuts [peanut-containing drug products], Pneumococcal vaccine, Sulfa antibiotics, Topiramate, Triamcinolone acetonide, Ultram [tramadol], Valium [diazepam], and Vitamin b12  Restrictions/Precautions:  No data recordedNo data recorded   Interventions Planned (Treatment may consist of any combination of the following):    Current Treatment Recommendations: Strengthening; Balance training; Functional mobility training; Gait training; Home exercise program; Vestibular rehab     Subjective Comments:  \"I had a massive migraine yesterday. Dizziness 2/10\".   Initial:}    4/10Post Session:       4/10  Medications Last Reviewed:  9/15/2022  Updated Objective Findings:  None Today  Treatment   NEUROMUSCULAR RE-EDUCATION: (30 minutes):    Exercise/activities per grid below to improve balance and coordination. Required minimal verbal cues to promote static and dynamic balance in standing. Date:  9/8/2022 Date:  9/12/2022 Date:  9/15/2022   Activity/Exercise Parameters Parameters Parameters   Marching in hallway with hiking pole  4 laps 4 laps   Sidestepping in hallway with hiking pole 4 laps 4 laps 4 laps   Walking through cones with hiking pole 4 laps 4 laps 4 laps   Supine to sit from table      Stepping over half foam with hiking pole      Step taps with hiking pole      Sit to stand from chair 3 reps     Sliding right/Sliding left      Seated turns right/left on hilo mat      Standing on blue foam  Eyes open and eyes open with head turns Standing on black foam eyes open   Standing on blue foam  Eyes open weight shifting left and right. Eyes open weight shifting up and down. MANUAL THERAPY: (15 minutes): Soft tissue mobilization was utilized and necessary because of the patient's painful spasm. Patient received trigger point release along bilateral upper traps/scapular region to decrease pain. Skin intact after treatment. Treatment/Session Summary:    Treatment Assessment:  Patient needed extended rest breaks due to increased dizziness. Patient rates dizziness as 4/10 after treatment. Communication/Consultation:  None today  Equipment provided today:  None  Recommendations/Intent for next treatment session: Next visit will focus on balance, gait, and vestibular.     Total Treatment Billable Duration:  45 minutes  Time In: 4101  Time Out: 1340    CHIRAG HOPE, PT       Charge Capture  }Post Session Pain  PT Visit Info  [employer] Portal  MD Guidelines  Scanned Media  Benefits  MyChart    Future Appointments   Date Time Provider Aye Caputo   9/20/2022 11:00 AM Moni Ziegler PT [facility]   9/22/2022  1:45 PM Moni Ziegler PT [facility]   9/27/2022  9:30 AM LIVIA Perez SFE   9/28/2022  1:30 PM SFE [facility] ROOM 4 [facility] SFE   9/29/2022 1:00 PM Inder Fanning, PT [facility] SFE   10/3/2022 10:15 AM MD VANESSA MinAP [facility] AMB   10/3/2022  1:00 PM Inder Fanning, PT [facility]FE   10/6/2022 11:00 AM Inder Fanning, PT [facility]   10/10/2022 11:00 AM Inder Fanning, PT [facility]   10/13/2022 11:00 AM Inder Fanning, PT [facility]   10/17/2022 11:00 AM Inder Fanning, PT [facility]   10/20/2022 11:00 AM Inder Fanning, PT [facility]   10/24/2022 11:00 AM Inder Fanning, PT [facility]   10/25/2022 11:00 AM Bertram Wilson MD [id number]   10/27/2022 11:00 AM Inder Fanning, PT [facility]   1/13/2023  9:00 AM [facility] AMB   1/20/2023 11:00 AM Chao Alvarez MD [facility]F [facility] AMB

## 2022-09-20 ENCOUNTER — HOSPITAL ENCOUNTER (OUTPATIENT)
Dept: PHYSICAL THERAPY | Age: 42
Setting detail: RECURRING SERIES
Discharge: HOME | End: 2022-09-23
Payer: MEDICARE

## 2022-09-20 PROCEDURE — 97112 NEUROMUSCULAR REEDUCATION: CPT

## 2022-09-20 PROCEDURE — 97140 MANUAL THERAPY 1/> REGIONS: CPT

## 2022-09-20 ASSESSMENT — PAIN SCALES - GENERAL: PAINLEVEL_OUTOF10: 5

## 2022-09-20 NOTE — PROGRESS NOTES
Date: 6/28/2021  PCP: Khanh Hairston MD    Chief Complaint   Patient presents with   [facility] Wellness Visit     Subsequent labs drawn 6-24-21       HISTORY OF PRESENT ILLNESS: Ulisses Chambers is a 80year old female presenting to clinic for [address] and follow up. Hyperlipidemia: Not currently on any medications. Last lipid panel was on 6/24/2021 cholesterol-250, LDL-173, HDL-61, triglyceride-78. Â   Hypothyroidism: Maintained on Levothyroxine 75 mcg daily, patient reports compliance with medications. Denies any symptoms of difficulty swallowing, neck swelling, constipation, weight gain, edema, hoarseness, depressed mood, thinning of hair, dry skin, menstrual irregularities. Last TSH was 1.836 on 6/24/2021. Â   Anxiety: Patient maintained on Paxil 10 mg daily, reports compliance. Symptoms are well controlled. HTN: Maintained on Losartan 25 mg daily, patient reports compliance with medications. BP today is 148/70, repeat 138/78. Patient denies any current symptoms of chest pain, shortness of breath, palpitations, headaches, visual disturbances, weakness on any one side of the body, speech abnormalities. Last BUN/Cr was 12/0.83 on 6/24/2021. Osteoporosis: Patient has been on Fosamax in the past. Had a compression fracture 5/2021. No other concerns today. Complete review of systems completed and is otherwise negative except for those mentioned in the history of presenting complaints. Past Medical History, Surgical History, Social History and Allergies were all reviewed and updated within [facility]. MEDS:  Current Outpatient Medications   Medication Sig   â¢ losartan (COZAAR) 25 MG tablet Take 1 tablet by mouth daily. â¢ acetaminophen (TYLENOL) 500 MG tablet Take 2 tablets by mouth 3 times daily. Scheduled for 2 weeks, then change to as needed   â¢ Homeopathic Products (ARNICARE EX) Apply topically as needed. â¢ levothyroxine 75 MCG tablet Take 1 tablet by mouth daily.    â¢ PARoxetine (PAXIL) 10 MG Tierney Arboleda  : 1980  Primary: Bonita Kinney Ppo  [facility]:  [facility]  [address] [place] [address] Way [address]  Phone: [phone]  Fax: [phone] Plan Frequency: 2 times a week for 90 days    Plan of Care/Certification Expiration Date: 22      PT Visit Info: Total # of Visits to Date: 9  Progress Note Counter: 9     Visit Count:  9   OUTPATIENT PHYSICAL THERAPY:OP NOTE TYPE: Treatment Note 2022       Episode  }Appt Desk             Treatment Diagnosis:  Difficulty in walking, Not elsewhere classified (R26.2)  Generalized Muscle Weakness (M62.81)  Other abnormalities of gait and mobility (R26.89)  Medical/Referring Diagnosis:  No admission diagnoses are documented for this encounter. Referring Physician:  Armani Styles MD Orders:  PT Eval and Treat   Date of Onset:  Onset Date:  (Chronic)     Allergies:   Diazepam, Penicillins, Pineapple, Sumatriptan, Theophylline, Theophyllines, Cephalexin, Sulfa antibiotics, Topiramate, Tramadol, Verapamil, [place] oil, Augmentin [amoxicillin-pot clavulanate], Dermabond, Keflex [cephalexin], Kenalog [triamcinolone acetonide], Nuts [peanut-containing drug products], Pneumococcal vaccine, Sulfa antibiotics, Topiramate, Triamcinolone acetonide, Ultram [tramadol], Valium [diazepam], and Vitamin b12  Restrictions/Precautions:  No data recordedNo data recorded   Interventions Planned (Treatment may consist of any combination of the following):    Current Treatment Recommendations: Strengthening; Balance training; Functional mobility training; Gait training; Home exercise program; Vestibular rehab     Subjective Comments:  \"I had a fall on Saturday. I stood up and looked to the right and fell. Dizziness 6/10. \"  Initial:}    5/10Post Session:       2/10  Medications Last Reviewed:  2022  Updated Objective Findings:   See progress note  Treatment   NEUROMUSCULAR RE-EDUCATION: (30 minutes): "tablet Take 1 tablet by mouth every morning. (Patient taking differently: Take 10 mg by mouth nightly. )   â¢ zoster vaccine recomb adjuvanted (SHINGRIX) 50 MCG/0.5ML injection Inject 0.5 mLs into the muscle 1 time. Indications: Second dose to be administered 2-6 months after the initial dose. No current facility-administered medications for this visit. PHYSICAL EXAM:  Visit Vitals  /78 (BP Location: LUE - Left upper extremity, Patient Position: Sitting, Cuff Size: Regular)   Pulse 64   Temp 97.6 Â°F (36.4 Â°C) (Temporal)   Ht 5' 3"" (1.6 m)   Wt 67.7 kg   BMI 26.45 kg/mÂ²     General: No acute distress, well hydrated, well groomed. HEENT: Mucous membranes moist, neck supple, no cervical or supraclavicular lymphadenopathy, TMs clear bilaterally, pharynx without erythema, swelling, exudate, nasal turbinates normal bilaterally, no thyromegaly appreciated   CV: regular rate and rhythm, normal T4/R8, systolic murmur appreciated, no rubs/gallops, non-displaced PMI  Resp: RR- 15, good air entry, CTAB, no wheeze/rales/rhonchi. Abd: Soft, nontender/non-distended, normoactive bowel sounds, no rebound or guarding, no hepatosplenomegaly. Ext: No cyanosis or edema, moves all extremities. Neuro: CN 2-12 grossly intact, no focal deficits   Psych: Mood and affect appropriate. ASSESSMENT & PLAN:  This patient is a 80year old female presenting with:      Hypothyroidism:  - Continue Levothyroxine 75 mcg daily.  - TSH prior to next clinic visit. Â   Hyperlipidemia:  - Discussed lifestyle and dietary modifications. - Lipid panel prior to next clinic visit. Â   Anxiety:  - Continue Paxil 10 mg daily     HTN:  - Continue Losartan 25 mg daily   - CMP prior to next clinic visit. Osteoporosis: with compression fracture   - Discussed signs and symptoms that would warrant immediate evaluation. Health Maintenance:  Up to date      Results will be discussed at next appointment.      FOLLOW-UP:  Patient is to return " Exercise/activities per grid below to improve balance and coordination. Required minimal verbal cues to promote static and dynamic balance in standing. Date:  9/20/2022 Date:  9/12/2022 Date:  9/15/2022   Activity/Exercise Parameters Parameters Parameters   Marching in hallway with hiking pole 4 laps 4 laps 4 laps   Sidestepping in hallway with hiking pole 4 laps  4 laps 4 laps   Walking through cones with hiking pole  4 laps 4 laps   Supine to sit from table      Stepping over half foam with hiking pole      Step taps with hiking pole      Sit to stand from chair      Sliding right/Sliding left      Seated turns right/left on hilo mat      Standing on blue foam  Eyes open and eyes open with head turns Standing on black foam eyes open   Standing on blue foam  Eyes open weight shifting left and right. Eyes open weight shifting up and down. Standing on black foam Eyes open; eyes open with visual fixation, eyes open with blue theraband retraction       MANUAL THERAPY: (15 minutes): Soft tissue mobilization was utilized and necessary because of the patient's painful spasm. Patient received trigger point release along bilateral upper traps/scapular region to decrease pain. Skin intact after treatment. Treatment/Session Summary:    Treatment Assessment:  Patient reports decreased dizziness after manual therapy to 4/10. Communication/Consultation:  None today  Equipment provided today:  None  Recommendations/Intent for next treatment session: Next visit will focus on balance, gait, and vestibular.     Total Treatment Billable Duration:  45 minutes  Time In: 1100  Time Out: 9821    CHIRAG HOPE, PT       Charge Capture  }Post Session Pain  PT Visit Info  [employer] Portal  MD Guidelines  Scanned Media  Benefits  MyChart    Future Appointments   Date Time Provider Aye Caputo   9/22/2022  1:45 PM Stanislav Fishman, PT [facility]   9/27/2022  9:30 AM Stanislav Fishman PT [facility]   9/28/2022 1:30 PM SFE [facility] ROOM 4 [facility] SFE   9/29/2022  1:00 PM Shelvy Regla, PT [facility]   10/3/2022 10:15 AM Kristen Ingram MD [facility] AMB   10/3/2022  1:00 PM Jose De Jesus Laura Easton, PT [facility]   10/6/2022 11:00 AM Shelvy Regla, PT [facility]   10/10/2022 11:00 AM Shelvy Regla, PT [facility]   10/13/2022 11:00 AM Shelvy Regla, PT [facility]   10/17/2022 11:00 AM Shelvy Regla, PT [facility]   10/20/2022 11:00 AM Shelvy Regla, PT [facility]   10/24/2022 11:00 AM Shelvy Regla, PT [facility]   10/25/2022 11:00 AM Pop Cassidy MD [id number]   10/27/2022 11:00 AM Shelvy Regla, PT [facility]   1/13/2023  9:00 AM [facility] LAB [facility]   1/20/2023 11:00 AM Maria Eugenia Parsons MD CAF [facility] AMB in 1 year or sooner as needed.      Radha Rodas MD  6/28/2021

## 2022-09-20 NOTE — PROGRESS NOTES
Rosalva Negron  : 1980  Primary: Tahmina Hager Ppo  Secondary:  Richardine Severs  [address] [address]  Phone: [phone]  Fax: [phone] Plan Frequency: 2 times a week for 90 days    Plan of Care/Certification Expiration Date: 22      PT Visit Info: Total # of Visits to Date: 9  Progress Note Counter: 9      Visit Count:  9    OUTPATIENT PHYSICAL THERAPY:OP NOTE TYPE: Progress Report 2022               Episode  Appt Desk         Treatment Diagnosis:  Difficulty in walking, Not elsewhere classified (R26.2)  Generalized Muscle Weakness (M62.81)  Other abnormalities of gait and mobility (R26.89)  Medical/Referring Diagnosis:  No admission diagnoses are documented for this encounter. Referring Physician:  Bard Carlos A HERRING Orders:  PT Eval and Treat   Return MD Appt:  unknown   Date of Onset:  Onset Date:  (Chronic)     Allergies:  Diazepam, Penicillins, Pineapple, Sumatriptan, Theophylline, Theophyllines, Cephalexin, Sulfa antibiotics, Topiramate, Tramadol, Verapamil, [place] oil, Augmentin [amoxicillin-pot clavulanate], Dermabond, Keflex [cephalexin], Kenalog [triamcinolone acetonide], Nuts [peanut-containing drug products], Pneumococcal vaccine, Sulfa antibiotics, Topiramate, Triamcinolone acetonide, Ultram [tramadol], Valium [diazepam], and Vitamin b12  Restrictions/Precautions:    No data recordedNo data recorded   Medications Last Reviewed:  2022     SUBJECTIVE   History of Injury/Illness (Reason for Referral):  Patient reports more spinning dizziness, imbalance, and difficulty walking. Aggravating factors are head turns and body turns. Patient has had no falls recently. Patient rates dizziness as 4/10 and pain level as 2/10. Patient  had anterior discectomy and cervical fusion on 2022. Patient reports needing more assistance with laundry, cooking, and cleaning.   Patient Stated Goal(s): \"To decrease spinning dizziness with movements\". Initial:     5/10 Post Session:     2/10  Past Medical History/Comorbidities:   Ms. Landon Parekh  has a past medical history of Adverse effect of anesthesia, Arthritis, Arthritis, Asthma, BMI 36.0-36.9,adult, Chronic pain, Depression, Hyperlipidemia, Hypertension, Migraines, Movement disorder, PONV (postoperative nausea and vomiting), Thyroid disease, Vertigo, and Vestibular nerve disorder. Ms. Landon Parekh  has a past surgical history that includes Cholecystectomy; eye muscle surgery; joint replacement; Cervical disc arthroplasty; orthopedic surgery; heent; Cholecystectomy; and cervical fusion (N/A, 7/12/2022). Social History/Living Environment:   Lives With: Alone  Type of Home: House     Prior Level of Function/Work/Activity:   Prior level of function: Independent  Occupation: Part time employment  No data recordedNo data recorded   Learning:   Does the patient/guardian have any barriers to learning?: No barriers  Will there be a co-learner?: No  What is the preferred language of the patient/guardian?: English  Is an  required?: No  How does the patient/guardian prefer to learn new concepts?: Listening; Reading; Demonstration     Fall Risk Scale: Total Score: 65  [place] Fall Risk: High (45 and higher)     Dominant Side:  right handed      OBJECTIVE   Balance / Vestibular:   Sensation:   Bilateral lower extremity within normal limits. Strength:  Hip flexion 4/5, hip abduction 4+/5, hip adduction 4/5, quadriceps 5/5, hamstrings 5/5, ankle dorsiflexion 5/5, ankle plantarflexion 5/5. Balance Outcome Measures:  Barrera Balance Scale:  22 /56  (A score less than 45/56 indicates high risk of falls)  ASSESSMENT   Initial Assessment:  Patient presents with imbalance, dizziness, difficulty walking, and decreased mobility.   Patient is at higher fall risk based on scores received on Barrera Balance Scale and Timed Up and Go Test.  Patient would benefit from skilled physical therapy to address problems and goals. Thank you for this referral.   Progress note:  Patient has attended nine scheduled physical therapy appointments from 8/19/2022 to 9/20/2022. Patient would benefit from continuing skilled physical therapy to address problems and goals. Thank you for this referral.    Problem List: (Impacting functional limitations): Body Structures, Functions, Activity Limitations Requiring Skilled Therapeutic Intervention: Decreased functional mobility ; Decreased strength; Decreased balance; Vestibular Impairment   ,  manual therapy  Therapy Prognosis:   Therapy Prognosis: Good     Assessment Complexity:      PLAN   Effective Dates: 8/19/2022 TO Plan of Care/Certification Expiration Date: 11/17/22     Frequency/Duration: Plan Frequency: 2 times a week for 90 days     Interventions Planned (Treatment may consist of any combination of the following):    Current Treatment Recommendations: Strengthening; Balance training; Functional mobility training; Gait training; Home exercise program; Vestibular rehab   manual therapy  Goals: (Goals have been discussed and agreed upon with patient.)  Short-Term Functional Goals: Time Frame: 30 days   Patient will be independent with home exercise program to improve balance and decrease dizziness. Goal met. Patient will score less than or equal to 18 seconds on Timed Up and Go Test indicating improved gait speed. Goal ongoing. Discharge Goals: Time Frame: 90 days   Patient will score less than or equal to 13 seconds on Timed Up and Go Test indicating improved gait speed. Goal ongoing. Patient will increase score on Barrera Balance Scale greater than or equal to 30/56 demonstrating improved balance and decreased fall risk with daily activities. Goal ongoing. Patient will report needing less assistance with laundry and cooking. Goal ongoing. Outcome Measure:    Tool Used: Barrera Balance Scale  Score:  Initial: 22/56 Most Recent: X/56 (Date: -- )   Interpretation of Score: Each section is scored on a 0-4 scale, 0 representing the patients inability to perform the task and 4 representing independence. The scores of each section are added together for a total score of 56. The higher the patients score, the more independent the patient is. Any score below 45 indicates increased risk for falls. Tool Used: Timed Up and Go (TUG)  Score:  Initial: 21.5 seconds Most Recent: 20 seconds (Date: 9- )   Interpretation of Score: The test measures, in seconds, the time taken by an individual to stand up from a standard arm chair (seat height 46 cm [18 in], arm height 65 cm [25.6 in]), walk a distance of 3 meters (118 in, approx 10 ft), turn, walk back to the chair and sit down. If the individual takes longer than 14 seconds to complete TUG, this indicates risk for falls. Medical Necessity:   > Patient is expected to demonstrate progress in strength and balance to improve safety during activities of daily living. Reason For Services/Other Comments:  > Patient continues to require skilled intervention due to higher fall risk with daily activities. Total Duration:  Time In: 1100  Time Out: 1145    Regarding Amanda Celaya's therapy, I certify that the treatment plan above will be carried out by a therapist or under their direction. Thank you for this referral,  Wayne Louis, PT     Referring Physician Signature: Mary Anne Miller No Signature is Required for this note.         Post Session Pain  Charge Capture  PT Visit Info MD Guidelines  Ashley

## 2022-09-22 ENCOUNTER — HOSPITAL ENCOUNTER (OUTPATIENT)
Dept: PHYSICAL THERAPY | Age: 42
Setting detail: RECURRING SERIES
Discharge: HOME | End: 2022-09-25
Payer: MEDICARE

## 2022-09-22 PROCEDURE — 97140 MANUAL THERAPY 1/> REGIONS: CPT

## 2022-09-22 PROCEDURE — 97112 NEUROMUSCULAR REEDUCATION: CPT

## 2022-09-22 ASSESSMENT — PAIN SCALES - GENERAL: PAINLEVEL_OUTOF10: 2

## 2022-09-22 NOTE — PROGRESS NOTES
Magdy Gage  : 1980  Primary: Chema Holden Ppo  Secondary:  Hannah Unegr  [address] [address]  Phone: [phone]  Fax: [phone] Plan Frequency: 2 times a week for 90 days    Plan of Care/Certification Expiration Date: 22      PT Visit Info: Total # of Visits to Date: 10  Progress Note Counter: 10     Visit Count:  10   OUTPATIENT PHYSICAL THERAPY:OP NOTE TYPE: Treatment Note 2022       Episode  }Appt Desk             Treatment Diagnosis:  Difficulty in walking, Not elsewhere classified (R26.2)  Generalized Muscle Weakness (M62.81)  Other abnormalities of gait and mobility (R26.89)  Medical/Referring Diagnosis:  No admission diagnoses are documented for this encounter. Referring Physician:  Jazmyn Saxena MD Orders:  PT Eval and Treat   Date of Onset:  Onset Date:  (Chronic)     Allergies:   Diazepam, Penicillins, Pineapple, Sumatriptan, Theophylline, Theophyllines, Cephalexin, Sulfa antibiotics, Topiramate, Tramadol, Verapamil, [place] oil, Augmentin [amoxicillin-pot clavulanate], Dermabond, Keflex [cephalexin], Kenalog [triamcinolone acetonide], Nuts [peanut-containing drug products], Pneumococcal vaccine, Sulfa antibiotics, Topiramate, Triamcinolone acetonide, Ultram [tramadol], Valium [diazepam], and Vitamin b12  Restrictions/Precautions:  No data recordedNo data recorded   Interventions Planned (Treatment may consist of any combination of the following):    Current Treatment Recommendations: Strengthening; Balance training; Functional mobility training; Gait training; Home exercise program; Vestibular rehab     Subjective Comments: dizziness 6/10  No falls. \"Today has been a rough day. I am having difficulty finding words\".   Initial:}    2/10Post Session:       2/10  Medications Last Reviewed:  2022  Updated Objective Findings:  None Today  Treatment   NEUROMUSCULAR RE-EDUCATION: (30 minutes): Exercise/activities per grid below to improve balance and coordination. Required minimal verbal cues to promote static and dynamic balance in standing. Date:  9/20/2022 Date:  9/22/2022 Date:  9/15/2022   Activity/Exercise Parameters Parameters Parameters   Marching in hallway with hiking pole 4 laps 4 laps 4 laps   Sidestepping in hallway with hiking pole 4 laps  4 laps 4 laps   Walking through cones with hiking pole  4 laps 4 laps   Supine to sit from table      Stepping over half foam with hiking pole      Step taps with hiking pole      Sit to stand from chair      Sliding right/Sliding left      Seated turns right/left on hilo mat      Seated ball kicks/ball throws  5 minutes    Seated trunk rotation with red ball  5 minutes    Standing on black foam Eyes open; eyes open with visual fixation, eyes open with blue theraband retraction       MANUAL THERAPY: (15 minutes): Soft tissue mobilization was utilized and necessary because of the patient's painful spasm. Patient received trigger point release along bilateral upper traps/scapular region to decrease pain. Skin intact after treatment. Treatment/Session Summary:    Treatment Assessment:  Patient had to stop frequently due to increased dizziness. Dizziness 7/10 after treatment. Communication/Consultation:  None today  Equipment provided today:  None  Recommendations/Intent for next treatment session: Next visit will focus on balance, gait, and vestibular.     Total Treatment Billable Duration:  45 minutes  Time In: 0412  Time Out: 1430    CHIRAG Rodriges, PT       Charge Capture  }Post Session Pain  PT Visit Info  Med[employer] Portal  MD Guidelines  Scanned Media  Benefits  MyChart    Future Appointments   Date Time Provider Aye Goinsi   9/27/2022  9:30 AM Khari Valdes PT [facility]   9/28/2022  1:30 PM [facility] ROOM 4 SF[facility] SFE   9/29/2022  1:00 PM Khari Valdes PT [facility] SFE   10/3/2022 10:15 AM MD BERNARD Bernal GVL AMB   10/3/2022  1:00 PM Wolcott Edi, PT [facility] SFE   10/6/2022 11:00 AM Milton Edi, PT [facility] SFE   10/10/2022 11:00 AM Milton Edi, PT [facility] SFE   10/13/2022 11:00 AM Wolcott Edi, PT [facility] SFE   10/17/2022 11:00 AM Wolcott Edi, PT [facility] SFE   10/20/2022 11:00 AM Milton Edi, PT [facility] SFE   10/24/2022 11:00 AM Wolcott Edi, PT S[facility]PT SFE   10/25/2022 11:00 AM Yefri Traore MD [id number] GVL AMB   10/27/2022 11:00 AM Milton Edi, PT [facility] SFE   1/13/2023  9:00 AM CA[facility] LAB [facility] GVL AMB   1/20/2023 11:00 AM Diamond Soni MD [facility] GVL AMB

## 2022-09-27 ENCOUNTER — HOSPITAL ENCOUNTER (OUTPATIENT)
Dept: PHYSICAL THERAPY | Age: 42
Setting detail: RECURRING SERIES
Discharge: HOME | End: 2022-09-30
Payer: MEDICARE

## 2022-09-27 PROCEDURE — 97112 NEUROMUSCULAR REEDUCATION: CPT

## 2022-09-27 PROCEDURE — 97140 MANUAL THERAPY 1/> REGIONS: CPT

## 2022-09-27 ASSESSMENT — PAIN SCALES - GENERAL: PAINLEVEL_OUTOF10: 2

## 2022-09-27 NOTE — PROGRESS NOTES
Klever Media  : 1980  Primary: Isidro Strong Ppo  Secondary:  Stephen Best  [address] [address]  Phone: [phone]  Fax: [phone] Plan Frequency: 2 times a week for 90 days    Plan of Care/Certification Expiration Date: 22      PT Visit Info: Total # of Visits to Date: 6  Progress Note Counter: 11     Visit Count:  11   OUTPATIENT PHYSICAL THERAPY:OP NOTE TYPE: Treatment Note 2022       Episode  }Appt Desk             Treatment Diagnosis:  Difficulty in walking, Not elsewhere classified (R26.2)  Generalized Muscle Weakness (M62.81)  Other abnormalities of gait and mobility (R26.89)  Medical/Referring Diagnosis:  No admission diagnoses are documented for this encounter. Referring Physician:  Sacha Oconnor MD Orders:  PT Eval and Treat   Date of Onset:  Onset Date:  (Chronic)     Allergies:   Diazepam, Penicillins, Pineapple, Sumatriptan, Theophylline, Theophyllines, Cephalexin, Sulfa antibiotics, Topiramate, Tramadol, Verapamil, [place] oil, Augmentin [amoxicillin-pot clavulanate], Dermabond, Keflex [cephalexin], Kenalog [triamcinolone acetonide], Nuts [peanut-containing drug products], Pneumococcal vaccine, Sulfa antibiotics, Topiramate, Triamcinolone acetonide, Ultram [tramadol], Valium [diazepam], and Vitamin b12  Restrictions/Precautions:  No data recordedNo data recorded   Interventions Planned (Treatment may consist of any combination of the following):    Current Treatment Recommendations: Strengthening; Balance training; Functional mobility training; Gait training; Home exercise program; Vestibular rehab     Subjective Comments:   \"I tried my new glasses and I couldn't handle it. I had a bad weekend. Dizziness 4/10\".   Initial:}    2/10Post Session:       2/10  Medications Last Reviewed:  2022  Updated Objective Findings:  None Today  Treatment   NEUROMUSCULAR RE-EDUCATION: (30 minutes): Exercise/activities per grid below to improve balance and coordination. Required minimal verbal cues to promote static and dynamic balance in standing. Date:  9/20/2022 Date:  9/22/2022 Date:  9/27/2022   Activity/Exercise Parameters Parameters Parameters   Marching in hallway with hiking pole 4 laps 4 laps 4 laps   Sidestepping in hallway with hiking pole 4 laps  4 laps 4 laps   Walking through cones with hiking pole  4 laps 4 laps   Supine to sit from table      Stepping over half foam with hiking pole   10 reps each   Step taps with hiking pole   6 inch 2x10 reps   Sit to stand from chair      Sliding right/Sliding left      Seated turns right/left on hilo mat      Seated ball kicks/ball throws  5 minutes    Seated trunk rotation with red ball  5 minutes    Standing on black foam Eyes open; eyes open with visual fixation, eyes open with blue theraband retraction       MANUAL THERAPY: (15 minutes): Soft tissue mobilization was utilized and necessary because of the patient's painful spasm. Patient received trigger point release along bilateral upper traps/scapular region to decrease pain. Skin intact after treatment. Treatment/Session Summary:    Treatment Assessment:  Patient needed multiple rest breaks due to vertigo. Patient rates dizziness as 6/10 after treatment. Communication/Consultation:  None today  Equipment provided today:  None  Recommendations/Intent for next treatment session: Next visit will focus on balance, gait, and vestibular.     Total Treatment Billable Duration:  45 minutes  Time In: 0930  Time Out: 8776    Leena Crews, PT       Charge Capture  }Post Session Pain  PT Visit Info  MedBridge Portal  MD Guidelines  Scanned Media  Benefits  MyChart    Future Appointments   Date Time Provider Aye Patito   9/28/2022  1:30 PM [facility] ROOM [facility]   9/29/2022  [facility]00 [facility]   10/3/2022 10:15 AM MD BERNARD Fonseca AMB 10/3/2022  1:00 PM Katie Easton, PT S[facility]ORPT SFE   10/6/2022 11:00 AM José Miguel Gakona, PT S[facility]ORPT SFE   10/10/2022 11:00 AM José Miguel Gakona, PT S[facility]PT SFE   10/13/2022 11:00 AM José Miguel Gakona, PT SFEORPT SFE   10/17/2022 11:00 AM José Miguel Gakona, PT [facility]PT SFE   10/20/2022 11:00 AM José Miguel Gakona, PT S[facility]PT SFE   10/24/2022 11:00 AM José Miguel Gakona, PT S[facility]ORPT SFE   10/25/2022 11:00 AM Sheila Cline MD [id number] GVL AMB   10/27/2022 11:00 [facility]   1/13/2023  9:00 AM CAF LAB CAF GVL AMB   1/20/2023 11:00 AM Pat De La Torre MD CAF GVL AMB

## 2022-09-28 ENCOUNTER — HOSPITAL ENCOUNTER (OUTPATIENT)
Dept: MAMMOGRAPHY | Age: 42
Discharge: HOME | End: 2022-10-01
Payer: MEDICARE

## 2022-09-28 DIAGNOSIS — Z12.31 ENCOUNTER FOR SCREENING MAMMOGRAM FOR MALIGNANT NEOPLASM OF BREAST: ICD-10-CM

## 2022-09-28 PROCEDURE — 77067 SCR MAMMO BI INCL CAD: CPT

## 2022-09-29 ENCOUNTER — HOSPITAL ENCOUNTER (OUTPATIENT)
Dept: PHYSICAL THERAPY | Age: 42
Setting detail: RECURRING SERIES
Discharge: HOME | End: 2022-10-02
Payer: MEDICARE

## 2022-09-29 PROCEDURE — 97110 THERAPEUTIC EXERCISES: CPT

## 2022-09-29 PROCEDURE — 97140 MANUAL THERAPY 1/> REGIONS: CPT

## 2022-09-29 ASSESSMENT — PAIN SCALES - GENERAL: PAINLEVEL_OUTOF10: 3

## 2022-09-29 NOTE — PROGRESS NOTES
Fuad Rodriguez  : 1980  Primary: Derrick Bhatia Ppo  Secondary:  Rae Cavazos  [address] [address]  Phone: [phone]  Fax: [phone] Plan Frequency: 2 times a week for 90 days    Plan of Care/Certification Expiration Date: 22      PT Visit Info: Total # of Visits to Date: 15  Progress Note Counter: 12     Visit Count:  12   OUTPATIENT PHYSICAL THERAPY:OP NOTE TYPE: Treatment Note 2022       Episode  }Appt Desk             Treatment Diagnosis:  Difficulty in walking, Not elsewhere classified (R26.2)  Generalized Muscle Weakness (M62.81)  Other abnormalities of gait and mobility (R26.89)  Medical/Referring Diagnosis:  No admission diagnoses are documented for this encounter. Referring Physician:  Joseph Ryan MD Orders:  PT Eval and Treat   Date of Onset:  Onset Date:  (Chronic)     Allergies:   Diazepam, Penicillins, Pineapple, Sumatriptan, Theophylline, Theophyllines, Cephalexin, Sulfa antibiotics, Topiramate, Tramadol, Verapamil, [place] oil, Augmentin [amoxicillin-pot clavulanate], Dermabond, Keflex [cephalexin], Kenalog [triamcinolone acetonide], Nuts [peanut-containing drug products], Pneumococcal vaccine, Sulfa antibiotics, Topiramate, Triamcinolone acetonide, Ultram [tramadol], Valium [diazepam], and Vitamin b12  Restrictions/Precautions:  No data recordedNo data recorded   Interventions Planned (Treatment may consist of any combination of the following):    Current Treatment Recommendations: Strengthening; Balance training; Functional mobility training; Gait training; Home exercise program; Vestibular rehab     Subjective Comments:   Patient reports she just fell in the bathroom at a restaurant. \"I went to turn and the room started to spin and I fell. I tensed my muscles when I fell. Dizziness 7/10\".   Initial:}    3/10Post Session:       2/10  Medications Last Reviewed:  2022  Updated Objective Findings:  None Today  Treatment   NEUROMUSCULAR RE-EDUCATION: (20 minutes):    Exercise/activities per grid below to improve balance and coordination. Required minimal verbal cues to promote static and dynamic balance in standing. Date:  9/20/2022 Date:  9/22/2022 Date:  9/29/2022   Activity/Exercise Parameters Parameters Parameters   Marching in hallway with hiking pole 4 laps 4 laps 4 laps   Sidestepping in hallway with hiking pole 4 laps  4 laps 4 laps   Walking through cones with hiking pole  4 laps 4 laps   Supine to sit from table      Stepping over half foam with hiking pole      Step taps with hiking pole      Sit to stand from chair      Sliding right/Sliding left      Seated turns right/left on hilo mat      Seated ball kicks/ball throws  5 minutes    Seated trunk rotation with red ball  5 minutes    Standing on black foam Eyes open; eyes open with visual fixation, eyes open with blue theraband retraction       MANUAL THERAPY: (25 minutes): Soft tissue mobilization was utilized and necessary because of the patient's painful spasm. Patient received trigger point release along bilateral upper traps/scapular region to decrease pain. Skin intact after treatment. Treatment/Session Summary:    Treatment Assessment:  Dizziness a lot worse with movements today. Patient needed extended rest breaks due to dizziness. Patient reports slight decrease in dizziness to 6/10 after treatment. Communication/Consultation:  None today  Equipment provided today:  None  Recommendations/Intent for next treatment session: Next visit will focus on balance, gait, and vestibular.     Total Treatment Billable Duration:  45 minutes  Time In: 1300  Time Out: 6700    CHIRAG Perez, PT       Charge Capture  }Post Session Pain  PT Visit Info  [facility] Portal  MD Guidelines  Scanned Media  Benefits  MyChart    Future Appointments   Date Time Provider Aye Caputo   10/3/2022 10:15 AM MD BERNARD Norman GVL AMB   10/3/2022  1:00 PM Dusilva Marcelojaeli, PT [facility]PT SFE   10/6/2022 11:00 AM Dusilva Marcelojaeli, PT [facility]PT SFE   10/10/2022 11:00 AM Dusilva Marcelojaeli, PT [facility]PT SFE   10/13/2022 11:00 AM Dusilva Soleri, PT [facility]PT SFE   10/17/2022 11:00 AM Dusilva Marcelojaeli, PT [facility]PT SFE   10/20/2022 11:00 AM Dusilva Marcelojaeli, PT [facility]PT SFE   10/24/2022 11:00 AM Dusilva Marcelojaeli, PT S[facility]PT SFE   10/25/2022 11:00 AM Chris Ivan MD [id number] GVL AMB   10/27/2022 11:00 AM Sher Marcelojaeli, PT S[facility]ORPT SFE   1/13/2023  9:00 AM [facility] LAB [facility] GVL AMB   1/20/2023 11:00 AM Rasta Dangelo MD [facility] GVL AMB

## 2022-10-03 ENCOUNTER — OFFICE VISIT (OUTPATIENT)
Dept: ORTHOPEDIC SURGERY | Age: 42
End: 2022-10-03
Payer: MEDICARE

## 2022-10-03 ENCOUNTER — HOSPITAL ENCOUNTER (OUTPATIENT)
Dept: PHYSICAL THERAPY | Age: 42
Setting detail: RECURRING SERIES
Discharge: HOME | End: 2022-10-06
Payer: MEDICARE

## 2022-10-03 DIAGNOSIS — Z98.1 STATUS POST CERVICAL ARTHRODESIS: Primary | ICD-10-CM

## 2022-10-03 PROCEDURE — 97112 NEUROMUSCULAR REEDUCATION: CPT

## 2022-10-03 PROCEDURE — 99024 POSTOP FOLLOW-UP VISIT: CPT | Performed by: ORTHOPAEDIC SURGERY

## 2022-10-03 PROCEDURE — 97140 MANUAL THERAPY 1/> REGIONS: CPT

## 2022-10-03 ASSESSMENT — PAIN SCALES - GENERAL: PAINLEVEL_OUTOF10: 3

## 2022-10-03 NOTE — PROGRESS NOTES
Katalina Wolff  : 1980  Primary: Emma Santos Ppo  Secondary:  Waqas Valdez  [address] [address] [address]  Phone: [phone]  Fax: [phone] Plan Frequency: 2 times a week for 90 days    Plan of Care/Certification Expiration Date: 22      PT Visit Info: Total # of Visits to Date: 15  Progress Note Counter: 15     Visit Count:  13   OUTPATIENT PHYSICAL THERAPY:OP NOTE TYPE: Treatment Note 10/3/2022       Episode  }Appt Desk             Treatment Diagnosis:  Difficulty in walking, Not elsewhere classified (R26.2)  Generalized Muscle Weakness (M62.81)  Other abnormalities of gait and mobility (R26.89)  Medical/Referring Diagnosis:  No admission diagnoses are documented for this encounter. Referring Physician:  Bulmaro Perez MD Orders:  PT Eval and Treat   Date of Onset:  Onset Date:  (Chronic)     Allergies:   Diazepam, Penicillins, Pineapple, Sumatriptan, Theophylline, Theophyllines, Cephalexin, Sulfa antibiotics, Topiramate, Tramadol, Verapamil, [place] oil, Augmentin [amoxicillin-pot clavulanate], Dermabond, Keflex [cephalexin], Kenalog [triamcinolone acetonide], Nuts [peanut-containing drug products], Pneumococcal vaccine, Sulfa antibiotics, Topiramate, Triamcinolone acetonide, Ultram [tramadol], Valium [diazepam], and Vitamin b12  Restrictions/Precautions:  No data recordedNo data recorded   Interventions Planned (Treatment may consist of any combination of the following):    Current Treatment Recommendations: Strengthening; Balance training; Functional mobility training; Gait training; Home exercise program; Vestibular rehab     Subjective Comments:   Dizziness 5/10. No falls.   Initial:}    3/10Post Session:       2/10  Medications Last Reviewed:  10/3/2022  Updated Objective Findings:  None Today  Treatment   NEUROMUSCULAR RE-EDUCATION: (30 minutes):    Exercise/activities per grid below to improve balance and coordination. Required minimal verbal cues to promote static and dynamic balance in standing. Date:  10/3/22 Date:  9/22/2022 Date:  9/29/2022   Activity/Exercise Parameters Parameters Parameters   Marching in hallway with hiking pole 4 laps 4 laps 4 laps   Sidestepping in hallway with hiking pole 4 laps  4 laps 4 laps   Walking through cones with hiking pole 4 lap 4 laps 4 laps   Supine to sit from table      Stepping over half foam with hiking pole      Step taps with hiking pole 6 inch  10 reps  Crosstaps 10 reps     Sit to stand from chair      Sliding right/Sliding left      Seated turns right/left on hilo mat      Seated ball kicks/ball throws  5 minutes    Seated trunk rotation with red ball  5 minutes    Standing on black foam        MANUAL THERAPY: (15 minutes): Soft tissue mobilization was utilized and necessary because of the patient's painful spasm. Patient received trigger point release along bilateral upper traps/scapular region to decrease pain. Skin intact after treatment. Treatment/Session Summary:    Treatment Assessment:  Patient needed several rest breaks during treatment. Communication/Consultation:  None today  Equipment provided today:  None  Recommendations/Intent for next treatment session: Next visit will focus on balance, gait, and vestibular.     Total Treatment Billable Duration:  45 minutes  Time In: 1300  Time Out: 8435    CHIRAG Patrick, PT       Charge Capture  }Post Session Pain  PT Visit Info  [employer] [facility]  MD Guidelines  Scanned Media  Benefits  MyChart    Future Appointments   Date Time Provider Aye Caputo   10/6/2022 11:00 AM Carly Ann, PT [facility]   10/10/2022 11:00 AM Carly Ann, PT [facility]   10/13/2022 11:00 AM Carly Ann, PT [facility]   10/17/2022 11:00 AM Carly Ann, PT [facility]   10/20/2022 11:00 AM Carly Ann, PT [facility]   10/24/2022 11:00 AM Carly Ann, PT [facility]   10/25/2022 11:00 AM Melissa Donna Dobbs MD [id number]   10/27/2022 11:00 AM Katelynn Brewster, PT [facility]   1/13/2023  9:00 AM [facility]   1/20/2023 11:00 AM Viraj Iverson MD [facility]   4/3/2023 11:15 AM Prince Shaffer MD [facility]

## 2022-10-03 NOTE — PROGRESS NOTES
Name: Bonifacio Monge  YOB: 1980  Gender: female  MRN: [id number]  Age: 43 y.o. Chief Complaint: Neck surgery follow up. History of present illness:    Bonifacio Monge is here for 2 and half months follow up of her  anterior cervical discectomy and fusion surgery. She reports some relief of preoperative upper extremity pain, weakness and parasthesias. The wound is benign. She continues to have some left anterior shoulder pain over the bicipital groove. She also has some right medial scapula pain. Also, she has some \"vestibular dysfunction\"     Medications:   Current Outpatient Medications   Medication Sig    lamoTRIgine (LAMICTAL) 100 MG tablet Take 1 tablet by mouth in the morning. venlafaxine (EFFEXOR XR) 75 MG extended release capsule Take 1 capsule by mouth in the morning. clonazePAM (KLONOPIN) 0.5 MG tablet Take 1 tablet by mouth daily as needed for Anxiety for up to 90 days. thyroid (ARMOUR) 30 MG tablet Take 30 mg by mouth in the morning. Betahistine HCl (BETAHISTINE DIHYDROCHLORIDE) POWD 16 mg by Does not apply route 2 times daily    fexofenadine (ALLEGRA) 180 MG tablet Take 180 mg by mouth in the morning.     fluticasone (FLONASE) 50 MCG/ACT nasal spray 1 spray by Each Nostril route daily    tiZANidine (ZANAFLEX) 4 MG capsule Take 4 mg by mouth 3 times daily as needed for Muscle spasms    ondansetron (ZOFRAN) 4 MG tablet Take 4 mg by mouth every 8 hours as needed for Nausea or Vomiting    Albuterol Sulfate, sensor, (PROAIR DIGIHALER) 108 (90 Base) MCG/ACT AEPB Inhale 2 puffs into the lungs 4 times daily as needed    acetaminophen (TYLENOL) 500 MG tablet Take 1,000 mg by mouth every 6 hours as needed for Pain    candesartan (ATACAND) 16 MG tablet Take 16 mg by mouth at bedtime    PROGESTERONE PO Take by mouth at bedtime Progesterone with testosterone denny (dissolvable pill)    MAGNESIUM PO Take 2 tablets by mouth daily    albuterol sulfate  (90 Base) MCG/ACT inhaler Inhale 2 puffs into the lungs every 4 hours as needed    ascorbic acid (VITAMIN C) 500 MG tablet Take 500 mg by mouth daily    indomethacin (INDOCIN) 25 MG capsule TAKE 1-2 TABLETS BY MOUTH 3 TIMES DAILY AS NEEDED FOR MIGRAINE. LIMIT 2 DAYS/WEEK. TAKE WITH FOOD    mometasone (NASONEX) 50 MCG/ACT nasal spray 1 spray by Nasal route at bedtime      No current facility-administered medications for this visit. Allergies: Allergies   Allergen Reactions    Diazepam Other (See Comments)     Suicidal thoughts       Penicillins Anaphylaxis     Has not been able to tolerate cephalosporins     Pineapple Shortness Of Breath     wheezing    Sumatriptan Shortness Of Breath    Theophylline Shortness Of Breath    Theophyllines Shortness Of Breath    Cephalexin Other (See Comments)     Other reaction(s): Dizziness    Sulfa Antibiotics Rash     Other reaction(s): Vomiting    Topiramate Other (See Comments)     Caused disorientation, loss of feeling in left leg (numbness)    Tramadol Rash    Verapamil Other (See Comments)     Lowers blood pressure significantly beyond normal hypotension      [place] Oil Swelling     Causes lips to swell       Augmentin [Amoxicillin-Pot Clavulanate] Hives    Dermabond Hives    Keflex [Cephalexin] Hives    Kenalog [Triamcinolone Acetonide] Hives    Nuts [Peanut-Containing Drug Products] Hives    Pneumococcal Vaccine Swelling    Sulfa Antibiotics Hives    Topiramate Other (See Comments) and Myalgia    Triamcinolone Acetonide Hives     Kenalog injection caused hives     Ultram [Tramadol] Hives    Valium [Diazepam] Other (See Comments)    Vitamin B12 Other (See Comments)     Injection Only  swelling        Physical Exam:     Oriented to person, place and time. Mood and affect are appropriate. Respirations are unlabored and there is no evidence of cyanosis. Wound is healed up nicely without erythema or underlying fluctuance.      Sensory testing reveals intact sensation to light touch. Strength testing in the upper extremity reveals the following based on the 5 point grading scale:       Delt(C5) Bicep(C6) WE(C6) Tricep (C7) WF(C7) (C8) Int (T1)   Right 5 5 5 5 5 5 5   Left 5 4 5 5 5 5 5     She has tenderness over her left anterior shoulder which is the same location of the pain with biceps testing. Radiographic Studies:    Radiographs:   2 views cervical spine (AP and Lateral) taken today, reveal postoperative changes status post instrumented fusion with excellent graft and hardware placement, and no interval decompensation from the immediate postoperative appearance. Radiographic Impression: Satisfactory postoperative appearance, status post cervical fusion. Diagnosis:      ICD-10-CM    1. Status post cervical arthrodesis  Z98.1           Assessment/Plan: This patient is following the expected course following cervical surgery. I will have her continue to increase activities as tolerated and follow up in 6 months with repeat radiographs of the cervical spine, to assess for fusion consolidation. In the meantime, I do think she should follow-up with Dr. Michael Snyder regarding the left anterior shoulder.         Electronically Signed By Ceasar Quintero MD     10:10 AM

## 2022-10-06 ENCOUNTER — HOSPITAL ENCOUNTER (OUTPATIENT)
Dept: PHYSICAL THERAPY | Age: 42
Setting detail: RECURRING SERIES
Discharge: HOME | End: 2022-10-09
Payer: MEDICARE

## 2022-10-06 PROCEDURE — 97140 MANUAL THERAPY 1/> REGIONS: CPT

## 2022-10-06 PROCEDURE — 97112 NEUROMUSCULAR REEDUCATION: CPT

## 2022-10-06 ASSESSMENT — PAIN SCALES - GENERAL: PAINLEVEL_OUTOF10: 5

## 2022-10-06 NOTE — PROGRESS NOTES
Bonifacio Monge  : 1980  Primary: Judie Silva Ppo  Secondary:  Kristeen Curling  [address] [address]  Phone: [phone]  Fax: [phone] Plan Frequency: 2 times a week for 90 days    Plan of Care/Certification Expiration Date: 22      PT Visit Info: Total # of Visits to Date: 14  Progress Note Counter: 14     Visit Count:  14   OUTPATIENT PHYSICAL THERAPY:OP NOTE TYPE: Treatment Note 10/6/2022       Episode  }Appt Desk             Treatment Diagnosis:  Difficulty in walking, Not elsewhere classified (R26.2)  Generalized Muscle Weakness (M62.81)  Other abnormalities of gait and mobility (R26.89)  Medical/Referring Diagnosis:  No admission diagnoses are documented for this encounter. Referring Physician:  Philemon Castleman MD Orders:  PT Eval and Treat   Date of Onset:  Onset Date:  (Chronic)     Allergies:   Diazepam, Penicillins, Pineapple, Sumatriptan, Theophylline, Theophyllines, Cephalexin, Sulfa antibiotics, Topiramate, Tramadol, Verapamil, [place] oil, Augmentin [amoxicillin-pot clavulanate], Dermabond, Keflex [cephalexin], Kenalog [triamcinolone acetonide], Nuts [peanut-containing drug products], Pneumococcal vaccine, Sulfa antibiotics, Topiramate, Triamcinolone acetonide, Ultram [tramadol], Valium [diazepam], and Vitamin b12  Restrictions/Precautions:  No data recordedNo data recorded   Interventions Planned (Treatment may consist of any combination of the following):    Current Treatment Recommendations: Strengthening; Balance training; Functional mobility training; Gait training; Home exercise program; Vestibular rehab     Subjective Comments:   Dizziness 6/10. \"I was doing to much. I looked right and turned left and fell\".   Initial:}    5/10Post Session:       2/10  Medications Last Reviewed:  10/6/2022  Updated Objective Findings:  None Today  Treatment   NEUROMUSCULAR RE-EDUCATION: (10 minutes): Exercise/activities per grid below to improve balance and coordination. Required minimal verbal cues to promote static and dynamic balance in standing. Date:  10/6/22 Date:   Date:  9/29/2022   Activity/Exercise Parameters Parameters Parameters   Marching in hallway with hiking pole 4 laps 4 laps 4 laps   Sidestepping in hallway with hiking pole  4 laps 4 laps   Walking through cones with hiking pole  4 laps 4 laps   Supine to sit from table      Stepping over half foam with hiking pole      Step taps with hiking pole      Sit to stand from chair      Sliding right/Sliding left      Seated turns right/left on hilo mat      Seated ball kicks/ball throws  5 minutes    Seated trunk rotation with red ball  5 minutes    Standing on black foam        MANUAL THERAPY: (30 minutes): Soft tissue mobilization was utilized and necessary because of the patient's painful spasm. Patient received trigger point release along bilateral upper traps/scapular region to decrease pain. Skin intact after treatment. Treatment/Session Summary:    Treatment Assessment:  Patient reports vertigo after marching down hallway. Stopped exercises and performed manual therapy. Patient reports decreased muscular tightness after manual therapy. Patient stood to leave appointment and had insidious onset of vertigo. (9/10 dizziness). Patient taken downstairs to her car in a wheelchair for safety. Patient assisted safely into passenger's side of her car. Communication/Consultation:  None today  Equipment provided today:  None  Recommendations/Intent for next treatment session: Next visit will focus on balance, gait, and vestibular.     Total Treatment Billable Duration:  40 minutes  Time In: 1100  Time Out: 4528    CHIRAG HOPE, PT       Charge Capture  }Post Session Pain  PT Visit Info  [email or web address] Portal  MD Guidelines  Scanned Media  Benefits  MyChart    Future Appointments   Date Time Provider Aye Caputo   10/10/2022 11:00 AM Elena Jose, PT [facility]   10/13/2022 11:00 AM Elena Jose, PT [facility]   10/17/2022 11:00 AM Elena Jose, PT [facility]   10/20/2022 11:00 AM Elena Jose, PT [facility]   10/20/2022  1:30 PM Hannah Madera, DO [facility]   10/20/2022  2:00 PM Gallo Salvador, AuD [facility]   10/24/2022 11:00 AM Elena Jose, PT [facility]   10/25/2022 11:00 AM Nimo Bonds MD [id number]   10/27/2022 11:00 AM Elena Jose, PT [facility]   11/2/2022  1:45 PM THEODORE Alicea MD [facility]   1/13/2023  9:00 AM [facility]   1/20/2023 11:00 AM Marlyn Wolff MD [facility]   4/3/2023 11:15 AM Alejandro Miner MD [facility]

## 2022-10-10 ENCOUNTER — HOSPITAL ENCOUNTER (OUTPATIENT)
Dept: PHYSICAL THERAPY | Age: 42
Setting detail: RECURRING SERIES
End: 2022-10-10
Payer: MEDICARE

## 2022-10-10 NOTE — PROGRESS NOTES
Aneta Le  : 1980  Primary: Rasta Espinoza Ppo  Secondary:  Danie Collins  [address] [facility] [address]  Phone: [phone]  Fax: [phone]    PT Visit Info: Total # of Visits to Date: 14  Progress Note Counter: 15  Canceled Appointment: 1     OT Visit Info:  No data recorded    OUTPATIENT THERAPY:OP NOTE TYPE: Progress Report 10/10/2022               Episode  Appt Desk           Aneta Le cancelled her appointment for today due to unknown reasons. Will plan to follow up next during next appointment.   Thank you,  Vandana Morgan, PT    Future Appointments   Date Time Provider Aye Caputo   10/13/2022 11:00 AM Katelynn Brewster, PT [facility]   10/17/2022 11:00 AM Katelynn Brewster, PT [facility]   10/20/2022 11:00 AM Katelynn Brewster, PT [facility]   10/20/2022  1:30 PM Hannah Armstrong, DO [facility]   10/20/2022  2:00 PM Issa Winter, AuD [facility]   10/24/2022 11:00 AM Katelynn Brewster, PT [facility]   10/25/2022 11:00 AM Noris Santillan MD [id number]   10/27/2022 11:00 AM Katelynn Brewster, PT [facility]   2022  1:45 PM THEODORE Coats MD [facility]   2023  9:00 AM [facility]   2023 11:00 AM Viraj Iverson MD [facility]   4/3/2023 11:15 AM Prince Shaffer MD [facility]

## 2022-10-13 ENCOUNTER — HOSPITAL ENCOUNTER (OUTPATIENT)
Dept: PHYSICAL THERAPY | Age: 42
Setting detail: RECURRING SERIES
End: 2022-10-13
Payer: MEDICARE

## 2022-10-13 NOTE — PROGRESS NOTES
Jennifer Martinez  : 1980  Primary: Kimo Parish Ppo  Secondary:  Gianna Pichardo  [address] [facility] [address]  Phone: [phone]  Fax: [phone]    PT Visit Info: Total # of Visits to Date: 14  Progress Note Counter: 15  Canceled Appointment: 2     OT Visit Info:  No data recorded    OUTPATIENT THERAPY:OP NOTE TYPE: Progress Report 10/13/2022               Episode  Appt Desk           Jennifer Martinez cancelled her appointment for today due to unknown reasons. Will plan to follow up next during next appointment.   Thank you,  Jose Brown, PT    Future Appointments   Date Time Provider Aye Patito   10/20/2022 11:00 AM Hiwot Nolasco PT [facility]   10/20/2022  1:30 PM Pau Skinner DO [facility]   10/20/2022  2:00 PM Jess Coley [facility]   10/24/2022 11:00 AM Hiwot Nolasco PT [facility]   10/25/2022 11:00 AM Nelia Burciaga MD [id number]   10/27/2022 11:00 AM Hiwot Nolasco PT [facility]   2022  1:45 PM THEODORE Santoro MD [facility]   2023  9:00 AM [facility]   2023 11:00 AM Genia Herr MD [facility]   4/3/2023 11:15 AM Fidel Baltazar MD [facility]

## 2022-10-17 ENCOUNTER — APPOINTMENT (OUTPATIENT)
Dept: PHYSICAL THERAPY | Age: 42
End: 2022-10-17
Payer: MEDICARE

## 2022-10-20 ENCOUNTER — HOSPITAL ENCOUNTER (OUTPATIENT)
Dept: PHYSICAL THERAPY | Age: 42
Setting detail: RECURRING SERIES
Discharge: HOME | End: 2022-10-23
Payer: MEDICARE

## 2022-10-20 ENCOUNTER — OFFICE VISIT (OUTPATIENT)
Dept: ENT CLINIC | Age: 42
End: 2022-10-20
Payer: MEDICARE

## 2022-10-20 VITALS
DIASTOLIC BLOOD PRESSURE: 70 MMHG | BODY MASS INDEX: 43.23 KG/M2 | WEIGHT: 229 LBS | SYSTOLIC BLOOD PRESSURE: 115 MMHG | HEIGHT: 61 IN

## 2022-10-20 DIAGNOSIS — R42 VERTIGO: Primary | ICD-10-CM

## 2022-10-20 DIAGNOSIS — H81.10 BENIGN PAROXYSMAL POSITIONAL VERTIGO, UNSPECIFIED LATERALITY: ICD-10-CM

## 2022-10-20 DIAGNOSIS — R42 DIZZINESS: Primary | ICD-10-CM

## 2022-10-20 DIAGNOSIS — H92.02 OTALGIA, LEFT: ICD-10-CM

## 2022-10-20 DIAGNOSIS — G43.809 VESTIBULAR MIGRAINE: ICD-10-CM

## 2022-10-20 PROCEDURE — 97112 NEUROMUSCULAR REEDUCATION: CPT

## 2022-10-20 PROCEDURE — 92567 TYMPANOMETRY: CPT | Performed by: AUDIOLOGIST

## 2022-10-20 PROCEDURE — 97140 MANUAL THERAPY 1/> REGIONS: CPT

## 2022-10-20 PROCEDURE — 92557 COMPREHENSIVE HEARING TEST: CPT | Performed by: AUDIOLOGIST

## 2022-10-20 PROCEDURE — 99214 OFFICE O/P EST MOD 30 MIN: CPT | Performed by: STUDENT IN AN ORGANIZED HEALTH CARE EDUCATION/TRAINING PROGRAM

## 2022-10-20 ASSESSMENT — PAIN SCALES - GENERAL: PAINLEVEL_OUTOF10: 0

## 2022-10-20 ASSESSMENT — ENCOUNTER SYMPTOMS
ALLERGIC/IMMUNOLOGIC NEGATIVE: 1
GASTROINTESTINAL NEGATIVE: 1
RESPIRATORY NEGATIVE: 1
EYES NEGATIVE: 1

## 2022-10-20 NOTE — PROGRESS NOTES
[address] [facility] had Tympanometry and Audiometry performed today. The patient reports dizziness. Results as follows:    Tympanometry    Type A -  bilaterally    Audiometry    Test Performed - Comprehensive Audiogram    Type of Loss - Right Ear: normal hearing                          Left Ear: normal hearing    SRT   Measurement Right Ear Left Ear   Value 10 10   Unit dB dB     Discrimination  Measurement Right Ear Left Ear   Value 100% 100%   Unit dB dB     Recommend  Retest as clinically indicated    [address], [address] [facility]  Audiologist

## 2022-10-20 NOTE — PROGRESS NOTES
HPI:  Orquidea Johnson is a 43 y.o. female seen Established   Chief Complaint   Patient presents with    Follow-up     Dizziness worse since neck surgery in July, left ear pain, some ear drainage        58-year-old female seen as a follow-up evaluation longstanding history of dizziness/vertigo. As review she has had some vertigo symptoms mostly secondary to vestibular migraines for several years. At some point in the past although not in our records for review she had VNG testing which she states showed one side to be somewhat weak as compared to the other. She has done well in the past with vestibular rehab but unfortunately she has had a new onset different type of vertigo in the last 3 to 4 months. After having had cervical spine surgery in July 2022 she began having a new room spinning type sensation that is different than her past vertigo symptoms. This is now almost daily but at least a few times per week described as severe room spinning that will last on and off throughout the day. This can be sometimes with movement of her heads such as when she goes from sitting to standing but also occurs spontaneously when she is sitting still. She unfortunately is also had some falls 3-4 times in the last month or so. There is been no obvious improvement with continued vestibular rehab although they did somewhat suggest that she was having BPPV other testing. There has been no obvious changes to her hearing although she does continue to have chronic left-sided otalgia. She has had normal ear exams outside of cerumen in the past.  She gets some relief using Dramamine for severe dizzy days. She [place] with [facility] [place] for her chronic migraines. She has been on multiple variations of migraine preventative medications. Past Medical History, Past Surgical History, Family history, Social History, and Medications were all reviewed with the patient today and updated as necessary.      Allergies   Allergen Reactions    Diazepam Other (See Comments)     Suicidal thoughts       Penicillins Anaphylaxis     Has not been able to tolerate cephalosporins     Pineapple Shortness Of Breath     wheezing    Sumatriptan Shortness Of Breath    Theophylline Shortness Of Breath    Theophyllines Shortness Of Breath    Cephalexin Other (See Comments)     Other reaction(s): Dizziness    Sulfa Antibiotics Rash     Other reaction(s): Vomiting    Topiramate Other (See Comments)     Caused disorientation, loss of feeling in left leg (numbness)    Tramadol Rash    Verapamil Other (See Comments)     Lowers blood pressure significantly beyond normal hypotension      [place] Oil Swelling     Causes lips to swell       Augmentin [Amoxicillin-Pot Clavulanate] Hives    Dermabond Hives    Keflex [Cephalexin] Hives    Kenalog [Triamcinolone Acetonide] Hives    Nuts [Peanut-Containing Drug Products] Hives    Pneumococcal Vaccine Swelling    Sulfa Antibiotics Hives    Topiramate Other (See Comments) and Myalgia    Triamcinolone Acetonide Hives     Kenalog injection caused hives     Ultram [Tramadol] Hives    Valium [Diazepam] Other (See Comments)    Vitamin B12 Other (See Comments)     Injection Only  swelling       Patient Active Problem List   Diagnosis    Left leg weakness    Dysphagia    Neck pain    Severe obesity (HCC)    Hypothyroidism due to acquired atrophy of thyroid    Traumatic tear of left rotator cuff    Fatigue    Hyperlipidemia    Migraine variant    Trigeminal neuralgia    Chronic migraine with aura    Drug reaction    Cervical radiculopathy    Left shoulder pain    Carpal tunnel syndrome, bilateral    Asthma    Vestibular migraine    Major depressive disorder, single episode, moderate (HCC)    S/P cholecystectomy    Arm paresthesia, left    Dysmenorrhea    Conversion disorder    Generalized anxiety disorder    Cervicocranial syndrome    Family history of colonic polyps    Vertigo    Movement disorder    Vitamin D deficiency Cervical spinal stenosis       Current Outpatient Medications   Medication Sig    lamoTRIgine (LAMICTAL) 100 MG tablet Take 1 tablet by mouth in the morning. venlafaxine (EFFEXOR XR) 75 MG extended release capsule Take 1 capsule by mouth in the morning. clonazePAM (KLONOPIN) 0.5 MG tablet Take 1 tablet by mouth daily as needed for Anxiety for up to 90 days. thyroid (ARMOUR) 30 MG tablet Take 30 mg by mouth in the morning. Betahistine HCl (BETAHISTINE DIHYDROCHLORIDE) POWD 16 mg by Does not apply route 2 times daily    fexofenadine (ALLEGRA) 180 MG tablet Take 180 mg by mouth in the morning. fluticasone (FLONASE) 50 MCG/ACT nasal spray 1 spray by Each Nostril route daily    tiZANidine (ZANAFLEX) 4 MG capsule Take 4 mg by mouth 3 times daily as needed for Muscle spasms    ondansetron (ZOFRAN) 4 MG tablet Take 4 mg by mouth every 8 hours as needed for Nausea or Vomiting    Albuterol Sulfate, sensor, (PROAIR DIGIHALER) 108 (90 Base) MCG/ACT AEPB Inhale 2 puffs into the lungs 4 times daily as needed    acetaminophen (TYLENOL) 500 MG tablet Take 1,000 mg by mouth every 6 hours as needed for Pain    candesartan (ATACAND) 16 MG tablet Take 16 mg by mouth at bedtime    PROGESTERONE PO Take by mouth at bedtime Progesterone with testosterone denny (dissolvable pill)    MAGNESIUM PO Take 2 tablets by mouth daily    albuterol sulfate  (90 Base) MCG/ACT inhaler Inhale 2 puffs into the lungs every 4 hours as needed    ascorbic acid (VITAMIN C) 500 MG tablet Take 500 mg by mouth daily    indomethacin (INDOCIN) 25 MG capsule TAKE 1-2 TABLETS BY MOUTH 3 TIMES DAILY AS NEEDED FOR MIGRAINE. LIMIT 2 DAYS/WEEK. TAKE WITH FOOD    mometasone (NASONEX) 50 MCG/ACT nasal spray 1 spray by Nasal route at bedtime      No current facility-administered medications for this visit.        Past Medical History:   Diagnosis Date    Adverse effect of anesthesia     \"a little goes a long way with me\"     Arthritis     Arthritis spine and hands     Asthma     last attack 05/2019; inhaler prn     BMI 36.0-36.9,adult     Chronic pain     chronic headache  beta histidine for this    Depression     Hyperlipidemia     no meds     Hypertension     Migraines     candesartan for this - takes at night    Movement disorder     loses where body parts are at times    PONV (postoperative nausea and vomiting)     nauseous upon waking in PACU     Thyroid disease     hypo-on med    Vertigo     Vestibular nerve disorder 2008    migraines - and nerve damage as well       Past Surgical History:   Procedure Laterality Date    CERVICAL DISC ARTHROPLASTY      CERVICAL FUSION N/A 7/12/2022    C5-C7 anterior cervical discectomy and fusion with interbody spacer/allograft and instrumentation performed by Ky Macario MD at [address]      possibly open patient unsure    EYE MUSCLE SURGERY      HEENT      oral, bilateral eye muscle    JOINT REPLACEMENT      ORTHOPEDIC SURGERY      right wrist x 2, right shoulder        Social History     Tobacco Use    Smoking status: Never    Smokeless tobacco: Never   Substance Use Topics    Alcohol use: Not Currently       Family History   Problem Relation Age of Onset    Parkinson's Disease Paternal Grandfather     Macular Degen Paternal Grandfather     Cancer Paternal Grandmother         brain tumor    Stroke Maternal Grandfather     Heart Disease Maternal Grandfather     Cancer Maternal Grandmother         lymphoma    Osteoarthritis Father     Diabetes Father     Thyroid Disease Mother     Diabetes Mother     Asthma Mother     Stroke Mother         11/2020        ROS:    Review of Systems   HENT:  Positive for ear discharge and ear pain. Eyes: Negative. Respiratory: Negative. Cardiovascular: Negative. Gastrointestinal: Negative. Endocrine: Negative. Genitourinary: Negative. Musculoskeletal: Negative. Skin: Negative. Allergic/Immunologic: Negative. Neurological:  Positive for dizziness. Hematological: Negative. Psychiatric/Behavioral: Negative. PHYSICAL EXAM:    /70 (Site: Left Upper Arm, Position: Sitting)   Ht 5' 1\" (1.549 m)   Wt 229 lb (103.9 kg)   BMI 43.27 kg/m²     Physical Exam  Vitals and nursing note reviewed. Constitutional:       Appearance: Normal appearance. HENT:      Head: Normocephalic and atraumatic. Right Ear: Tympanic membrane, ear canal and external ear normal. There is no impacted cerumen. Left Ear: Tympanic membrane, ear canal and external ear normal. There is no impacted cerumen. Nose: Nose normal. No congestion or rhinorrhea. Mouth/Throat:      Mouth: Mucous membranes are moist.      Pharynx: Oropharynx is clear. No oropharyngeal exudate or posterior oropharyngeal erythema. Eyes:      General: No scleral icterus. Pulmonary:      Effort: Pulmonary effort is normal.   Musculoskeletal:      Cervical back: Normal range of motion and neck supple. No rigidity. Lymphadenopathy:      Cervical: No cervical adenopathy. Skin:     General: Skin is warm and dry. Neurological:      Mental Status: She is alert and oriented to person, place, and time. Psychiatric:         Mood and Affect: Mood normal.         Behavior: Behavior normal.              ASSESSMENT and PLAN        ICD-10-CM    1. Vertigo  R42 External Referral To ENT      2. Vestibular migraine  G43.809       3. Benign paroxysmal positional vertigo, unspecified laterality  H81.10       4. Otalgia, left  H92.02           Patient was reassured of no concerns on today's ear exam with no cerumen present in bilateral TMs intact with no perforations retractions or middle ear effusions. Mild right-sided TM scar tissue noted. She was also reassured of no concerns on today's hearing testing with normal hearing thresholds bilaterally.     Longstanding history of vestibular migraine now with a new room spinning vertigo sensation ongoing ever since she underwent cervical spine surgery in July. There has been somewhat obvious signs of BPPV on special motion testing in the last couple months. With ongoing symptoms that have not resolved with traditional canalith repositioning exercises I have recommended that she follow-up with [facility] [place] otology department for VNG testing and further management recommendations. She is willing to go forward as discussed.     Korey Likes, DO  10/20/2022

## 2022-10-20 NOTE — PROGRESS NOTES
Maico Marquez  : 1980  Primary: Griselda Good Ppo  Secondary:  Adela Taylor[address] [address]  Phone: [phone]  Fax: [phone] Plan Frequency: 2 times a week for 90 days    Plan of Care/Certification Expiration Date: 22      PT Visit Info: Total # of Visits to Date: 15  Progress Note Counter: 13  Canceled Appointment: 2     Visit Count:  15   OUTPATIENT PHYSICAL THERAPY:OP NOTE TYPE: Treatment Note 10/20/2022       Episode  }Appt Desk             Treatment Diagnosis:  Difficulty in walking, Not elsewhere classified (R26.2)  Generalized Muscle Weakness (M62.81)  Other abnormalities of gait and mobility (R26.89)  Medical/Referring Diagnosis:  No admission diagnoses are documented for this encounter. Referring Physician:  Mattie Mittal DO MD Orders:  PT Eval and Treat   Date of Onset:  Onset Date:  (Chronic)     Allergies:   Diazepam, Penicillins, Pineapple, Sumatriptan, Theophylline, Theophyllines, Cephalexin, Sulfa antibiotics, Topiramate, Tramadol, Verapamil, [place] oil, Augmentin [amoxicillin-pot clavulanate], Dermabond, Keflex [cephalexin], Kenalog [triamcinolone acetonide], Nuts [peanut-containing drug products], Pneumococcal vaccine, Sulfa antibiotics, Topiramate, Triamcinolone acetonide, Ultram [tramadol], Valium [diazepam], and Vitamin b12  Restrictions/Precautions:  No data recordedNo data recorded   Interventions Planned (Treatment may consist of any combination of the following):    Current Treatment Recommendations: Strengthening; Balance training; Functional mobility training; Gait training; Home exercise program; Vestibular rehab     Subjective Comments:   \"I see the ENT today at 1:30. Dizziness 5/10. Some nausea over the last week. No nausea right now. No headache. My shoulders are tight\".   Initial:}    0/10Post Session:       0/10  Medications Last Reviewed:  10/20/2022  Updated Objective Findings:  None Today  Treatment   NEUROMUSCULAR RE-EDUCATION: (15 minutes):    Exercise/activities per grid below to improve balance and coordination. Required minimal verbal cues to promote static and dynamic balance in standing. Date:  10/6/22 Date:  10/20/2022 Date:  9/29/2022   Activity/Exercise Parameters Parameters Parameters   Marching in hallway with hiking pole 4 laps 4 laps 4 laps   Sidestepping in hallway with hiking pole  4 laps 4 laps   Walking through cones with hiking pole   4 laps   Supine to sit from table      Stepping over half foam with hiking pole      Step taps with hiking pole      Sit to stand from chair      Sliding right/Sliding left      Seated turns right/left on hilo mat      Seated ball kicks/ball throws      Seated trunk rotation with red ball      Standing on black foam        MANUAL THERAPY: (30 minutes): Soft tissue mobilization was utilized and necessary because of the patient's painful spasm. Patient received trigger point release along bilateral upper traps/scapular region to decrease pain. Skin intact after treatment. Treatment/Session Summary:    Treatment Assessment:  Patient reports dizziness 7/10 after treatment. Patient continues to experience violent vertigo with minimal exercises. Patient needs extended rest breaks to recover. Communication/Consultation:  None today  Equipment provided today:  None  Recommendations/Intent for next treatment session: Next visit will focus on balance, gait, and vestibular.     Total Treatment Billable Duration:  45 minutes  Time In: 1100  Time Out: 0063    CHIRAG HOPE, PT       Charge Capture  }Post Session Pain  PT Visit Info  [employer] Portal  MD Guidelines  Scanned Media  Benefits  MyChart    Future Appointments   Date Time Provider Aye Caputo   10/20/2022  1:30 PM Devorah Skinner DO [facility]   10/20/2022  2:00 PM Jess Farrar [facility]   10/24/2022 11:00 AM José Miguel Courtney, PT [facility]   10/25/2022 11:00 AM Leatha Blackburn MD [id number]   10/27/2022 11:00 AM Christine Easton, PT [facility]   11/1/2022 11:00 AM Clearence Elan, PT [facility]   11/2/2022  1:45 PM THEODORE Roberto MD [facility]   11/3/2022 11:00 AM Clearence Elan, PT [facility]   11/8/2022 11:00 AM Clearence Elan, PT [facility]   11/11/2022 11:00 AM Clearence Elan, PT [facility]   11/15/2022 11:00 AM Clearence Elan, PT [facility]   11/17/2022  1:00 PM Clearence Elan, PT [facility]   11/22/2022 11:00 AM Clearence Elan, PT [facility]   11/29/2022 11:00 AM Clearence Elan, PT [facility]   12/1/2022 11:00 AM Clearence Elan, PT [facility]   1/13/2023  9:00 AM [facility]   1/20/2023 11:00 AM Марина Salgado MD [facility]   4/3/2023 11:15 AM Cam Leach MD [facility] AM[facility]

## 2022-10-24 ENCOUNTER — APPOINTMENT (OUTPATIENT)
Dept: PHYSICAL THERAPY | Age: 42
End: 2022-10-24
Payer: MEDICARE

## 2022-10-25 ENCOUNTER — OFFICE VISIT (OUTPATIENT)
Dept: BEHAVIORAL/MENTAL HEALTH CLINIC | Age: 42
End: 2022-10-25
Payer: MEDICARE

## 2022-10-25 VITALS
BODY MASS INDEX: 42.71 KG/M2 | OXYGEN SATURATION: 98 % | WEIGHT: 226.2 LBS | HEIGHT: 61 IN | DIASTOLIC BLOOD PRESSURE: 74 MMHG | TEMPERATURE: 98.7 F | HEART RATE: 96 BPM | SYSTOLIC BLOOD PRESSURE: 118 MMHG

## 2022-10-25 DIAGNOSIS — F51.05 INSOMNIA DUE TO MENTAL DISORDER: ICD-10-CM

## 2022-10-25 DIAGNOSIS — F33.0 MILD EPISODE OF RECURRENT MAJOR DEPRESSIVE DISORDER (HCC): Primary | ICD-10-CM

## 2022-10-25 DIAGNOSIS — F41.1 GENERALIZED ANXIETY DISORDER: ICD-10-CM

## 2022-10-25 PROCEDURE — 99214 OFFICE O/P EST MOD 30 MIN: CPT | Performed by: PSYCHIATRY & NEUROLOGY

## 2022-10-25 RX ORDER — VENLAFAXINE HYDROCHLORIDE 75 MG/1
75 CAPSULE, EXTENDED RELEASE ORAL DAILY
Qty: 30 CAPSULE | Refills: 3 | Status: SHIPPED | OUTPATIENT
Start: 2022-10-25

## 2022-10-25 RX ORDER — LAMOTRIGINE 100 MG/1
100 TABLET ORAL DAILY
Qty: 30 TABLET | Refills: 3 | Status: SHIPPED | OUTPATIENT
Start: 2022-10-25

## 2022-10-25 ASSESSMENT — PATIENT HEALTH QUESTIONNAIRE - PHQ9
3. TROUBLE FALLING OR STAYING ASLEEP: 3
8. MOVING OR SPEAKING SO SLOWLY THAT OTHER PEOPLE COULD HAVE NOTICED. OR THE OPPOSITE, BEING SO FIGETY OR RESTLESS THAT YOU HAVE BEEN MOVING AROUND A LOT MORE THAN USUAL: 2
SUM OF ALL RESPONSES TO PHQ QUESTIONS 1-9: 9
4. FEELING TIRED OR HAVING LITTLE ENERGY: 2
7. TROUBLE CONCENTRATING ON THINGS, SUCH AS READING THE NEWSPAPER OR WATCHING TELEVISION: 2
1. LITTLE INTEREST OR PLEASURE IN DOING THINGS: 0
SUM OF ALL RESPONSES TO PHQ QUESTIONS 1-9: 9
10. IF YOU CHECKED OFF ANY PROBLEMS, HOW DIFFICULT HAVE THESE PROBLEMS MADE IT FOR YOU TO DO YOUR WORK, TAKE CARE OF THINGS AT HOME, OR GET ALONG WITH OTHER PEOPLE: 1
2. FEELING DOWN, DEPRESSED OR HOPELESS: 0
9. THOUGHTS THAT YOU WOULD BE BETTER OFF DEAD, OR OF HURTING YOURSELF: 0
SUM OF ALL RESPONSES TO PHQ9 QUESTIONS 1 & 2: 0
SUM OF ALL RESPONSES TO PHQ QUESTIONS 1-9: 9
6. FEELING BAD ABOUT YOURSELF - OR THAT YOU ARE A FAILURE OR HAVE LET YOURSELF OR YOUR FAMILY DOWN: 0
SUM OF ALL RESPONSES TO PHQ QUESTIONS 1-9: 9
5. POOR APPETITE OR OVEREATING: 0

## 2022-10-25 ASSESSMENT — ANXIETY QUESTIONNAIRES
5. BEING SO RESTLESS THAT IT IS HARD TO SIT STILL: 0
GAD7 TOTAL SCORE: 0
2. NOT BEING ABLE TO STOP OR CONTROL WORRYING: 0
4. TROUBLE RELAXING: 0
7. FEELING AFRAID AS IF SOMETHING AWFUL MIGHT HAPPEN: 0
IF YOU CHECKED OFF ANY PROBLEMS ON THIS QUESTIONNAIRE, HOW DIFFICULT HAVE THESE PROBLEMS MADE IT FOR YOU TO DO YOUR WORK, TAKE CARE OF THINGS AT HOME, OR GET ALONG WITH OTHER PEOPLE: NOT DIFFICULT AT ALL
1. FEELING NERVOUS, ANXIOUS, OR ON EDGE: 0
6. BECOMING EASILY ANNOYED OR IRRITABLE: 0
3. WORRYING TOO MUCH ABOUT DIFFERENT THINGS: 0

## 2022-10-25 NOTE — PROGRESS NOTES
Patient:  Victoriano Chaves  Age:  43 y.o.  :  1980     SEX:  female MRN:  [id number]     RACE: White (non-)     SEEN:  [x]  PATIENT  []  SPOUSE []  OTHER:              PHQ-9  10/25/2022 8/3/2022 2022   Little interest or pleasure in doing things 0 0 0   Little interest or pleasure in doing things - - -   Feeling down, depressed, or hopeless 0 0 0   Trouble falling or staying asleep, or sleeping too much 3 0 -   Trouble falling or staying asleep, or sleeping too much - - -   Feeling tired or having little energy 2 3 -   Feeling tired or having little energy - - -   Poor appetite or overeating 0 0 -   Poor appetite, weight loss, or overeating - - -   Feeling bad about yourself - or that you are a failure or have let yourself or your family down 0 0 -   Feeling bad about yourself - or that you are a failure or have let yourself or your family down - - -   Trouble concentrating on things, such as reading the newspaper or watching television 2 3 -   Trouble concentrating on things such as school, work, reading, or watching TV - - -   Moving or speaking so slowly that other people could have noticed. Or the opposite - being so fidgety or restless that you have been moving around a lot more than usual 2 1 -   Moving or speaking so slowly that other people could have noticed; or the opposite being so fidgety that others notice - - -   Thoughts that you would be better off dead, or of hurting yourself in some way 0 0 -   Thoughts of being better off dead, or hurting yourself in some way - - -   PHQ-2 Score 0 0 0   Total Score PHQ 2 - - -   PHQ-9 Total Score 9 7 0   PHQ 9 Score - - -   If you checked off any problems, how difficult have these problems made it for you to do your work, take care of things at home, or get along with other people?  1 2 -   How difficult have these problems made it for you to do your work, take care of your home and get along with others - - -       BRENDA-7 SCREENING 10/25/2022 8/3/2022 1/31/2022   Feeling nervous, anxious, or on edge Not at all Several days -   Not being able to stop or control worrying Not at all Not at all -   Worrying too much about different things Not at all Not at all -   Trouble relaxing Not at all Not at all -   Being so restless that it is hard to sit still Not at all Not at all -   Becoming easily annoyed or irritable Not at all Not at all -   Feeling afraid as if something awful might happen Not at all Not at all -   BRENDA-7 Total Score 0 1 -   How difficult have these problems made it for you to do your work, take care of things at home, or get along with other people? Not difficult at all Not difficult at all Very difficult   Feeling nervous, anxious, or on edge - - Not at all   BRENDA-7 Total Score - - 1          Chief complaint:  Pt says she has been having vertigo since after her neck surgery and has been referred to [facility] for an updated balance test.    Subjective:  Seen for follow-up. States has spinning vertigo since after her neck surgery. She has been going to vestibular rehab. Noise stimulates the vertigo. She has been referred to [facility] for updated balance test and neurology appointment on December 8. Has had 4 falls. Uses walking canes or walker to prevent herself from falling. Klonopin calms down some spinning but she does not want to take it regularly. Has not been taking it. Does not need a prescription for that today. Sleeping too much. Trouble concentrating. Psychotherapy provided. She denies suicidal ideation/homicidal ideations. Denies symptoms of psychosis.     Patient Active Problem List   Diagnosis    Left leg weakness    Dysphagia    Neck pain    Severe obesity (HCC)    Hypothyroidism due to acquired atrophy of thyroid    Traumatic tear of left rotator cuff    Fatigue    Hyperlipidemia    Migraine variant    Trigeminal neuralgia    Chronic migraine with aura    Drug reaction    Cervical radiculopathy    Left shoulder pain Carpal tunnel syndrome, bilateral    Asthma    Vestibular migraine    Major depressive disorder, single episode, moderate (HCC)    S/P cholecystectomy    Arm paresthesia, left    Dysmenorrhea    Conversion disorder    Generalized anxiety disorder    Cervicocranial syndrome    Family history of colonic polyps    Vertigo    Movement disorder    Vitamin D deficiency    Cervical spinal stenosis     LMP 10/12/22 Birth control,   Not pregnant    Denies palpitation,SOB, Chest pain, headaches. In no acute distress. MEDICATION REVIEW:    Current Medications:    Current Outpatient Medications   Medication Sig    lamoTRIgine (LAMICTAL) 100 MG tablet Take 1 tablet by mouth daily    venlafaxine (EFFEXOR XR) 75 MG extended release capsule Take 1 capsule by mouth daily    clonazePAM (KLONOPIN) 0.5 MG tablet Take 1 tablet by mouth daily as needed for Anxiety for up to 90 days. thyroid (ARMOUR) 30 MG tablet Take 30 mg by mouth in the morning. Betahistine HCl (BETAHISTINE DIHYDROCHLORIDE) POWD 16 mg by Does not apply route 2 times daily    fexofenadine (ALLEGRA) 180 MG tablet Take 180 mg by mouth in the morning.     tiZANidine (ZANAFLEX) 4 MG capsule Take 4 mg by mouth 3 times daily as needed for Muscle spasms    ondansetron (ZOFRAN) 4 MG tablet Take 4 mg by mouth every 8 hours as needed for Nausea or Vomiting    Albuterol Sulfate, sensor, (PROAIR DIGIHALER) 108 (90 Base) MCG/ACT AEPB Inhale 2 puffs into the lungs 4 times daily as needed    acetaminophen (TYLENOL) 500 MG tablet Take 1,000 mg by mouth every 6 hours as needed for Pain    candesartan (ATACAND) 16 MG tablet Take 16 mg by mouth at bedtime    PROGESTERONE PO Take by mouth at bedtime Progesterone with testosterone denny (dissolvable pill)    MAGNESIUM PO Take 2 tablets by mouth daily    albuterol sulfate  (90 Base) MCG/ACT inhaler Inhale 2 puffs into the lungs every 4 hours as needed    ascorbic acid (VITAMIN C) 500 MG tablet Take 500 mg by mouth daily indomethacin (INDOCIN) 25 MG capsule TAKE 1-2 TABLETS BY MOUTH 3 TIMES DAILY AS NEEDED FOR MIGRAINE. LIMIT 2 DAYS/WEEK. TAKE WITH FOOD    mometasone (NASONEX) 50 MCG/ACT nasal spray 1 spray by Nasal route at bedtime     fluticasone (FLONASE) 50 MCG/ACT nasal spray 1 spray by Each Nostril route daily (Patient not taking: Reported on 10/25/2022)     No current facility-administered medications for this visit. Allergies   Allergen Reactions    Diazepam Other (See Comments)     Suicidal thoughts       Penicillins Anaphylaxis     Has not been able to tolerate cephalosporins     Pineapple Shortness Of Breath     wheezing    Sumatriptan Shortness Of Breath    Theophylline Shortness Of Breath    Theophyllines Shortness Of Breath    Cephalexin Other (See Comments)     Other reaction(s): Dizziness    Sulfa Antibiotics Rash     Other reaction(s): Vomiting    Topiramate Other (See Comments)     Caused disorientation, loss of feeling in left leg (numbness)    Tramadol Rash    Verapamil Other (See Comments)     Lowers blood pressure significantly beyond normal hypotension      [place] Oil Swelling     Causes lips to swell       Augmentin [Amoxicillin-Pot Clavulanate] Hives    Dermabond Hives    Keflex [Cephalexin] Hives    Kenalog [Triamcinolone Acetonide] Hives    Nuts [Peanut-Containing Drug Products] Hives     [place] only- not peanuts    Pneumococcal Vaccine Swelling    Sulfa Antibiotics Hives    Topiramate Other (See Comments) and Myalgia    Triamcinolone Acetonide Hives     Kenalog injection caused hives     Ultram [Tramadol] Hives    Valium [Diazepam] Other (See Comments)    Vitamin B12 Other (See Comments)     Injection Only  swelling       Past Medical History, Past Surgical History, Family history, Social History, and Medications were all reviewed with the patient today and updated as necessary.      Compliant with medication: Yes   Side effects from medications:  No     EXAMINATION  Musculoskeletal    GAIT AND STATION   [] WNL   [] RESTRICTED   [x] UNSTEADY WALK        [] ABNORMAL   [x] UNBALANCED         PSYCHIATRIC     GENERAL APPEARANCE:   [x]  WELL GROOMED []     DISHEVELED   []  UNKEMPT      []  UNUSUAL/BIZZARE    [] WNL       ATTITUDE:   [x] COOPERATIVE   [] GUARDED   [] SUSPICIOUS      [] HOSTILE                            BEHAVIOR:   [x] CALM   [] HYPERACTIVE   [] MANNERISMS      [] BIZZARE         SPEECH:   [x] NORMAL FOR CLIENT   [] SPONTANEOUS   [] SLURRED   [] WHISPERING      [] LOUD   [] PRESSURED   [] ARTICULATE       EYE CONTACT:   [x] WNL   [] BLANK STARE   [] INTENSE      [] AVOIDANT         MOOD:   [x] EUTHYMIC   [] ANXIOUS   [] DEPRESSED      [] IRRITABLE   [] ANGRY   [] APATHETIC     AFFECT:   [x] CONGRUENT WITH MOOD   [] FLAT   [] CONSTRICTED      [] INAPPROPRIATE   [] LABILE           THOUGHT PROCESS:   [x] LOGICAL/GOAL-DIRECTED   [] FOI   [] CIRCUMSANTIAL      [] INCOHERENT   [] TANGENTIAL   [] CONCRETE      [] PERSEVERATION           THOUGHT CONTENT:                DELUSIONS  [x] DENIES  [] GRANDIOSE  [] PERSECUTORY  [] Taoist  [] REFERENCE   HALLUCINATIONS  [x] DENIES  [] AUDITORY  [] VISUAL  [] OLFACTORY  [] TACTILE     [] GUSTATORY  [] SOMATIC         OBSESSIONS  [x] DENIES  [] PRESENT         SUICIDAL IDEATION  [x] DENIES  [] PRESENT W/O PLAN  [] PRESENT W/ PLAN       HOMICIDAL IDEATION  [x] DENIES  [] PRESENT W/O PLAN  [] PRESENT W/ PLAN           JUDGEMENT:   [x] GOOD   [] FAIR   [] POOR   INSIGHT:   [x] GOOD   [] FAIR   [] POOR     COGNITION:           SENSORIUM:   [x] ALERT   [] CLOUDED   [] DROWSY     ORIENTATION:   [x] INTACT   [] TIME:  PLACE  PERSON   RECENT & REMOTE MEMORY:   [] NORMAL   [x] OTHER:                  ATTENTION:   [] INTACT   [x] MILD IMPAIRMENT   [] SEVERE IMPAIRMENT     CONCENTRATION:   [] INTACT   [x] MILD IMPAIRMENT   [] SEVERE IMPAIRMENT     LANGUAGE:   [x] AVERAGE   [] ABOVE AVERAGE   [] BELOW AVERAGE     FUND OF KNOWLEDGE:   [] UNABLE TO ASSESS AT THIS TIME [x] AVERAGE   [] ABOVE AVERAGE   [] BELOW AVERAGE      [] GOOD TO EXCELLENT KNOWLEDGE OF CURRENT EVENTS   [] POOR TO NO KNLEDGE OF CURRENT EVENTS           ABNORMAL MOVEMENTS:   [x] NONE   [] TICS   [] TREMORS   [] BIZZARE      [] FACE   [] TRUNK   [] EXTREMETIES   [] GESTURES        SLEEP:   [] GOOD   [] FAIR   [x] POOR      MUSCLE STRENGTH AND TONE   [] WNL   [] ATROPHY   [] SPASTIC        [] FLACCID   [] COGWHEEL         Diagnoses/Impressions:    ICD-10-CM    1. Mild episode of recurrent major depressive disorder ([facility] [address]  F33.0       2. Generalized anxiety disorder  F41.1       3. Insomnia due to mental disorder  F51.05           TREATMENT GOALS:  Symptom reduction, Medication adherence, maintain therapeutic gains    LABS/IMAGING:    []  Ordered [x]  Reviewed []  New Labs Ordered:     LAB  WBC   Date/Time Value Ref Range Status   09/07/2022 09:08 AM 8.8 4.3 - 11.1 K/uL Final     Hemoglobin   Date/Time Value Ref Range Status   09/07/2022 09:08 AM 14.0 11.7 - 15.4 g/dL Final     Hematocrit   Date/Time Value Ref Range Status   09/07/2022 09:08 AM 42.2 35.8 - 46.3 % Final     Platelets   Date/Time Value Ref Range Status   09/07/2022 09:08  150 - 450 K/uL Final     Sodium   Date/Time Value Ref Range Status   09/07/2022 09:08  136 - 145 mmol/L Final     Potassium   Date/Time Value Ref Range Status   09/07/2022 09:08 AM 4.1 3.5 - 5.1 mmol/L Final     Chloride   Date/Time Value Ref Range Status   09/07/2022 09:08  101 - 110 mmol/L Final     CO2   Date/Time Value Ref Range Status   09/07/2022 09:08 AM 22 21 - 32 mmol/L Final     BUN   Date/Time Value Ref Range Status   09/07/2022 09:08 AM 9 6 - 23 MG/DL Final     ALT   Date/Time Value Ref Range Status   09/07/2022 09:08 AM 44 12 - 65 U/L Final     AST   Date/Time Value Ref Range Status   09/07/2022 09:08 AM 18 15 - 37 U/L Final       Please refer to the lab tab in the epic and care everywhere for the most recent lab results.     Plan:     [x]  Medication ordered: Effexor and Lamictal to target depression, anxiety, insomnia. Patient may take Klonopin as needed for anxiety. [x]  Medication education/counseling provided  Medication dosage and time to take, purpose/expected benefits/risks, common side effects, lab monitoring required and reason, expected length of treatment, risk of no treatment, effects on pregnancy/nursing, financial availability. Educated patient on  side effects/risks/benefits of meds including  cardiac arrhythmias, suicidal ideations,  orthostatic hypotension, serotonin syndrome, risk of nicole/hypomania from antidepressants, withdrawals from abrupt discontinuation of meds, risk of rash, risk of infection, risk of bleeding, risk of seizures, addiction potential, memory impairment with long term use of benzos, respiratory depression, high blood pressure, dizziness, drowsiness, sedation , risk of falls, Risk & benefits discussed: including but not limited to possible off-label medication usage. [x] Follow MSE for sxs improvement     I have reviewed the patients controlled substance prescription history, as maintained in the [place] prescription monitoring program, so that the prescription(s) for a  controlled substance can be given. Recommendations and Referrals: Follow up with : MD, requires monitoring of response to medication, requires monitoring of medication side effects. Time until next PMA:     Follow up with Mental Health Clinicians: psychotherapy interventions, improve level of functioning, monitoring to prevent decompensation /hospitalization, monitoring to maintain therapeutic gains, symptoms (resolving and controlled)           Psychotherapy note:                                __10_ Minutes of psychotherapy     [x]  Supportive psychotherapy, Patient discussed certain situational and personal stressors ongoing in her life at this time, weight management d/w the patient. Sleep hygiene d/w patient.  Patient allowed to vent out her emotions. Scenarios were reviewed using role playing and CBT techniques in order to increase insight and decrease anxiety. []  Disposition planning      []  Dangerous and will not contract for safety in the community    **Pateint has been notified: They are to call 911 or go to their nearest E.R. if they are experiencing a medical emergency or suicidal ideations/homicidal ideations. **  All ancillary documentation entered reviewed by provider. PLEASE NOTE:  This document has been produced in part or whole using voice recognition software. Proofread however unrecognized errors in transcription may be present. Reece Lowry MD            Having increased vertigo since neck surgery in July. Not sure if it is medication related or something else.

## 2022-10-27 ENCOUNTER — HOSPITAL ENCOUNTER (OUTPATIENT)
Dept: PHYSICAL THERAPY | Age: 42
Setting detail: RECURRING SERIES
Discharge: HOME | End: 2022-10-30
Payer: MEDICARE

## 2022-10-27 PROCEDURE — 97112 NEUROMUSCULAR REEDUCATION: CPT

## 2022-10-27 PROCEDURE — 97140 MANUAL THERAPY 1/> REGIONS: CPT

## 2022-10-27 ASSESSMENT — PAIN SCALES - GENERAL: PAINLEVEL_OUTOF10: 3

## 2022-10-27 NOTE — PROGRESS NOTES
Katalina Wolff  : 1980  Primary: Emma Santos Ppo  Secondary:  Waqas Valdez  [address] [address]  Phone: [phone]  Fax: [phone] Plan Frequency: 2 times a week for 90 days    Plan of Care/Certification Expiration Date: 22      PT Visit Info: Total # of Visits to Date: 12  Progress Note Counter: 12  Canceled Appointment: 2     Visit Count:  16   OUTPATIENT PHYSICAL THERAPY:OP NOTE TYPE: Treatment Note 10/27/2022       Episode  }Appt Desk             Treatment Diagnosis:  Difficulty in walking, Not elsewhere classified (R26.2)  Generalized Muscle Weakness (M62.81)  Other abnormalities of gait and mobility (R26.89)  Medical/Referring Diagnosis:  No admission diagnoses are documented for this encounter. Referring Physician:  Bulmaro Perez DO MD Orders:  PT Eval and Treat   Date of Onset:  Onset Date:  (Chronic)     Allergies:   Diazepam, Penicillins, Pineapple, Sumatriptan, Theophylline, Theophyllines, Cephalexin, Sulfa antibiotics, Topiramate, Tramadol, Verapamil, [place] oil, Augmentin [amoxicillin-pot clavulanate], Dermabond, Keflex [cephalexin], Kenalog [triamcinolone acetonide], Nuts [peanut-containing drug products], Pneumococcal vaccine, Sulfa antibiotics, Topiramate, Triamcinolone acetonide, Ultram [tramadol], Valium [diazepam], and Vitamin b12  Restrictions/Precautions:  No data recordedNo data recorded   Interventions Planned (Treatment may consist of any combination of the following):    Current Treatment Recommendations: Strengthening; Balance training; Functional mobility training; Gait training; Home exercise program; Vestibular rehab     Subjective Comments:   \"I was at the dentist all day yesterday. I saw the ENT last week and he wants me to see a specialist at [facility]".   Initial:}    3/10Post Session:       2/10  Medications Last Reviewed:  10/27/2022  Updated Objective Findings:  None Today  Treatment   NEUROMUSCULAR RE-EDUCATION: (30 minutes):    Exercise/activities per grid below to improve balance and coordination. Required minimal verbal cues to promote static and dynamic balance in standing. Date:  10/6/22 Date:  10/20/2022 Date:  10/27/22   Activity/Exercise Parameters Parameters Parameters   Marching in hallway with hiking pole 4 laps 4 laps 4 laps   Sidestepping in hallway with hiking pole  4 laps 4 laps   Walking through cones with hiking pole   4 laps   Supine to sit from table      Stepping over half foam with hiking pole      Step taps with hiking pole      Sit to stand from chair   5 reps   Sliding right/Sliding left      Seated turns right/left on hilo mat      Seated ball kicks/ball throws      Seated trunk rotation with red ball      Standing on black foam        MANUAL THERAPY: (15 minutes): Soft tissue mobilization was utilized and necessary because of the patient's painful spasm. Patient received trigger point release along bilateral upper traps/scapular region to decrease pain. Skin intact after treatment. Treatment/Session Summary:    Treatment Assessment:  Patient continues to have vertigo requiring significant rest breaks. Communication/Consultation:  None today  Equipment provided today:  None  Recommendations/Intent for next treatment session: Next visit will focus on balance, gait, and vestibular.     Total Treatment Billable Duration:  45 minutes  Time In: 1100  Time Out: 6496    CHIRAG Hernandez, PT       Charge Capture  }Post Session Pain  PT Visit Info  Eventable Portal  MD Guidelines  Scanned Media  Benefits  MyChart    Future Appointments   Date Time Provider Aye Caputo   11/1/2022 11:00 AM José Miguel Courtney, PT [facility] SFE   11/2/2022  1:45 PM THEODORE Pina MD [facility] AMB   11/3/2022 11:00 AM José Miguel Courtney, PT [facility]   11/8/2022 11:00 AM José Miguel Courtney, PT [facility]   11/11/2022 11:00 AM José Miguel Courtney, PT [facility]   11/15/2022 11:00 AM Hiwot Nolasco, PT [facility] SFE   11/17/2022  1:00 PM Hiwot Nolasco, PT [facility] SFE   11/22/2022 11:00 AM Hiwot Gaurav, PT [facility] SFE   11/29/2022 11:00 AM Hiwot Gaurav, PT [facility] SFE   12/1/2022 11:00 AM Hiwot Nolasco, PT [facility] SFE   1/13/2023  9:00 AM [facility] AMB   1/20/2023 11:00 AM Genia Herr MD [facility] G[facility] AMB   1/25/2023 11:20 AM Nelia Burciaga MD [id number] AMB   4/3/2023 11:15 AM Fidel Baltazar MD [facility] GVL AMB

## 2022-11-01 ENCOUNTER — HOSPITAL ENCOUNTER (OUTPATIENT)
Dept: PHYSICAL THERAPY | Age: 42
Setting detail: RECURRING SERIES
Discharge: HOME | End: 2022-11-04
Payer: MEDICARE

## 2022-11-01 PROCEDURE — 97140 MANUAL THERAPY 1/> REGIONS: CPT

## 2022-11-01 PROCEDURE — 97112 NEUROMUSCULAR REEDUCATION: CPT

## 2022-11-01 ASSESSMENT — PAIN SCALES - GENERAL: PAINLEVEL_OUTOF10: 6

## 2022-11-01 NOTE — PROGRESS NOTES
Rebeka Bonilla  : 1980  Primary: Rico Schmid Ppo  Secondary:  Tevin Coyle [address] [place] [address] Way [address]  Phone: [phone]  Fax: [phone] Plan Frequency: 2 times a week for 90 days    Plan of Care/Certification Expiration Date: 22      PT Visit Info: Total # of Visits to Date: 17  Progress Note Counter: 16  Canceled Appointment: 2     Visit Count:  17   OUTPATIENT PHYSICAL THERAPY:OP NOTE TYPE: Treatment Note 2022       Episode  }Appt Desk             Treatment Diagnosis:  Difficulty in walking, Not elsewhere classified (R26.2)  Generalized Muscle Weakness (M62.81)  Other abnormalities of gait and mobility (R26.89)  Medical/Referring Diagnosis:  No admission diagnoses are documented for this encounter. Referring Physician:  Angelika Marinelli DO MD Orders:  PT Eval and Treat   Date of Onset:  Onset Date:  (Chronic)     Allergies:   Diazepam, Penicillins, Pineapple, Sumatriptan, Theophylline, Theophyllines, Cephalexin, Sulfa antibiotics, Topiramate, Tramadol, Verapamil, [place] oil, Augmentin [amoxicillin-pot clavulanate], Dermabond, Keflex [cephalexin], Kenalog [triamcinolone acetonide], Nuts [peanut-containing drug products], Pneumococcal vaccine, Sulfa antibiotics, Topiramate, Triamcinolone acetonide, Ultram [tramadol], Valium [diazepam], and Vitamin b12  Restrictions/Precautions:  No data recordedNo data recorded   Interventions Planned (Treatment may consist of any combination of the following):    Current Treatment Recommendations: Strengthening; Balance training; Functional mobility training; Gait training; Home exercise program; Vestibular rehab     Subjective Comments:   Patient reports waking up this morning with more neck pain/headache.   Initial:}    6/10Post Session:       4/10  Medications Last Reviewed:  2022  Updated Objective Findings:  None Today  Treatment   NEUROMUSCULAR RE-EDUCATION: 30 minutes):    Exercise/activities per grid below to improve balance and coordination. Required minimal verbal cues to promote static and dynamic balance in standing. Date:  11/1/22 Date:  10/20/2022 Date:  10/27/22   Activity/Exercise Parameters Parameters Parameters   Marching in hallway with hiking pole 4 laps 4 laps 4 laps   Sidestepping in hallway with hiking pole 4 laps 4 laps 4 laps   Walking through cones with hiking pole 4 laps  4 laps   Supine to sit from table      Stepping over half foam with hiking pole      Step taps with hiking pole      Sit to stand from chair   5 reps   Sliding right/Sliding left      Seated turns right/left on hilo mat      Seated ball kicks/ball throws      Seated trunk rotation with red ball      Standing on blue foam Eyes open       MANUAL THERAPY: (15 minutes): Soft tissue mobilization was utilized and necessary because of the patient's painful spasm. Patient received trigger point release along bilateral upper traps/scapular region to decrease pain. Skin intact after treatment. Treatment/Session Summary:    Treatment Assessment:  Patient reports decreased neck pain/headache after manual therapy. Patient reports increased dizziness after exercises. Communication/Consultation:  None today  Equipment provided today:  None  Recommendations/Intent for next treatment session: Next visit will focus on balance, gait, and vestibular.     Total Treatment Billable Duration:  45 minutes  Time In: 1100  Time Out: 1266    CHIRAG HOPE PT       Charge Capture  }Post Session Pain  PT Visit Info  [employer] Portal  MD Guidelines  Scanned Media  Benefits  MyChart    Future Appointments   Date Time Provider Aye Caputo   11/2/2022  1:45 PM THEODORE Joya MD [facility] AMB   11/3/2022 11:00 AM Rachelle Joshua, PT [facility] SFE   11/8/2022 11:00 AM Rachelle Joshua, PT [facility]   11/11/2022 11:00 AM Rachelle Joshua, PT [facility] S[facility]   11/15/2022 11:00 AM Rachelle Joshua, PT [facility] S[facility]   11/17/2022  1:00 PM Domonique Viktor, PT [facility] SFE   11/22/2022 11:00 AM Domonique Viktor, PT [facility] SFE   11/29/2022 11:00 AM Domonique Viktor, PT [facility] S[facility]   12/1/2022 11:00 AM Domonique Viktor, PT [facility] S[facility]   [facility]   1/20/2023 11:00 AM Richelle Fink MD [facility] AMB   1/25/2023 11:20 AM Traci Ritter MD [id number] AMB   4/3/2023 11:15 AM Robles Sullivan MD [facility] GVL AMB

## 2022-11-02 ENCOUNTER — OFFICE VISIT (OUTPATIENT)
Dept: ORTHOPEDIC SURGERY | Age: 42
End: 2022-11-02
Payer: MEDICARE

## 2022-11-02 DIAGNOSIS — M67.922 TENDINOPATHY OF LEFT BICEPS: ICD-10-CM

## 2022-11-02 DIAGNOSIS — R20.2 ARM PARESTHESIA, LEFT: ICD-10-CM

## 2022-11-02 DIAGNOSIS — M25.512 LEFT SHOULDER PAIN, UNSPECIFIED CHRONICITY: ICD-10-CM

## 2022-11-02 DIAGNOSIS — S46.012A TRAUMATIC TEAR OF LEFT ROTATOR CUFF, UNSPECIFIED TEAR EXTENT, INITIAL ENCOUNTER: Primary | ICD-10-CM

## 2022-11-02 DIAGNOSIS — M54.2 NECK PAIN: ICD-10-CM

## 2022-11-02 DIAGNOSIS — M19.012 OSTEOARTHRITIS OF LEFT AC (ACROMIOCLAVICULAR) JOINT: ICD-10-CM

## 2022-11-02 PROCEDURE — 20610 DRAIN/INJ JOINT/BURSA W/O US: CPT | Performed by: ORTHOPAEDIC SURGERY

## 2022-11-02 RX ORDER — METHYLPREDNISOLONE ACETATE 40 MG/ML
80 INJECTION, SUSPENSION INTRA-ARTICULAR; INTRALESIONAL; INTRAMUSCULAR; SOFT TISSUE ONCE
Status: COMPLETED | OUTPATIENT
Start: 2022-11-02 | End: 2022-11-02

## 2022-11-02 RX ADMIN — METHYLPREDNISOLONE ACETATE 80 MG: 40 INJECTION, SUSPENSION INTRA-ARTICULAR; INTRALESIONAL; INTRAMUSCULAR; SOFT TISSUE at 14:22

## 2022-11-02 NOTE — PROGRESS NOTES
Name: Neeta Hensley  YOB: 1980  Gender: female  MRN: [id number]    CC:   Chief Complaint   Patient presents with    Shoulder Pain     Recheck left shoulder        HPI: Patient presents for follow-up of left shoulder pain. Patient had a biceps tendon injection on 12- which was her last visit. She notes relief from injection. Recall that the patient has proprioceptive dysfunction disorder and sees Dr. Luis Toussaint who is her neurologist.  Since her last visit she had cervical spine surgery by Dr. Satinder Tony which included ACDF C5-C6 and C6/C7. The patient has fallen four times in the last month. The patient notes that her neurologist is going to refer her over to [facility] but she has not heard from [facility] yet. The patient enjoys doing looming and this has been difficult for her.     Allergies   Allergen Reactions    Diazepam Other (See Comments)     Suicidal thoughts       Penicillins Anaphylaxis     Has not been able to tolerate cephalosporins     Pineapple Shortness Of Breath     wheezing    Sumatriptan Shortness Of Breath    Theophylline Shortness Of Breath    Theophyllines Shortness Of Breath    Cephalexin Other (See Comments)     Other reaction(s): Dizziness    Sulfa Antibiotics Rash     Other reaction(s): Vomiting    Topiramate Other (See Comments)     Caused disorientation, loss of feeling in left leg (numbness)    Tramadol Rash    Verapamil Other (See Comments)     Lowers blood pressure significantly beyond normal hypotension      [place] Oil Swelling     Causes lips to swell       Augmentin [Amoxicillin-Pot Clavulanate] Hives    Dermabond Hives    Keflex [Cephalexin] Hives    Kenalog [Triamcinolone Acetonide] Hives    Nuts [Peanut-Containing Drug Products] Hives     [place] only- not peanuts    Pneumococcal Vaccine Swelling    Sulfa Antibiotics Hives    Topiramate Other (See Comments) and Myalgia    Triamcinolone Acetonide Hives     Kenalog injection caused hives     Ultram [Tramadol] Hives    Valium [Diazepam] Other (See Comments)    Vitamin B12 Other (See Comments)     Injection Only  swelling     Past Medical History:   Diagnosis Date    Adverse effect of anesthesia     \"a little goes a long way with me\"     Arthritis     Arthritis     spine and hands     Asthma     last attack 05/2019; inhaler prn     BMI 36.0-36.9,adult     Chronic pain     chronic headache  beta histidine for this    Depression     Hyperlipidemia     no meds     Hypertension     Migraines     candesartan for this - takes at night    Movement disorder     loses where body parts are at times    PONV (postoperative nausea and vomiting)     nauseous upon waking in PACU     Thyroid disease     hypo-on med    Vertigo     Vestibular nerve disorder 2008    migraines - and nerve damage as well     Past Surgical History:   Procedure Laterality Date    CERVICAL DISC ARTHROPLASTY      CERVICAL FUSION N/A 7/12/2022    C5-C7 anterior cervical discectomy and fusion with interbody spacer/allograft and instrumentation performed by Sulaiman Aranda MD at [address]      possibly open patient unsure    EYE MUSCLE SURGERY      HEENT      oral, bilateral eye muscle    JOINT REPLACEMENT      ORTHOPEDIC SURGERY      right wrist x 2, right shoulder      Family History   Problem Relation Age of Onset    Parkinson's Disease Paternal Grandfather     Macular Degen Paternal Grandfather     Cancer Paternal Grandmother         brain tumor    Stroke Maternal Grandfather     Heart Disease Maternal Grandfather     Cancer Maternal Grandmother         lymphoma    Osteoarthritis Father     Diabetes Father     Thyroid Disease Mother     Diabetes Mother     Asthma Mother     Stroke Mother         11/2020     Social History     Socioeconomic History    Marital status: Single     Spouse name: Not on file    Number of children: Not on file    Years of education: Not on file    Highest education level: Not on file Occupational History    Not on file   Tobacco Use    Smoking status: Never    Smokeless tobacco: Never   Vaping Use    Vaping Use: Never used   Substance and Sexual Activity    Alcohol use: Yes    Drug use: No    Sexual activity: Not Currently     Partners: Male   Other Topics Concern    Not on file   Social History Narrative    ** Merged History Encounter **          Social Determinants of Health     Financial Resource Strain: Not on file   Food Insecurity: Not on file   Transportation Needs: Not on file   Physical Activity: Insufficiently Active    Days of Exercise per Week: 3 days    Minutes of Exercise per Session: 10 min   Stress: Not on file   Social Connections: Not on file   Intimate Partner Violence: Not on file   Housing Stability: Not on file          AMB PAIN ASSESSMENT 7/19/2022   Severity of Pain 7       Review of Systems  Non-contributory    PE left shoulder: On exam patient has fairly normal motion. She is a little more guarded with dynamic adduction shear. Empty can is positive for a painful click anteriorly. She is tender along the biceps and has positive speeds. O'Briens is positive with both exam maneuvers. AC joint is tender and is painful but not as painful as the anterior bicipital pain. Xray: Grashey, outlet and axillary radiographs of the left shoulder were obtained and reviewed today. No evidence of acute osseous abnormality. There is a type II acromion with moderate AC joint DJD. A/Plan:     ICD-10-CM    1. Traumatic tear of left rotator cuff, unspecified tear extent, initial encounter  S46.012A XR SHOULDER LEFT (MIN 2 VIEWS)      2. Left shoulder pain, unspecified chronicity  M25.512 XR SHOULDER LEFT (MIN 2 VIEWS)     DRAIN/INJECT LARGE JOINT/BURSA     methylPREDNISolone acetate (DEPO-MEDROL) injection 80 mg      3. Arm paresthesia, left  R20.2 XR SHOULDER LEFT (MIN 2 VIEWS)      4. Neck pain  M54.2       5.  Tendinopathy of left biceps  M67.922 DRAIN/INJECT LARGE JOINT/BURSA     methylPREDNISolone acetate (DEPO-MEDROL) injection 80 mg      6. Osteoarthritis of left AC (acromioclavicular) joint  M19.012 DRAIN/INJECT LARGE JOINT/BURSA     methylPREDNISolone acetate (DEPO-MEDROL) injection 80 mg           Suggest repeat biceps injection template. Discussed possibility of AC joint injection if more pain is there and follow-up. At this point would not suggest MRI until we have further information from her neurology visit at [facility]. Procedure note: After discussion of risks and benefits including, but not limited to pain, infection, steroid flare, increased blood sugar, fat necrosis, skin discoloration, and injury to blood vessels or nerves, the patient verbally consented to proceed with a glenohumeral joint injection. The affected left shoulder was sterilely prepped in standard fashion and injected with 2 cc of Depo-Medrol (40mg/ml), 2 cc of 1% Lidocaine, and 2 cc of 0.5% Marcaine into the anterior shoulder near the biceps tendon. The patient tolerated the injection well. Return in about 6 weeks (around 12/14/2022).         Trey Marcelino MD  11/02/22

## 2022-11-03 ENCOUNTER — HOSPITAL ENCOUNTER (OUTPATIENT)
Dept: PHYSICAL THERAPY | Age: 42
Setting detail: RECURRING SERIES
End: 2022-11-03
Payer: MEDICARE

## 2022-11-03 NOTE — PROGRESS NOTES
Bruna Esteves  : 1980  Primary: Anastasiya Ballard Ppo  Secondary:  Cody John  [address] [facility] [address]  Phone: [phone]  Fax: [phone]    PT Visit Info: Total # of Visits to Date: 17  Progress Note Counter: 16  Canceled Appointment: 3     OT Visit Info:  No data recorded    OUTPATIENT THERAPY:OP NOTE TYPE: Progress Report 11/3/2022               Episode  Appt Desk           Bruna Esteves cancelled her appointment for today due to  a fall . Will plan to follow up next during next appointment.   Thank you,  Aye Conde, PT    Future Appointments   Date Time Provider Aye Caputo   2022 11:00 AM Rachelle Joshua, PT [facility]   2022 11:00 AM Rachelle Joshua, PT [facility]   11/15/2022 11:00 AM Rachelle Joshua, PT [facility]   2022  1:00 PM Rachelle Joshua, PT [facility]   2022 11:00 AM Rachelle Joshua, PT [facility]   2022 11:00 AM Rachelle Joshua, PT [facility]   2022 11:00 AM Rachelle Joshua, PT [facility]   2022 10:30 AM MD LEROY Ram [facility]   2023  9:00 AM [facility]   2023 11:00 AM Adrien Donahue MD [facility]   2023 11:20 AM Bry Duvall MD [id number]   4/3/2023 11:15 AM Cliff Granda MD [facility]

## 2022-11-08 ENCOUNTER — APPOINTMENT (OUTPATIENT)
Dept: PHYSICAL THERAPY | Age: 42
End: 2022-11-08
Payer: MEDICARE

## 2022-11-11 ENCOUNTER — HOSPITAL ENCOUNTER (OUTPATIENT)
Dept: PHYSICAL THERAPY | Age: 42
Setting detail: RECURRING SERIES
Discharge: HOME | End: 2022-11-14
Payer: MEDICARE

## 2022-11-11 PROCEDURE — 97140 MANUAL THERAPY 1/> REGIONS: CPT

## 2022-11-11 PROCEDURE — 97112 NEUROMUSCULAR REEDUCATION: CPT

## 2022-11-11 ASSESSMENT — PAIN SCALES - GENERAL: PAINLEVEL_OUTOF10: 2

## 2022-11-11 NOTE — THERAPY RECERTIFICATION
Marcia Colette  : 1980  Primary: Kita Tubbs Ppo  Secondary:  Kannan Davis  [address] [address]  Phone: [phone]  Fax: [phone] Plan Frequency: 2 times a week for 90 days    Plan of Care/Certification Expiration Date: 23      PT Visit Info: Total # of Visits to Date: 25  Progress Note Counter: 25  Canceled Appointment: 3      Visit Count:  18    OUTPATIENT PHYSICAL THERAPY:OP NOTE TYPE: Recertification                Episode  Appt Desk         Treatment Diagnosis:  Difficulty in walking, Not elsewhere classified (R26.2)  Generalized Muscle Weakness (M62.81)  Other abnormalities of gait and mobility (R26.89)  Medical/Referring Diagnosis:  No admission diagnoses are documented for this encounter. Referring Physician:  Candice Ferguson DO MD Orders:  PT Eval and Treat   Return MD Appt:  unknown   Date of Onset:  Onset Date:  (Chronic)     Allergies:  Diazepam, Penicillins, Pineapple, Sumatriptan, Theophylline, Theophyllines, Cephalexin, Sulfa antibiotics, Topiramate, Tramadol, Verapamil, [place] oil, Augmentin [amoxicillin-pot clavulanate], Dermabond, Keflex [cephalexin], Kenalog [triamcinolone acetonide], Nuts [peanut-containing drug products], Pneumococcal vaccine, Sulfa antibiotics, Topiramate, Triamcinolone acetonide, Ultram [tramadol], Valium [diazepam], and Vitamin b12  Restrictions/Precautions:    No data recordedNo data recorded   Medications Last Reviewed:  2022     SUBJECTIVE   History of Injury/Illness (Reason for Referral):  Patient reports more spinning dizziness, imbalance, and difficulty walking. Aggravating factors are head turns and body turns. Patient has had no falls recently. Patient rates dizziness as 4/10 and pain level as 2/10. Patient  had anterior discectomy and cervical fusion on 2022.   Patient reports needing more assistance with laundry, cooking, and cleaning. Patient Stated Goal(s): \"To decrease spinning dizziness with movements\". Initial:     2/10 Post Session:     1/10  Past Medical History/Comorbidities:   Ms. Ashley Sparks  has a past medical history of Adverse effect of anesthesia, Arthritis, Arthritis, Asthma, BMI 36.0-36.9,adult, Chronic pain, Depression, Hyperlipidemia, Hypertension, Migraines, Movement disorder, PONV (postoperative nausea and vomiting), Thyroid disease, Vertigo, and Vestibular nerve disorder. Ms. Ashley Sparks  has a past surgical history that includes Cholecystectomy; eye muscle surgery; joint replacement; Cervical disc arthroplasty; orthopedic surgery; heent; Cholecystectomy; and cervical fusion (N/A, 7/12/2022). Social History/Living Environment:   Lives With: Alone  Type of Home: House     Prior Level of Function/Work/Activity:   Prior level of function: Independent  Occupation: Part time employment  No data recordedNo data recorded   Learning:   Does the patient/guardian have any barriers to learning?: No barriers  Will there be a co-learner?: No  What is the preferred language of the patient/guardian?: English  Is an  required?: No  How does the patient/guardian prefer to learn new concepts?: Listening; Reading; Demonstration     Fall Risk Scale: [place] Total Score: 65  [place] Fall Risk: High (45 and higher)     Dominant Side:  right handed      OBJECTIVE   Balance / Vestibular:   Sensation:   Bilateral lower extremity within normal limits. Strength:  Hip flexion 4/5, hip abduction 4+/5, hip adduction 4/5, quadriceps 5/5, hamstrings 5/5, ankle dorsiflexion 5/5, ankle plantarflexion 5/5. Balance Outcome Measures:  [place]rera Balance Scale:  22 /56  (A score less than 45/56 indicates high risk of falls)  ASSESSMENT   Initial Assessment:  Patient presents with imbalance, dizziness, difficulty walking, and decreased mobility.   Patient is at higher fall risk based on scores received on [place]rera Balance Scale and Timed Up and Go Test. Patient would benefit from skilled physical therapy to address problems and goals. Thank you for this referral.   Recerfification note:  Patient has attended eighteen scheduled physical therapy appointments from 8/19/2022 to 11/11/2022. Patient demonstrates improved gait speed. Patient would benefit from continuing skilled physical therapy to address problems and goals. Thank you for this referral.    Problem List: (Impacting functional limitations): Body Structures, Functions, Activity Limitations Requiring Skilled Therapeutic Intervention: Decreased functional mobility ; Decreased strength; Decreased balance; Vestibular Impairment   ,  manual therapy  Therapy Prognosis:   Therapy Prognosis: Good     Assessment Complexity:      PLAN   Effective Dates: 11/11/2022 TO Plan of Care/Certification Expiration Date: 02/09/23     Frequency/Duration: Plan Frequency: 2 times a week for 90 days     Interventions Planned (Treatment may consist of any combination of the following):    Current Treatment Recommendations: Strengthening; Balance training; Functional mobility training; Gait training; Home exercise program; Vestibular rehab   manual therapy  Goals: (Goals have been discussed and agreed upon with patient.)  Short-Term Functional Goals: Time Frame: 30 days   Patient will be independent with home exercise program to improve balance and decrease dizziness. Goal met. Patient will score less than or equal to 18 seconds on Timed Up and Go Test indicating improved gait speed. Goal met. Discharge Goals: Time Frame: 90 days   Patient will score less than or equal to 13 seconds on Timed Up and Go Test indicating improved gait speed. Goal ongoing. Patient will increase score on Barrera Balance Scale greater than or equal to 30/56 demonstrating improved balance and decreased fall risk with daily activities. Goal ongoing. Patient will report needing less assistance with laundry and cooking. Goal ongoing. Outcome Measure: Tool Used: Barrera Balance Scale  Score:  Initial: 22/56 Most Recent: 24/56 (Date: 11-11-22 )   Interpretation of Score: Each section is scored on a 0-4 scale, 0 representing the patients inability to perform the task and 4 representing independence. The scores of each section are added together for a total score of 56. The higher the patients score, the more independent the patient is. Any score below 45 indicates increased risk for falls. Tool Used: Timed Up and Go (TUG)  Score:  Initial: 21.5 seconds Most Recent: 16.3 seconds (Date: 11- )   Interpretation of Score: The test measures, in seconds, the time taken by an individual to stand up from a standard arm chair (seat height 46 cm [18 in], arm height 65 cm [25.6 in]), walk a distance of 3 meters (118 in, approx 10 ft), turn, walk back to the chair and sit down. If the individual takes longer than 14 seconds to complete TUG, this indicates risk for falls. Medical Necessity:   > Patient is expected to demonstrate progress in strength and balance to improve safety during activities of daily living. Reason For Services/Other Comments:  > Patient continues to require skilled intervention due to higher fall risk with daily activities. Total Duration:  Time In: 1100  Time Out: 1145    Regarding Amanda Celaya's therapy, I certify that the treatment plan above will be carried out by a therapist or under their direction.   Thank you for this referral,  Saurav Biggs, PT     Referring Physician Signature: Mattie Mittal, * _______________________________ Date _____________        Post Session Pain  Charge Capture  PT Visit Info MD Guidelines  MyChart

## 2022-11-11 NOTE — PROGRESS NOTES
Jennifer Martinez  : 1980  Primary: Kimo Parish Ppo  Secondary:  Gianna Coyle [address]  [facility] [address]  Phone: [phone]  Fax: [phone] Plan Frequency: 2 times a week for 90 days    Plan of Care/Certification Expiration Date: 23      PT Visit Info: Total # of Visits to Date: 25  Progress Note Counter: 25  Canceled Appointment: 3     Visit Count:  18   OUTPATIENT PHYSICAL THERAPY:OP NOTE TYPE: Treatment Note 2022       Episode  }Appt Desk             Treatment Diagnosis:  Difficulty in walking, Not elsewhere classified (R26.2)  Generalized Muscle Weakness (M62.81)  Other abnormalities of gait and mobility (R26.89)  Medical/Referring Diagnosis:  No admission diagnoses are documented for this encounter. Referring Physician:  Jagruti Espinosa DO MD Orders:  PT Eval and Treat   Date of Onset:  Onset Date:  (Chronic)     Allergies:   Diazepam, Penicillins, Pineapple, Sumatriptan, Theophylline, Theophyllines, Cephalexin, Sulfa antibiotics, Topiramate, Tramadol, Verapamil, [place] oil, Augmentin [amoxicillin-pot clavulanate], Dermabond, Keflex [cephalexin], Kenalog [triamcinolone acetonide], Nuts [peanut-containing drug products], Pneumococcal vaccine, Sulfa antibiotics, Topiramate, Triamcinolone acetonide, Ultram [tramadol], Valium [diazepam], and Vitamin b12  Restrictions/Precautions:  No data recordedNo data recorded   Interventions Planned (Treatment may consist of any combination of the following):    Current Treatment Recommendations: Strengthening; Balance training; Functional mobility training; Gait training; Home exercise program; Vestibular rehab     Subjective Comments:   Patient reports she had to cancel the last couple of appointments due to a sinus infection/cold.   Initial:}    2/10Post Session:       1/10  Medications Last Reviewed:  2022  Updated Objective Findings:   See recertification note  Treatment   NEUROMUSCULAR RE-EDUCATION: (30 minutes):    Exercise/activities per grid below to improve balance and coordination. Required minimal verbal cues to promote static and dynamic balance in standing. Date:  11/11/22 Date:  10/20/2022 Date:  10/27/22   Activity/Exercise Parameters Parameters Parameters   Marching in hallway with hiking pole 4 laps 4 laps 4 laps   Sidestepping in hallway with hiking pole 4 laps 4 laps 4 laps   Walking through cones with hiking pole 4 laps  4 laps   Supine to sit from table      Stepping over half foam with hiking pole      Step taps with hiking pole      Sit to stand from chair   5 reps   Sliding right/Sliding left      Seated turns right/left on hilo mat      Seated ball kicks/ball throws      Seated trunk rotation with red ball      Standing on blue foam Eyes open       MANUAL THERAPY: (15 minutes): Soft tissue mobilization was utilized and necessary because of the patient's painful spasm. Patient received trigger point release along bilateral upper traps/scapular region to decrease pain. Skin intact after treatment. Treatment/Session Summary:    Treatment Assessment:  Patient demonstrates improved gait speed since initial evaluation. Communication/Consultation:  None today  Equipment provided today:  None  Recommendations/Intent for next treatment session: Next visit will focus on balance, gait, and vestibular.     Total Treatment Billable Duration:  45 minutes  Time In: 1100  Time Out: 0231    CHIRAG Monroe, LIVIA       Charge Capture  }Post Session Pain  PT Visit Info  [email or web address] Portal  MD Guidelines  Scanned Media  Benefits  MyChart    Future Appointments   Date Time Provider Aye Caputo   11/15/2022 11:00 AM Hiwot Nolasco PT [facility]   11/17/2022  1:00 PM Hiwot Nolasco PT [facility]   11/22/2022 11:00 AM LIVIA Regan   11/29/2022 11:00 AM LIVIA Regan   12/1/2022 11:00 AM Hiwot Nolasco PT [facility]   12/16/2022 10:30 AM THEODORE Coats MD [facility]   1/13/2023  9:00 AM [facility] AMB   1/20/2023 11:00 AM Viraj Iverson MD [facility] AMB   2/22/2023 10:00 AM Noris Santillan MD [id number]   4/3/2023 11:15 AM Prince Shaffer MD [facility] AMB

## 2022-11-15 ENCOUNTER — HOSPITAL ENCOUNTER (OUTPATIENT)
Dept: PHYSICAL THERAPY | Age: 42
Setting detail: RECURRING SERIES
Discharge: HOME | End: 2022-11-18
Payer: MEDICARE

## 2022-11-15 PROCEDURE — 97140 MANUAL THERAPY 1/> REGIONS: CPT

## 2022-11-15 PROCEDURE — 97112 NEUROMUSCULAR REEDUCATION: CPT

## 2022-11-15 ASSESSMENT — PAIN SCALES - GENERAL: PAINLEVEL_OUTOF10: 1

## 2022-11-15 NOTE — PROGRESS NOTES
[facility]  : 1980  Primary: Klever Justin Ppo  Secondary:  Enio Coyle [address]  [address] [address]  Phone: [phone]  Fax: [phone] Plan Frequency: 2 times a week for 90 days    Plan of Care/Certification Expiration Date: 23      PT Visit Info: Total # of Visits to Date: 23  Progress Note Counter: 2  Canceled Appointment: 3     Visit Count:  19   OUTPATIENT PHYSICAL THERAPY:OP NOTE TYPE: Treatment Note 11/15/2022       Episode  }Appt Desk             Treatment Diagnosis:  Difficulty in walking, Not elsewhere classified (R26.2)  Generalized Muscle Weakness (M62.81)  Other abnormalities of gait and mobility (R26.89)  Medical/Referring Diagnosis:  No admission diagnoses are documented for this encounter. Referring Physician:  Otis Smith DO MD Orders:  PT Eval and Treat   Date of Onset:  Onset Date:  (Chronic)     Allergies:   Diazepam, Penicillins, Pineapple, Sumatriptan, Theophylline, Theophyllines, Cephalexin, Sulfa antibiotics, Topiramate, Tramadol, Verapamil, [place] oil, Augmentin [amoxicillin-pot clavulanate], Dermabond, Keflex [cephalexin], Kenalog [triamcinolone acetonide], Nuts [peanut-containing drug products], Pneumococcal vaccine, Sulfa antibiotics, Topiramate, Triamcinolone acetonide, Ultram [tramadol], Valium [diazepam], and Vitamin b12  Restrictions/Precautions:  No data recordedNo data recorded   Interventions Planned (Treatment may consist of any combination of the following):    Current Treatment Recommendations: Strengthening; Balance training; Functional mobility training; Gait training; Home exercise program; Vestibular rehab     Subjective Comments:   Patient reports being a little dizzy. Dizziness 3/10 and no headache.   Initial:}    1/10Post Session:       0/10  Medications Last Reviewed:  11/15/2022  Updated Objective Findings:  None Today  Treatment   NEUROMUSCULAR RE-EDUCATION: 30 minutes):    Exercise/activities per grid below to improve balance and coordination. Required minimal verbal cues to promote static and dynamic balance in standing. Date:  11/11/22 Date:  11/15/2022 Date:  10/27/22   Activity/Exercise Parameters Parameters Parameters   Marching in hallway with hiking pole 4 laps 4 laps 4 laps   Sidestepping in hallway with hiking pole 4 laps 4 laps 4 laps   Walking through cones with hiking pole 4 laps 4 laps 4 laps   Supine to sit from table      Stepping over half foam with hiking pole  Forward 15 reps  Lateral 15 reps    Step taps with hiking pole      Sit to stand from chair   5 reps   Sliding right/Sliding left      Seated turns right/left on hilo mat      Seated ball kicks/ball throws      Seated trunk rotation with red ball      Standing on blue foam        MANUAL THERAPY: (15 minutes): Soft tissue mobilization was utilized and necessary because of the patient's painful spasm. Patient received trigger point release along bilateral upper traps/scapular region to decrease pain. Skin intact after treatment. Treatment/Session Summary:    Treatment Assessment:  Patient tolerated treatment well. Patient needed rest breaks to decrease dizziness. Patient reports dizziness as 3/10 after treatment. Communication/Consultation:  None today  Equipment provided today:  None  Recommendations/Intent for next treatment session: Next visit will focus on balance, gait, and vestibular.     Total Treatment Billable Duration:  45 minutes  Time In: 1100  Time Out: 9918    CHIRAG HOPE, PT       Charge Capture  }Post Session Pain  PT Visit Info  [facility] Portal  MD Guidelines  Scanned Media  Benefits  MyChart    Future Appointments   Date Time Provider Aye Caputo   11/17/2022  1:00 PM Katharina Shannon PT [facility]   11/22/2022 11:00 AM LIVIA Cox   11/29/2022 11:00 AM LIVIA Cox   12/1/2022 11:00 AM Katharina Shannon PT [facility] SFE   12/16/2022 10:30 AM THEODORE Zacarias MD [facility]   1/13/2023  9:00 AM [facility] AM[facility]   1/20/2023 11:00 AM Debbie Palacios MD [facility] AM[facility]   2/22/2023 10:00 AM Mihai Hoang MD [id number] AM[facility]   4/3/2023 11:15 AM Megan Dill MD [facility] AMB

## 2022-11-17 ENCOUNTER — HOSPITAL ENCOUNTER (OUTPATIENT)
Dept: PHYSICAL THERAPY | Age: 42
Setting detail: RECURRING SERIES
Discharge: HOME | End: 2022-11-20
Payer: MEDICARE

## 2022-11-17 PROCEDURE — 97112 NEUROMUSCULAR REEDUCATION: CPT

## 2022-11-17 PROCEDURE — 97140 MANUAL THERAPY 1/> REGIONS: CPT

## 2022-11-17 ASSESSMENT — PAIN SCALES - GENERAL: PAINLEVEL_OUTOF10: 6

## 2022-11-17 ASSESSMENT — PAIN DESCRIPTION - ORIENTATION: ORIENTATION: LEFT

## 2022-11-17 ASSESSMENT — PAIN DESCRIPTION - LOCATION: LOCATION: SHOULDER

## 2022-11-17 NOTE — PROGRESS NOTES
Kirsty Mancia  : 1980  Primary: Steven Beltrán Ppo  Secondary:  [address]  [address] [address]  Phone: [phone]  Fax: [phone] Plan Frequency: 2 times a week for 90 days    Plan of Care/Certification Expiration Date: 23      PT Visit Info: Total # of Visits to Date: 20  Progress Note Counter: 2  Canceled Appointment: 3     Visit Count:  20   OUTPATIENT PHYSICAL THERAPY:OP NOTE TYPE: Treatment Note 2022       Episode  }Appt Desk             Treatment Diagnosis:  Difficulty in walking, Not elsewhere classified (R26.2)  Generalized Muscle Weakness (M62.81)  Other abnormalities of gait and mobility (R26.89)  Medical/Referring Diagnosis:  No admission diagnoses are documented for this encounter. Referring Physician:  Jenny Carrasco DO MD Orders:  PT Eval and Treat   Date of Onset:  Onset Date:  (Chronic)     Allergies:   Diazepam, Penicillins, Pineapple, Sumatriptan, Theophylline, Theophyllines, Cephalexin, Sulfa antibiotics, Topiramate, Tramadol, Verapamil, [place] oil, Augmentin [amoxicillin-pot clavulanate], Dermabond, Keflex [cephalexin], Kenalog [triamcinolone acetonide], Nuts [peanut-containing drug products], Pneumococcal vaccine, Sulfa antibiotics, Topiramate, Triamcinolone acetonide, Ultram [tramadol], Valium [diazepam], and Vitamin b12  Restrictions/Precautions:  No data recordedNo data recorded   Interventions Planned (Treatment may consist of any combination of the following):    Current Treatment Recommendations: Strengthening; Balance training; Functional mobility training; Gait training; Home exercise program; Vestibular rehab     Subjective Comments:   Dizziness 4/10. \"Turning my head to the right makes me dizzy\".   Initial:}Left Shoulder 6/10Post Session:  Left  Shoulder 4/10  Medications Last Reviewed:  2022  Updated Objective Findings:  None Today  Treatment   NEUROMUSCULAR RE-EDUCATION: (30 minutes):    Exercise/activities per grid below to improve balance and coordination. Required minimal verbal cues to promote static and dynamic balance in standing. Date:  11/11/22 Date:  11/15/2022 Date:  11/17/22   Activity/Exercise Parameters Parameters Parameters   Marching in hallway with hiking pole 4 laps 4 laps 4 laps   Sidestepping in hallway with hiking pole 4 laps 4 laps 4 laps   Walking through cones with hiking pole 4 laps 4 laps 4 laps   Supine to sit from table      Stepping over half foam with hiking pole  Forward 15 reps  Lateral 15 reps Lateral 15 reps   Step taps with hiking pole      Sit to stand from chair      Sliding right/Sliding left      Seated turns right/left on hilo mat      Seated ball kicks/ball throws      Seated trunk rotation with red ball      Standing on blue foam   Eyes open and eyes closed     MANUAL THERAPY: (15 minutes): Soft tissue mobilization was utilized and necessary because of the patient's painful spasm. Patient received trigger point release along bilateral upper traps/scapular region to decrease pain. Skin intact after treatment. Treatment/Session Summary:    Treatment Assessment:  Patient reports decreased pain after manual therapy. Patient reports dizziness as 4/10 after treatment. Communication/Consultation:  None today  Equipment provided today:  None  Recommendations/Intent for next treatment session: Next visit will focus on balance, gait, and vestibular.     Total Treatment Billable Duration:  45 minutes  Time In: 1300  Time Out: 5108    CHIRAG Singletary, PT       Charge Capture  }Post Session Pain  PT Visit Info  [employer] Portal  MD Guidelines  Scanned Media  Benefits  MyChart    Future Appointments   Date Time Provider Aye Caputo   11/22/2022 11:00 AM Herbert Garcia PT [facility]   11/29/2022 11:00 AM Herbert Garcia PT [facility]   12/1/2022 11:00 AM LIVIA Quinones [facility]   12/16/2022 10:30 AM THEODORE Mcdermott Heavenly Angeles MD [facility] AMB   1/13/2023  9:00 AM [facility] AMB   1/20/2023 11:00 AM Marlyn Wolff MD [facility] AMB   2/22/2023 10:00 AM Nimo Bonds MD [id number] AMB   4/3/2023 11:15 AM Alejandro Miner MD [facility] G[facility] AMB

## 2022-11-22 ENCOUNTER — HOSPITAL ENCOUNTER (OUTPATIENT)
Dept: PHYSICAL THERAPY | Age: 42
Setting detail: RECURRING SERIES
Discharge: HOME | End: 2022-11-25
Payer: MEDICARE

## 2022-11-22 PROCEDURE — 97140 MANUAL THERAPY 1/> REGIONS: CPT

## 2022-11-22 PROCEDURE — 97112 NEUROMUSCULAR REEDUCATION: CPT

## 2022-11-22 ASSESSMENT — PAIN SCALES - GENERAL: PAINLEVEL_OUTOF10: 4

## 2022-11-22 NOTE — PROGRESS NOTES
Casi Thacker  : 1980  Primary: Toi Reyes Ppo  Secondary:  [address]az[address]  [address] [place] [address] Way [address]  Phone: [phone]  Fax: [phone] Plan Frequency: 2 times a week for 90 days    Plan of Care/Certification Expiration Date: 23      PT Visit Info: Total # of Visits to Date: 24  Progress Note Counter: 2  Canceled Appointment: 3     Visit Count:  21   OUTPATIENT PHYSICAL THERAPY:OP NOTE TYPE: Treatment Note 2022       Episode  }Appt Desk             Treatment Diagnosis:  Difficulty in walking, Not elsewhere classified (R26.2)  Generalized Muscle Weakness (M62.81)  Other abnormalities of gait and mobility (R26.89)  Medical/Referring Diagnosis:  No admission diagnoses are documented for this encounter. Referring Physician:  Kelsey Fink DO MD Orders:  PT Eval and Treat   Date of Onset:  Onset Date:  (Chronic)     Allergies:   Diazepam, Penicillins, Pineapple, Sumatriptan, Theophylline, Theophyllines, Cephalexin, Sulfa antibiotics, Topiramate, Tramadol, Verapamil, [place] oil, Augmentin [amoxicillin-pot clavulanate], Dermabond, Keflex [cephalexin], Kenalog [triamcinolone acetonide], Nuts [peanut-containing drug products], Pneumococcal vaccine, Sulfa antibiotics, Topiramate, Triamcinolone acetonide, Ultram [tramadol], Valium [diazepam], and Vitamin b12  Restrictions/Precautions:  No data recordedNo data recorded   Interventions Planned (Treatment may consist of any combination of the following):    Current Treatment Recommendations: Strengthening; Balance training; Functional mobility training; Gait training; Home exercise program; Vestibular rehab     Subjective Comments:   \"I had a root canal yesterday. I feel like I am in a fog. Dizziness 5/10\".   Initial:}    4/10Post Session:       2/10  Medications Last Reviewed:  2022  Updated Objective Findings:  None Today  Treatment   NEUROMUSCULAR RE-EDUCATION: (30 minutes):    Exercise/activities per grid below to improve balance and coordination. Required minimal verbal cues to promote static and dynamic balance in standing. Date:  11/22/22 Date:  11/15/2022 Date:  11/17/22   Activity/Exercise Parameters Parameters Parameters   Marching in hallway with hiking pole 4 laps 4 laps 4 laps   Sidestepping in hallway with hiking pole 4 laps 4 laps 4 laps   Walking through cones with hiking pole 4 laps 4 laps 4 laps   Supine to sit from table      Stepping over half foam with hiking pole  Forward 15 reps  Lateral 15 reps Lateral 15 reps   Step taps with hiking pole      Sit to stand from chair      Sliding right/Sliding left      Seated turns right/left on hilo mat      Seated ball kicks/ball throws      Seated trunk rotation with red ball      Standing on blue foam   Eyes open and eyes closed     MANUAL THERAPY: (15 minutes): Soft tissue mobilization was utilized and necessary because of the patient's painful spasm. Patient received trigger point release along bilateral upper traps/scapular region to decrease pain. Skin intact after treatment. Treatment/Session Summary:    Treatment Assessment:  Patient needs more frequent/extensive rest breaks today due to increased dizziness. Communication/Consultation:  None today  Equipment provided today:  None  Recommendations/Intent for next treatment session: Next visit will focus on balance, gait, and vestibular.     Total Treatment Billable Duration:  45 minutes  Time In: 1100  Time Out: 1898    CHIRAG Benítez, PT       Charge Capture  }Post Session Pain  PT Visit Info  [employer] Portal  MD Guidelines  Scanned Media  Benefits  MyChart    Future Appointments   Date Time Provider Aye Caputo   11/29/2022 11:00 AM Kemi Colin PT [facility] SFE   12/1/2022 11:00 AM Kemi Colin PT [facility]   12/12/2022 11:00 AM Kemi Colin PT [facility]FE   12/16/2022 10:30 AM MD LEROY Aguiar   12/19/2022 10:15 AM Mervin Turner, PT [facility] SFE   12/29/2022  2:30 PM Mervin Turner, PT [facility] SFE   1/13/2023  9:00 AM [facility] AMB   1/20/2023 11:00 AM Anam Lopez MD [facility] AMB   2/22/2023 10:00 AM Marlen Mccray MD [id number] AMB   4/3/2023 11:15 AM Elly Davenport MD [facility] AMB

## 2022-11-29 ENCOUNTER — APPOINTMENT (OUTPATIENT)
Dept: PHYSICAL THERAPY | Age: 42
End: 2022-11-29
Payer: MEDICARE

## 2022-12-01 ENCOUNTER — HOSPITAL ENCOUNTER (OUTPATIENT)
Dept: PHYSICAL THERAPY | Age: 42
Setting detail: RECURRING SERIES
Discharge: HOME | End: 2022-12-04
Payer: MEDICARE

## 2022-12-01 PROCEDURE — 97112 NEUROMUSCULAR REEDUCATION: CPT

## 2022-12-01 PROCEDURE — 97140 MANUAL THERAPY 1/> REGIONS: CPT

## 2022-12-01 ASSESSMENT — PAIN SCALES - GENERAL: PAINLEVEL_OUTOF10: 4

## 2022-12-01 NOTE — PROGRESS NOTES
Robin Mandujano  : 1980  Primary: Kalie Mariola Ppo  Secondary:  [address]  [place] [address]  Phone: [phone]  Fax: [phone] Plan Frequency: 2 times a week for 90 days    Plan of Care/Certification Expiration Date: 23      PT Visit Info: Total # of Visits to Date: 25  Progress Note Counter: 2  Canceled Appointment: 3     Visit Count:  22   OUTPATIENT PHYSICAL THERAPY:OP NOTE TYPE: Treatment Note 2022       Episode  }Appt Desk             Treatment Diagnosis:  Difficulty in walking, Not elsewhere classified (R26.2)  Generalized Muscle Weakness (M62.81)  Other abnormalities of gait and mobility (R26.89)  Medical/Referring Diagnosis:  No admission diagnoses are documented for this encounter. Referring Physician:  Adrianna Ramírez DO MD Orders:  PT Eval and Treat   Date of Onset:  Onset Date:  (Chronic)     Allergies:   Diazepam, Penicillins, Pineapple, Sumatriptan, Theophylline, Theophyllines, Cephalexin, Sulfa antibiotics, Topiramate, Tramadol, Verapamil, [place] oil, Augmentin [amoxicillin-pot clavulanate], Dermabond, Keflex [cephalexin], Kenalog [triamcinolone acetonide], Nuts [peanut-containing drug products], Pneumococcal vaccine, Sulfa antibiotics, Topiramate, Triamcinolone acetonide, Ultram [tramadol], Valium [diazepam], and Vitamin b12  Restrictions/Precautions:  No data recordedNo data recorded   Interventions Planned (Treatment may consist of any combination of the following):    Current Treatment Recommendations: Strengthening; Balance training; Functional mobility training; Gait training; Home exercise program; Vestibular rehab     Subjective Comments:   Patient reports having to cancel last time due to cracking her tooth. Dizziness 3/10.   Initial:}    4/10Post Session:       3/10  Medications Last Reviewed:  2022  Updated Objective Findings:  None Today  Treatment   NEUROMUSCULAR RE-EDUCATION: (30 minutes):    Exercise/activities per grid below to improve balance and coordination. Required minimal verbal cues to promote static and dynamic balance in standing. Date:  11/22/22 Date:  12/1/2022 Date:  11/17/22   Activity/Exercise Parameters Parameters Parameters   Marching in hallway with hiking pole 4 laps 4 laps 4 laps   Sidestepping in hallway with hiking pole 4 laps 4 laps 4 laps   Walking through cones with hiking pole 4 laps 4 laps 4 laps   Walking with horizontal head turns  1 lap    Stepping over half foam with hiking pole  Forward 15 reps  Lateral 15 reps Lateral 15 reps   Step taps with hiking pole      Sit to stand from chair      Sliding right/Sliding left      Seated turns right/left on hilo mat      Seated ball kicks/ball throws      Seated trunk rotation with red ball      Standing on blue foam   Eyes open and eyes closed     MANUAL THERAPY: (15 minutes): Soft tissue mobilization was utilized and necessary because of the patient's painful spasm. Patient received trigger point release along bilateral upper traps/scapular region to decrease pain. Skin intact after treatment. Treatment/Session Summary:    Treatment Assessment:  Patient reports increased dizziness with head turns. Patient reports decreased muscular tightness after treatment. Communication/Consultation:  None today  Equipment provided today:  None  Recommendations/Intent for next treatment session: Next visit will focus on balance, gait, and vestibular.     Total Treatment Billable Duration:  45 minutes  Time In: 1100  Time Out: 9958    CHIRAG HOPE, PT       Charge Capture  }Post Session Pain  PT Visit Info  [employer] Portal  MD Guidelines  Scanned Media  Benefits  MyChart    Future Appointments   Date Time Provider Aye Caputo   12/16/2022 10:30 AM MD LEROY Quinones   12/19/2022 10:15 AM LIVIA Casitllo   12/29/2022  2:30 PM LIVIA Liz 1/13/2023  9:00 AM [facility] AMB   1/20/2023 11:00 AM Jason Lugo MD [facility] GVL AMB   2/22/2023 10:00 AM Sierra Calderon MD [id number] AMB   4/3/2023 11:15 AM Gavi Harden MD [facility] GVL AMB

## 2022-12-12 ENCOUNTER — APPOINTMENT (OUTPATIENT)
Dept: PHYSICAL THERAPY | Age: 42
End: 2022-12-12
Payer: MEDICARE

## 2022-12-19 ENCOUNTER — HOSPITAL ENCOUNTER (OUTPATIENT)
Dept: PHYSICAL THERAPY | Age: 42
Setting detail: RECURRING SERIES
Discharge: HOME | End: 2022-12-22
Payer: MEDICARE

## 2022-12-19 PROCEDURE — 97112 NEUROMUSCULAR REEDUCATION: CPT

## 2022-12-19 PROCEDURE — 97140 MANUAL THERAPY 1/> REGIONS: CPT

## 2022-12-19 ASSESSMENT — PAIN SCALES - GENERAL: PAINLEVEL_OUTOF10: 3

## 2022-12-19 NOTE — PROGRESS NOTES
Everlena Leyden  : 1980  Primary: Nestor Easton Ppo  Secondary:  Racheal Lanes  [address] [address]  Phone: [phone]  Fax: [phone] Plan Frequency: 2 times a week for 90 days    Plan of Care/Certification Expiration Date: 23      PT Visit Info: Total # of Visits to Date: 21  Progress Note Counter: 2  Canceled Appointment: 3     Visit Count:  23   OUTPATIENT PHYSICAL THERAPY:OP NOTE TYPE: Treatment Note 2022       Episode  }Appt Desk             Treatment Diagnosis:  Difficulty in walking, Not elsewhere classified (R26.2)  Generalized Muscle Weakness (M62.81)  Other abnormalities of gait and mobility (R26.89)  Medical/Referring Diagnosis:  No admission diagnoses are documented for this encounter. Referring Physician:  Elizabeth Musa DO MD Orders:  PT Eval and Treat   Date of Onset:  Onset Date:  (Chronic)     Allergies:   Diazepam, Penicillins, Pineapple, Sumatriptan, Theophylline, Theophyllines, Cephalexin, Sulfa antibiotics, Topiramate, Tramadol, Verapamil, [place] oil, Augmentin [amoxicillin-pot clavulanate], Dermabond, Keflex [cephalexin], Kenalog [triamcinolone acetonide], Nuts [peanut-containing drug products], Pneumococcal vaccine, Sulfa antibiotics, Topiramate, Triamcinolone acetonide, Ultram [tramadol], Valium [diazepam], and Vitamin b12  Restrictions/Precautions:  No data recordedNo data recorded   Interventions Planned (Treatment may consist of any combination of the following):    Current Treatment Recommendations: Strengthening; Balance training; Functional mobility training; Gait training; Home exercise program; Vestibular rehab     Subjective Comments:   Patient reports she is going to the balance lab at [facility] next week. Dizziness 4-5/10.   Initial:}    3/10Post Session:       2/10  Medications Last Reviewed:  2022  Updated Objective Findings:  None Today  Treatment   NEUROMUSCULAR RE-EDUCATION: (30 minutes):    Exercise/activities per grid below to improve balance and coordination. Required minimal verbal cues to promote static and dynamic balance in standing. Date:  12/19/22 Date:  12/1/2022 Date:  11/17/22   Activity/Exercise Parameters Parameters Parameters   Marching in hallway with hiking pole 4 laps 4 laps 4 laps   Sidestepping in hallway with hiking pole 4 laps 4 laps 4 laps   Walking through cones with hiking pole 4 laps 4 laps 4 laps   Walking with horizontal head turns  1 lap    Stepping over half foam with hiking pole Forward 15 reps  Lateral 15 reps Forward 15 reps  Lateral 15 reps Lateral 15 reps   Step taps with hiking pole      Sit to stand from chair      Sliding right/Sliding left      Seated turns right/left on hilo mat      Seated ball kicks/ball throws      Seated trunk rotation with red ball      Standing on blue foam   Eyes open and eyes closed     MANUAL THERAPY: (15 minutes): Soft tissue mobilization was utilized and necessary because of the patient's painful spasm. Patient received trigger point release along bilateral upper traps/scapular region to decrease pain. Skin intact after treatment. Treatment/Session Summary:    Treatment Assessment:  Patient needed frequent rest breaks due to increased dizziness with exercises. Communication/Consultation:  None today  Equipment provided today:  None  Recommendations/Intent for next treatment session: Next visit will focus on balance, gait, and vestibular.     Total Treatment Billable Duration:  45 minutes  Time In: 5142  Time Out: 1100    CHIRAG HOPE PT       Charge Capture  }Post Session Pain  PT Visit Info  [facility] Portal  MD Guidelines  Scanned Media  Benefits  MyChart    Future Appointments   Date Time Provider Aye Caputo   12/29/2022  7:00 PM Domonique Hoang PT [facility]LE   1/5/2023  3:15 PM Domonique Hoang PT [facility]   1/9/2023  1:45 PM LIVIA Tiwari   1/11/2023 1:40 PM THEODORE Abreu MD [facility] AMB   1/12/2023  2:30 PM Paulette Alvaro Vissage, PT [facility] SFE   1/13/2023  9:00 AM [facility] GVL AMB   1/17/2023 11:00 AM Paulette Alvaro Vissage, PT [facility] SFE   1/19/2023 11:00 AM Thamas Gus, PT [facility] SFE   1/20/2023 11:00 AM Slim Maguire MD [facility] G[facility] AMB   1/23/2023 11:00 AM Thamas Gus, PT [facility] SFE   1/26/2023  1:00 PM Thamas Gus, PT [facility] SFE   1/31/2023 11:00 AM Thamas Gus, PT [facility] SFE   2/22/2023 10:00 AM Jyoti Ballard MD [id number] AMB   4/3/2023 11:15 AM Kiarra York MD [facility] AMB

## 2022-12-29 ENCOUNTER — APPOINTMENT (OUTPATIENT)
Dept: PHYSICAL THERAPY | Age: 42
End: 2022-12-29
Payer: MEDICARE

## 2023-01-05 ENCOUNTER — HOSPITAL ENCOUNTER (OUTPATIENT)
Dept: PHYSICAL THERAPY | Age: 43
Setting detail: RECURRING SERIES
Discharge: HOME | End: 2023-01-08
Payer: MEDICARE

## 2023-01-05 PROCEDURE — 97140 MANUAL THERAPY 1/> REGIONS: CPT

## 2023-01-05 PROCEDURE — 97112 NEUROMUSCULAR REEDUCATION: CPT

## 2023-01-05 ASSESSMENT — PAIN SCALES - GENERAL: PAINLEVEL_OUTOF10: 0

## 2023-01-05 NOTE — PROGRESS NOTES
César Flower  : 1980  Primary: Daisy Jim Logano  Secondary:  Mike Coyle [address] [place] [address] Way [address]  Phone: [phone]  Fax: [phone] Plan Frequency: 2 times a week for 90 days    Plan of Care/Certification Expiration Date: 23      PT Visit Info: Total # of Visits to Date: 24  Progress Note Counter: 2  Canceled Appointment: 3     Visit Count:  24   OUTPATIENT PHYSICAL THERAPY:OP NOTE TYPE: Treatment Note 2023       Episode  }Appt Desk             Treatment Diagnosis:  Difficulty in walking, Not elsewhere classified (R26.2)  Generalized Muscle Weakness (M62.81)  Other abnormalities of gait and mobility (R26.89)  Medical/Referring Diagnosis:  No admission diagnoses are documented for this encounter. Referring Physician:  Annmarie Glover DO MD Orders:  PT Eval and Treat   Date of Onset:  Onset Date:  (Chronic)     Allergies:   Diazepam, Penicillins, Pineapple, Sumatriptan, Theophylline, Theophyllines, Cephalexin, Sulfa antibiotics, Topiramate, Tramadol, Verapamil, [place] oil, Augmentin [amoxicillin-pot clavulanate], Dermabond, Keflex [cephalexin], Kenalog [triamcinolone acetonide], Nuts [peanut-containing drug products], Pneumococcal vaccine, Sulfa antibiotics, Topiramate, Triamcinolone acetonide, Ultram [tramadol], Valium [diazepam], and Vitamin b12  Restrictions/Precautions:  No data recordedNo data recorded   Interventions Planned (Treatment may consist of any combination of the following):    Current Treatment Recommendations: Strengthening; Balance training; Functional mobility training; Gait training; Home exercise program; Vestibular rehab     Subjective Comments:   \"I took so many medications yesterday because of my dizziness. I feel very tired today. Dizziness 3/10. I had to reschedule the vestibular lab because my dad had to be hospitalized for heart issues \".   Initial:}    0/10Post Session: 1/10  Medications Last Reviewed:  1/5/2023  Updated Objective Findings:  None Today  Treatment   NEUROMUSCULAR RE-EDUCATION: (20 minutes):    Exercise/activities per grid below to improve balance and coordination. Required minimal verbal cues to promote static and dynamic balance in standing. Date:  12/19/22 Date:  12/1/2022 Date:  1/5/23   Activity/Exercise Parameters Parameters Parameters   Marching in hallway with hiking pole 4 laps 4 laps 4 laps   Sidestepping in hallway with hiking pole 4 laps 4 laps 4 laps   Walking through cones with hiking pole 4 laps 4 laps 4 laps   Walking with horizontal head turns  1 lap    Stepping over half foam with hiking pole Forward 15 reps  Lateral 15 reps Forward 15 reps  Lateral 15 reps    Step taps with hiking pole      Sit to stand from chair      Sliding right/Sliding left      Seated turns right/left on hilo mat      Seated ball kicks/ball throws      Seated trunk rotation with red ball      Standing on blue foam        MANUAL THERAPY: (25 minutes): Soft tissue mobilization was utilized and necessary because of the patient's painful spasm. Patient received trigger point release along bilateral upper traps/scapular region to decrease pain. Skin intact after treatment. Treatment/Session Summary:    Treatment Assessment:  Stopped vestibular exercises due to increased nausea. Patient reports increase in dizziness to a 4-5/10 after treatment. Communication/Consultation:  None today  Equipment provided today:  None  Recommendations/Intent for next treatment session: Next visit will focus on balance, gait, and vestibular.     Total Treatment Billable Duration:  45 minutes  Time In: 9008  Time Out: 1600    CHIRAG HOPE PT       Charge Capture  }Post Session Pain  PT Visit Info  [employer] Portal  MD Guidelines  Scanned Media  Benefits  MyChart    Future Appointments   Date Time Provider Aye Caputo   1/9/2023  1:45 PM Katelynn Brewster, PT [facility] 1/11/2023  1:40 PM THEODORE Gaffney MD [facility] AMB   1/12/2023  2:30 PM Alice Lillianl Vissage, PT [facility] SFE   1/13/2023  9:00 AM [facility] AMB   1/17/2023 11:00 AM Alice Litzy Vissage, PT [facility] SFE   1/19/2023 11:00 AM Stanislav Fishman, PT [facility] SFE   1/20/2023 11:00 AM Geeta Hollis MD [facility] GVL AMB   1/23/2023 11:00 AM Stanislav Fishman, PT [facility] SFE   1/26/2023  1:00 PM Stanislav Fishman, PT [facility] SFE   1/31/2023 11:00 AM Stanislav Fishman, PT [facility] SFE   2/22/2023 10:00 AM Malena Cotton MD [id number] AMB   4/3/2023 11:15 AM Papito Jaime MD [facility] G[facility] AMB

## 2023-01-09 ENCOUNTER — HOSPITAL ENCOUNTER (OUTPATIENT)
Dept: PHYSICAL THERAPY | Age: 43
Setting detail: RECURRING SERIES
Discharge: HOME | End: 2023-01-12
Payer: MEDICARE

## 2023-01-09 PROCEDURE — 97112 NEUROMUSCULAR REEDUCATION: CPT

## 2023-01-09 PROCEDURE — 97140 MANUAL THERAPY 1/> REGIONS: CPT

## 2023-01-09 ASSESSMENT — PAIN SCALES - GENERAL: PAINLEVEL_OUTOF10: 0

## 2023-01-09 NOTE — PROGRESS NOTES
Amanda Celaya  : 1980  Primary: [employer]-advantage Ppo  Secondary:  [facility] @ [address]  [facility] [address]  Phone: [phone]  Fax: [phone] Plan Frequency: 2 times a week for 90 days    Plan of Care/Certification Expiration Date: 23      PT Visit Info:  Total # of Visits to Date: 25  Progress Note Counter: 2  Canceled Appointment: 3     Visit Count:  25   OUTPATIENT PHYSICAL THERAPY:OP NOTE TYPE: Treatment Note 2023       Episode  }Appt Desk             Treatment Diagnosis:  Difficulty in walking, Not elsewhere classified (R26.2)  Generalized Muscle Weakness (M62.81)  Other abnormalities of gait and mobility (R26.89)  Medical/Referring Diagnosis:  No admission diagnoses are documented for this encounter.  Referring Physician:  Josefina Gallo DO MD Orders:  PT Eval and Treat   Date of Onset:  Onset Date:  (Chronic)     Allergies:   Diazepam, Penicillins, Pineapple, Sumatriptan, Theophylline, Theophyllines, Cephalexin, Sulfa antibiotics, Topiramate, Tramadol, Verapamil, [place] oil, Augmentin [amoxicillin-pot clavulanate], Dermabond, Keflex [cephalexin], Kenalog [triamcinolone acetonide], Nuts [peanut-containing drug products], Pneumococcal vaccine, Sulfa antibiotics, Topiramate, Triamcinolone acetonide, Ultram [tramadol], Valium [diazepam], and Vitamin b12  Restrictions/Precautions:  No data recordedNo data recorded   Interventions Planned (Treatment may consist of any combination of the following):    Current Treatment Recommendations: Strengthening; Balance training; Functional mobility training; Gait training; Home exercise program; Vestibular rehab     Subjective Comments:   Patient reports having two falls since last time.  Dizziness 5/10.  Right ear ache.  Initial:}    0/10Post Session:       0/10  Medications Last Reviewed:  2023  Updated Objective Findings:  None Today  Treatment   NEUROMUSCULAR RE-EDUCATION:  (25 minutes):    Exercise/activities per grid below to improve balance and coordination. Required minimal verbal cues to promote static and dynamic balance in standing. Date:  1/9/2023 Date:  12/1/2022 Date:  1/5/23   Activity/Exercise Parameters Parameters Parameters   Marching in hallway with hiking pole 4 laps 4 laps 4 laps   Sidestepping in hallway with hiking pole 4 laps 4 laps 4 laps   Walking through cones with hiking pole 4 laps 4 laps 4 laps   Walking with horizontal head turns  1 lap    Stepping over half foam with hiking pole  Forward 15 reps  Lateral 15 reps    Step taps with hiking pole      Sit to stand from chair      Sliding right/Sliding left      Seated turns right/left on hilo mat      Seated ball kicks/ball throws      Seated trunk rotation with red ball      Standing on blue foam        MANUAL THERAPY: (20 minutes): Soft tissue mobilization was utilized and necessary because of the patient's painful spasm. Patient received trigger point release along bilateral upper traps/scapular region to decrease pain. Skin intact after treatment. Treatment/Session Summary:    Treatment Assessment:  Patient reports increased dizziness during treatment. Dizziness decreased with rest.  Patient reports dizziness as 5/10. Communication/Consultation:  None today  Equipment provided today:  None  Recommendations/Intent for next treatment session: Next visit will focus on balance, gait, and vestibular.     Total Treatment Billable Duration:  45 minutes  Time In: 4416  Time Out: 1430    CHIRAG Mejia, PT       Charge Capture  }Post Session Pain  PT Visit Info  [employer] Portal  MD Guidelines  Scanned Media  Benefits  MyChart    Future Appointments   Date Time Provider Aye Caputo   1/11/2023  1:40 PM THEODORE Bridges MD [facility] AM[facility]   1/12/2023  2:30 PM LIVIA Krishna   1/13/2023  9:00 AM [facility] AM[facility]   1/17/2023 11:00 AM LIVIA Montague   1/19/2023 11:00 AM Hope Concord, PT [facility]   1/20/2023 11:00 AM Love Ceja MD [facility] AMB   1/23/2023 11:00 AM [facility], PT [facility]   1/26/2023  1:00 PM [facility], PT [facility]   1/31/2023 11:00 AM [facility], PT [facility]   2/22/2023 10:00 AM Jorge Neal MD [id number] AMB   4/3/2023 11:15 AM Honorio Garcia MD [facility] AMB

## 2023-01-11 ENCOUNTER — OFFICE VISIT (OUTPATIENT)
Dept: ORTHOPEDIC SURGERY | Age: 43
End: 2023-01-11

## 2023-01-11 DIAGNOSIS — M25.512 LEFT SHOULDER PAIN, UNSPECIFIED CHRONICITY: Primary | ICD-10-CM

## 2023-01-11 DIAGNOSIS — M67.922 TENDINOPATHY OF LEFT BICEPS: ICD-10-CM

## 2023-01-11 DIAGNOSIS — M19.012 OSTEOARTHRITIS OF LEFT AC (ACROMIOCLAVICULAR) JOINT: ICD-10-CM

## 2023-01-11 DIAGNOSIS — R20.2 ARM PARESTHESIA, LEFT: ICD-10-CM

## 2023-01-11 RX ORDER — METHYLPREDNISOLONE ACETATE 40 MG/ML
40 INJECTION, SUSPENSION INTRA-ARTICULAR; INTRALESIONAL; INTRAMUSCULAR; SOFT TISSUE ONCE
Status: COMPLETED | OUTPATIENT
Start: 2023-01-11 | End: 2023-01-11

## 2023-01-11 RX ADMIN — METHYLPREDNISOLONE ACETATE 40 MG: 40 INJECTION, SUSPENSION INTRA-ARTICULAR; INTRALESIONAL; INTRAMUSCULAR; SOFT TISSUE at 14:05

## 2023-01-11 NOTE — PROGRESS NOTES
Name: Marcia Alvarenga  YOB: 1980  Gender: female  MRN: [id number]    CC:   Chief Complaint   Patient presents with    Follow-up     Recheck left shoulder          HPI: Patient presents for follow-up of left shoulder pain. Patient had a biceps tendon injection on 11-2-2022 and notes sever weeks relief with injection. Notes even during the time it was fully effective, she had continued AC pain. Recall that the patient has a proprioceptive dysfunction disorder and sees Dr. Aryan Gunn who is her neurologist.  She has also previously had cervical spine surgery by Dr. Liane Ackerman which included ACDF of C5-C6 and C6-C7. Patient has multiple falls making her a poor surgical candidate. We also discussed the possibility of AC injection in the future if warranted. Patient has had MRI in the past.  She did have her neurology follow-up at [facility] he was sending her for more vestibular testing. She has a rear facing walker she uses at home since she does not use her arms.     Allergies   Allergen Reactions    Diazepam Other (See Comments)     Suicidal thoughts       Penicillins Anaphylaxis     Has not been able to tolerate cephalosporins     Pineapple Shortness Of Breath     wheezing    Sumatriptan Shortness Of Breath    Theophylline Shortness Of Breath    Theophyllines Shortness Of Breath    Cephalexin Other (See Comments)     Other reaction(s): Dizziness    Sulfa Antibiotics Rash     Other reaction(s): Vomiting    Topiramate Other (See Comments)     Caused disorientation, loss of feeling in left leg (numbness)    Tramadol Rash    Verapamil Other (See Comments)     Lowers blood pressure significantly beyond normal hypotension      [place] Oil Swelling     Causes lips to swell       Augmentin [Amoxicillin-Pot Clavulanate] Hives    Dermabond Hives    Keflex [Cephalexin] Hives    Kenalog [Triamcinolone Acetonide] Hives    Nuts [Peanut-Containing Drug Products] Hives     [place] only- not peanuts    Pneumococcal Vaccine Swelling    Sulfa Antibiotics Hives    Topiramate Other (See Comments) and Myalgia    Triamcinolone Acetonide Hives     Kenalog injection caused hives     Ultram [Tramadol] Hives    Valium [Diazepam] Other (See Comments)    Vitamin B12 Other (See Comments)     Injection Only  swelling     Past Medical History:   Diagnosis Date    Adverse effect of anesthesia     \"a little goes a long way with me\"     Arthritis     Arthritis     spine and hands     Asthma     last attack 05/2019; inhaler prn     BMI 36.0-36.9,adult     Chronic pain     chronic headache  beta histidine for this    Depression     Hyperlipidemia     no meds     Hypertension     Migraines     candesartan for this - takes at night    Movement disorder     loses where body parts are at times    PONV (postoperative nausea and vomiting)     nauseous upon waking in PACU     Thyroid disease     hypo-on med    Vertigo     Vestibular nerve disorder 2008    migraines - and nerve damage as well     Past Surgical History:   Procedure Laterality Date    CERVICAL DISC ARTHROPLASTY      CERVICAL FUSION N/A 7/12/2022    C5-C7 anterior cervical discectomy and fusion with interbody spacer/allograft and instrumentation performed by Mayra López MD at [address]      possibly open patient unsure    EYE MUSCLE SURGERY      HEENT      oral, bilateral eye muscle    JOINT REPLACEMENT      ORTHOPEDIC SURGERY      right wrist x 2, right shoulder      Family History   Problem Relation Age of Onset    Parkinson's Disease Paternal Grandfather     Macular Degen Paternal Grandfather     Cancer Paternal Grandmother         brain tumor    Stroke Maternal Grandfather     Heart Disease Maternal Grandfather     Cancer Maternal Grandmother         lymphoma    Osteoarthritis Father     Diabetes Father     Thyroid Disease Mother     Diabetes Mother     Asthma Mother     Stroke Mother         11/2020     Social History Socioeconomic History    Marital status: Single     Spouse name: Not on file    Number of children: Not on file    Years of education: Not on file    Highest education level: Not on file   Occupational History    Not on file   Tobacco Use    Smoking status: Never    Smokeless tobacco: Never   Vaping Use    Vaping Use: Never used   Substance and Sexual Activity    Alcohol use: Yes    Drug use: No    Sexual activity: Not Currently     Partners: Male   Other Topics Concern    Not on file   Social History Narrative    ** Merged History Encounter **          Social Determinants of Health     Financial Resource Strain: Not on file   Food Insecurity: Not on file   Transportation Needs: Not on file   Physical Activity: Insufficiently Active    Days of Exercise per Week: 3 days    Minutes of Exercise per Session: 10 min   Stress: Not on file   Social Connections: Not on file   Intimate Partner Violence: Not on file   Housing Stability: Not on file          AMB PAIN ASSESSMENT 7/19/2022   Severity of Pain 7       Review of Systems  Non-contributory    PE left shoulder: On exam patient has fairly normal motion. She is a little more guarded with dynamic adduction shear. Empty can is positive for a painful click anteriorly. She is tender along the biceps and has positive speeds. O'Briens is positive with both exam maneuvers. AC joint is tender and is painful but is now more painful than the biceps. A/Plan:     ICD-10-CM    1. Left shoulder pain, unspecified chronicity  M25.512       2. Arm paresthesia, left  R20.2       3. Tendinopathy of left biceps  M67.922       4. Osteoarthritis of left AC (acromioclavicular) joint  M19.012            Discussed with the patient biceps tendinitis as well as AC joint arthritis and even some signs of impingement syndrome. We discussed these things will often be seen together. She did have good relief with biceps tendon injection with continued superior shoulder pain.   She is interested in progressing with a AC joint injection. Reviewed once again increased complexity secondary to her vestibular disorder which causes frequent falls. She notes that they have been less frequent using her rear facing walker. She does state that it would bother her enough for surgical intervention, but we will try to refrain from this as long as possible given her fall risk and continued medical work-up. We also reviewed the appropriate use of NSAIDs. She is not a fan of some the oral versions. She has some topical that she used for something else in the past.  Discussed the appropriate use of this as well as some benefits of it. PROCEDURE NOTE:  After discussion of risks and benefits including, but not limited to pain, infection, steroid flare, fat necrosis, skin discoloration, and injury to blood vessels or nerves, the patient verbally consented to proceed with an acromioclavicular [facility]) joint injection. The affected left shoulder was sterilely prepped in standard fashion and injected with 2 cc of 1% Lidocaine in the subcutanesou tissue with a 25 gauge needle. The [facility] joint was then penetrated with the needle and 1 cc of Depo-Medrol (40mg/ml) was injected into the joint. The patient tolerated the injection well. Return in about 6 weeks (around 2/22/2023).         Pedro Palomino MD  01/11/23

## 2023-01-12 ENCOUNTER — HOSPITAL ENCOUNTER (OUTPATIENT)
Dept: PHYSICAL THERAPY | Age: 43
Setting detail: RECURRING SERIES
Discharge: HOME | End: 2023-01-15
Payer: MEDICARE

## 2023-01-12 PROCEDURE — 97140 MANUAL THERAPY 1/> REGIONS: CPT

## 2023-01-12 PROCEDURE — 97112 NEUROMUSCULAR REEDUCATION: CPT

## 2023-01-12 ASSESSMENT — PAIN DESCRIPTION - DESCRIPTORS: DESCRIPTORS: SHARP;STABBING

## 2023-01-12 ASSESSMENT — PAIN SCALES - GENERAL: PAINLEVEL_OUTOF10: 8

## 2023-01-12 ASSESSMENT — PAIN DESCRIPTION - LOCATION: LOCATION: SHOULDER

## 2023-01-12 ASSESSMENT — PAIN DESCRIPTION - ORIENTATION: ORIENTATION: LEFT

## 2023-01-12 NOTE — PROGRESS NOTES
Anne Lambert  : 1980  Primary: Lillymaya Worley Ppo  Secondary:  Vivien Coyle [address] [place] [address] Way [address]  Phone: [phone]  Fax: [phone] Plan Frequency: 2 times a week for 90 days    Plan of Care/Certification Expiration Date: 23      PT Visit Info: Total # of Visits to Date: 32  Progress Note Counter: 2  Canceled Appointment: 3     Visit Count:  26   OUTPATIENT PHYSICAL THERAPY:OP NOTE TYPE: Treatment Note 2023       Episode  }Appt Desk             Treatment Diagnosis:  Difficulty in walking, Not elsewhere classified (R26.2)  Generalized Muscle Weakness (M62.81)  Other abnormalities of gait and mobility (R26.89)  Medical/Referring Diagnosis:  No admission diagnoses are documented for this encounter. Referring Physician:  Sahara Tyson DO MD Orders:  PT Eval and Treat   Date of Onset:  Onset Date:  (Chronic)     Allergies:   Diazepam, Penicillins, Pineapple, Sumatriptan, Theophylline, Theophyllines, Cephalexin, Sulfa antibiotics, Topiramate, Tramadol, Verapamil, [place] oil, Augmentin [amoxicillin-pot clavulanate], Dermabond, Keflex [cephalexin], Kenalog [triamcinolone acetonide], Nuts [peanut-containing drug products], Pneumococcal vaccine, Sulfa antibiotics, Topiramate, Triamcinolone acetonide, Ultram [tramadol], Valium [diazepam], and Vitamin b12  Restrictions/Precautions:  No data recordedNo data recorded   Interventions Planned (Treatment may consist of any combination of the following):    Current Treatment Recommendations: Strengthening; Balance training; Functional mobility training; Gait training; Home exercise program; Vestibular rehab     Subjective Comments:   Patient reports she received an injection in right shoulder. \"My shoulder hurts. I broke an implant crown biting into a cracker\".   Initial:}Left Shoulder 8/10Post Session:  Left  Shoulder 6/10  Medications Last Reviewed:  2023  Updated Objective Findings:   See progress note  Treatment   NEUROMUSCULAR RE-EDUCATION: (25 minutes):    Exercise/activities per grid below to improve balance and coordination. Required minimal verbal cues to promote static and dynamic balance in standing. Date:  1/9/2023 Date:  1/12/23 Date:  1/5/23   Activity/Exercise Parameters Parameters Parameters   Marching in hallway with hiking pole 4 laps 4 laps 4 laps   Sidestepping in hallway with hiking pole 4 laps 4 laps 4 laps   Walking through cones with hiking pole 4 laps 4 laps 4 laps   Walking with horizontal head turns      Stepping over half foam with hiking pole      Step taps with hiking pole      Sit to stand from chair      Sliding right/Sliding left      Seated turns right/left on hilo mat      Seated ball kicks/ball throws      Seated trunk rotation with red ball      Standing on blue foam        MANUAL THERAPY: (20 minutes): Soft tissue mobilization was utilized and necessary because of the patient's painful spasm. Patient received trigger point release along bilateral upper traps/scapular region to decrease pain. Skin intact after treatment. Treatment/Session Summary:    Treatment Assessment:  Patient reports increased dizziness with movements today. Communication/Consultation:  None today  Equipment provided today:  None  Recommendations/Intent for next treatment session: Next visit will focus on balance, gait, and vestibular.     Total Treatment Billable Duration:  45 minutes  Time In: 1430  Time Out: 8717    PAULA D Dannial Barthel, PT       Charge Capture  }Post Session Pain  PT Visit Info  [employer] Portal  MD Guidelines  Scanned Media  Benefits  MyChart    Future Appointments   Date Time Provider Aye Caputo   1/13/2023  9:00 AM [facility]   1/17/2023 11:00 AM Conrado Hubbard PT [facility]   1/19/2023 11:00 AM Conrado Hubbard PT [facility]   1/20/2023 11:00 AM Dai Sidhu MD [facility] AMB   1/23/2023 11:00 AM Shelby Mcclain Rustam, PT [facility]   1/26/2023  1:00 PM Steve Rico, PT [facility]   1/31/2023 11:00 AM Steve Rico, PT [facility]   2/22/2023 10:00 AM John Nicole MD [id number] AMB   2/24/2023 10:40 AM THEODORE Siegel MD [facility] AMB   4/3/2023 11:15 AM Miguelina Morel MD [facility] AMB

## 2023-01-12 NOTE — PROGRESS NOTES
Estrada Delgado  : 1980  Primary: Mono Ghosh Ppo  Secondary:  Db Tello  [address] [address]  Phone: [phone]  Fax: [phone] Plan Frequency: 2 times a week for 90 days    Plan of Care/Certification Expiration Date: 23      PT Visit Info: Total # of Visits to Date: 32  Progress Note Counter: 2  Canceled Appointment: 3      Visit Count:  26    OUTPATIENT PHYSICAL THERAPY:OP NOTE TYPE: Progress Report 2023               Episode  Appt Desk         Treatment Diagnosis:  Difficulty in walking, Not elsewhere classified (R26.2)  Generalized Muscle Weakness (M62.81)  Other abnormalities of gait and mobility (R26.89)  Medical/Referring Diagnosis:  No admission diagnoses are documented for this encounter. Referring Physician:  Gaudencio Monreal DO MD Orders:  PT Eval and Treat   Return MD Appt:  unknown   Date of Onset:  Onset Date:  (Chronic)     Allergies:  Diazepam, Penicillins, Pineapple, Sumatriptan, Theophylline, Theophyllines, Cephalexin, Sulfa antibiotics, Topiramate, Tramadol, Verapamil, [place] oil, Augmentin [amoxicillin-pot clavulanate], Dermabond, Keflex [cephalexin], Kenalog [triamcinolone acetonide], Nuts [peanut-containing drug products], Pneumococcal vaccine, Sulfa antibiotics, Topiramate, Triamcinolone acetonide, Ultram [tramadol], Valium [diazepam], and Vitamin b12  Restrictions/Precautions:    No data recordedNo data recorded   Medications Last Reviewed:  2023     SUBJECTIVE   History of Injury/Illness (Reason for Referral):  Patient reports more spinning dizziness, imbalance, and difficulty walking. Aggravating factors are head turns and body turns. Patient has had no falls recently. Patient rates dizziness as 4/10 and pain level as 2/10. Patient  had anterior discectomy and cervical fusion on 2022.   Patient reports needing more assistance with laundry, cooking, and cleaning. Patient Stated Goal(s): \"To decrease spinning dizziness with movements\". Initial: Left Shoulder 8/10 Post Session: Left Shoulder 6/10  Past Medical History/Comorbidities:   Ms. Rosi Peters  has a past medical history of Adverse effect of anesthesia, Arthritis, Arthritis, Asthma, BMI 36.0-36.9,adult, Chronic pain, Depression, Hyperlipidemia, Hypertension, Migraines, Movement disorder, PONV (postoperative nausea and vomiting), Thyroid disease, Vertigo, and Vestibular nerve disorder. Ms. Rosi Peters  has a past surgical history that includes Cholecystectomy; eye muscle surgery; joint replacement; Cervical disc arthroplasty; orthopedic surgery; heent; Cholecystectomy; and cervical fusion (N/A, 7/12/2022). Social History/Living Environment:   Lives With: Alone  Type of Home: House     Prior Level of Function/Work/Activity:   Prior level of function: Independent  Occupation: Part time employment  No data recordedNo data recorded   Learning:   Does the patient/guardian have any barriers to learning?: No barriers  Will there be a co-learner?: No  What is the preferred language of the patient/guardian?: English  Is an  required?: No  How does the patient/guardian prefer to learn new concepts?: Listening; Reading; Demonstration     Fall Risk Scale: Omalley Total Score: 65  Omalley Fall Risk: High (45 and higher)     Dominant Side:  right handed      OBJECTIVE   Balance / Vestibular:   Sensation:   Bilateral lower extremity within normal limits. Strength:  Hip flexion 4/5, hip abduction 4+/5, hip adduction 4/5, quadriceps 5/5, hamstrings 5/5, ankle dorsiflexion 5/5, ankle plantarflexion 5/5. Balance Outcome Measures:  Barrera Balance Scale:  22 /56  (A score less than 45/56 indicates high risk of falls)  ASSESSMENT   Initial Assessment:  Patient presents with imbalance, dizziness, difficulty walking, and decreased mobility.   Patient is at higher fall risk based on scores received on Barrera Balance Scale and Timed Up and Go Test.  Patient would benefit from skilled physical therapy to address problems and goals. Thank you for this referral.   Progress note:  Patient has attended twenty-six scheduled physical therapy appointments from 8/19/2022 to 1/12/2023. Patient would benefit from continuing skilled physical therapy to address problems and goals. Thank you for this referral.    Problem List: (Impacting functional limitations): Body Structures, Functions, Activity Limitations Requiring Skilled Therapeutic Intervention: Decreased functional mobility ; Decreased strength; Decreased balance; Vestibular Impairment   ,  manual therapy  Therapy Prognosis:   Therapy Prognosis: Good     Assessment Complexity:      PLAN   Effective Dates: 11/11/2022 TO Plan of Care/Certification Expiration Date: 02/09/23     Frequency/Duration: Plan Frequency: 2 times a week for 90 days     Interventions Planned (Treatment may consist of any combination of the following):    Current Treatment Recommendations: Strengthening; Balance training; Functional mobility training; Gait training; Home exercise program; Vestibular rehab   manual therapy  Goals: (Goals have been discussed and agreed upon with patient.)  Short-Term Functional Goals: Time Frame: 30 days   Patient will be independent with home exercise program to improve balance and decrease dizziness. Goal met. Patient will score less than or equal to 18 seconds on Timed Up and Go Test indicating improved gait speed. Goal met. Discharge Goals: Time Frame: 90 days   Patient will score less than or equal to 13 seconds on Timed Up and Go Test indicating improved gait speed. Goal ongoing. Patient will increase score on Barrera Balance Scale greater than or equal to 30/56 demonstrating improved balance and decreased fall risk with daily activities. Goal ongoing. Patient will report needing less assistance with laundry and cooking. Goal ongoing. Outcome Measure:    Tool Used: Barrera Balance Scale  Score:  Initial: 22/56 Most Recent: 24/56 (Date: 11-11-22 )   Interpretation of Score: Each section is scored on a 0-4 scale, 0 representing the patients inability to perform the task and 4 representing independence. The scores of each section are added together for a total score of 56. The higher the patients score, the more independent the patient is. Any score below 45 indicates increased risk for falls. Tool Used: Timed Up and Go (TUG)  Score:  Initial: 21.5 seconds Most Recent: 15.2 seconds (Date: 1-)   Interpretation of Score: The test measures, in seconds, the time taken by an individual to stand up from a standard arm chair (seat height 46 cm [18 in], arm height 65 cm [25.6 in]), walk a distance of 3 meters (118 in, approx 10 ft), turn, walk back to the chair and sit down. If the individual takes longer than 14 seconds to complete TUG, this indicates risk for falls. Medical Necessity:   > Patient is expected to demonstrate progress in strength and balance to improve safety during activities of daily living. Reason For Services/Other Comments:  > Patient continues to require skilled intervention due to higher fall risk with daily activities. Total Duration:  Time In: 1430  Time Out: 1515    Regarding Amanda Celaya's therapy, I certify that the treatment plan above will be carried out by a therapist or under their direction. Thank you for this referral,  Melissa Bedoya, PT     Referring Physician Signature: Wayne Freeman, * No Signature is Required for this note.         Post Session Pain  Charge Capture  PT Visit Info MD Guidelines  Ashley

## 2023-01-13 DIAGNOSIS — Z11.59 NEED FOR HEPATITIS C SCREENING TEST: ICD-10-CM

## 2023-01-13 DIAGNOSIS — Z11.4 SCREENING FOR HIV (HUMAN IMMUNODEFICIENCY VIRUS): ICD-10-CM

## 2023-01-13 DIAGNOSIS — E55.9 VITAMIN D DEFICIENCY: ICD-10-CM

## 2023-01-13 DIAGNOSIS — E78.00 PURE HYPERCHOLESTEROLEMIA: ICD-10-CM

## 2023-01-13 LAB
25(OH)D3 SERPL-MCNC: 30.6 NG/ML (ref 30–100)
ALBUMIN SERPL-MCNC: 3.9 G/DL (ref 3.5–5)
ALBUMIN/GLOB SERPL: 1.3 (ref 0.4–1.6)
ALP SERPL-CCNC: 71 U/L (ref 50–136)
ALT SERPL-CCNC: 35 U/L (ref 12–65)
ANION GAP SERPL CALC-SCNC: 9 MMOL/L (ref 2–11)
AST SERPL-CCNC: 16 U/L (ref 15–37)
BASOPHILS # BLD: 0.1 K/UL (ref 0–0.2)
BASOPHILS NFR BLD: 1 % (ref 0–2)
BILIRUB SERPL-MCNC: 0.6 MG/DL (ref 0.2–1.1)
BUN SERPL-MCNC: 9 MG/DL (ref 6–23)
CALCIUM SERPL-MCNC: 10 MG/DL (ref 8.3–10.4)
CHLORIDE SERPL-SCNC: 108 MMOL/L (ref 101–110)
CHOLEST SERPL-MCNC: 214 MG/DL
CO2 SERPL-SCNC: 22 MMOL/L (ref 21–32)
CREAT SERPL-MCNC: 0.8 MG/DL (ref 0.6–1)
DIFFERENTIAL METHOD BLD: NORMAL
EOSINOPHIL # BLD: 0.3 K/UL (ref 0–0.8)
EOSINOPHIL NFR BLD: 3 % (ref 0.5–7.8)
ERYTHROCYTE [DISTWIDTH] IN BLOOD BY AUTOMATED COUNT: 12.4 % (ref 11.9–14.6)
GLOBULIN SER CALC-MCNC: 3.1 G/DL (ref 2.8–4.5)
GLUCOSE SERPL-MCNC: 114 MG/DL (ref 65–100)
HCT VFR BLD AUTO: 41.9 % (ref 35.8–46.3)
HCV AB SER QL: NONREACTIVE
HDLC SERPL-MCNC: 53 MG/DL (ref 40–60)
HDLC SERPL: 4
HGB BLD-MCNC: 14.4 G/DL (ref 11.7–15.4)
HIV 1+2 AB+HIV1 P24 AG SERPL QL IA: NONREACTIVE
HIV 1/2 RESULT COMMENT: NORMAL
IMM GRANULOCYTES # BLD AUTO: 0 K/UL (ref 0–0.5)
IMM GRANULOCYTES NFR BLD AUTO: 0 % (ref 0–5)
LDLC SERPL CALC-MCNC: 137.8 MG/DL
LYMPHOCYTES # BLD: 2.5 K/UL (ref 0.5–4.6)
LYMPHOCYTES NFR BLD: 27 % (ref 13–44)
MCH RBC QN AUTO: 30.2 PG (ref 26.1–32.9)
MCHC RBC AUTO-ENTMCNC: 34.4 G/DL (ref 31.4–35)
MCV RBC AUTO: 87.8 FL (ref 82–102)
MONOCYTES # BLD: 0.7 K/UL (ref 0.1–1.3)
MONOCYTES NFR BLD: 7 % (ref 4–12)
NEUTS SEG # BLD: 5.7 K/UL (ref 1.7–8.2)
NEUTS SEG NFR BLD: 62 % (ref 43–78)
NRBC # BLD: 0 K/UL (ref 0–0.2)
PLATELET # BLD AUTO: 276 K/UL (ref 150–450)
PMV BLD AUTO: 9.8 FL (ref 9.4–12.3)
POTASSIUM SERPL-SCNC: 4.3 MMOL/L (ref 3.5–5.1)
PROT SERPL-MCNC: 7 G/DL (ref 6.3–8.2)
RBC # BLD AUTO: 4.77 M/UL (ref 4.05–5.2)
SODIUM SERPL-SCNC: 139 MMOL/L (ref 133–143)
TRIGL SERPL-MCNC: 116 MG/DL (ref 35–150)
VLDLC SERPL CALC-MCNC: 23.2 MG/DL (ref 6–23)
WBC # BLD AUTO: 9.3 K/UL (ref 4.3–11.1)

## 2023-01-17 ENCOUNTER — HOSPITAL ENCOUNTER (OUTPATIENT)
Dept: PHYSICAL THERAPY | Age: 43
Setting detail: RECURRING SERIES
Discharge: HOME | End: 2023-01-20
Payer: MEDICARE

## 2023-01-17 PROCEDURE — 97140 MANUAL THERAPY 1/> REGIONS: CPT

## 2023-01-17 PROCEDURE — 97112 NEUROMUSCULAR REEDUCATION: CPT

## 2023-01-17 ASSESSMENT — PAIN DESCRIPTION - DESCRIPTORS: DESCRIPTORS: SHARP;STABBING

## 2023-01-17 ASSESSMENT — PAIN DESCRIPTION - ORIENTATION: ORIENTATION: LEFT

## 2023-01-17 ASSESSMENT — PAIN DESCRIPTION - LOCATION: LOCATION: SHOULDER

## 2023-01-17 ASSESSMENT — PAIN SCALES - GENERAL: PAINLEVEL_OUTOF10: 5

## 2023-01-17 NOTE — PROGRESS NOTES
Joey Calix  : 1980  Primary: Jaxon Starksing Ppo  Secondary:  Johan Mitchell  [address] [address]  Phone: [phone]  Fax: [phone] Plan Frequency: 2 times a week for 90 days    Plan of Care/Certification Expiration Date: 23      PT Visit Info: Total # of Visits to Date: 32  Progress Note Counter: 2  Canceled Appointment: 3     Visit Count:  27   OUTPATIENT PHYSICAL THERAPY:OP NOTE TYPE: Treatment Note 2023       Episode  }Appt Desk             Treatment Diagnosis:  Difficulty in walking, Not elsewhere classified (R26.2)  Generalized Muscle Weakness (M62.81)  Other abnormalities of gait and mobility (R26.89)  Medical/Referring Diagnosis:  No admission diagnoses are documented for this encounter. Referring Physician:  Blake Anthony DO MD Orders:  PT Eval and Treat   Date of Onset:  Onset Date:  (Chronic)     Allergies:   Diazepam, Penicillins, Pineapple, Sumatriptan, Theophylline, Theophyllines, Cephalexin, Sulfa antibiotics, Topiramate, Tramadol, Verapamil, [place] oil, Augmentin [amoxicillin-pot clavulanate], Dermabond, Keflex [cephalexin], Kenalog [triamcinolone acetonide], Nuts [peanut-containing drug products], Pneumococcal vaccine, Sulfa antibiotics, Topiramate, Triamcinolone acetonide, Ultram [tramadol], Valium [diazepam], and Vitamin b12  Restrictions/Precautions:  No data recordedNo data recorded   Interventions Planned (Treatment may consist of any combination of the following):    Current Treatment Recommendations: Strengthening; Balance training; Functional mobility training; Gait training; Home exercise program; Vestibular rehab     Subjective Comments:   Dizziness 4/10 and no headache.   Initial:}Left Shoulder 5/10Post Session:  Left  Shoulder 4/10  Medications Last Reviewed:  2023  Updated Objective Findings:  None Today  Treatment   NEUROMUSCULAR RE-EDUCATION: (25 minutes):   Exercise/activities per grid below to improve balance and coordination.  Required minimal verbal cues to promote static and dynamic balance in standing.   Date:  1/9/2023 Date:  1/12/23 Date:  1/17/23   Activity/Exercise Parameters Parameters Parameters   Marching in hallway with hiking pole 4 laps 4 laps 4 laps   Sidestepping in hallway with hiking pole 4 laps 4 laps 4 laps   Walking through cones with hiking pole 4 laps 4 laps 4 laps   Walking with horizontal head turns      Stepping over half foam with hiking pole   Forward 10 reps each direction  Lateral 10 reps each direction   Step taps with hiking pole      Sit to stand from chair      Sliding right/Sliding left      Seated turns right/left on hilo mat      Seated ball kicks/ball throws      Seated trunk rotation with red ball      Standing on blue foam        MANUAL THERAPY: (20 minutes):   Soft tissue mobilization was utilized and necessary because of the patient's painful spasm.   Patient received trigger point release along bilateral upper traps/scapular region to decrease pain.  Skin intact after treatment.       Treatment/Session Summary:    Treatment Assessment:  Patient tolerated treatment without complaints.  Communication/Consultation:  None today  Equipment provided today:  None  Recommendations/Intent for next treatment session: Next visit will focus on balance, gait, and vestibular.    Total Treatment Billable Duration:  45 minutes  Time In: 1100  Time Out: 1145    MOLLY HOPE, PT       Charge Capture  }Post Session Pain  PT Visit Info  [employer] Portal  MD Guidelines  Scanned Media  Benefits  MyChart    Future Appointments   Date Time Provider Department Center   1/19/2023 11:00 AM Molly Hope PT [facility]   1/20/2023 11:00 AM Whitney Gonzalez MD [facility]   1/23/2023 11:00 AM Molly Hope, PT [facility]   1/26/2023  1:00 PM Molly Hope, PT [facility]   1/31/2023 11:00 AM Molly Hope, PT [facility] SFE   2/22/2023  10:00 AM Abbie Marc MD [id number] AMB   2/24/2023 10:40 AM THEODORE Rivera MD [facility] AMB   4/3/2023 11:15 AM Chantal Sandoval MD [facility] AMB

## 2023-01-19 ENCOUNTER — HOSPITAL ENCOUNTER (OUTPATIENT)
Dept: PHYSICAL THERAPY | Age: 43
Setting detail: RECURRING SERIES
Discharge: HOME | End: 2023-01-22
Payer: MEDICARE

## 2023-01-19 PROCEDURE — 97140 MANUAL THERAPY 1/> REGIONS: CPT

## 2023-01-19 PROCEDURE — 97112 NEUROMUSCULAR REEDUCATION: CPT

## 2023-01-19 ASSESSMENT — PAIN SCALES - GENERAL: PAINLEVEL_OUTOF10: 5

## 2023-01-19 NOTE — PROGRESS NOTES
Tierney Arboleda  : 1980  Primary: Bonita Kinney Ppo  Secondary:  Lashay Cedillo  [address] [address]  Phone: [phone]  Fax: [phone] Plan Frequency: 2 times a week for 90 days    Plan of Care/Certification Expiration Date: 23      PT Visit Info: Total # of Visits to Date: 29  Progress Note Counter: 2  Canceled Appointment: 3     Visit Count:  28   OUTPATIENT PHYSICAL THERAPY:OP NOTE TYPE: Treatment Note 2023       Episode  }Appt Desk             Treatment Diagnosis:  Difficulty in walking, Not elsewhere classified (R26.2)  Generalized Muscle Weakness (M62.81)  Other abnormalities of gait and mobility (R26.89)  Medical/Referring Diagnosis:  No admission diagnoses are documented for this encounter. Referring Physician:  Armani Styles DO MD Orders:  PT Eval and Treat   Date of Onset:  Onset Date:  (Chronic)     Allergies:   Diazepam, Penicillins, Pineapple, Sumatriptan, Theophylline, Theophyllines, Cephalexin, Sulfa antibiotics, Topiramate, Tramadol, Verapamil, [place] oil, Augmentin [amoxicillin-pot clavulanate], Dermabond, Keflex [cephalexin], Kenalog [triamcinolone acetonide], Nuts [peanut-containing drug products], Pneumococcal vaccine, Sulfa antibiotics, Topiramate, Triamcinolone acetonide, Ultram [tramadol], Valium [diazepam], and Vitamin b12  Restrictions/Precautions:  No data recordedNo data recorded   Interventions Planned (Treatment may consist of any combination of the following):    Current Treatment Recommendations: Strengthening; Balance training; Functional mobility training; Gait training; Home exercise program; Vestibular rehab     Subjective Comments: \"Yesterday I almost fell off the toilet. I took medication yesterday and I feel like I am in a fog\". Dizziness 5/10  No headache.   Initial:}    5/10Post Session:       4/10  Medications Last Reviewed:  2023  Updated Objective Findings: None Today  Treatment   NEUROMUSCULAR RE-EDUCATION: (25 minutes):    Exercise/activities per grid below to improve balance and coordination.  Required minimal verbal cues to promote static and dynamic balance in standing.   Date:  1/19/2023 Date:  1/12/23 Date:  1/17/23   Activity/Exercise Parameters Parameters Parameters   Marching in hallway with hiking pole 6 laps 4 laps 4 laps   Sidestepping in hallway with hiking pole 6 laps 4 laps 4 laps   Walking through cones with hiking pole 6 laps 4 laps 4 laps   Walking with horizontal head turns      Stepping over half foam with hiking pole Forward 15 reps each direction  Lateral 15 reps each direction  Forward 10 reps each direction  Lateral 10 reps each direction   Step taps with hiking pole      Sit to stand from chair      Sliding right/Sliding left      Seated turns right/left on hilo mat      Seated ball kicks/ball throws      Seated trunk rotation with red ball      Standing on blue foam        MANUAL THERAPY: (20 minutes):   Soft tissue mobilization was utilized and necessary because of the patient's painful spasm.   Patient received trigger point release along bilateral upper traps/scapular region to decrease pain.  Skin intact after treatment.       Treatment/Session Summary:    Treatment Assessment:  Patient tolerated additional repetitions.  Patient able to make turns with less instability.  Communication/Consultation:  None today  Equipment provided today:  None  Recommendations/Intent for next treatment session: Next visit will focus on balance, gait, and vestibular.    Total Treatment Billable Duration:  45 minutes  Time In: 1100  Time Out: 1145    CHIRAG HOPE, PT       Charge Capture  }Post Session Pain  PT Visit Info  [employer] Portal  MD Guidelines  Scanned Media  Benefits  MyChart    Future Appointments   Date Time Provider Department Center   1/20/2023 11:00 AM Whitney Gonzalez MD [facility]   1/23/2023 11:00 AM Chirag Hope, PT [facility]PT  SFE   1/26/2023  1:00 PM Salomon Antoine, PT [facility]LEANOR SFE   1/31/2023 11:00 AM Salomon Antoine, PT EVINE[facility]I SFE   2/22/2023 10:00 AM Mandi Dunbar MD [id number] AMB   2/24/2023 10:40 AM THEODORE Quintana MD [facility] AMB   4/3/2023 11:15 AM Onur Robledo MD [facility] AMB

## 2023-01-19 NOTE — PROGRESS NOTES
1/20/2023     Subjective:     Chief Complaint   Patient presents with    Discuss Labs       HPI:     Vestibular migraine  Had 2 falls over the last 2 weeks. Seeing Neurology, started on Nurtec thinks it is making her vertigo worse. Was given hydoxyzine for emergency for vestibular migraines which helps.    Hyperlipidemia  The patient is not following a low fat diet and is not exercising regularly.  Patient is not on any medication at this time..    Patient is not having associated symptoms of shortness of breath, chest pain, rapid heart rate, irregular heart rate, and dizziness    Patient labs are YOUR LAST LIPID PROFILE:   Lab Results   Component Value Date    CHOL 214 (H) 01/13/2023    CHOL 217 (H) 09/07/2022    CHOL 224 (H) 06/01/2022     Lab Results   Component Value Date    TRIG 116 01/13/2023    TRIG 156 (H) 09/07/2022    TRIG 152 (H) 06/01/2022     Lab Results   Component Value Date    HDL 53 01/13/2023    HDL 44 09/07/2022    HDL 48 06/01/2022     Lab Results   Component Value Date    LDLCALC 137.8 (H) 01/13/2023    LDLCALC 141.8 (H) 09/07/2022    LDLCALC 145.6 (H) 06/01/2022     Lab Results   Component Value Date    LABVLDL 23.2 (H) 01/13/2023    LABVLDL 31.2 (H) 09/07/2022    LABVLDL 30.4 (H) 06/01/2022     Lab Results   Component Value Date    CHOLHDLRATIO 4.0 01/13/2023    CHOLHDLRATIO 4.9 09/07/2022    CHOLHDLRATIO 4.7 06/01/2022        Impaired Fasting Glucose    Patient is following up on Impaired fasting glucose .   Compliant with exercise : No  Current treatment : none  Compliant with diet : No  Current diet : Regular  C/ O : none  No results found for: LABA1C  No results found for: EAG           Interim History: none     Review of Systems   Constitutional:  Negative for fatigue.   HENT:  Negative for congestion.    Respiratory:  Negative for shortness of breath.    Cardiovascular:  Negative for chest pain.   Gastrointestinal:  Negative for abdominal pain and blood in stool.   Genitourinary:   Negative for difficulty urinating. Skin:  Negative for rash. Neurological:  Positive for dizziness and headaches.        Past Medical History:   Diagnosis Date    Adverse effect of anesthesia     \"a little goes a long way with me\"     Arthritis     Arthritis     spine and hands     Asthma     last attack 05/2019; inhaler prn     BMI 36.0-36.9,adult     Chronic pain     chronic headache  beta histidine for this    Depression     Hyperlipidemia     no meds     Hypertension     Hypothyroidism 2018    Migraines     candesartan for this - takes at night    Movement disorder     loses where body parts are at times    PONV (postoperative nausea and vomiting)     nauseous upon waking in PACU     Thyroid disease     hypo-on med    Vertigo     Vestibular nerve disorder 2008    migraines - and nerve damage as well     Past Surgical History:   Procedure Laterality Date    CERVICAL DISC ARTHROPLASTY      CERVICAL FUSION N/A 7/12/2022    C5-C7 anterior cervical discectomy and fusion with interbody spacer/allograft and instrumentation performed by Sage Sánchez MD at [address]      possibly open patient unsure    EYE MUSCLE SURGERY      HEENT      oral, bilateral eye muscle    JOINT REPLACEMENT      ORTHOPEDIC SURGERY      right wrist x 2, right shoulder      Family History   Problem Relation Age of Onset    Parkinson's Disease Paternal Grandfather     Macular Degen Paternal Grandfather     Cancer Paternal Grandmother         brain tumor    Stroke Maternal Grandfather     Heart Disease Maternal Grandfather     Cancer Maternal Grandmother         lymphoma    Osteoarthritis Father     Diabetes Father     Thyroid Disease Mother     Diabetes Mother     Asthma Mother     Stroke Mother         11/2020     Social History     Socioeconomic History    Marital status: Single     Spouse name: None    Number of children: None    Years of education: None    Highest education level: None Tobacco Use    Smoking status: Never    Smokeless tobacco: Never   Vaping Use    Vaping Use: Never used   Substance and Sexual Activity    Alcohol use: Yes    Drug use: No    Sexual activity: Not Currently     Partners: Male   Social History Narrative    ** Merged History Encounter **          Social Determinants of Health     Financial Resource Strain: Low Risk     Difficulty of Paying Living Expenses: Not hard at all   Food Insecurity: No Food Insecurity    Worried About Running Out of Food in the Last Year: Never true    Ran Out of Food in the Last Year: Never true   Physical Activity: Insufficiently Active    Days of Exercise per Week: 3 days    Minutes of Exercise per Session: 10 min      Current Outpatient Medications   Medication Sig Dispense Refill    HYDROXYZINE HCL PO Take by mouth      lamoTRIgine (LAMICTAL) 100 MG tablet Take 1 tablet by mouth daily 30 tablet 3    venlafaxine (EFFEXOR XR) 75 MG extended release capsule Take 1 capsule by mouth daily 30 capsule 3    thyroid (ARMOUR) 30 MG tablet Take 30 mg by mouth in the morning. Betahistine HCl (BETAHISTINE DIHYDROCHLORIDE) POWD 16 mg by Does not apply route 2 times daily      fexofenadine (ALLEGRA) 180 MG tablet Take 180 mg by mouth in the morning.       fluticasone (FLONASE) 50 MCG/ACT nasal spray 1 spray by Each Nostril route daily      tiZANidine (ZANAFLEX) 4 MG capsule Take 4 mg by mouth 3 times daily as needed for Muscle spasms      ondansetron (ZOFRAN) 4 MG tablet Take 4 mg by mouth every 8 hours as needed for Nausea or Vomiting      Albuterol Sulfate, sensor, (PROAIR DIGIHALER) 108 (90 Base) MCG/ACT AEPB Inhale 2 puffs into the lungs 4 times daily as needed      acetaminophen (TYLENOL) 500 MG tablet Take 1,000 mg by mouth every 6 hours as needed for Pain      MAGNESIUM PO Take 2 tablets by mouth daily      albuterol sulfate  (90 Base) MCG/ACT inhaler Inhale 2 puffs into the lungs every 4 hours as needed      ascorbic acid (VITAMIN C) 500 MG tablet Take 500 mg by mouth daily      indomethacin (INDOCIN) 25 MG capsule TAKE 1-2 TABLETS BY MOUTH 3 TIMES DAILY AS NEEDED FOR MIGRAINE. LIMIT 2 DAYS/WEEK. TAKE WITH FOOD      mometasone (NASONEX) 50 MCG/ACT nasal spray 1 spray by Nasal route at bedtime       clonazePAM (KLONOPIN) 0.5 MG tablet Take 1 tablet by mouth daily as needed for Anxiety for up to 90 days. 30 tablet 2    candesartan (ATACAND) 16 MG tablet Take 16 mg by mouth at bedtime      PROGESTERONE PO Take by mouth at bedtime Progesterone with testosterone denny (dissolvable pill)       No current facility-administered medications for this visit.      Allergies   Allergen Reactions    Diazepam Other (See Comments)     Suicidal thoughts       Penicillins Anaphylaxis     Has not been able to tolerate cephalosporins     Pineapple Shortness Of Breath     wheezing    Sumatriptan Shortness Of Breath    Theophylline Shortness Of Breath    Theophyllines Shortness Of Breath    Cephalexin Other (See Comments)     Other reaction(s): Dizziness    Sulfa Antibiotics Rash     Other reaction(s): Vomiting    Topiramate Other (See Comments)     Caused disorientation, loss of feeling in left leg (numbness)    Tramadol Rash    Verapamil Other (See Comments)     Lowers blood pressure significantly beyond normal hypotension      [place] Oil Swelling     Causes lips to swell       Augmentin [Amoxicillin-Pot Clavulanate] Hives    Dermabond Hives    Keflex [Cephalexin] Hives    Kenalog [Triamcinolone Acetonide] Hives    Nuts [Peanut-Containing Drug Products] Hives     [place] only- not peanuts    Pneumococcal Vaccine Swelling    Sulfa Antibiotics Hives    Topiramate Other (See Comments) and Myalgia    Triamcinolone Acetonide Hives     Kenalog injection caused hives     Ultram [Tramadol] Hives    Valium [Diazepam] Other (See Comments)    Vitamin B12 Other (See Comments)     Injection Only  swelling       Objective:   BP (!) 151/88   Pulse 95   Temp 98.6 °F (37 °C) Resp 12   Ht 5' 1\" (1.549 m)   Wt 230 lb (104.3 kg)   SpO2 98%   BMI 43.46 kg/m²     Physical Exam  Vitals and nursing note reviewed. Constitutional:       General: She is not in acute distress. Appearance: Normal appearance. She is normal weight. She is not ill-appearing, toxic-appearing or diaphoretic. HENT:      Head: Normocephalic. Nose: Nose normal.   Eyes:      Extraocular Movements: Extraocular movements intact. Cardiovascular:      Rate and Rhythm: Normal rate and regular rhythm. Heart sounds: No murmur heard. Pulmonary:      Effort: Pulmonary effort is normal.      Breath sounds: Normal breath sounds. Musculoskeletal:         General: No deformity. Skin:     General: Skin is warm. Neurological:      General: No focal deficit present. Mental Status: She is alert. Psychiatric:         Mood and Affect: Mood normal.       Assessment and Plan:      Diagnosis Orders   1. Pure hypercholesterolemia        2. Impaired fasting blood sugar        3. Vestibular migraine        4. Obesity, Class III, BMI 40-49.9 (morbid obesity) ([facility] [address]        5. Mild episode of recurrent major depressive disorder [facility])          Data reviewed:  Previous notes, labs and Specialists notes, if any, reviewed and discussed with patient. Hyperlipidemia. No meds for now. Reinforced lifestyle changes. Consider adding a statin if no better. IFG. New finding. We will check an A1c on her next visit and again reinforced lifestyle changes to limit processed carbohydrates. She does have a strong family history of diabetes hypertension and hyperlipidemia. Vestibular migraine keep appointment with neurology. Trial of hydroxyzine at night and see if it makes her feel better the next day. Obesity. Most likely related to lack of activity and poor diet. Depression is stable. Continue vitamin D supplement. Follow-up will be in 6 months for her wellness visit. Labs prior to her visit.   Opportunity to ask questions was offered and were answered to the best of my ability.  Patient agrees to plan of treatment.      Over 50% of today's office visit was spent in face to face time reviewing test results, prognosis, importance of compliance, education about disease process, benefits of medications, instructions for management of disease and follow up plans.  Total face to face time spent with patient was at least 25 minutes      There are no Patient Instructions on file for this visit.    No follow-up provider specified.    Whitney Gonzalez MD

## 2023-01-20 ENCOUNTER — OFFICE VISIT (OUTPATIENT)
Dept: INTERNAL MEDICINE CLINIC | Facility: CLINIC | Age: 43
End: 2023-01-20
Payer: MEDICARE

## 2023-01-20 VITALS
WEIGHT: 230 LBS | BODY MASS INDEX: 43.43 KG/M2 | RESPIRATION RATE: 12 BRPM | SYSTOLIC BLOOD PRESSURE: 151 MMHG | HEART RATE: 95 BPM | HEIGHT: 61 IN | TEMPERATURE: 98.6 F | DIASTOLIC BLOOD PRESSURE: 88 MMHG | OXYGEN SATURATION: 98 %

## 2023-01-20 DIAGNOSIS — R73.01 IMPAIRED FASTING BLOOD SUGAR: ICD-10-CM

## 2023-01-20 DIAGNOSIS — E55.9 VITAMIN D DEFICIENCY: ICD-10-CM

## 2023-01-20 DIAGNOSIS — E78.00 PURE HYPERCHOLESTEROLEMIA: Primary | ICD-10-CM

## 2023-01-20 DIAGNOSIS — F33.0 MILD EPISODE OF RECURRENT MAJOR DEPRESSIVE DISORDER (HCC): ICD-10-CM

## 2023-01-20 DIAGNOSIS — E66.01 OBESITY, CLASS III, BMI 40-49.9 (MORBID OBESITY) (HCC): ICD-10-CM

## 2023-01-20 DIAGNOSIS — G43.809 VESTIBULAR MIGRAINE: ICD-10-CM

## 2023-01-20 PROCEDURE — 99214 OFFICE O/P EST MOD 30 MIN: CPT | Performed by: FAMILY MEDICINE

## 2023-01-20 SDOH — ECONOMIC STABILITY: FOOD INSECURITY: WITHIN THE PAST 12 MONTHS, YOU WORRIED THAT YOUR FOOD WOULD RUN OUT BEFORE YOU GOT MONEY TO BUY MORE.: NEVER TRUE

## 2023-01-20 SDOH — ECONOMIC STABILITY: FOOD INSECURITY: WITHIN THE PAST 12 MONTHS, THE FOOD YOU BOUGHT JUST DIDN'T LAST AND YOU DIDN'T HAVE MONEY TO GET MORE.: NEVER TRUE

## 2023-01-20 ASSESSMENT — PATIENT HEALTH QUESTIONNAIRE - PHQ9
SUM OF ALL RESPONSES TO PHQ9 QUESTIONS 1 & 2: 0
3. TROUBLE FALLING OR STAYING ASLEEP: 3
8. MOVING OR SPEAKING SO SLOWLY THAT OTHER PEOPLE COULD HAVE NOTICED. OR THE OPPOSITE, BEING SO FIGETY OR RESTLESS THAT YOU HAVE BEEN MOVING AROUND A LOT MORE THAN USUAL: 1
5. POOR APPETITE OR OVEREATING: 0
6. FEELING BAD ABOUT YOURSELF - OR THAT YOU ARE A FAILURE OR HAVE LET YOURSELF OR YOUR FAMILY DOWN: 0
2. FEELING DOWN, DEPRESSED OR HOPELESS: 0
10. IF YOU CHECKED OFF ANY PROBLEMS, HOW DIFFICULT HAVE THESE PROBLEMS MADE IT FOR YOU TO DO YOUR WORK, TAKE CARE OF THINGS AT HOME, OR GET ALONG WITH OTHER PEOPLE: 1
SUM OF ALL RESPONSES TO PHQ QUESTIONS 1-9: 10
SUM OF ALL RESPONSES TO PHQ QUESTIONS 1-9: 10
7. TROUBLE CONCENTRATING ON THINGS, SUCH AS READING THE NEWSPAPER OR WATCHING TELEVISION: 3
4. FEELING TIRED OR HAVING LITTLE ENERGY: 3
1. LITTLE INTEREST OR PLEASURE IN DOING THINGS: 0
9. THOUGHTS THAT YOU WOULD BE BETTER OFF DEAD, OR OF HURTING YOURSELF: 0
SUM OF ALL RESPONSES TO PHQ QUESTIONS 1-9: 10
SUM OF ALL RESPONSES TO PHQ QUESTIONS 1-9: 10

## 2023-01-20 ASSESSMENT — SOCIAL DETERMINANTS OF HEALTH (SDOH): HOW HARD IS IT FOR YOU TO PAY FOR THE VERY BASICS LIKE FOOD, HOUSING, MEDICAL CARE, AND HEATING?: NOT HARD AT ALL

## 2023-01-20 ASSESSMENT — ENCOUNTER SYMPTOMS
SHORTNESS OF BREATH: 0
BLOOD IN STOOL: 0
ABDOMINAL PAIN: 0

## 2023-01-23 ENCOUNTER — HOSPITAL ENCOUNTER (OUTPATIENT)
Dept: PHYSICAL THERAPY | Age: 43
Setting detail: RECURRING SERIES
Discharge: HOME | End: 2023-01-26
Payer: MEDICARE

## 2023-01-23 PROCEDURE — 97140 MANUAL THERAPY 1/> REGIONS: CPT

## 2023-01-23 PROCEDURE — 97112 NEUROMUSCULAR REEDUCATION: CPT

## 2023-01-23 RX ORDER — LAMOTRIGINE 100 MG/1
TABLET ORAL
Qty: 90 TABLET | Refills: 1 | OUTPATIENT
Start: 2023-01-23

## 2023-01-23 ASSESSMENT — PAIN SCALES - GENERAL: PAINLEVEL_OUTOF10: 0

## 2023-01-23 NOTE — PROGRESS NOTES
Giovana Chamberlain  : 1980  Primary: Naif Keller Ppo  Secondary:  Jimi Coyle [address] [place] [address] Way [address]  Phone: [phone]  Fax: [phone] Plan Frequency: 2 times a week for 90 days    Plan of Care/Certification Expiration Date: 23      PT Visit Info: Total # of Visits to Date: 34  Progress Note Counter: 2  Canceled Appointment: 3     Visit Count:  29   OUTPATIENT PHYSICAL THERAPY:OP NOTE TYPE: Treatment Note 2023       Episode  }Appt Desk             Treatment Diagnosis:  Difficulty in walking, Not elsewhere classified (R26.2)  Generalized Muscle Weakness (M62.81)  Other abnormalities of gait and mobility (R26.89)  Medical/Referring Diagnosis:  No admission diagnoses are documented for this encounter. Referring Physician:  Nicole Valiente DO MD Orders:  PT Eval and Treat   Date of Onset:  Onset Date:  (Chronic)     Allergies:   Diazepam, Penicillins, Pineapple, Sumatriptan, Theophylline, Theophyllines, Cephalexin, Sulfa antibiotics, Topiramate, Tramadol, Verapamil, [place] oil, Augmentin [amoxicillin-pot clavulanate], Dermabond, Keflex [cephalexin], Kenalog [triamcinolone acetonide], Nuts [peanut-containing drug products], Pneumococcal vaccine, Sulfa antibiotics, Topiramate, Triamcinolone acetonide, Ultram [tramadol], Valium [diazepam], and Vitamin b12  Restrictions/Precautions:  No data recordedNo data recorded   Interventions Planned (Treatment may consist of any combination of the following):    Current Treatment Recommendations: Strengthening; Balance training; Functional mobility training; Gait training; Home exercise program; Vestibular rehab     Subjective Comments:   \"I am okay. Dizziness 3/10\".   Initial:}    010Post Session:       010  Medications Last Reviewed:  2023  Updated Objective Findings:  None Today  Treatment   NEUROMUSCULAR RE-EDUCATION: (25 minutes):    Exercise/activities per grid below to improve balance and coordination. Required minimal verbal cues to promote static and dynamic balance in standing. Date:  1/19/2023 Date:  1/23/23 Date:  1/17/23   Activity/Exercise Parameters Parameters Parameters   Marching in hallway with hiking pole 6 laps 4 laps 4 laps   Sidestepping in hallway with hiking pole 6 laps 4 laps 4 laps   Walking through cones with hiking pole 6 laps 4 laps 4 laps   Walking with horizontal head turns      Stepping over half foam with hiking pole Forward 15 reps each direction  Lateral 15 reps each direction Forward 15 reps each direction  Lateral 15 reps each direction Forward 10 reps each direction  Lateral 10 reps each direction   Step taps with hiking pole      Sit to stand from chair      Sliding right/Sliding left      Seated turns right/left on hilo mat      Seated ball kicks/ball throws      Seated trunk rotation with red ball      Standing on blue foam        MANUAL THERAPY: (15 minutes): Soft tissue mobilization was utilized and necessary because of the patient's painful spasm. Patient received trigger point release along bilateral upper traps/scapular region to decrease pain. Skin intact after treatment. Treatment/Session Summary:    Treatment Assessment:  Patient reports slight increase in dizziness to 4/10 after treatment. Patient tolerated treatment well. Communication/Consultation:  None today  Equipment provided today:  None  Recommendations/Intent for next treatment session: Next visit will focus on balance, gait, and vestibular.     Total Treatment Billable Duration:  40 minutes  Time In: 3495  Time Out: 8993    CHIRAG HOPE, PT       Charge Capture  }Post Session Pain  PT Visit Info  [employer] Portal  MD Guidelines  Scanned Media  Benefits  MyChart    Future Appointments   Date Time Provider Aye Caputo   1/26/2023  1:00 PM Stanislav Fishman PT [facility] NICOLE   2/9/2023  2:30 PM Stanislav Fishman PT [facility] SFCARINA   2/16/2023  2:30 PM Alice Mcghee Rustam, PT S[facility]ORPT SFE   2/22/2023 10:00 AM Lakeisha Garza MD [id number] [facility] AMB   2/23/2023  2:30 PM Aminta Alvarado, LIVIA SFEORPT SFE   2/24/2023 10:40 AM THEODORE Rossi MD POAI [facility] AMB   4/3/2023 11:15 AM MD LEONEL Mi [facility] AMB   7/14/2023  9:00 AM CAFM LAB CAF [facility] AMB   7/21/2023 11:20 AM Cinthya Nguyen MD [facility]F [facility] AMB

## 2023-01-26 ENCOUNTER — HOSPITAL ENCOUNTER (OUTPATIENT)
Dept: PHYSICAL THERAPY | Age: 43
Setting detail: RECURRING SERIES
Discharge: HOME | End: 2023-01-29
Payer: MEDICARE

## 2023-01-26 PROCEDURE — 97140 MANUAL THERAPY 1/> REGIONS: CPT

## 2023-01-26 PROCEDURE — 97112 NEUROMUSCULAR REEDUCATION: CPT

## 2023-01-26 ASSESSMENT — PAIN SCALES - GENERAL: PAINLEVEL_OUTOF10: 2

## 2023-01-26 NOTE — PROGRESS NOTES
Tejas Solomon  : 1980  Primary: Purnima Wynne Ppo  Secondary:  Barb Coyle [address] [place] [address] Way [address]  Phone: [phone]  Fax: [phone] Plan Frequency: 2 times a week for 90 days    Plan of Care/Certification Expiration Date: 23      PT Visit Info: Total # of Visits to Date: 30  Progress Note Counter: 2  Canceled Appointment: 3     Visit Count:  30   OUTPATIENT PHYSICAL THERAPY:OP NOTE TYPE: Treatment Note 2023       Episode  }Appt Desk             Treatment Diagnosis:  Difficulty in walking, Not elsewhere classified (R26.2)  Generalized Muscle Weakness (M62.81)  Other abnormalities of gait and mobility (R26.89)  Medical/Referring Diagnosis:  No admission diagnoses are documented for this encounter. Referring Physician:  Shivam Landeros DO MD Orders:  PT Eval and Treat   Date of Onset:  Onset Date:  (Chronic)     Allergies:   Diazepam, Penicillins, Pineapple, Sumatriptan, Theophylline, Theophyllines, Cephalexin, Sulfa antibiotics, Topiramate, Tramadol, Verapamil, [place] oil, Augmentin [amoxicillin-pot clavulanate], Dermabond, Keflex [cephalexin], Kenalog [triamcinolone acetonide], Nuts [peanut-containing drug products], Pneumococcal vaccine, Sulfa antibiotics, Topiramate, Triamcinolone acetonide, Ultram [tramadol], Valium [diazepam], and Vitamin b12  Restrictions/Precautions:  No data recordedNo data recorded   Interventions Planned (Treatment may consist of any combination of the following):    Current Treatment Recommendations: Strengthening; Balance training; Functional mobility training; Gait training; Home exercise program; Vestibular rehab     Subjective Comments: \"No dizziness but I have a headache\".   Initial:}    2/10Post Session:       2/10  Medications Last Reviewed:  2023  Updated Objective Findings:  None Today  Treatment   NEUROMUSCULAR RE-EDUCATION: (30 minutes):    Exercise/activities per grid below to improve balance and coordination. Required minimal verbal cues to promote static and dynamic balance in standing. Date:  1/19/2023 Date:  1/23/23 Date:  1/26/23   Activity/Exercise Parameters Parameters Parameters   Marching in hallway with hiking pole 6 laps 4 laps 4 laps   Sidestepping in hallway with hiking pole 6 laps 4 laps 4 laps   Walking through cones with hiking pole 6 laps 4 laps 4 laps   Walking with horizontal head turns      Stepping over half foam with hiking pole Forward 15 reps each direction  Lateral 15 reps each direction Forward 15 reps each direction  Lateral 15 reps each direction Forward 10 reps each direction  Lateral 10 reps each direction   Step taps with hiking pole      Sit to stand from chair      Sliding right/Sliding left      Seated turns right/left on hilo mat      Seated ball kicks/ball throws      Seated trunk rotation with red ball      Standing on blue foam   Feet together eyes open/tandem stance eyes open with head turns and eyes closed     MANUAL THERAPY: (15 minutes): Soft tissue mobilization was utilized and necessary because of the patient's painful spasm. Patient received trigger point release along bilateral upper traps/scapular region to decrease pain. Skin intact after treatment. Treatment/Session Summary:    Treatment Assessment:  Patient demonstrates improved balance and stability during exercises. Communication/Consultation:  None today  Equipment provided today:  None  Recommendations/Intent for next treatment session: Next visit will focus on balance, gait, and vestibular.     Total Treatment Billable Duration:  45 minutes  Time In: 1300  Time Out: 0764    CHIRAG Garcia, PT       Charge Capture  }Post Session Pain  PT Visit Info  [facility] Portal  MD Guidelines  Scanned Media  Benefits  MyChart    Future Appointments   Date Time Provider Aye Caputo   2/9/2023  2:30 PM Mervin Turner, [facility]   2/16/2023  2:30 PM Tree Wong Rustam, PT [facility]   2/22/2023 10:00 AM Rajani Guevara MD [id number]   2/23/2023  2:30 PM Maylin Grayson, PT [facility]   2/24/2023 10:40 AM THEODORE Henao MD [facility]   4/3/2023 11:15 AM Lelia Justice MD [facility]   7/14/2023  9:00 AM [facility]   7/21/2023 11:20 AM Mey Patrick MD [facility]

## 2023-01-31 ENCOUNTER — APPOINTMENT (OUTPATIENT)
Dept: PHYSICAL THERAPY | Age: 43
End: 2023-01-31
Payer: MEDICARE

## 2023-02-09 ENCOUNTER — HOSPITAL ENCOUNTER (OUTPATIENT)
Dept: PHYSICAL THERAPY | Age: 43
Setting detail: RECURRING SERIES
Discharge: HOME | End: 2023-02-12
Payer: MEDICARE

## 2023-02-09 PROCEDURE — 97112 NEUROMUSCULAR REEDUCATION: CPT

## 2023-02-09 PROCEDURE — 97140 MANUAL THERAPY 1/> REGIONS: CPT

## 2023-02-09 ASSESSMENT — PAIN DESCRIPTION - LOCATION: LOCATION: SHOULDER

## 2023-02-09 ASSESSMENT — PAIN DESCRIPTION - ORIENTATION: ORIENTATION: RIGHT

## 2023-02-09 ASSESSMENT — PAIN SCALES - GENERAL: PAINLEVEL_OUTOF10: 6

## 2023-02-09 NOTE — THERAPY RECERTIFICATION
Catrachito Escobar  : 1980  Primary: Memory Graver Ppo  Secondary:  Nidia Martin  [facility] [address]  [address] [place] [address] Way [address]  Phone: [phone]  Fax: [phone] Plan Frequency: 2 times a week for 90 days    Plan of Care/Certification Expiration Date: 05/10/23      PT Visit Info: Total # of Visits to Date: 32  Progress Note Counter: 2  Canceled Appointment: 3      Visit Count:  31    OUTPATIENT PHYSICAL THERAPY:OP NOTE TYPE: Recertification [id number]               Episode  Appt Desk         Treatment Diagnosis:  Difficulty in walking, Not elsewhere classified (R26.2)  Generalized Muscle Weakness (M62.81)  Other abnormalities of gait and mobility (R26.89)  Medical/Referring Diagnosis:  No admission diagnoses are documented for this encounter. Referring Physician:  Naomy Luo DO MD Orders:  PT Eval and Treat   Return MD Appt:  unknown   Date of Onset:  Onset Date:  (Chronic)     Allergies:  Diazepam, Penicillins, Pineapple, Sumatriptan, Theophylline, Theophyllines, Cephalexin, Sulfa antibiotics, Topiramate, Tramadol, Verapamil, [place] oil, Augmentin [amoxicillin-pot clavulanate], Dermabond, Keflex [cephalexin], Kenalog [triamcinolone acetonide], Nuts [peanut-containing drug products], Pneumococcal vaccine, Sulfa antibiotics, Topiramate, Triamcinolone acetonide, Ultram [tramadol], Valium [diazepam], and Vitamin b12  Restrictions/Precautions:    No data recordedNo data recorded   Medications Last Reviewed:  2023     SUBJECTIVE   History of Injury/Illness (Reason for Referral):  Patient reports more spinning dizziness, imbalance, and difficulty walking. Aggravating factors are head turns and body turns. Patient has had no falls recently. Patient rates dizziness as 4/10 and pain level as 2/10. Patient  had anterior discectomy and cervical fusion on 2022.   Patient reports needing more assistance with laundry, cooking, and cleaning. Patient Stated Goal(s): \"To decrease spinning dizziness with movements\". Initial: Right Shoulder 6/10 Post Session: Right Shoulder 4/10  Past Medical History/Comorbidities:   Ms. Naima Rodriguez  has a past medical history of Adverse effect of anesthesia, Arthritis, Arthritis, Asthma, BMI 36.0-36.9,adult, Chronic pain, Depression, Hyperlipidemia, Hypertension, Hypothyroidism, Migraines, Movement disorder, PONV (postoperative nausea and vomiting), Thyroid disease, Vertigo, and Vestibular nerve disorder. Ms. Naima Rodriguez  has a past surgical history that includes Cholecystectomy; eye muscle surgery; joint replacement; Cervical disc arthroplasty; orthopedic surgery; heent; Cholecystectomy; and cervical fusion (N/A, 7/12/2022). Social History/Living Environment:   Lives With: Alone  Type of Home: House     Prior Level of Function/Work/Activity:   Prior level of function: Independent  Occupation: Part time employment  No data recordedNo data recorded   Learning:   Does the patient/guardian have any barriers to learning?: No barriers  Will there be a co-learner?: No  What is the preferred language of the patient/guardian?: English  Is an  required?: No  How does the patient/guardian prefer to learn new concepts?: Listening; Reading; Demonstration     Fall Risk Scale: Omalley Total Score: 65  Omalley Fall Risk: High (45 and higher)     Dominant Side:  right handed      OBJECTIVE   Balance / Vestibular:   Sensation:   Bilateral lower extremity within normal limits. Strength:  Hip flexion 4/5, hip abduction 4+/5, hip adduction 4/5, quadriceps 5/5, hamstrings 5/5, ankle dorsiflexion 5/5, ankle plantarflexion 5/5. Balance Outcome Measures:  Barrera Balance Scale:  22 /56  (A score less than 45/56 indicates high risk of falls)  ASSESSMENT   Initial Assessment:  Patient presents with imbalance, dizziness, difficulty walking, and decreased mobility.   Patient is at higher fall risk based on scores received on Barrera Balance Scale and Timed Up and Go Test.  Patient would benefit from skilled physical therapy to address problems and goals. Thank you for this referral.   Recertification note (5/3/42193):  Patient has attended thirty-one scheduled physical therapy appointments from 8/19/2022 to 2/9/2023. Patient demonstrates slow improvements in balance and gait. Patient would benefit from continuing skilled physical therapy to address problems and goals. Thank you for this referral.    Problem List: (Impacting functional limitations): Body Structures, Functions, Activity Limitations Requiring Skilled Therapeutic Intervention: Decreased functional mobility ; Decreased strength; Decreased balance; Vestibular Impairment   ,  manual therapy  Therapy Prognosis:   Therapy Prognosis: Good     Assessment Complexity:   Medium Complexity  PLAN   Effective Dates: 2/9/2023 TO Plan of Care/Certification Expiration Date: 05/10/23     Frequency/Duration: Plan Frequency: 2 times a week for 90 days     Interventions Planned (Treatment may consist of any combination of the following):    Current Treatment Recommendations: Strengthening; Balance training; Functional mobility training; Gait training; Home exercise program; Vestibular rehab   manual therapy  Goals: (Goals have been discussed and agreed upon with patient.)  Short-Term Functional Goals: Time Frame: 30 days   Patient will be independent with home exercise program to improve balance and decrease dizziness. Goal met. Patient will score less than or equal to 18 seconds on Timed Up and Go Test indicating improved gait speed. Goal met. Discharge Goals: Time Frame: 90 days   Patient will score less than or equal to 13 seconds on Timed Up and Go Test indicating improved gait speed. Goal ongoing. Patient will increase score on Barrera Balance Scale greater than or equal to 30/56 demonstrating improved balance and decreased fall risk with daily activities. Goal ongoing.    Patient will report needing less assistance with laundry and cooking. Goal ongoing. Outcome Measure: Tool Used: Barrera Balance Scale  Score:  Initial: 22/56 Most Recent: 25/56 (Date: 2-9-2023 )   Interpretation of Score: Each section is scored on a 0-4 scale, 0 representing the patients inability to perform the task and 4 representing independence. The scores of each section are added together for a total score of 56. The higher the patients score, the more independent the patient is. Any score below 45 indicates increased risk for falls. Tool Used: Timed Up and Go (TUG)  Score:  Initial: 21.5 seconds Most Recent: 14.2 seconds (Date: 2-9-2023)   Interpretation of Score: The test measures, in seconds, the time taken by an individual to stand up from a standard arm chair (seat height 46 cm [18 in], arm height 65 cm [25.6 in]), walk a distance of 3 meters (118 in, approx 10 ft), turn, walk back to the chair and sit down. If the individual takes longer than 14 seconds to complete TUG, this indicates risk for falls. Medical Necessity:   > Patient is expected to demonstrate progress in strength and balance to improve safety during activities of daily living. Reason For Services/Other Comments:  > Patient continues to require skilled intervention due to higher fall risk with daily activities. Total Duration:  Time In: 1430  Time Out: 1515    Regarding Amanda Celaya's therapy, I certify that the treatment plan above will be carried out by a therapist or under their direction.   Thank you for this referral,  Shae Tony, PT     Referring Physician Signature: Vera Redman, * _______________________________ Date _____________        Post Session Pain  Charge Capture  PT Visit Info MD Guidelines  MyChart

## 2023-02-09 NOTE — PROGRESS NOTES
Suresh Celaya  : 1980  Primary: Arnulfo Lynch Ppo  Secondary:  Nancy Taylor [address]  [address] [place] [address] Way [address]  Phone: [phone]  Fax: [phone] Plan Frequency: 2 times a week for 90 days    Plan of Care/Certification Expiration Date: 05/10/23      PT Visit Info: Total # of Visits to Date: 32  Progress Note Counter: 2  Canceled Appointment: 3     Visit Count:  31   OUTPATIENT PHYSICAL THERAPY:OP NOTE TYPE: Treatment Note 2023       Episode  }Appt Desk             Treatment Diagnosis:  Difficulty in walking, Not elsewhere classified (R26.2)  Generalized Muscle Weakness (M62.81)  Other abnormalities of gait and mobility (R26.89)  Medical/Referring Diagnosis:  No admission diagnoses are documented for this encounter. Referring Physician:  Trisha Rincon DO MD Orders:  PT Eval and Treat   Date of Onset:  Onset Date:  (Chronic)     Allergies:   Diazepam, Penicillins, Pineapple, Sumatriptan, Theophylline, Theophyllines, Cephalexin, Sulfa antibiotics, Topiramate, Tramadol, Verapamil, [place] oil, Augmentin [amoxicillin-pot clavulanate], Dermabond, Keflex [cephalexin], Kenalog [triamcinolone acetonide], Nuts [peanut-containing drug products], Pneumococcal vaccine, Sulfa antibiotics, Topiramate, Triamcinolone acetonide, Ultram [tramadol], Valium [diazepam], and Vitamin b12  Restrictions/Precautions:  No data recordedNo data recorded   Interventions Planned (Treatment may consist of any combination of the following):    Current Treatment Recommendations: Strengthening; Balance training; Functional mobility training; Gait training; Home exercise program; Vestibular rehab     Subjective Comments:   \"I went to the balance lab. I had a 2% weakness. Dizziness 5/10. No falls\".   Initial:}Right Shoulder 6/10Post Session:  Right  Shoulder 4/10  Medications Last Reviewed:  2023  Updated Objective Findings:   See recertification note  Treatment   NEUROMUSCULAR RE-EDUCATION: (30 minutes):    Exercise/activities per grid below to improve balance and coordination. Required minimal verbal cues to promote static and dynamic balance in standing. Date:  2/9/2023 Date:  1/23/23 Date:  1/26/23   Activity/Exercise Parameters Parameters Parameters   Marching in hallway with hiking pole 6 laps 4 laps 4 laps   Sidestepping in hallway with hiking pole 6 laps 4 laps 4 laps   Walking through cones with hiking pole 6 laps 4 laps 4 laps   Walking with horizontal head turns      Stepping over half foam with hiking pole Forward 15 reps each direction  Lateral 15 reps each direction Forward 15 reps each direction  Lateral 15 reps each direction Forward 10 reps each direction  Lateral 10 reps each direction   Step taps with hiking pole      Sit to stand from chair      Sliding right/Sliding left      Seated turns right/left on hilo mat      Seated ball kicks/ball throws      Seated trunk rotation with red ball      Standing on blue foam   Feet together eyes open/tandem stance eyes open with head turns and eyes closed     MANUAL THERAPY: (15 minutes): Soft tissue mobilization was utilized and necessary because of the patient's painful spasm. Patient received trigger point release along bilateral upper traps/scapular region to decrease pain. Skin intact after treatment. Treatment/Session Summary:    Treatment Assessment:  Patient tolerated treatment well. Communication/Consultation:  None today  Equipment provided today:  None  Recommendations/Intent for next treatment session: Next visit will focus on balance, gait, and vestibular.     Total Treatment Billable Duration:  45 minutes  Time In: 1430  Time Out: 1691    CHIRAG Rivas [place], PT       Charge Capture  }Post Session Pain  PT Visit Info  [employer] Portal  MD Guidelines  Scanned Media  Benefits  MyChart    Future Appointments   Date Time Provider Aye Caputo   2/16/2023  2:30 PM Naif López Rustam, PT [facility]   2/22/2023 10:00 AM Mary Ashley MD [id number]   2/23/2023  2:30 PM Lorna Alcantara, PT [facility]   2/24/2023 10:40 AM MD LEROY Cosme [facility] AMB   4/3/2023 11:15 AM Joao Morgan MD [facility]   7/14/2023  9:00 AM CA[facility] LAB [facility]   7/21/2023 11:20 AM [facility]

## 2023-02-16 ENCOUNTER — HOSPITAL ENCOUNTER (OUTPATIENT)
Dept: PHYSICAL THERAPY | Age: 43
Setting detail: RECURRING SERIES
Discharge: HOME | End: 2023-02-19
Payer: MEDICARE

## 2023-02-16 PROCEDURE — 97112 NEUROMUSCULAR REEDUCATION: CPT

## 2023-02-16 PROCEDURE — 97140 MANUAL THERAPY 1/> REGIONS: CPT

## 2023-02-16 ASSESSMENT — PAIN DESCRIPTION - LOCATION: LOCATION: SHOULDER

## 2023-02-16 ASSESSMENT — PAIN DESCRIPTION - ORIENTATION: ORIENTATION: RIGHT

## 2023-02-16 ASSESSMENT — PAIN SCALES - GENERAL: PAINLEVEL_OUTOF10: 6

## 2023-02-16 NOTE — PROGRESS NOTES
Torri Gwendolyn  : 1980  Primary: Valentín Torres Ppo  Secondary:  Annabelle Moreira  [facility] [address]  [address] [place] [address] Way [address]  Phone: [phone]  Fax: [phone] Plan Frequency: 2 times a week for 90 days    Plan of Care/Certification Expiration Date: 05/10/23      PT Visit Info: Total # of Visits to Date: 28  Progress Note Counter: 2  Canceled Appointment: 3     Visit Count:  32   OUTPATIENT PHYSICAL THERAPY:OP NOTE TYPE: Treatment Note 2023       Episode  }Appt Desk             Treatment Diagnosis:  Difficulty in walking, Not elsewhere classified (R26.2)  Generalized Muscle Weakness (M62.81)  Other abnormalities of gait and mobility (R26.89)  Medical/Referring Diagnosis:  No admission diagnoses are documented for this encounter. Referring Physician:  Antonio Romero DO MD Orders:  PT Eval and Treat   Date of Onset:  Onset Date:  (Chronic)     Allergies:   Diazepam, Penicillins, Pineapple, Sumatriptan, Theophylline, Theophyllines, Cephalexin, Sulfa antibiotics, Topiramate, Tramadol, Verapamil, [place] oil, Augmentin [amoxicillin-pot clavulanate], Dermabond, Keflex [cephalexin], Kenalog [triamcinolone acetonide], Nuts [peanut-containing drug products], Pneumococcal vaccine, Sulfa antibiotics, Topiramate, Triamcinolone acetonide, Ultram [tramadol], Valium [diazepam], and Vitamin b12  Restrictions/Precautions:  No data recordedNo data recorded   Interventions Planned (Treatment may consist of any combination of the following):    Current Treatment Recommendations: Strengthening; Balance training; Functional mobility training; Gait training; Home exercise program; Vestibular rehab     Subjective Comments:   \"5/10 dizziness. Yesterday was really bad.   I tried to stand up and went back to bed  Initial:}Right Shoulder 6/10Post Session:  Right  Shoulder 4/10  Medications Last Reviewed:  2023  Updated Objective Findings:  None Today  Treatment NEUROMUSCULAR RE-EDUCATION: (30 minutes):    Exercise/activities per grid below to improve balance and coordination. Required minimal verbal cues to promote static and dynamic balance in standing. Date:  2/9/2023 Date:  2/16/23 Date:  1/26/23   Activity/Exercise Parameters Parameters Parameters   Marching in hallway with hiking pole 6 laps 4 laps 4 laps   Sidestepping in hallway with hiking pole 6 laps 4 laps 4 laps   Walking through cones with hiking pole 6 laps 4 laps 4 laps   Walking with horizontal head turns      Stepping over half foam with hiking pole Forward 15 reps each direction  Lateral 15 reps each direction Forward 15 reps each direction  Lateral 15 reps each direction Forward 10 reps each direction  Lateral 10 reps each direction   Step taps with hiking pole      Sit to stand from chair      Sliding right/Sliding left      Seated turns right/left on hilo mat      Seated ball kicks/ball throws      Seated trunk rotation with red ball      Standing on blue foam   Feet together eyes open/tandem stance eyes open with head turns and eyes closed     MANUAL THERAPY: (15 minutes): Soft tissue mobilization was utilized and necessary because of the patient's painful spasm. Patient received trigger point release along bilateral upper traps/scapular region to decrease pain. Skin intact after treatment. Treatment/Session Summary:    Treatment Assessment:  Patient tolerated exercises with minimal complaints. Communication/Consultation:  None today  Equipment provided today:  None  Recommendations/Intent for next treatment session: Next visit will focus on balance, gait, and vestibular.     Total Treatment Billable Duration:  45 minutes  Time In: 1430  Time Out: 1139    CHIRAG Chisholm, PT       Charge Capture  }Post Session Pain  PT Visit Info  [employer] Portal  MD Guidelines  Scanned Media  Benefits  MyChart    Future Appointments   Date Time Provider Aye Caputo   2/22/2023 10:00 AM Melissa Michoacano Martell MD [id number]   2/23/2023  2:30 PM José Miguel Flandreau, PT [facility]   2/24/2023 10:40 AM THEODORE Pina MD [facility] AM[facility]   3/2/2023  3:15 PM José Miguel Flandreau, PT [facility]   3/9/2023  2:30 PM José Miguel Flandreau, PT [facility]   3/16/2023  2:30 PM José Miguel Flandreau, PT [facility]   4/3/2023 11:15 AM Julieann Scheuermann, MD [facility] AMB   7/14/2023  9:00 AM [facility] AMB   7/21/2023 11:20 AM Pat De La Torre MD [facility] AMB

## 2023-02-21 ASSESSMENT — ANXIETY QUESTIONNAIRES
5. BEING SO RESTLESS THAT IT IS HARD TO SIT STILL: 0
2. NOT BEING ABLE TO STOP OR CONTROL WORRYING: 0
IF YOU CHECKED OFF ANY PROBLEMS ON THIS QUESTIONNAIRE, HOW DIFFICULT HAVE THESE PROBLEMS MADE IT FOR YOU TO DO YOUR WORK, TAKE CARE OF THINGS AT HOME, OR GET ALONG WITH OTHER PEOPLE: NOT DIFFICULT AT ALL
2. NOT BEING ABLE TO STOP OR CONTROL WORRYING: NOT AT ALL
5. BEING SO RESTLESS THAT IT IS HARD TO SIT STILL: NOT AT ALL
7. FEELING AFRAID AS IF SOMETHING AWFUL MIGHT HAPPEN: 0
1. FEELING NERVOUS, ANXIOUS, OR ON EDGE: NOT AT ALL
3. WORRYING TOO MUCH ABOUT DIFFERENT THINGS: 0
IF YOU CHECKED OFF ANY PROBLEMS ON THIS QUESTIONNAIRE, HOW DIFFICULT HAVE THESE PROBLEMS MADE IT FOR YOU TO DO YOUR WORK, TAKE CARE OF THINGS AT HOME, OR GET ALONG WITH OTHER PEOPLE: NOT DIFFICULT AT ALL
3. WORRYING TOO MUCH ABOUT DIFFERENT THINGS: NOT AT ALL
1. FEELING NERVOUS, ANXIOUS, OR ON EDGE: 0
6. BECOMING EASILY ANNOYED OR IRRITABLE: 0
7. FEELING AFRAID AS IF SOMETHING AWFUL MIGHT HAPPEN: NOT AT ALL
4. TROUBLE RELAXING: NOT AT ALL
GAD7 TOTAL SCORE: 0
6. BECOMING EASILY ANNOYED OR IRRITABLE: NOT AT ALL
4. TROUBLE RELAXING: 0

## 2023-02-21 ASSESSMENT — PATIENT HEALTH QUESTIONNAIRE - PHQ9
SUM OF ALL RESPONSES TO PHQ QUESTIONS 1-9: 12
1. LITTLE INTEREST OR PLEASURE IN DOING THINGS: 0
6. FEELING BAD ABOUT YOURSELF - OR THAT YOU ARE A FAILURE OR HAVE LET YOURSELF OR YOUR FAMILY DOWN: 0
4. FEELING TIRED OR HAVING LITTLE ENERGY: 3
SUM OF ALL RESPONSES TO PHQ QUESTIONS 1-9: 12
SUM OF ALL RESPONSES TO PHQ9 QUESTIONS 1 & 2: 0
8. MOVING OR SPEAKING SO SLOWLY THAT OTHER PEOPLE COULD HAVE NOTICED. OR THE OPPOSITE, BEING SO FIGETY OR RESTLESS THAT YOU HAVE BEEN MOVING AROUND A LOT MORE THAN USUAL: 3
2. FEELING DOWN, DEPRESSED OR HOPELESS: NOT AT ALL
9. THOUGHTS THAT YOU WOULD BE BETTER OFF DEAD, OR OF HURTING YOURSELF: 0
SUM OF ALL RESPONSES TO PHQ9 QUESTIONS 1 & 2: 0
2. FEELING DOWN, DEPRESSED OR HOPELESS: 0
1. LITTLE INTEREST OR PLEASURE IN DOING THINGS: NOT AT ALL
3. TROUBLE FALLING OR STAYING ASLEEP: 3
SUM OF ALL RESPONSES TO PHQ QUESTIONS 1-9: 12
10. IF YOU CHECKED OFF ANY PROBLEMS, HOW DIFFICULT HAVE THESE PROBLEMS MADE IT FOR YOU TO DO YOUR WORK, TAKE CARE OF THINGS AT HOME, OR GET ALONG WITH OTHER PEOPLE: 2
5. POOR APPETITE OR OVEREATING: 0
SUM OF ALL RESPONSES TO PHQ QUESTIONS 1-9: 12
7. TROUBLE CONCENTRATING ON THINGS, SUCH AS READING THE NEWSPAPER OR WATCHING TELEVISION: 3

## 2023-02-21 ASSESSMENT — COLUMBIA-SUICIDE SEVERITY RATING SCALE - C-SSRS
2. HAVE YOU ACTUALLY HAD ANY THOUGHTS OF KILLING YOURSELF?: NO
6. HAVE YOU EVER DONE ANYTHING, STARTED TO DO ANYTHING, OR PREPARED TO DO ANYTHING TO END YOUR LIFE?: NO
1. WITHIN THE PAST MONTH, HAVE YOU WISHED YOU WERE DEAD OR WISHED YOU COULD GO TO SLEEP AND NOT WAKE UP?: NO

## 2023-02-22 ENCOUNTER — TELEMEDICINE (OUTPATIENT)
Dept: BEHAVIORAL/MENTAL HEALTH CLINIC | Age: 43
End: 2023-02-22
Payer: MEDICARE

## 2023-02-22 DIAGNOSIS — F33.0 MILD EPISODE OF RECURRENT MAJOR DEPRESSIVE DISORDER (HCC): Primary | ICD-10-CM

## 2023-02-22 DIAGNOSIS — F41.1 GENERALIZED ANXIETY DISORDER: ICD-10-CM

## 2023-02-22 DIAGNOSIS — F51.05 INSOMNIA DUE TO MENTAL DISORDER: ICD-10-CM

## 2023-02-22 PROCEDURE — 99214 OFFICE O/P EST MOD 30 MIN: CPT | Performed by: PSYCHIATRY & NEUROLOGY

## 2023-02-22 RX ORDER — HYDROXYZINE HYDROCHLORIDE 10 MG/1
TABLET, FILM COATED ORAL
COMMUNITY
Start: 2023-02-15

## 2023-02-22 RX ORDER — VENLAFAXINE HYDROCHLORIDE 75 MG/1
75 CAPSULE, EXTENDED RELEASE ORAL DAILY
Qty: 30 CAPSULE | Refills: 3 | Status: SHIPPED | OUTPATIENT
Start: 2023-02-22

## 2023-02-22 RX ORDER — LAMOTRIGINE 100 MG/1
100 TABLET ORAL DAILY
Qty: 30 TABLET | Refills: 3 | Status: SHIPPED | OUTPATIENT
Start: 2023-02-22

## 2023-02-22 NOTE — PROGRESS NOTES
Patient:  Shirley Alves  Age:  43 y.o.  :  1980     SEX:  female MRN:  [id number]     RACE: White (non-)     SEEN:  [x]  PATIENT  []  SPOUSE []  OTHER:              PHQ-9  2023 2023 10/25/2022   Little interest or pleasure in doing things 0 0 0   Little interest or pleasure in doing things - - -   Feeling down, depressed, or hopeless 0 0 0   Trouble falling or staying asleep, or sleeping too much 3 3 3   Trouble falling or staying asleep, or sleeping too much - - -   Feeling tired or having little energy 3 3 2   Feeling tired or having little energy - - -   Poor appetite or overeating 0 0 0   Poor appetite, weight loss, or overeating - - -   Feeling bad about yourself - or that you are a failure or have let yourself or your family down 0 0 0   Feeling bad about yourself - or that you are a failure or have let yourself or your family down - - -   Trouble concentrating on things, such as reading the newspaper or watching television 3 3 2   Trouble concentrating on things such as school, work, reading, or watching TV - - -   Moving or speaking so slowly that other people could have noticed. Or the opposite - being so fidgety or restless that you have been moving around a lot more than usual 3 1 2   Moving or speaking so slowly that other people could have noticed; or the opposite being so fidgety that others notice - - -   Thoughts that you would be better off dead, or of hurting yourself in some way 0 0 0   Thoughts of being better off dead, or hurting yourself in some way - - -   PHQ-2 Score 0 0 0   Total Score PHQ 2 - - -   PHQ-9 Total Score 12 10 9   PHQ 9 Score - - -   If you checked off any problems, how difficult have these problems made it for you to do your work, take care of things at home, or get along with other people?  2 1 1   How difficult have these problems made it for you to do your work, take care of your home and get along with others - - -       BRENDA-7 SCREENING 2/21/2023 10/25/2022 8/3/2022   Feeling nervous, anxious, or on edge Not at all Not at all Several days   Not being able to stop or control worrying Not at all Not at all Not at all   Worrying too much about different things Not at all Not at all Not at all   Trouble relaxing Not at all Not at all Not at all   Being so restless that it is hard to sit still Not at all Not at all Not at all   Becoming easily annoyed or irritable Not at all Not at all Not at all   Feeling afraid as if something awful might happen Not at all Not at all Not at all   BRENDA-7 Total Score 0 0 1   How difficult have these problems made it for you to do your work, take care of things at home, or get along with other people? Not difficult at all Not difficult at all Not difficult at all   Feeling nervous, anxious, or on edge - - -   BRENDA-7 Total Score - - -        I was at home while conducting this encounter. Consent:  She and/or her healthcare decision maker is aware that this patient-initiated Telehealth encounter is a billable service, with coverage as determined by her insurance carrier. She is aware that she may receive a bill and has provided verbal consent to proceed: YesPatient identification was verified, and a caregiver was present when appropriate. The patient was located in a state where the provider was credentialed to provide care. This virtual visit was conducted via [address] Pursuant to the emergency declaration under the [facility], [facility] waiver authority and the [employer] General Act, this Virtual  Visit was conducted to reduce the patient's risk of exposure to COVID-19 and provide continuity of care for an established patient. Services were provided through a video synchronous discussion virtually to substitute for in-person clinic visit.   Due to this being a TeleHealth evaluation, many elements of the physical examination are unable to be assessed. Total Time: minutes: 11-20 minutes. Chief complaint:  Pt says she is still struggling with vertigo. Subjective:  Seen virtually for follow-up. States vertigo is still very much there. No falls in the last 1 month. She went to [facility] and they said it was not coming from in her ear. She is being treated for vestibular migraine. On betahistine and hydroxyzine. Hydroxyzine has been very helpful. She only takes it as needed 10 mg. If she takes it regularly it makes her sleepy. Lives near her parents. Has a good group of friends and good support group. Has been weaving recently scarves a baby wrap and some towels. Also takes indomethacin as needed. He has been using Voltaren topically on her shoulder. Has biceps tendinitis and acromial joint issues. Participating in a study at [facility] in the dizziness lab work. Does balance/and activity for them twice a day. Has to keep her eyes closed for some time after that because of visual vertigo. It is a tiring activity. Supportive psychotherapy provided. Patient allowed to vent her emotions. She denies suicidal ideation/homicidal ideations. Denies symptoms psychosis.     Patient Active Problem List   Diagnosis    Left leg weakness    Dysphagia    Neck pain    Severe obesity (HCC)    Hypothyroidism due to acquired atrophy of thyroid    Traumatic tear of left rotator cuff    Fatigue    Hyperlipidemia    Migraine variant    Trigeminal neuralgia    Chronic migraine with aura    Drug reaction    Cervical radiculopathy    Left shoulder pain    Carpal tunnel syndrome, bilateral    Asthma    Vestibular migraine    Major depressive disorder, single episode, moderate (HCC)    S/P cholecystectomy    Arm paresthesia, left    Dysmenorrhea    Conversion disorder    Generalized anxiety disorder    Cervicocranial syndrome    Family history of colonic polyps    Vertigo    Movement disorder    Vitamin D deficiency    Cervical spinal stenosis     LMP 2/14/23, Birth control,   Not pregnant    Denies palpitation,SOB, Chest pain, headaches. In no acute distress. MEDICATION REVIEW:    Current Medications:    Current Outpatient Medications   Medication Sig    hydrOXYzine HCl (ATARAX) 10 MG tablet     lamoTRIgine (LAMICTAL) 100 MG tablet Take 1 tablet by mouth daily    venlafaxine (EFFEXOR XR) 75 MG extended release capsule Take 1 capsule by mouth daily    thyroid (ARMOUR) 30 MG tablet Take 30 mg by mouth in the morning. Betahistine HCl (BETAHISTINE DIHYDROCHLORIDE) POWD 16 mg by Does not apply route 2 times daily    fexofenadine (ALLEGRA) 180 MG tablet Take 180 mg by mouth in the morning. fluticasone (FLONASE) 50 MCG/ACT nasal spray 1 spray by Each Nostril route daily    tiZANidine (ZANAFLEX) 4 MG capsule Take 4 mg by mouth 3 times daily as needed for Muscle spasms    ondansetron (ZOFRAN) 4 MG tablet Take 4 mg by mouth every 8 hours as needed for Nausea or Vomiting    Albuterol Sulfate, sensor, (PROAIR DIGIHALER) 108 (90 Base) MCG/ACT AEPB Inhale 2 puffs into the lungs 4 times daily as needed    acetaminophen (TYLENOL) 500 MG tablet Take 1,000 mg by mouth every 6 hours as needed for Pain    candesartan (ATACAND) 16 MG tablet Take 16 mg by mouth at bedtime    PROGESTERONE PO Take by mouth at bedtime Progesterone with testosterone denny (dissolvable pill)    MAGNESIUM PO Take 2 tablets by mouth daily    albuterol sulfate  (90 Base) MCG/ACT inhaler Inhale 2 puffs into the lungs every 4 hours as needed    ascorbic acid (VITAMIN C) 500 MG tablet Take 500 mg by mouth daily    indomethacin (INDOCIN) 25 MG capsule TAKE 1-2 TABLETS BY MOUTH 3 TIMES DAILY AS NEEDED FOR MIGRAINE. LIMIT 2 DAYS/WEEK. TAKE WITH FOOD    mometasone (NASONEX) 50 MCG/ACT nasal spray 1 spray by Nasal route at bedtime     clonazePAM (KLONOPIN) 0.5 MG tablet Take 1 tablet by mouth daily as needed for Anxiety for up to 90 days.      No current facility-administered medications for this visit. Allergies   Allergen Reactions    Diazepam Other (See Comments)     Suicidal thoughts       Penicillins Anaphylaxis     Has not been able to tolerate cephalosporins     Pineapple Shortness Of Breath     wheezing    Sumatriptan Shortness Of Breath    Theophylline Shortness Of Breath    Theophyllines Shortness Of Breath    Cephalexin Other (See Comments)     Other reaction(s): Dizziness    Sulfa Antibiotics Rash     Other reaction(s): Vomiting    Topiramate Other (See Comments)     Caused disorientation, loss of feeling in left leg (numbness)    Tramadol Rash    Verapamil Other (See Comments)     Lowers blood pressure significantly beyond normal hypotension      [place] Oil Swelling     Causes lips to swell       Augmentin [Amoxicillin-Pot Clavulanate] Hives    Dermabond Hives    Keflex [Cephalexin] Hives    Kenalog [Triamcinolone Acetonide] Hives    Nuts [Peanut-Containing Drug Products] Hives     [place] only- not peanuts    Pneumococcal Vaccine Swelling    Sulfa Antibiotics Hives    Topiramate Other (See Comments) and Myalgia    Triamcinolone Acetonide Hives     Kenalog injection caused hives     Ultram [Tramadol] Hives    Valium [Diazepam] Other (See Comments)    Vitamin B12 Other (See Comments)     Injection Only  swelling       Past Medical History, Past Surgical History, Family history, Social History, and Medications were all reviewed with the patient today and updated as necessary.      Compliant with medication: Yes   Side effects from medications:  No     EXAMINATION  Musculoskeletal    GAIT AND STATION   [] WNL   [x] RESTRICTED   [] UNSTEADY WALK        [] ABNORMAL   [] UNBALANCED         PSYCHIATRIC     GENERAL APPEARANCE:   [x]  WELL GROOMED []     DISHEVELED   []  UNKEMPT      []  UNUSUAL/BIZZARE    [] WNL       ATTITUDE:   [x] COOPERATIVE   [] GUARDED   [] SUSPICIOUS      [] HOSTILE                            BEHAVIOR:   [x] CALM   [] HYPERACTIVE [] MANNERISMS      [] BIZZARE         SPEECH:   [x] NORMAL FOR CLIENT   [] SPONTANEOUS   [] SLURRED   [] WHISPERING      [] LOUD   [] PRESSURED   [] ARTICULATE       EYE CONTACT:   [x] WNL   [] BLANK STARE   [] INTENSE      [] AVOIDANT         MOOD:   [x] EUTHYMIC   [] ANXIOUS   [] DEPRESSED      [] IRRITABLE   [] ANGRY   [] APATHETIC     AFFECT:   [x] CONGRUENT WITH MOOD   [] FLAT   [] CONSTRICTED      [] INAPPROPRIATE   [] LABILE           THOUGHT PROCESS:   [x] LOGICAL/GOAL-DIRECTED   [] FOI   [] CIRCUMSANTIAL      [] INCOHERENT   [] TANGENTIAL   [] CONCRETE      [] PERSEVERATION           THOUGHT CONTENT:                DELUSIONS  [x] DENIES  [] GRANDIOSE  [] PERSECUTORY  [] Pentecostalism  [] REFERENCE   HALLUCINATIONS  [x] DENIES  [] AUDITORY  [] VISUAL  [] OLFACTORY  [] TACTILE     [] GUSTATORY  [] SOMATIC         OBSESSIONS  [x] DENIES  [] PRESENT         SUICIDAL IDEATION  [x] DENIES  [] PRESENT W/O PLAN  [] PRESENT W/ PLAN       HOMICIDAL IDEATION  [x] DENIES  [] PRESENT W/O PLAN  [] PRESENT W/ PLAN           JUDGEMENT:   [x] GOOD   [] FAIR   [] POOR   INSIGHT:   [x] GOOD   [] FAIR   [] POOR     COGNITION:           SENSORIUM:   [x] ALERT   [] CLOUDED   [] DROWSY     ORIENTATION:   [x] INTACT   [] TIME:  PLACE  PERSON   RECENT & REMOTE MEMORY:   [] NORMAL   [x] OTHER:                  ATTENTION:   [] INTACT   [x] MILD IMPAIRMENT   [] SEVERE IMPAIRMENT     CONCENTRATION:   [] INTACT   [x] MILD IMPAIRMENT   [] SEVERE IMPAIRMENT     LANGUAGE:   [x] AVERAGE   [] ABOVE AVERAGE   [] BELOW AVERAGE     FUND OF KNOWLEDGE:   [] UNABLE TO ASSESS AT THIS TIME   [x] AVERAGE   [] ABOVE AVERAGE   [] BELOW AVERAGE      [] GOOD TO EXCELLENT KNOWLEDGE OF CURRENT EVENTS   [] POOR TO NO KNLEDGE OF CURRENT EVENTS           ABNORMAL MOVEMENTS:   [x] NONE   [] TICS   [] TREMORS   [] BIZZARE      [] FACE   [] TRUNK   [] EXTREMETIES   [] GESTURES        SLEEP:   [x] GOOD   [] FAIR   [x] POOR      MUSCLE STRENGTH AND TONE   [] WNL   [] ATROPHY   [] SPASTIC        [] FLACCID   [] COGWHEEL         Diagnoses/Impressions:    ICD-10-CM    1. Mild episode of recurrent major depressive disorder ([facility] [address]  F33.0       2. Generalized anxiety disorder  F41.1       3. Insomnia due to mental disorder  F51.05           TREATMENT GOALS:  Symptom reduction, Medication adherence, maintain therapeutic gains    LABS/IMAGING:    []  Ordered [x]  Reviewed []  New Labs Ordered:     LAB  WBC   Date/Time Value Ref Range Status   01/13/2023 08:52 AM 9.3 4.3 - 11.1 K/uL Final     Hemoglobin   Date/Time Value Ref Range Status   01/13/2023 08:52 AM 14.4 11.7 - 15.4 g/dL Final     Hematocrit   Date/Time Value Ref Range Status   01/13/2023 08:52 AM 41.9 35.8 - 46.3 % Final     Platelets   Date/Time Value Ref Range Status   01/13/2023 08:52  150 - 450 K/uL Final     Sodium   Date/Time Value Ref Range Status   01/13/2023 08:52  133 - 143 mmol/L Final     Potassium   Date/Time Value Ref Range Status   01/13/2023 08:52 AM 4.3 3.5 - 5.1 mmol/L Final     Chloride   Date/Time Value Ref Range Status   01/13/2023 08:52  101 - 110 mmol/L Final     CO2   Date/Time Value Ref Range Status   01/13/2023 08:52 AM 22 21 - 32 mmol/L Final     BUN   Date/Time Value Ref Range Status   01/13/2023 08:52 AM 9 6 - 23 MG/DL Final     ALT   Date/Time Value Ref Range Status   01/13/2023 08:52 AM 35 12 - 65 U/L Final     AST   Date/Time Value Ref Range Status   01/13/2023 08:52 AM 16 15 - 37 U/L Final       Please refer to the lab tab in the epic and care everywhere for the most recent lab results. Plan:     [x]  Medication ordered: Lamotrigine and Effexor to target depression, anxiety, insomnia. [x]  Medication education/counseling provided  Medication dosage and time to take, purpose/expected benefits/risks, common side effects, lab monitoring required and reason, expected length of treatment, risk of no treatment, effects on pregnancy/nursing, financial availability discussed. Educated patient on  side effects/risks/benefits of meds including  cardiac arrhythmias, suicidal ideations,  orthostatic hypotension, serotonin syndrome, risk of nicole/hypomania from antidepressants, withdrawals from abrupt discontinuation of meds, risk of rash, risk of infection, risk of bleeding, risk of seizures,  high blood pressure, dizziness, drowsiness, sedation , risk of falls, Risk & benefits discussed: including but not limited to possible off-label medication usage. [x] Follow MSE for sxs improvement     I have reviewed the patients controlled substance prescription history, as maintained in the [place] prescription monitoring program, so that the prescription(s) for a  controlled substance can be given. Recommendations and Referrals: Follow up with : MD, requires monitoring of response to medication, requires monitoring of medication side effects. Time until next PMA:     Follow up with Mental Health Clinicians: psychotherapy interventions, improve level of functioning, monitoring to prevent decompensation /hospitalization, monitoring to maintain therapeutic gains, symptoms (resolving and controlled)           Psychotherapy note:                                __10_ Minutes of psychotherapy     [x]  Supportive psychotherapy, Patient discussed certain situational and personal stressors ongoing in her life at this time, weight management d/w the patient. Sleep hygiene d/w patient. Patient allowed to vent out her emotions. Scenarios were reviewed using role playing and CBT techniques in order to increase insight and decrease anxiety. []  Disposition planning      []  Dangerous and will not contract for safety in the community    **Pateint has been notified: They are to call 911 or go to their nearest E.R. if they are experiencing a medical emergency or suicidal ideations/homicidal ideations. **  All ancillary documentation entered reviewed by provider.       PLEASE NOTE:  This document has been produced in part or whole using voice recognition software. Proofread however unrecognized errors in transcription may be present.         Emilia Waller MD

## 2023-02-23 ENCOUNTER — HOSPITAL ENCOUNTER (OUTPATIENT)
Dept: PHYSICAL THERAPY | Age: 43
Setting detail: RECURRING SERIES
Discharge: HOME | End: 2023-02-26
Payer: MEDICARE

## 2023-02-23 PROCEDURE — 97112 NEUROMUSCULAR REEDUCATION: CPT

## 2023-02-23 PROCEDURE — 97140 MANUAL THERAPY 1/> REGIONS: CPT

## 2023-02-23 ASSESSMENT — PAIN SCALES - GENERAL: PAINLEVEL_OUTOF10: 6

## 2023-02-23 ASSESSMENT — PAIN DESCRIPTION - ORIENTATION: ORIENTATION: RIGHT

## 2023-02-23 ASSESSMENT — PAIN DESCRIPTION - LOCATION: LOCATION: SHOULDER;HEAD

## 2023-02-23 NOTE — PROGRESS NOTES
Jimbo Castillo  : 1980  Primary: Tobi Oreilly Ppo  Secondary:  [facility] [address]  [address] [place] [address] Way [address]  Phone: [phone]  Fax: [phone] Plan Frequency: 2 times a week for 90 days    Plan of Care/Certification Expiration Date: 05/10/23      PT Visit Info: Total # of Visits to Date: 35  Progress Note Counter: 2  Canceled Appointment: 3     Visit Count:  33   OUTPATIENT PHYSICAL THERAPY:OP NOTE TYPE: Treatment Note 2023       Episode  }Appt Desk             Treatment Diagnosis:  Difficulty in walking, Not elsewhere classified (R26.2)  Generalized Muscle Weakness (M62.81)  Other abnormalities of gait and mobility (R26.89)  Medical/Referring Diagnosis:  No admission diagnoses are documented for this encounter. Referring Physician:  Darlynn Bence, DO MD Orders:  PT Eval and Treat   Date of Onset:  Onset Date:  (Chronic)     Allergies:   Diazepam, Penicillins, Pineapple, Sumatriptan, Theophylline, Theophyllines, Cephalexin, Sulfa antibiotics, Topiramate, Tramadol, Verapamil, [place] oil, Augmentin [amoxicillin-pot clavulanate], Dermabond, Keflex [cephalexin], Kenalog [triamcinolone acetonide], Nuts [peanut-containing drug products], Pneumococcal vaccine, Sulfa antibiotics, Topiramate, Triamcinolone acetonide, Ultram [tramadol], Valium [diazepam], and Vitamin b12  Restrictions/Precautions:  No data recordedNo data recorded   Interventions Planned (Treatment may consist of any combination of the following):    Current Treatment Recommendations: Strengthening; Balance training; Functional mobility training; Gait training; Home exercise program; Vestibular rehab     Subjective Comments:   \"I am hurting today. I have a headache\".   Initial:}Right Shoulder, Head 6/10Post Session:  Right  Shoulder, Head /10  Medications Last Reviewed:  2023  Updated Objective Findings:  None Today  Treatment   NEUROMUSCULAR RE-EDUCATION: (95 minutes):    Exercise/activities per grid below to improve balance and coordination. Required minimal verbal cues to promote static and dynamic balance in standing. Date:  2/9/2023 Date:  2/16/23 Date:  2/23/23   Activity/Exercise Parameters Parameters Parameters   Marching in hallway with hiking pole 6 laps 4 laps 4 laps   Sidestepping in hallway with hiking pole 6 laps 4 laps 4 laps   Walking through cones with hiking pole 6 laps 4 laps 4 laps   Walking with horizontal head turns      Stepping over half foam with hiking pole Forward 15 reps each direction  Lateral 15 reps each direction Forward 15 reps each direction  Lateral 15 reps each direction Forward 10 reps each direction  Lateral 10 reps each direction   Step taps with hiking pole      Sit to stand from chair      Sliding right/Sliding left      Seated turns right/left on hilo mat      Seated ball kicks/ball throws      Seated trunk rotation with red ball      Standing on blue foam        MANUAL THERAPY: (15 minutes): Soft tissue mobilization was utilized and necessary because of the patient's painful spasm. Patient received trigger point release along bilateral upper traps/scapular region to decrease pain. Skin intact after treatment. Treatment/Session Summary:    Treatment Assessment:  Patient tolerated treatment well. Communication/Consultation:  None today  Equipment provided today:  None  Recommendations/Intent for next treatment session: Next visit will focus on balance, gait, and vestibular.     Total Treatment Billable Duration:  45 minutes  Time In: 9837  Time Out: [address] Cherie Clifton PT       Charge Capture  }Post Session Pain  PT Visit Info  [facility] Portal  MD Guidelines  Scanned Media  Benefits  MyChart    Future Appointments   Date Time Provider Aye Caputo   2/24/2023 10:40 AM MD LEROY Ayers [facility] AMB   3/2/2023  3:15 PM Lele Marcos, PT [facility]   3/9/2023  2:30 PM Marisela Easton, LIVIA RIVERA 3/16/2023  2:30 PM Bruce Motta, PT [facility]   4/3/2023 11:15 AM Jake Hough MD [facility] AMB   5/17/2023  1:40 PM Stevo Rao MD [id number] AMB   7/14/2023  9:00 AM [facility] AMB   7/21/2023 11:20 AM Blaire Goldman MD [facility] AMB

## 2023-02-24 ENCOUNTER — OFFICE VISIT (OUTPATIENT)
Dept: ORTHOPEDIC SURGERY | Age: 43
End: 2023-02-24

## 2023-02-24 DIAGNOSIS — M67.922 TENDINOPATHY OF LEFT BICEPS: ICD-10-CM

## 2023-02-24 DIAGNOSIS — R20.2 ARM PARESTHESIA, LEFT: ICD-10-CM

## 2023-02-24 DIAGNOSIS — M25.512 LEFT SHOULDER PAIN, UNSPECIFIED CHRONICITY: Primary | ICD-10-CM

## 2023-02-24 DIAGNOSIS — M19.012 OSTEOARTHRITIS OF LEFT AC (ACROMIOCLAVICULAR) JOINT: ICD-10-CM

## 2023-02-24 NOTE — PROGRESS NOTES
Name: Klever Golgi  YOB: 1980  Gender: female  MRN: [id number]    CC:   Chief Complaint   Patient presents with    Shoulder Pain     Left shoulder        HPI: Patient presents for follow-up of left shoulder pain. Patient had a biceps tendon injection on 11-2-2022 and AC injection on 1-. Patient does note continued superior and lateral pain. Still some anterior pain as well. She is ready for surgical intervention. We have previously discussed some increased complexity given her neurological symptoms and falls. She has not fallen in one month. She is a participant in a study. She states she had about 75 to 80% relief of the [facility] joint which also helped with some of the biceps.     Allergies   Allergen Reactions    Diazepam Other (See Comments)     Suicidal thoughts       Penicillins Anaphylaxis     Has not been able to tolerate cephalosporins     Pineapple Shortness Of Breath     wheezing    Sumatriptan Shortness Of Breath    Theophylline Shortness Of Breath    Theophyllines Shortness Of Breath    Cephalexin Other (See Comments)     Other reaction(s): Dizziness    Sulfa Antibiotics Rash     Other reaction(s): Vomiting    Topiramate Other (See Comments)     Caused disorientation, loss of feeling in left leg (numbness)    Tramadol Rash    Verapamil Other (See Comments)     Lowers blood pressure significantly beyond normal hypotension      [place] Oil Swelling     Causes lips to swell       Augmentin [Amoxicillin-Pot Clavulanate] Hives    Dermabond Hives    Keflex [Cephalexin] Hives    Kenalog [Triamcinolone Acetonide] Hives    Nuts [Peanut-Containing Drug Products] Hives     [place] only- not peanuts    Pneumococcal Vaccine Swelling    Sulfa Antibiotics Hives    Topiramate Other (See Comments) and Myalgia    Triamcinolone Acetonide Hives     Kenalog injection caused hives     Ultram [Tramadol] Hives    Valium [Diazepam] Other (See Comments)    Vitamin B12 Other (See Comments) Injection Only  swelling     Past Medical History:   Diagnosis Date    Adverse effect of anesthesia     \"a little goes a long way with me\"     Arthritis     Arthritis     spine and hands     Asthma     last attack 05/2019; inhaler prn     BMI 36.0-36.9,adult     Chronic pain     chronic headache  beta histidine for this    Depression     Hyperlipidemia     no meds     Hypertension     Hypothyroidism 2018    Migraines     candesartan for this - takes at night    Movement disorder     loses where body parts are at times    PONV (postoperative nausea and vomiting)     nauseous upon waking in PACU     Thyroid disease     hypo-on med    Vertigo     Vestibular nerve disorder 2008    migraines - and nerve damage as well     Past Surgical History:   Procedure Laterality Date    CERVICAL DISC ARTHROPLASTY      CERVICAL FUSION N/A 7/12/2022    C5-C7 anterior cervical discectomy and fusion with interbody spacer/allograft and instrumentation performed by Vincent Guidry MD at [address]      possibly open patient unsure    EYE MUSCLE SURGERY      HEENT      oral, bilateral eye muscle    JOINT REPLACEMENT      ORTHOPEDIC SURGERY      right wrist x 2, right shoulder      Family History   Problem Relation Age of Onset    Parkinson's Disease Paternal Grandfather     Macular Degen Paternal Grandfather     Cancer Paternal Grandmother         brain tumor    Stroke Maternal Grandfather     Heart Disease Maternal Grandfather     Cancer Maternal Grandmother         lymphoma    Osteoarthritis Father     Diabetes Father     Thyroid Disease Mother     Diabetes Mother     Asthma Mother     Stroke Mother         11/2020     Social History     Socioeconomic History    Marital status: Single     Spouse name: Not on file    Number of children: Not on file    Years of education: Not on file    Highest education level: Not on file   Occupational History    Not on file   Tobacco Use    Smoking status: Never    Smokeless tobacco: Never   Vaping Use    Vaping Use: Never used   Substance and Sexual Activity    Alcohol use: Yes    Drug use: No    Sexual activity: Not Currently     Partners: Male   Other Topics Concern    Not on file   Social History Narrative    ** Merged History Encounter **          Social Determinants of Health     Financial Resource Strain: Low Risk     Difficulty of Paying Living Expenses: Not hard at all   Food Insecurity: No Food Insecurity    Worried About Running Out of Food in the Last Year: Never true    Ran Out of Food in the Last Year: Never true   Transportation Needs: Not on file   Physical Activity: Insufficiently Active    Days of Exercise per Week: 3 days    Minutes of Exercise per Session: 10 min   Stress: Not on file   Social Connections: Not on file   Intimate Partner Violence: Not on file   Housing Stability: Not on file          AMB PAIN ASSESSMENT 7/19/2022   Severity of Pain 7       Review of Systems  Non-contributory    PE:    Full motion. Tender at the [facility] joint by this. Positive speeds and Hooven's. Xray:    A/Plan:     ICD-10-CM    1. Left shoulder pain, unspecified chronicity  M25.512       2. Arm paresthesia, left  R20.2       3. Tendinopathy of left biceps  M67.922       4. Osteoarthritis of left AC (acromioclavicular) joint  M19.012            Given the chronicity and severity of the patient's symptoms, we will obtain an MRI. We will see them back after the scan and make a determination regarding further treatment. If there is a tear or other problem, they may require surgery. If negative, would recommend NSAID's, PT, or injection. They are amenable to this treatment plan. Return for MRI slot.         Huang Anthony MD  02/24/23

## 2023-02-27 ENCOUNTER — TELEMEDICINE (OUTPATIENT)
Dept: INTERNAL MEDICINE CLINIC | Facility: CLINIC | Age: 43
End: 2023-02-27
Payer: MEDICARE

## 2023-02-27 DIAGNOSIS — J02.9 PHARYNGITIS, UNSPECIFIED ETIOLOGY: Primary | ICD-10-CM

## 2023-02-27 PROCEDURE — 99213 OFFICE O/P EST LOW 20 MIN: CPT | Performed by: FAMILY MEDICINE

## 2023-02-27 RX ORDER — AZITHROMYCIN 250 MG/1
250 TABLET, FILM COATED ORAL SEE ADMIN INSTRUCTIONS
Qty: 6 TABLET | Refills: 0 | Status: SHIPPED | OUTPATIENT
Start: 2023-02-27 | End: 2023-03-04

## 2023-02-27 ASSESSMENT — ENCOUNTER SYMPTOMS
TROUBLE SWALLOWING: 1
SORE THROAT: 1
SHORTNESS OF BREATH: 0
VOICE CHANGE: 1
ABDOMINAL PAIN: 0
BLOOD IN STOOL: 0

## 2023-02-27 ASSESSMENT — PATIENT HEALTH QUESTIONNAIRE - PHQ9
SUM OF ALL RESPONSES TO PHQ9 QUESTIONS 1 & 2: 0
1. LITTLE INTEREST OR PLEASURE IN DOING THINGS: 0
SUM OF ALL RESPONSES TO PHQ QUESTIONS 1-9: 0
2. FEELING DOWN, DEPRESSED OR HOPELESS: 0
SUM OF ALL RESPONSES TO PHQ QUESTIONS 1-9: 0

## 2023-02-27 NOTE — PROGRESS NOTES
2023    TELEHEALTH EVALUATION -- Audio/Visual (During XDMKS-04 public health emergency)    HPI:    Polo Le (:  1980) has requested an audio/video evaluation for the following concern(s):    Chief Complaint   Patient presents with    Pharyngitis     Severe sore throat on left side - Pt states it is very red with dark spots in the back of throat. Also pain in the upper palate on the left side      Sore throat  Left ear pain radiating to left neck since last week. Last night with left sided throat pain, hard to swallow no fever. Tried Cepacol OTC. Pain progressive, also now with very red left tonsil and palate. Review of Systems   Constitutional:  Negative for fatigue and fever. HENT:  Positive for ear pain, sore throat, trouble swallowing and voice change. Negative for congestion. Respiratory:  Negative for shortness of breath. Cardiovascular:  Negative for chest pain. Gastrointestinal:  Negative for abdominal pain and blood in stool. Genitourinary:  Negative for difficulty urinating. Skin:  Negative for rash. Neurological:  Negative for headaches. Prior to Visit Medications    Medication Sig Taking? Authorizing Provider   hydrOXYzine HCl (ATARAX) 10 MG tablet  Yes Historical Provider, MD   lamoTRIgine (LAMICTAL) 100 MG tablet Take 1 tablet by mouth daily Yes Anne James MD   venlafaxine (EFFEXOR XR) 75 MG extended release capsule Take 1 capsule by mouth daily Yes Anne James MD   clonazePAM (KLONOPIN) 0.5 MG tablet Take 1 tablet by mouth daily as needed for Anxiety for up to 90 days. Yes Anne James MD   thyroid (ARMOUR) 30 MG tablet Take 30 mg by mouth in the morning. Yes Historical Provider, MD   Betahistine HCl (BETAHISTINE DIHYDROCHLORIDE) POWD 16 mg by Does not apply route 2 times daily Yes Historical Provider, MD   fexofenadine (ALLEGRA) 180 MG tablet Take 180 mg by mouth in the morning.  Yes Historical Provider, MD   fluticasone (FLONASE) 50 MCG/ACT nasal spray 1 spray by Each Nostril route daily Yes Historical Provider, MD   tiZANidine (ZANAFLEX) 4 MG capsule Take 4 mg by mouth 3 times daily as needed for Muscle spasms Yes Historical Provider, MD   ondansetron (ZOFRAN) 4 MG tablet Take 4 mg by mouth every 8 hours as needed for Nausea or Vomiting Yes Historical Provider, MD   acetaminophen (TYLENOL) 500 MG tablet Take 1,000 mg by mouth every 6 hours as needed for Pain Yes Historical Provider, MD   PROGESTERONE PO Take by mouth at bedtime Progesterone with testosterone denny (dissolvable pill) Yes Historical Provider, MD   MAGNESIUM PO Take 2 tablets by mouth daily Yes Ar Automatic Reconciliation   albuterol sulfate  (90 Base) MCG/ACT inhaler Inhale 2 puffs into the lungs every 4 hours as needed Yes Ar Automatic Reconciliation   ascorbic acid (VITAMIN C) 500 MG tablet Take 500 mg by mouth daily Yes Ar Automatic Reconciliation   indomethacin (INDOCIN) 25 MG capsule TAKE 1-2 TABLETS BY MOUTH 3 TIMES DAILY AS NEEDED FOR MIGRAINE. LIMIT 2 DAYS/WEEK. TAKE WITH FOOD Yes Ar Automatic Reconciliation   mometasone (NASONEX) 50 MCG/ACT nasal spray 1 spray by Nasal route at bedtime  Yes Ar Automatic Reconciliation   Albuterol Sulfate, sensor, (PROAIR DIGIHALER) 108 (90 Base) MCG/ACT AEPB Inhale 2 puffs into the lungs 4 times daily as needed  Patient not taking: Reported on 2/27/2023  Historical Provider, MD   candesartan (ATACAND) 16 MG tablet Take 16 mg by mouth at bedtime  Patient not taking: Reported on 2/27/2023  Historical Provider, MD       Social History     Tobacco Use    Smoking status: Never    Smokeless tobacco: Never   Vaping Use    Vaping Use: Never used   Substance Use Topics    Alcohol use: Yes    Drug use:  No            PHYSICAL EXAMINATION:  [ INSTRUCTIONS:  \"[x]\" Indicates a positive item  \"[]\" Indicates a negative item  -- DELETE ALL ITEMS NOT EXAMINED]  Vital Signs: (As obtained by patient/caregiver or practitioner observation)    Blood pressure- Heart rate-    Respiratory rate-    Temperature-  Pulse oximetry-     Constitutional: [x] Appears well-developed and well-nourished [] No apparent distress      [] Abnormal-   Mental status  [x] Alert and awake  [] Oriented to person/place/time []Able to follow commands      Eyes:  EOM    []  Normal  [] Abnormal-  Sclera  []  Normal  [] Abnormal -         Discharge []  None visible  [] Abnormal -    HENT:   [] Normocephalic, atraumatic. [x] Abnormal slightly hoarse  [] Mouth/Throat: Mucous membranes are moist.     External Ears [] Normal  [] Abnormal-     Neck: [] No visualized mass     Pulmonary/Chest: [x] Respiratory effort normal.  [] No visualized signs of difficulty breathing or respiratory distress        [] Abnormal-      Musculoskeletal:   [] Normal gait with no signs of ataxia         [] Normal range of motion of neck        [] Abnormal-       Neurological:        [] No Facial Asymmetry (Cranial nerve 7 motor function) (limited exam to video visit)          [] No gaze palsy        [] Abnormal-         Skin:        [] No significant exanthematous lesions or discoloration noted on facial skin         [] Abnormal-            Psychiatric:       [] Normal Affect [] No Hallucinations        [] Abnormal-     Other pertinent observable physical exam findings-     ASSESSMENT/PLAN:   Diagnosis Orders   1. Pharyngitis, unspecified etiology  azithromycin (ZITHROMAX) 250 MG tablet        Pharyngitis, getting worse despite OTC meds. Gargle with warm water and salt. Start Dipeshwyn Griffon, consider prednisone if no better. Opportunity to ask questions was offered and were answered to the best of my ability. Call, send [employer] message or RTC if with questions or concerns      No follow-ups on file. Slime Harrell, was evaluated through a synchronous (real-time) audio-video encounter. The patient (or guardian if applicable) is aware that this is a billable service, which includes applicable co-pays.  This Virtual Visit was conducted with patient's (and/or legal guardian's) consent. The visit was conducted pursuant to the emergency declaration under the [facility] [address] authority and the [employer] Act. Patient identification was verified, and a caregiver was present when appropriate. The patient was located at Home: [address] [place] [address]  [facility]  Provider was located at Home ([facility]): [place]        Total time spent on this encounter: Not billed by time    --Norma Muhammad MD on 2/27/2023 at 1:43 PM    An electronic signature was used to authenticate this note.

## 2023-03-02 ENCOUNTER — HOSPITAL ENCOUNTER (OUTPATIENT)
Dept: PHYSICAL THERAPY | Age: 43
Setting detail: RECURRING SERIES
Discharge: HOME | End: 2023-03-05
Payer: MEDICARE

## 2023-03-02 PROCEDURE — 97140 MANUAL THERAPY 1/> REGIONS: CPT

## 2023-03-02 PROCEDURE — 97112 NEUROMUSCULAR REEDUCATION: CPT

## 2023-03-02 ASSESSMENT — PAIN DESCRIPTION - LOCATION: LOCATION: SHOULDER;HEAD

## 2023-03-02 ASSESSMENT — PAIN SCALES - GENERAL: PAINLEVEL_OUTOF10: 5

## 2023-03-02 ASSESSMENT — PAIN DESCRIPTION - ORIENTATION: ORIENTATION: RIGHT

## 2023-03-02 NOTE — PROGRESS NOTES
Flako Díaz  : 1980  Primary: [facility]  Secondary:  Katy Manuelt  [place] [address]  Phone: [phone]  Fax: [phone] Plan Frequency: 2 times a week for 90 days    Plan of Care/Certification Expiration Date: 05/10/23      PT Visit Info: Total # of Visits to Date: 29  Progress Note Counter: 2  Canceled Appointment: 3     Visit Count:  34   OUTPATIENT PHYSICAL THERAPY:OP NOTE TYPE: Treatment Note 3/2/2023       Episode  }Appt Desk             Treatment Diagnosis:  Difficulty in walking, Not elsewhere classified (R26.2)  Generalized Muscle Weakness (M62.81)  Other abnormalities of gait and mobility (R26.89)  Medical/Referring Diagnosis:  No admission diagnoses are documented for this encounter. Referring Physician:  Jarek Orellana DO MD Orders:  PT Eval and Treat   Date of Onset:  Onset Date:  (Chronic)     Allergies:   Diazepam, Penicillins, Pineapple, Sumatriptan, Theophylline, Theophyllines, Cephalexin, Sulfa antibiotics, Topiramate, Tramadol, Verapamil, [place] oil, Augmentin [amoxicillin-pot clavulanate], Dermabond, Keflex [cephalexin], Kenalog [triamcinolone acetonide], Nuts [peanut-containing drug products], Pneumococcal vaccine, Sulfa antibiotics, Topiramate, Triamcinolone acetonide, Ultram [tramadol], Valium [diazepam], and Vitamin b12  Restrictions/Precautions:  No data recordedNo data recorded   Interventions Planned (Treatment may consist of any combination of the following):    Current Treatment Recommendations: Strengthening; Balance training; Functional mobility training; Gait training; Home exercise program; Vestibular rehab     Subjective Comments:   \"I am on day four of antibiotics for my tonsils. Dizziness has been up and down today\".   Initial:}Right Shoulder, Head 5/10Post Session:  Right  Shoulder, Head 10  Medications Last Reviewed:  3/2/2023  Updated Objective Findings:  None Today  Treatment   NEUROMUSCULAR RE-EDUCATION: (30 minutes):    Exercise/activities per grid below to improve balance and coordination. Required minimal verbal cues to promote static and dynamic balance in standing. Date:  3/2/2023 Date:  2/16/23 Date:  2/23/23   Activity/Exercise Parameters Parameters Parameters   Marching in hallway with hiking pole 6 laps 4 laps 4 laps   Sidestepping in hallway with hiking pole 6 laps 4 laps 4 laps   Walking through cones with hiking pole 6 laps 4 laps 4 laps   Walking with horizontal head turns      Stepping over half foam with hiking pole Forward 15 reps each direction  Lateral 15 reps each direction Forward 15 reps each direction  Lateral 15 reps each direction Forward 10 reps each direction  Lateral 10 reps each direction   Step taps with hiking pole      Sit to stand from chair      Sliding right/Sliding left      Seated turns right/left on hilo mat      Seated ball kicks/ball throws      Seated trunk rotation with red ball      Standing on blue foam        MANUAL THERAPY: (15 minutes): Soft tissue mobilization was utilized and necessary because of the patient's painful spasm. Patient received trigger point release along bilateral upper traps/scapular region to decrease pain. Skin intact after treatment. Treatment/Session Summary:    Treatment Assessment:  Patient tolerated exercises well. Communication/Consultation:  None today  Equipment provided today:  None  Recommendations/Intent for next treatment session: Next visit will focus on balance, gait, and vestibular.     Total Treatment Billable Duration:  45 minutes  Time In: 0340  Time Out: 1600    CHIRAG HOPE, PT       Charge Capture  }Post Session Pain  PT Visit Info  [facility] Portal  MD Guidelines  Scanned Media  Benefits  MyChart    Future Appointments   Date Time Provider Aye Caputo   3/9/2023  2:30 PM Patrick Abbott PT [facility]   3/16/2023  2:30 PM Patrick Abbott PT ANURADHA RIVERA 3/17/2023 10:45 AM POA INT MRI INTMXR AMB RAD SC   4/3/2023 11:15 AM Akin Zapata MD [facility] AMB   4/4/2023  9:00 AM THEODORE Myrick MD PO [facility] AMB   5/17/2023  1:40 PM Aayush Ronquillo MD [id number] AMB   7/14/2023  9:00 AM CA[facility] LAB [facility] AMB   7/21/2023 11:20 AM Keith Mota MD [facility] AMB

## 2023-03-09 ENCOUNTER — HOSPITAL ENCOUNTER (OUTPATIENT)
Dept: PHYSICAL THERAPY | Age: 43
Setting detail: RECURRING SERIES
Discharge: HOME | End: 2023-03-12
Payer: MEDICARE

## 2023-03-09 PROCEDURE — 97112 NEUROMUSCULAR REEDUCATION: CPT

## 2023-03-09 PROCEDURE — 97140 MANUAL THERAPY 1/> REGIONS: CPT

## 2023-03-09 ASSESSMENT — PAIN SCALES - GENERAL: PAINLEVEL_OUTOF10: 6

## 2023-03-09 NOTE — PROGRESS NOTES
Tejsa Solomon  : 1980  Primary: [facility]  Secondary:  Barb Taylor[address] [address]  Phone: [phone]  Fax: [phone] Plan Frequency: 2 times a week for 90 days    Plan of Care/Certification Expiration Date: 05/10/23      PT Visit Info: Total # of Visits to Date: 28  Progress Note Counter: 2  Canceled Appointment: 3     Visit Count:  35   OUTPATIENT PHYSICAL THERAPY:OP NOTE TYPE: Treatment Note 3/9/2023       Episode  }Appt Desk             Treatment Diagnosis:  Difficulty in walking, Not elsewhere classified (R26.2)  Generalized Muscle Weakness (M62.81)  Other abnormalities of gait and mobility (R26.89)  Medical/Referring Diagnosis:  No admission diagnoses are documented for this encounter. Referring Physician:  Shivam Landeros DO MD Orders:  PT Eval and Treat   Date of Onset:  Onset Date:  (Chronic)     Allergies:   Diazepam, Penicillins, Pineapple, Sumatriptan, Theophylline, Theophyllines, Cephalexin, Sulfa antibiotics, Topiramate, Tramadol, Verapamil, [place] oil, Augmentin [amoxicillin-pot clavulanate], Dermabond, Keflex [cephalexin], Kenalog [triamcinolone acetonide], Nuts [peanut-containing drug products], Pneumococcal vaccine, Sulfa antibiotics, Topiramate, Triamcinolone acetonide, Ultram [tramadol], Valium [diazepam], and Vitamin b12  Restrictions/Precautions:  No data recordedNo data recorded   Interventions Planned (Treatment may consist of any combination of the following):    Current Treatment Recommendations: Strengthening; Balance training; Functional mobility training; Gait training; Home exercise program; Vestibular rehab     Subjective Comments:   Patient reports taking some medications yesterday due to her dizziness. Dizziness 4/10.   Initial:}    6/10Post Session:       5/10  Medications Last Reviewed:  3/9/2023  Updated Objective Findings:  None Today  Treatment   NEUROMUSCULAR RE-EDUCATION: (30 minutes):    Exercise/activities per grid below to improve balance and coordination. Required minimal verbal cues to promote static and dynamic balance in standing. Date:  3/2/2023 Date:  3/9/23 Date:  2/23/23   Activity/Exercise Parameters Parameters Parameters   Marching in hallway with hiking pole 6 laps 4 laps 4 laps   Sidestepping in hallway with hiking pole 6 laps 4 laps 4 laps   Walking through cones with hiking pole 6 laps 4 laps 4 laps   Walking with horizontal head turns      Stepping over half foam with hiking pole Forward 15 reps each direction  Lateral 15 reps each direction Forward 15 reps each direction  Lateral 15 reps each direction Forward 10 reps each direction  Lateral 10 reps each direction   Step taps with hiking pole      Sit to stand from chair      Sliding right/Sliding left      Seated turns right/left on hilo mat      Seated ball kicks/ball throws      Seated trunk rotation with red ball      Standing on blue foam        MANUAL THERAPY: (15 minutes): Soft tissue mobilization was utilized and necessary because of the patient's painful spasm. Patient received trigger point release along bilateral upper traps/scapular region to decrease pain. Skin intact after treatment. Treatment/Session Summary:    Treatment Assessment:  Patient tolerated treatment well. Communication/Consultation:  None today  Equipment provided today:  None  Recommendations/Intent for next treatment session: Next visit will focus on balance, gait, and vestibular.     Total Treatment Billable Duration:  45 minutes  Time In: 1430  Time Out: 5783    PAULA D Verita Oppenheim, PT       Charge Capture  }Post Session Pain  PT Visit Info  [employer] Portal  MD Guidelines  Scanned Media  Benefits  MyChart    Future Appointments   Date Time Provider Aye Caputo   3/16/2023  2:30 PM Miguel Blandon PT [facility]   3/17/2023 10:45 AM POA INT MRI INTMXR AMB RAD SC   4/3/2023 11:15 AM Carolina Needle MD [facility] AMB   4/4/2023  9:00 AM B Nissa Dukes MD [facility] AMB   5/17/2023  1:40 PM Traci Ritter MD [id number] AMB   7/14/2023  9:00 AM CAF LAB [facility] AMB   7/21/2023 11:20 AM Richelle Fink MD [facility] AMB

## 2023-03-16 ENCOUNTER — HOSPITAL ENCOUNTER (OUTPATIENT)
Dept: PHYSICAL THERAPY | Age: 43
Setting detail: RECURRING SERIES
Discharge: HOME | End: 2023-03-19
Payer: MEDICARE

## 2023-03-16 PROCEDURE — 97140 MANUAL THERAPY 1/> REGIONS: CPT

## 2023-03-16 PROCEDURE — 97112 NEUROMUSCULAR REEDUCATION: CPT

## 2023-03-16 ASSESSMENT — PAIN SCALES - GENERAL: PAINLEVEL_OUTOF10: 7

## 2023-03-16 NOTE — PROGRESS NOTES
MD [facility]PER [facility] AMB   4/6/2023  2:30 PM Carolynne Ros, PT [facility]   4/13/2023  2:30 PM Carolynne Ros, PT [facility]   4/20/2023  2:30 PM Carolynne Ros, PT [facility]   4/27/2023  2:30 PM Carolynne Ros, PT [facility]   5/17/2023  1:40 PM Michael Bhatt MD [id number] AMB   7/14/2023  9:00 AM CA[facility] LAB [facility] AMB   7/21/2023 11:20 AM Yolis Jacobo MD [facility] AMB

## 2023-03-23 ENCOUNTER — APPOINTMENT (OUTPATIENT)
Dept: PHYSICAL THERAPY | Age: 43
End: 2023-03-23
Payer: MEDICARE

## 2023-03-30 ENCOUNTER — APPOINTMENT (OUTPATIENT)
Dept: PHYSICAL THERAPY | Age: 43
End: 2023-03-30
Payer: MEDICARE

## 2023-04-03 ENCOUNTER — OFFICE VISIT (OUTPATIENT)
Dept: ORTHOPEDIC SURGERY | Age: 43
End: 2023-04-03

## 2023-04-03 DIAGNOSIS — Z98.1 STATUS POST CERVICAL ARTHRODESIS: Primary | ICD-10-CM

## 2023-04-03 NOTE — PROGRESS NOTES
albuterol sulfate  (90 Base) MCG/ACT inhaler Inhale 2 puffs into the lungs every 4 hours as needed    ascorbic acid (VITAMIN C) 500 MG tablet Take 500 mg by mouth daily    indomethacin (INDOCIN) 25 MG capsule TAKE 1-2 TABLETS BY MOUTH 3 TIMES DAILY AS NEEDED FOR MIGRAINE. LIMIT 2 DAYS/WEEK. TAKE WITH FOOD    mometasone (NASONEX) 50 MCG/ACT nasal spray 1 spray by Nasal route at bedtime      No current facility-administered medications for this visit. Allergies: Allergies   Allergen Reactions    Diazepam Other (See Comments)     Suicidal thoughts       Penicillins Anaphylaxis     Has not been able to tolerate cephalosporins     Pineapple Shortness Of Breath     wheezing    Sumatriptan Shortness Of Breath    Theophylline Shortness Of Breath    Theophyllines Shortness Of Breath    Cephalexin Other (See Comments)     Other reaction(s): Dizziness    Sulfa Antibiotics Rash     Other reaction(s): Vomiting    Topiramate Other (See Comments)     Caused disorientation, loss of feeling in left leg (numbness)    Tramadol Rash    Verapamil Other (See Comments)     Lowers blood pressure significantly beyond normal hypotension      [place] Oil Swelling     Causes lips to swell       Augmentin [Amoxicillin-Pot Clavulanate] Hives    Dermabond Hives    Keflex [Cephalexin] Hives    Kenalog [Triamcinolone Acetonide] Hives    Nuts [Peanut-Containing Drug Products] Hives     [place] only- not peanuts    Pneumococcal Vaccine Swelling    Sulfa Antibiotics Hives    Topiramate Other (See Comments) and Myalgia    Triamcinolone Acetonide Hives     Kenalog injection caused hives     Ultram [Tramadol] Hives    Valium [Diazepam] Other (See Comments)    Vitamin B12 Other (See Comments)     Injection Only  swelling        Physical Exam:     Oriented to person, place and time. Mood and affect are appropriate. Respirations are unlabored and there is no evidence of cyanosis.     Wound is healed up nicely without erythema or underlying

## 2023-04-04 ENCOUNTER — OFFICE VISIT (OUTPATIENT)
Dept: ORTHOPEDIC SURGERY | Age: 43
End: 2023-04-04

## 2023-04-04 DIAGNOSIS — M25.512 LEFT SHOULDER PAIN, UNSPECIFIED CHRONICITY: ICD-10-CM

## 2023-04-04 DIAGNOSIS — M19.012 OSTEOARTHRITIS OF LEFT AC (ACROMIOCLAVICULAR) JOINT: Primary | ICD-10-CM

## 2023-04-04 NOTE — PROGRESS NOTES
Patient was fitted and instructed on the use of Arm Sling for the left shoulder. Patient is aware the arm should fit comfortably in the sling with the elbow as far back as possible. I explained the thumb should be placed in the thumb loop to help prevent the arm from sliding out of the sling. Patient was instructed that the shoulder strap should cross over the opposite shoulder continuing threw the loop attached to the sling and then velcro back on the shoulder strap. I explained and demonstrated all information to the patient about how to properly use the machine. It will be used to help reduce pain and swelling, in turn facilitate healing and speed up the rehabilitation process. The patient was instructed on how to properly fill the machine with ice and water as well as the importance to ALWAYS use a barrier between your skin and the cold pad to avoid additional injury. The patient was instructed to take the machine to the hospital with them the morning of their surgery. Patient was advised that if they had any questions about the Ice Man Machine or how to properly use it to please call me at [phone]. Patient read and signed documenting they understand and agree to [facility]'s current DME return policy.
Rotator Cuff:    Supraspinatus: Mild tendinosis without discrete tearing   Infraspinatus: Mild tendinosis without discrete tearing   Subscapularis: Intact       Biceps Tendon: Intact       Glenohumeral Joint:   Labrum: Probable tearing anterior superior labrum extending into the region of   the biceps anchor with small cyst formation of the superior glenoid. Cartilage: No full-thickness cartilage defect. Other: Persistent effacement of the fat of the rotator interval       Bones: Similar appearance of diffusely low marrow signal of the scapula and   humerus with prominent sparing of the humeral epiphysis, likely red marrow   conversion. Muscles: Muscle bulk is intact. Nerves: Unremarkable without mass lesion in the quadrilateral space or course of   the suprascapular nerve. Vessels: Unremarkable noncontrast appearance. Impression   1. Probable tearing anterior superior labrum extending into the region of the   biceps anchor with small cyst formation within the superior glenoid. MRI   arthrogram may better delineate extent of labral tearing. 2.  Similar appearance of mild supraspinous and infraspinatus tendinosis without   discrete tearing. 3.  Persistent effacement of the fat of the rotator interval, to be seen in   adhesive capsulitis         Independent review of the MRIs performed today from the left shoulder on 4/3/2023. Patient has medial subluxation of the superior biceps to me on both the coronal and somewhat axial films. There is a cyst seen in the superior glenoid somewhat midline with mild anterior. The rest of the rotator cuff appears intact. Mild edema seen with distal clavicle could be artifact. There is thickening of the anteroinferior synovium in the shoulder. A/Plan:     ICD-10-CM    1. Osteoarthritis of left AC (acromioclavicular) joint  M19.012       2.  Left shoulder pain, unspecified chronicity  M25.512            The patient desires arthroscopy of the left

## 2023-04-13 ENCOUNTER — APPOINTMENT (OUTPATIENT)
Dept: PHYSICAL THERAPY | Age: 43
End: 2023-04-13
Payer: MEDICARE

## 2023-04-19 RX ORDER — BETAHISTINE HCL 100 %
24 POWDER (GRAM) MISCELLANEOUS 2 TIMES DAILY
COMMUNITY
Start: 2023-03-07

## 2023-04-19 RX ORDER — IBUPROFEN 200 MG
200 TABLET ORAL AS NEEDED
COMMUNITY

## 2023-04-20 ENCOUNTER — HOSPITAL ENCOUNTER (OUTPATIENT)
Dept: PHYSICAL THERAPY | Age: 43
Setting detail: RECURRING SERIES
Discharge: HOME | End: 2023-04-23
Payer: MEDICARE

## 2023-04-20 PROCEDURE — 97112 NEUROMUSCULAR REEDUCATION: CPT

## 2023-04-20 PROCEDURE — 97140 MANUAL THERAPY 1/> REGIONS: CPT

## 2023-04-20 ASSESSMENT — PAIN SCALES - GENERAL: PAINLEVEL_OUTOF10: 4

## 2023-04-27 ENCOUNTER — HOSPITAL ENCOUNTER (OUTPATIENT)
Dept: PHYSICAL THERAPY | Age: 43
Setting detail: RECURRING SERIES
Discharge: HOME | End: 2023-04-30
Payer: MEDICARE

## 2023-04-27 PROCEDURE — 97112 NEUROMUSCULAR REEDUCATION: CPT

## 2023-04-27 PROCEDURE — 97140 MANUAL THERAPY 1/> REGIONS: CPT

## 2023-04-27 ASSESSMENT — PAIN DESCRIPTION - LOCATION: LOCATION: SHOULDER

## 2023-04-27 ASSESSMENT — PAIN SCALES - GENERAL: PAINLEVEL_OUTOF10: 5

## 2023-05-05 PROBLEM — M25.512 LEFT SHOULDER PAIN: Status: ACTIVE | Noted: 2021-12-16

## 2023-05-05 PROBLEM — M75.22 BICEPS TENDINITIS OF LEFT SHOULDER: Status: ACTIVE | Noted: 2023-05-05

## 2023-05-07 ENCOUNTER — ANESTHESIA EVENT (OUTPATIENT)
Dept: SURGERY | Age: 43
End: 2023-05-07
Payer: MEDICARE

## 2023-05-07 DIAGNOSIS — M25.512 LEFT SHOULDER PAIN, UNSPECIFIED CHRONICITY: Primary | ICD-10-CM

## 2023-05-07 RX ORDER — ASPIRIN 325 MG
325 TABLET ORAL DAILY
Qty: 7 TABLET | Refills: 0 | Status: SHIPPED | OUTPATIENT
Start: 2023-05-07 | End: 2023-05-14

## 2023-05-07 RX ORDER — MELOXICAM 7.5 MG/1
7.5 TABLET ORAL 2 TIMES DAILY PRN
Qty: 28 TABLET | Refills: 0 | Status: SHIPPED | OUTPATIENT
Start: 2023-05-07 | End: 2023-05-21

## 2023-05-07 RX ORDER — OXYCODONE HYDROCHLORIDE 5 MG/1
5-10 TABLET ORAL
Qty: 30 TABLET | Refills: 0 | Status: SHIPPED | OUTPATIENT
Start: 2023-05-07 | End: 2023-05-10

## 2023-05-07 RX ORDER — ACETAMINOPHEN 500 MG
500 TABLET ORAL 4 TIMES DAILY PRN
Qty: 40 TABLET | Refills: 0 | Status: SHIPPED | OUTPATIENT
Start: 2023-05-07 | End: 2023-05-17

## 2023-05-07 RX ORDER — AMOXICILLIN 250 MG
1 CAPSULE ORAL DAILY
Qty: 21 TABLET | Refills: 0 | Status: SHIPPED | OUTPATIENT
Start: 2023-05-07

## 2023-05-07 RX ORDER — ONDANSETRON 8 MG/1
4 TABLET, ORALLY DISINTEGRATING ORAL EVERY 6 HOURS
Qty: 16 TABLET | Refills: 0 | Status: SHIPPED | OUTPATIENT
Start: 2023-05-07

## 2023-05-08 ENCOUNTER — ANESTHESIA (OUTPATIENT)
Dept: SURGERY | Age: 43
End: 2023-05-08
Payer: MEDICARE

## 2023-05-08 ENCOUNTER — HOSPITAL ENCOUNTER (OUTPATIENT)
Age: 43
Setting detail: OUTPATIENT SURGERY
Discharge: HOME | End: 2023-05-08
Attending: ORTHOPAEDIC SURGERY | Admitting: ORTHOPAEDIC SURGERY
Payer: MEDICARE

## 2023-05-08 VITALS
OXYGEN SATURATION: 92 % | TEMPERATURE: 98.1 F | DIASTOLIC BLOOD PRESSURE: 82 MMHG | RESPIRATION RATE: 18 BRPM | SYSTOLIC BLOOD PRESSURE: 152 MMHG | WEIGHT: 230 LBS | BODY MASS INDEX: 43.43 KG/M2 | HEART RATE: 85 BPM | HEIGHT: 61 IN

## 2023-05-08 DIAGNOSIS — M19.012 OSTEOARTHRITIS OF LEFT AC (ACROMIOCLAVICULAR) JOINT: ICD-10-CM

## 2023-05-08 PROCEDURE — 6360000002 HC RX W HCPCS: Performed by: NURSE ANESTHETIST, CERTIFIED REGISTERED

## 2023-05-08 PROCEDURE — 2580000003 HC RX 258: Performed by: PHYSICIAN ASSISTANT

## 2023-05-08 PROCEDURE — 64415 NJX AA&/STRD BRCH PLXS IMG: CPT | Performed by: ANESTHESIOLOGY

## 2023-05-08 PROCEDURE — 7100000001 HC PACU RECOVERY - ADDTL 15 MIN: Performed by: ORTHOPAEDIC SURGERY

## 2023-05-08 PROCEDURE — C1713 ANCHOR/SCREW BN/BN,TIS/BN: HCPCS | Performed by: ORTHOPAEDIC SURGERY

## 2023-05-08 PROCEDURE — 2500000003 HC RX 250 WO HCPCS: Performed by: NURSE ANESTHETIST, CERTIFIED REGISTERED

## 2023-05-08 PROCEDURE — 2500000003 HC RX 250 WO HCPCS: Performed by: ANESTHESIOLOGY

## 2023-05-08 PROCEDURE — 7100000010 HC PHASE II RECOVERY - FIRST 15 MIN: Performed by: ORTHOPAEDIC SURGERY

## 2023-05-08 PROCEDURE — 23430 REPAIR BICEPS TENDON: CPT | Performed by: ORTHOPAEDIC SURGERY

## 2023-05-08 PROCEDURE — 6360000002 HC RX W HCPCS: Performed by: ORTHOPAEDIC SURGERY

## 2023-05-08 PROCEDURE — 7100000011 HC PHASE II RECOVERY - ADDTL 15 MIN: Performed by: ORTHOPAEDIC SURGERY

## 2023-05-08 PROCEDURE — 2709999900 HC NON-CHARGEABLE SUPPLY: Performed by: ORTHOPAEDIC SURGERY

## 2023-05-08 PROCEDURE — 3600000014 HC SURGERY LEVEL 4 ADDTL 15MIN: Performed by: ORTHOPAEDIC SURGERY

## 2023-05-08 PROCEDURE — 2720000010 HC SURG SUPPLY STERILE: Performed by: ORTHOPAEDIC SURGERY

## 2023-05-08 PROCEDURE — 2580000003 HC RX 258: Performed by: NURSE ANESTHETIST, CERTIFIED REGISTERED

## 2023-05-08 PROCEDURE — 6360000002 HC RX W HCPCS: Performed by: ANESTHESIOLOGY

## 2023-05-08 PROCEDURE — 2580000003 HC RX 258: Performed by: ANESTHESIOLOGY

## 2023-05-08 PROCEDURE — 3700000001 HC ADD 15 MINUTES (ANESTHESIA): Performed by: ORTHOPAEDIC SURGERY

## 2023-05-08 PROCEDURE — 7100000000 HC PACU RECOVERY - FIRST 15 MIN: Performed by: ORTHOPAEDIC SURGERY

## 2023-05-08 PROCEDURE — 6370000000 HC RX 637 (ALT 250 FOR IP): Performed by: ANESTHESIOLOGY

## 2023-05-08 PROCEDURE — 29824 SHO ARTHRS SRG DSTL CLAVICLC: CPT | Performed by: ORTHOPAEDIC SURGERY

## 2023-05-08 PROCEDURE — 6360000002 HC RX W HCPCS: Performed by: PHYSICIAN ASSISTANT

## 2023-05-08 PROCEDURE — 3600000004 HC SURGERY LEVEL 4 BASE: Performed by: ORTHOPAEDIC SURGERY

## 2023-05-08 PROCEDURE — 3700000000 HC ANESTHESIA ATTENDED CARE: Performed by: ORTHOPAEDIC SURGERY

## 2023-05-08 PROCEDURE — 2500000003 HC RX 250 WO HCPCS: Performed by: ORTHOPAEDIC SURGERY

## 2023-05-08 DEVICE — ANCHOR SUT DIA2.6MM SFT ROT CUF FIBERTAK: Type: IMPLANTABLE DEVICE | Site: SHOULDER | Status: FUNCTIONAL

## 2023-05-08 RX ORDER — SODIUM CHLORIDE 0.9 % (FLUSH) 0.9 %
5-40 SYRINGE (ML) INJECTION EVERY 12 HOURS SCHEDULED
Status: DISCONTINUED | OUTPATIENT
Start: 2023-05-08 | End: 2023-05-08 | Stop reason: HOSPADM

## 2023-05-08 RX ORDER — OXYCODONE HYDROCHLORIDE 5 MG/1
5 TABLET ORAL
Status: DISCONTINUED | OUTPATIENT
Start: 2023-05-08 | End: 2023-05-08 | Stop reason: HOSPADM

## 2023-05-08 RX ORDER — KETOROLAC TROMETHAMINE 30 MG/ML
INJECTION, SOLUTION INTRAMUSCULAR; INTRAVENOUS PRN
Status: DISCONTINUED | OUTPATIENT
Start: 2023-05-08 | End: 2023-05-08 | Stop reason: SDUPTHER

## 2023-05-08 RX ORDER — GLYCOPYRROLATE 0.2 MG/ML
INJECTION INTRAMUSCULAR; INTRAVENOUS PRN
Status: DISCONTINUED | OUTPATIENT
Start: 2023-05-08 | End: 2023-05-08 | Stop reason: SDUPTHER

## 2023-05-08 RX ORDER — TRANEXAMIC ACID 100 MG/ML
INJECTION, SOLUTION INTRAVENOUS PRN
Status: DISCONTINUED | OUTPATIENT
Start: 2023-05-08 | End: 2023-05-08 | Stop reason: SDUPTHER

## 2023-05-08 RX ORDER — EPHEDRINE SULFATE/0.9% NACL/PF 50 MG/5 ML
SYRINGE (ML) INTRAVENOUS PRN
Status: DISCONTINUED | OUTPATIENT
Start: 2023-05-08 | End: 2023-05-08 | Stop reason: SDUPTHER

## 2023-05-08 RX ORDER — LIDOCAINE HYDROCHLORIDE 10 MG/ML
1 INJECTION, SOLUTION INFILTRATION; PERINEURAL
Status: DISCONTINUED | OUTPATIENT
Start: 2023-05-08 | End: 2023-05-08 | Stop reason: HOSPADM

## 2023-05-08 RX ORDER — IPRATROPIUM BROMIDE AND ALBUTEROL SULFATE 2.5; .5 MG/3ML; MG/3ML
1 SOLUTION RESPIRATORY (INHALATION)
Status: DISCONTINUED | OUTPATIENT
Start: 2023-05-08 | End: 2023-05-08 | Stop reason: HOSPADM

## 2023-05-08 RX ORDER — SODIUM CHLORIDE 9 MG/ML
INJECTION, SOLUTION INTRAVENOUS PRN
Status: DISCONTINUED | OUTPATIENT
Start: 2023-05-08 | End: 2023-05-08 | Stop reason: HOSPADM

## 2023-05-08 RX ORDER — FENTANYL CITRATE 50 UG/ML
INJECTION, SOLUTION INTRAMUSCULAR; INTRAVENOUS PRN
Status: DISCONTINUED | OUTPATIENT
Start: 2023-05-08 | End: 2023-05-08 | Stop reason: SDUPTHER

## 2023-05-08 RX ORDER — ONDANSETRON 2 MG/ML
INJECTION INTRAMUSCULAR; INTRAVENOUS PRN
Status: DISCONTINUED | OUTPATIENT
Start: 2023-05-08 | End: 2023-05-08 | Stop reason: SDUPTHER

## 2023-05-08 RX ORDER — DEXAMETHASONE SODIUM PHOSPHATE 10 MG/ML
INJECTION INTRAMUSCULAR; INTRAVENOUS PRN
Status: DISCONTINUED | OUTPATIENT
Start: 2023-05-08 | End: 2023-05-08 | Stop reason: SDUPTHER

## 2023-05-08 RX ORDER — PROPOFOL 10 MG/ML
INJECTION, EMULSION INTRAVENOUS PRN
Status: DISCONTINUED | OUTPATIENT
Start: 2023-05-08 | End: 2023-05-08 | Stop reason: SDUPTHER

## 2023-05-08 RX ORDER — MIDAZOLAM HYDROCHLORIDE 2 MG/2ML
2 INJECTION, SOLUTION INTRAMUSCULAR; INTRAVENOUS
Status: COMPLETED | OUTPATIENT
Start: 2023-05-08 | End: 2023-05-08

## 2023-05-08 RX ORDER — FENTANYL CITRATE 50 UG/ML
50 INJECTION, SOLUTION INTRAMUSCULAR; INTRAVENOUS EVERY 5 MIN PRN
Status: DISCONTINUED | OUTPATIENT
Start: 2023-05-08 | End: 2023-05-08 | Stop reason: HOSPADM

## 2023-05-08 RX ORDER — BUPIVACAINE HYDROCHLORIDE 5 MG/ML
INJECTION, SOLUTION EPIDURAL; INTRACAUDAL
Status: DISCONTINUED | OUTPATIENT
Start: 2023-05-08 | End: 2023-05-08 | Stop reason: SDUPTHER

## 2023-05-08 RX ORDER — LIDOCAINE HYDROCHLORIDE 20 MG/ML
INJECTION, SOLUTION EPIDURAL; INFILTRATION; INTRACAUDAL; PERINEURAL PRN
Status: DISCONTINUED | OUTPATIENT
Start: 2023-05-08 | End: 2023-05-08 | Stop reason: SDUPTHER

## 2023-05-08 RX ORDER — ONDANSETRON 2 MG/ML
4 INJECTION INTRAMUSCULAR; INTRAVENOUS
Status: DISCONTINUED | OUTPATIENT
Start: 2023-05-08 | End: 2023-05-08 | Stop reason: HOSPADM

## 2023-05-08 RX ORDER — HYDROMORPHONE HYDROCHLORIDE 2 MG/ML
0.5 INJECTION, SOLUTION INTRAMUSCULAR; INTRAVENOUS; SUBCUTANEOUS EVERY 10 MIN PRN
Status: DISCONTINUED | OUTPATIENT
Start: 2023-05-08 | End: 2023-05-08 | Stop reason: HOSPADM

## 2023-05-08 RX ORDER — ACETAMINOPHEN 500 MG
1000 TABLET ORAL ONCE
Status: DISCONTINUED | OUTPATIENT
Start: 2023-05-08 | End: 2023-05-08 | Stop reason: HOSPADM

## 2023-05-08 RX ORDER — LIDOCAINE HYDROCHLORIDE AND EPINEPHRINE 10; 10 MG/ML; UG/ML
INJECTION, SOLUTION INFILTRATION; PERINEURAL PRN
Status: DISCONTINUED | OUTPATIENT
Start: 2023-05-08 | End: 2023-05-08 | Stop reason: ALTCHOICE

## 2023-05-08 RX ORDER — ESMOLOL HYDROCHLORIDE 10 MG/ML
INJECTION INTRAVENOUS PRN
Status: DISCONTINUED | OUTPATIENT
Start: 2023-05-08 | End: 2023-05-08 | Stop reason: SDUPTHER

## 2023-05-08 RX ORDER — SODIUM CHLORIDE 0.9 % (FLUSH) 0.9 %
5-40 SYRINGE (ML) INJECTION PRN
Status: DISCONTINUED | OUTPATIENT
Start: 2023-05-08 | End: 2023-05-08 | Stop reason: HOSPADM

## 2023-05-08 RX ORDER — APREPITANT 40 MG/1
40 CAPSULE ORAL ONCE
Status: COMPLETED | OUTPATIENT
Start: 2023-05-08 | End: 2023-05-08

## 2023-05-08 RX ORDER — NEOSTIGMINE METHYLSULFATE 1 MG/ML
INJECTION, SOLUTION INTRAVENOUS PRN
Status: DISCONTINUED | OUTPATIENT
Start: 2023-05-08 | End: 2023-05-08 | Stop reason: SDUPTHER

## 2023-05-08 RX ORDER — EPINEPHRINE 1 MG/ML(1)
AMPUL (ML) INJECTION PRN
Status: DISCONTINUED | OUTPATIENT
Start: 2023-05-08 | End: 2023-05-08 | Stop reason: ALTCHOICE

## 2023-05-08 RX ORDER — HALOPERIDOL 5 MG/ML
1 INJECTION INTRAMUSCULAR
Status: COMPLETED | OUTPATIENT
Start: 2023-05-08 | End: 2023-05-08

## 2023-05-08 RX ORDER — SCOLOPAMINE TRANSDERMAL SYSTEM 1 MG/1
1 PATCH, EXTENDED RELEASE TRANSDERMAL
Status: DISCONTINUED | OUTPATIENT
Start: 2023-05-08 | End: 2023-05-08 | Stop reason: HOSPADM

## 2023-05-08 RX ORDER — SODIUM CHLORIDE, SODIUM LACTATE, POTASSIUM CHLORIDE, CALCIUM CHLORIDE 600; 310; 30; 20 MG/100ML; MG/100ML; MG/100ML; MG/100ML
INJECTION, SOLUTION INTRAVENOUS CONTINUOUS
Status: DISCONTINUED | OUTPATIENT
Start: 2023-05-08 | End: 2023-05-08 | Stop reason: HOSPADM

## 2023-05-08 RX ORDER — ROCURONIUM BROMIDE 10 MG/ML
INJECTION, SOLUTION INTRAVENOUS PRN
Status: DISCONTINUED | OUTPATIENT
Start: 2023-05-08 | End: 2023-05-08 | Stop reason: SDUPTHER

## 2023-05-08 RX ADMIN — ONDANSETRON 4 MG: 2 INJECTION INTRAMUSCULAR; INTRAVENOUS at 13:57

## 2023-05-08 RX ADMIN — HYDROMORPHONE HYDROCHLORIDE 0.5 MG: 2 INJECTION, SOLUTION INTRAMUSCULAR; INTRAVENOUS; SUBCUTANEOUS at 14:25

## 2023-05-08 RX ADMIN — PHENYLEPHRINE HYDROCHLORIDE 100 MCG: 10 INJECTION INTRAVENOUS at 13:21

## 2023-05-08 RX ADMIN — MIDAZOLAM 2 MG: 1 INJECTION INTRAMUSCULAR; INTRAVENOUS at 11:58

## 2023-05-08 RX ADMIN — HYDROMORPHONE HYDROCHLORIDE 0.5 MG: 2 INJECTION, SOLUTION INTRAMUSCULAR; INTRAVENOUS; SUBCUTANEOUS at 14:35

## 2023-05-08 RX ADMIN — GENTAMICIN SULFATE 360 MG: 40 INJECTION, SOLUTION INTRAMUSCULAR; INTRAVENOUS at 13:07

## 2023-05-08 RX ADMIN — Medication 10 MG: at 13:09

## 2023-05-08 RX ADMIN — ROCURONIUM BROMIDE 40 MG: 10 INJECTION, SOLUTION INTRAVENOUS at 12:51

## 2023-05-08 RX ADMIN — PROPOFOL 50 MG: 10 INJECTION, EMULSION INTRAVENOUS at 12:55

## 2023-05-08 RX ADMIN — Medication 10 MG: at 13:23

## 2023-05-08 RX ADMIN — SODIUM CHLORIDE, POTASSIUM CHLORIDE, SODIUM LACTATE AND CALCIUM CHLORIDE: 600; 310; 30; 20 INJECTION, SOLUTION INTRAVENOUS at 10:16

## 2023-05-08 RX ADMIN — DEXAMETHASONE SODIUM PHOSPHATE 10 MG: 10 INJECTION INTRAMUSCULAR; INTRAVENOUS at 13:06

## 2023-05-08 RX ADMIN — FENTANYL CITRATE 100 MCG: 50 INJECTION, SOLUTION INTRAMUSCULAR; INTRAVENOUS at 12:50

## 2023-05-08 RX ADMIN — ESMOLOL HYDROCHLORIDE 30 MG: 10 INJECTION INTRAVENOUS at 14:04

## 2023-05-08 RX ADMIN — APREPITANT 40 MG: 40 CAPSULE ORAL at 10:16

## 2023-05-08 RX ADMIN — KETOROLAC TROMETHAMINE 30 MG: 30 INJECTION, SOLUTION INTRAMUSCULAR at 14:17

## 2023-05-08 RX ADMIN — LIDOCAINE HYDROCHLORIDE 100 MG: 20 INJECTION, SOLUTION EPIDURAL; INFILTRATION; INTRACAUDAL; PERINEURAL at 12:50

## 2023-05-08 RX ADMIN — BUPIVACAINE HYDROCHLORIDE 20 ML: 5 INJECTION, SOLUTION EPIDURAL; INTRACAUDAL; PERINEURAL at 11:57

## 2023-05-08 RX ADMIN — PHENYLEPHRINE HYDROCHLORIDE 50 MCG: 10 INJECTION INTRAVENOUS at 13:41

## 2023-05-08 RX ADMIN — TRANEXAMIC ACID 1000 MG: 100 INJECTION, SOLUTION INTRAVENOUS at 13:09

## 2023-05-08 RX ADMIN — PROPOFOL 200 MG: 10 INJECTION, EMULSION INTRAVENOUS at 12:50

## 2023-05-08 RX ADMIN — PHENYLEPHRINE HYDROCHLORIDE 50 MCG: 10 INJECTION INTRAVENOUS at 13:45

## 2023-05-08 RX ADMIN — HALOPERIDOL LACTATE 1 MG: 5 INJECTION, SOLUTION INTRAMUSCULAR at 14:50

## 2023-05-08 RX ADMIN — Medication 5 MG: at 14:01

## 2023-05-08 RX ADMIN — GLYCOPYRROLATE 0.8 MG: 0.2 INJECTION INTRAMUSCULAR; INTRAVENOUS at 14:01

## 2023-05-08 RX ADMIN — VANCOMYCIN HYDROCHLORIDE 1500 MG: 10 INJECTION, POWDER, LYOPHILIZED, FOR SOLUTION INTRAVENOUS at 10:19

## 2023-05-08 RX ADMIN — PHENYLEPHRINE HYDROCHLORIDE 50 MCG: 10 INJECTION INTRAVENOUS at 13:14

## 2023-05-08 RX ADMIN — DEXAMETHASONE SODIUM PHOSPHATE 4 MG: 4 INJECTION, SOLUTION INTRAMUSCULAR; INTRAVENOUS at 11:57

## 2023-05-08 ASSESSMENT — PAIN DESCRIPTION - ORIENTATION
ORIENTATION: LEFT

## 2023-05-08 ASSESSMENT — PAIN DESCRIPTION - LOCATION
LOCATION: SHOULDER

## 2023-05-08 ASSESSMENT — PAIN SCALES - GENERAL
PAINLEVEL_OUTOF10: 9
PAINLEVEL_OUTOF10: 3
PAINLEVEL_OUTOF10: 6
PAINLEVEL_OUTOF10: 7

## 2023-05-08 NOTE — PROGRESS NOTES
Discharge instructions given and reviewed with patient and mother. Time allowed for questions and answers.

## 2023-05-08 NOTE — ANESTHESIA PROCEDURE NOTES
Airway  Date/Time: 5/8/2023 12:58 PM  Urgency: elective    Airway not difficult    General Information and Staff    Patient location during procedure: OR  Resident/CRNA: Kenan Diehl APRN - CRNA  Performed: resident/CRNA     Indications and Patient Condition  Indications for airway management: anesthesia  Spontaneous Ventilation: absent  Sedation level: deep  Preoxygenated: yes  Patient position: sniffing  MILS not maintained throughout  Mask difficulty assessment: vent by bag mask    Final Airway Details  Final airway type: endotracheal airway      Successful airway: ETT  Cuffed: yes   Successful intubation technique: video laryngoscopy  Facilitating devices/methods: intubating stylet  Endotracheal tube insertion site: oral  Blade type: glidescope size 3. Blade size: #3  ETT size (mm): 7.0  Cormack-Lehane Classification: grade I - full view of glottis  Placement verified by: chest auscultation and capnometry   Measured from: lips  ETT to lips (cm): 23  Number of attempts at approach: 3 or more  Ventilation between attempts: bag mask  Number of other approaches attempted: 0    Additional Comments  X2 attempts with glidescope by Saroj Linton, paramedic student-good view of cords with glidescope, unable to pass tube. X1 attempt by CRNA, easy pass, atraumatic.    no
extremely rare adverse even of permanent weakness numbness or altered sensation. During this block C5 was identified under U/S and both  interscalene muscle bellies identified, medication was visualized surrounding nerve. No complications were immediately apparent following the block. Patient was monitored carefully following block for acute reactions or complications by nursing of anesthesia staff.   Medications Administered  bupivacaine (MARCAINE) PF injection 0.5% - Perineural   20 mL - 5/8/2023 11:57:00 AM  dexamethasone (DECADRON) injection 4 mg/mL - Perineural   4 mg - 5/8/2023 11:57:00 AM

## 2023-05-08 NOTE — OP NOTE
Operative Note    5/8/2023     Preoperative diagnosis:  Osteoarthritis of left AC (acromioclavicular) joint [M19.012]  Left shoulder pain, unspecified chronicity [M25.512]    Postoperative diagnosis: Left AC osteoarthritis, Left superior labral tear, Left biceps tendinitis. Surgeon(s) and Role:     * THEODORE Moreno MD - Primary     Assistant: Sherrie Pimentel, first sorenson, assisted during the procedure. She was necessary for patient positioning, wound closure, and assistance with the major portions of the operation. Her presence decreased the operative time and potential complication rate. Anesthesia: General,  regional block    Antibiotics: Weight-based dosing of IV vancomycin and gentamicin    Procedures:  Procedure(s):  LEFT SHOULDER ARTHROSCOPY DISTAL CLAVICLE RESECTION  OPEN BICEPS TENODESIS     DCR 48052  Subpectoral biceps tenodesis 29043    Findings:  1. EUA -   Normal full ROM. 2. Intra-articular -glenohumeral course appeared normal.  There was mild inferior labral pathology. No full-thickness tearing no instability. Superiorly there was a degenerative tear of the superior labrum as well as the intra-articular aspect of biceps was partially torn. Seems tabletop group. Subscapularis and the rest of the superior and posterior superior with her cuff was intact. 3. Subacromial -mild bursitis otherwise no evidence of tearing  4. AC joint -moderate AC arthritis    Indications / Consent: This is a patient who has persistent pain with weakness in the shoulder. They have not responded to conservative measures and were  were desirous of evaluation and possible arthroscopic or open repair of the rotator cuff as well as biceps evaluation. After previous discussions and treatments using both conservative and/or non-operative treatment options the patient elected to proceed with surgery due to continued symptoms.   A review of the risks and benefits, including but not limited to infection, stiffness,

## 2023-05-08 NOTE — ANESTHESIA PRE PROCEDURE
Department of Anesthesiology  Preprocedure Note       Name:  Refugio Reeder   Age:  37 y.o.  :  1980                                          MRN:  [id number]         Date:  2023      Surgeon: Hailey Ojeda): Corina Berrios MD    Procedure: Procedure(s):  LEFT SHOULDER ARTHROSCOPY DISTAL CLAVICLE RESECTION -REGIONAL BLOCK  OPEN BICEPS TENODESIS -REGIONAL BLOCK    Medications prior to admission:   Prior to Admission medications    Medication Sig Start Date End Date Taking? Authorizing Provider   acetaminophen (TYLENOL) 500 MG tablet Take 1 tablet by mouth 4 times daily as needed for Pain 23  LUISA Valenzuela   aspirin 325 MG tablet Take 1 tablet by mouth daily for 7 days Start after surgery 23  LUISA Valenzuela   meloxicam (MOBIC) 7.5 MG tablet Take 1 tablet by mouth 2 times daily as needed for Pain Take 1 tablet po BID for 2 weeks as needed for pain 23  LUISA Valenzuela   senna-docusate (PERICOLACE) 8.6-50 MG per tablet Take 1 tablet by mouth daily Take for constipation prophylaxis 23   LUISA Valenzuela   oxyCODONE (ROXICODONE) 5 MG immediate release tablet Take 1-2 tablets by mouth every 4-6 hours as needed for Pain for up to 3 days. Take starting night of surgery Max Daily Amount: 60 mg 5/7/23 5/10/23  LUISA Valenzuela   ondansetron (ZOFRAN-ODT) 8 MG TBDP disintegrating tablet Place 0.5 tablets under the tongue in the morning and 0.5 tablets at noon and 0.5 tablets in the evening and 0.5 tablets before bedtime. Take 20 minutes prior to pain medication for nausea prevention.  23   LUISA Valenzuela   Betahistine HCl (BETAHISTINE DIHYDROCHLORIDE) POWD 24 mg in the morning and at bedtime 3/7/23   Historical Provider, MD   Coenzyme Q10 (CO Q 10 PO) Take by mouth daily    Historical Provider, MD Regalado, Zingiber officinalis, (GINGER PO) Take by mouth daily    Historical Provider, MD   Turmeric (QC TUMERIC COMPLEX PO) Take by mouth daily    Historical Provider, MD

## 2023-05-08 NOTE — ANESTHESIA POSTPROCEDURE EVALUATION
Department of Anesthesiology  Postprocedure Note    Patient: Bennie Morataya  MRN: [id number]  YOB: 1980  Date of evaluation: 5/8/2023      Procedure Summary     Date: 05/08/23 Room / Location: D OP OR 03 / [facility]    Anesthesia Start: 1236 Anesthesia Stop: 9594    Procedures:       LEFT SHOULDER ARTHROSCOPY DISTAL CLAVICLE RESECTION (Left: Shoulder)      OPEN BICEPS TENODESIS (Left: Shoulder) Diagnosis:       Osteoarthritis of left AC (acromioclavicular) joint      Left shoulder pain, unspecified chronicity      (Osteoarthritis of left AC (acromioclavicular) joint [M19.012])      (Left shoulder pain, unspecified chronicity [M25.512])    Surgeons: Kaleb Hammond MD Responsible Provider: Luciana Stacy MD    Anesthesia Type: general ASA Status: 3          Anesthesia Type: No value filed.     Chiqui Phase I: Chiqui Score: 9    Chiqui Phase II: Chiqui Score: 10      Anesthesia Post Evaluation    Patient location during evaluation: PACU  Patient participation: complete - patient participated  Level of consciousness: awake and alert  Airway patency: patent  Nausea & Vomiting: no nausea and no vomiting  Complications: no  Cardiovascular status: hemodynamically stable  Respiratory status: acceptable, nonlabored ventilation and spontaneous ventilation  Hydration status: euvolemic  Comments: BP (!) 152/82   Pulse 85   Temp 98.1 °F (36.7 °C) (Skin)   Resp 18   Ht 5' 1\" (1.549 m)   Wt 230 lb (104.3 kg)   SpO2 92%   BMI 43.46 kg/m²     Multimodal analgesia pain management approach

## 2023-05-12 ENCOUNTER — HOSPITAL ENCOUNTER (OUTPATIENT)
Dept: PHYSICAL THERAPY | Age: 43
Setting detail: RECURRING SERIES
Discharge: HOME | End: 2023-05-15
Payer: MEDICARE

## 2023-05-12 PROCEDURE — 97161 PT EVAL LOW COMPLEX 20 MIN: CPT

## 2023-05-12 PROCEDURE — 97110 THERAPEUTIC EXERCISES: CPT

## 2023-05-12 ASSESSMENT — PAIN DESCRIPTION - ORIENTATION: ORIENTATION: LEFT

## 2023-05-12 ASSESSMENT — PAIN DESCRIPTION - LOCATION: LOCATION: SHOULDER

## 2023-05-12 ASSESSMENT — PAIN DESCRIPTION - DESCRIPTORS: DESCRIPTORS: ACHING;SHARP;SHOOTING

## 2023-05-12 ASSESSMENT — PAIN SCALES - GENERAL: PAINLEVEL_OUTOF10: 3

## 2023-05-16 ENCOUNTER — HOSPITAL ENCOUNTER (OUTPATIENT)
Dept: PHYSICAL THERAPY | Age: 43
Setting detail: RECURRING SERIES
Discharge: HOME | End: 2023-05-19
Payer: MEDICARE

## 2023-05-16 PROCEDURE — 97110 THERAPEUTIC EXERCISES: CPT

## 2023-05-16 PROCEDURE — 97140 MANUAL THERAPY 1/> REGIONS: CPT

## 2023-05-16 ASSESSMENT — PAIN DESCRIPTION - ORIENTATION: ORIENTATION: LEFT

## 2023-05-16 ASSESSMENT — PAIN DESCRIPTION - LOCATION: LOCATION: SHOULDER

## 2023-05-16 ASSESSMENT — PAIN DESCRIPTION - DESCRIPTORS: DESCRIPTORS: ACHING

## 2023-05-16 ASSESSMENT — PAIN SCALES - GENERAL: PAINLEVEL_OUTOF10: 4

## 2023-05-16 NOTE — PROGRESS NOTES
Bruna Esteves  : 1980  Primary: Anastasiya Ballard Ppo (Medicare Managed)  Secondary:  Grant Goddard [address] [address]  Phone: [phone]  Fax: [phone] Plan Frequency: 2 times a week for 90 days    Plan of Care/Certification Expiration Date: 08/10/23      >PT Visit Info:  Plan Frequency: 2 times a week for 90 days  Plan of Care/Certification Expiration Date: 08/10/23  Total # of Visits to Date: 2  Progress Note Counter: 2  No Show: 0  Canceled Appointment: 0      Visit Count:  2    OUTPATIENT PHYSICAL THERAPY:OP NOTE TYPE: Treatment Note 2023       Episode  }Appt Desk             Treatment Diagnosis:  Pain in Left Shoulder (M25.512)  Stiffness of Left Shoulder, Not elsewhere classified (M25.612)  Medical/Referring Diagnosis:  Osteoarthritis of left AC (acromioclavicular) joint [M19.012]  Left shoulder pain, unspecified chronicity [M25.512]  Referring Physician:  Jacqui Bower MD MD Orders:  PT Eval and Treat   Date of Onset:  Onset Date:  (Chronic)     Allergies:   Diazepam, Penicillins, Pineapple, Sumatriptan, Theophylline, Theophyllines, Cephalexin, Sulfa antibiotics, Topiramate, Tramadol, Verapamil, [place] oil, Augmentin [amoxicillin-pot clavulanate], Dermabond, Keflex [cephalexin], Kenalog [triamcinolone acetonide], Nuts [peanut-containing drug products], Pneumococcal vaccine, Sulfa antibiotics, Topiramate, Triamcinolone acetonide, Ultram [tramadol], Valium [diazepam], and Vitamin b12  Restrictions/Precautions:  No data recorded  No data recorded   Interventions Planned (Treatment may consist of any combination of the following):    Current Treatment Recommendations: Strengthening; ROM; Neuromuscular re-education; Home exercise program; Pain management; Modalities; Manual     >Subjective Comments:  Patient reports she has been doing okay.   >Initial: Left Shoulder 4/10>Post Session:  Left  Shoulder 4 /10  Medications Last

## 2023-05-17 ENCOUNTER — TELEMEDICINE (OUTPATIENT)
Dept: BEHAVIORAL/MENTAL HEALTH CLINIC | Age: 43
End: 2023-05-17
Payer: MEDICARE

## 2023-05-17 DIAGNOSIS — F41.1 GENERALIZED ANXIETY DISORDER: ICD-10-CM

## 2023-05-17 DIAGNOSIS — F33.0 MILD EPISODE OF RECURRENT MAJOR DEPRESSIVE DISORDER (HCC): Primary | ICD-10-CM

## 2023-05-17 DIAGNOSIS — F51.05 INSOMNIA DUE TO MENTAL DISORDER: ICD-10-CM

## 2023-05-17 PROCEDURE — 99214 OFFICE O/P EST MOD 30 MIN: CPT | Performed by: PSYCHIATRY & NEUROLOGY

## 2023-05-17 RX ORDER — LAMOTRIGINE 100 MG/1
100 TABLET ORAL DAILY
Qty: 30 TABLET | Refills: 3 | Status: SHIPPED | OUTPATIENT
Start: 2023-05-17

## 2023-05-17 RX ORDER — HYDROXYZINE HYDROCHLORIDE 25 MG/1
TABLET, FILM COATED ORAL
COMMUNITY
Start: 2023-05-03

## 2023-05-17 RX ORDER — VENLAFAXINE HYDROCHLORIDE 75 MG/1
75 CAPSULE, EXTENDED RELEASE ORAL DAILY
Qty: 30 CAPSULE | Refills: 3 | Status: SHIPPED | OUTPATIENT
Start: 2023-05-17

## 2023-05-17 ASSESSMENT — PATIENT HEALTH QUESTIONNAIRE - PHQ9
2. FEELING DOWN, DEPRESSED OR HOPELESS: 0
SUM OF ALL RESPONSES TO PHQ QUESTIONS 1-9: 8
1. LITTLE INTEREST OR PLEASURE IN DOING THINGS: NOT AT ALL
1. LITTLE INTEREST OR PLEASURE IN DOING THINGS: 0
SUM OF ALL RESPONSES TO PHQ9 QUESTIONS 1 & 2: 0
4. FEELING TIRED OR HAVING LITTLE ENERGY: 3
9. THOUGHTS THAT YOU WOULD BE BETTER OFF DEAD, OR OF HURTING YOURSELF: 0
3. TROUBLE FALLING OR STAYING ASLEEP: 0
SUM OF ALL RESPONSES TO PHQ QUESTIONS 1-9: 8
8. MOVING OR SPEAKING SO SLOWLY THAT OTHER PEOPLE COULD HAVE NOTICED. OR THE OPPOSITE, BEING SO FIGETY OR RESTLESS THAT YOU HAVE BEEN MOVING AROUND A LOT MORE THAN USUAL: 2
6. FEELING BAD ABOUT YOURSELF - OR THAT YOU ARE A FAILURE OR HAVE LET YOURSELF OR YOUR FAMILY DOWN: 0
SUM OF ALL RESPONSES TO PHQ QUESTIONS 1-9: 8
SUM OF ALL RESPONSES TO PHQ QUESTIONS 1-9: 8
5. POOR APPETITE OR OVEREATING: 0
7. TROUBLE CONCENTRATING ON THINGS, SUCH AS READING THE NEWSPAPER OR WATCHING TELEVISION: 3
SUM OF ALL RESPONSES TO PHQ9 QUESTIONS 1 & 2: 0
2. FEELING DOWN, DEPRESSED OR HOPELESS: NOT AT ALL
10. IF YOU CHECKED OFF ANY PROBLEMS, HOW DIFFICULT HAVE THESE PROBLEMS MADE IT FOR YOU TO DO YOUR WORK, TAKE CARE OF THINGS AT HOME, OR GET ALONG WITH OTHER PEOPLE: 2

## 2023-05-17 ASSESSMENT — ANXIETY QUESTIONNAIRES
1. FEELING NERVOUS, ANXIOUS, OR ON EDGE: 1
2. NOT BEING ABLE TO STOP OR CONTROL WORRYING: 0
IF YOU CHECKED OFF ANY PROBLEMS ON THIS QUESTIONNAIRE, HOW DIFFICULT HAVE THESE PROBLEMS MADE IT FOR YOU TO DO YOUR WORK, TAKE CARE OF THINGS AT HOME, OR GET ALONG WITH OTHER PEOPLE: NOT DIFFICULT AT ALL
3. WORRYING TOO MUCH ABOUT DIFFERENT THINGS: 0
5. BEING SO RESTLESS THAT IT IS HARD TO SIT STILL: NOT AT ALL
5. BEING SO RESTLESS THAT IT IS HARD TO SIT STILL: 0
3. WORRYING TOO MUCH ABOUT DIFFERENT THINGS: NOT AT ALL
4. TROUBLE RELAXING: 0
GAD7 TOTAL SCORE: 1
4. TROUBLE RELAXING: NOT AT ALL
IF YOU CHECKED OFF ANY PROBLEMS ON THIS QUESTIONNAIRE, HOW DIFFICULT HAVE THESE PROBLEMS MADE IT FOR YOU TO DO YOUR WORK, TAKE CARE OF THINGS AT HOME, OR GET ALONG WITH OTHER PEOPLE: NOT DIFFICULT AT ALL
7. FEELING AFRAID AS IF SOMETHING AWFUL MIGHT HAPPEN: 0
7. FEELING AFRAID AS IF SOMETHING AWFUL MIGHT HAPPEN: NOT AT ALL
6. BECOMING EASILY ANNOYED OR IRRITABLE: NOT AT ALL
2. NOT BEING ABLE TO STOP OR CONTROL WORRYING: NOT AT ALL
6. BECOMING EASILY ANNOYED OR IRRITABLE: 0
1. FEELING NERVOUS, ANXIOUS, OR ON EDGE: SEVERAL DAYS

## 2023-05-17 NOTE — PROGRESS NOTES
Patient:  Torri Snow  Age:  37 y.o.  :  1980     SEX:  female MRN:  [id number]     RACE: White (non-)     SEEN:  [x]  PATIENT  []  SPOUSE []  OTHER:              PHQ-9  2023   Little interest or pleasure in doing things 0 0 0   Little interest or pleasure in doing things - - -   Feeling down, depressed, or hopeless 0 0 0   Trouble falling or staying asleep, or sleeping too much 0 - 3   Trouble falling or staying asleep, or sleeping too much - - -   Feeling tired or having little energy 3 - 3   Feeling tired or having little energy - - -   Poor appetite or overeating 0 - 0   Poor appetite, weight loss, or overeating - - -   Feeling bad about yourself - or that you are a failure or have let yourself or your family down 0 - 0   Feeling bad about yourself - or that you are a failure or have let yourself or your family down - - -   Trouble concentrating on things, such as reading the newspaper or watching television 3 - 3   Trouble concentrating on things such as school, work, reading, or watching TV - - -   Moving or speaking so slowly that other people could have noticed. Or the opposite - being so fidgety or restless that you have been moving around a lot more than usual 2 - 3   Moving or speaking so slowly that other people could have noticed; or the opposite being so fidgety that others notice - - -   Thoughts that you would be better off dead, or of hurting yourself in some way 0 - 0   Thoughts of being better off dead, or hurting yourself in some way - - -   PHQ-2 Score 0 0 0   Total Score PHQ 2 - - -   PHQ-9 Total Score 8 0 12   PHQ 9 Score - - -   If you checked off any problems, how difficult have these problems made it for you to do your work, take care of things at home, or get along with other people?  2 - 2   How difficult have these problems made it for you to do your work, take care of your home and get along with others - - -       BRENDA-7

## 2023-05-18 ENCOUNTER — HOSPITAL ENCOUNTER (OUTPATIENT)
Dept: PHYSICAL THERAPY | Age: 43
Setting detail: RECURRING SERIES
Discharge: HOME | End: 2023-05-21
Payer: MEDICARE

## 2023-05-18 PROCEDURE — 97140 MANUAL THERAPY 1/> REGIONS: CPT

## 2023-05-18 PROCEDURE — 97110 THERAPEUTIC EXERCISES: CPT

## 2023-05-18 ASSESSMENT — PAIN DESCRIPTION - DESCRIPTORS: DESCRIPTORS: ACHING

## 2023-05-18 ASSESSMENT — PAIN DESCRIPTION - LOCATION: LOCATION: SHOULDER

## 2023-05-18 ASSESSMENT — PAIN DESCRIPTION - ORIENTATION: ORIENTATION: LEFT

## 2023-05-18 ASSESSMENT — PAIN SCALES - GENERAL: PAINLEVEL_OUTOF10: 3

## 2023-05-19 ENCOUNTER — OFFICE VISIT (OUTPATIENT)
Dept: ORTHOPEDIC SURGERY | Age: 43
End: 2023-05-19

## 2023-05-19 DIAGNOSIS — M25.512 LEFT SHOULDER PAIN, UNSPECIFIED CHRONICITY: ICD-10-CM

## 2023-05-19 DIAGNOSIS — Z09 SURGERY FOLLOW-UP: ICD-10-CM

## 2023-05-19 DIAGNOSIS — M19.012 OSTEOARTHRITIS OF LEFT AC (ACROMIOCLAVICULAR) JOINT: Primary | ICD-10-CM

## 2023-05-19 PROCEDURE — 99024 POSTOP FOLLOW-UP VISIT: CPT | Performed by: ORTHOPAEDIC SURGERY

## 2023-05-19 RX ORDER — MELOXICAM 7.5 MG/1
7.5 TABLET ORAL 2 TIMES DAILY PRN
Qty: 28 TABLET | Refills: 1 | Status: SHIPPED | OUTPATIENT
Start: 2023-05-19 | End: 2023-06-02

## 2023-05-23 ENCOUNTER — HOSPITAL ENCOUNTER (OUTPATIENT)
Dept: PHYSICAL THERAPY | Age: 43
Setting detail: RECURRING SERIES
Discharge: HOME | End: 2023-05-26
Payer: MEDICARE

## 2023-05-23 PROCEDURE — 97110 THERAPEUTIC EXERCISES: CPT

## 2023-05-23 PROCEDURE — 97140 MANUAL THERAPY 1/> REGIONS: CPT

## 2023-05-23 ASSESSMENT — PAIN SCALES - GENERAL: PAINLEVEL_OUTOF10: 3

## 2023-05-23 ASSESSMENT — PAIN DESCRIPTION - ORIENTATION: ORIENTATION: LEFT

## 2023-05-23 ASSESSMENT — PAIN DESCRIPTION - DESCRIPTORS: DESCRIPTORS: ACHING

## 2023-05-23 ASSESSMENT — PAIN DESCRIPTION - LOCATION: LOCATION: SHOULDER

## 2023-05-23 NOTE — PROGRESS NOTES
Luis Real  : 1980  Primary: Sly Ham Ppo (Medicare Managed)  Secondary:  [facility]  [place] [address]  Phone: [phone]  Fax: [phone] Plan Frequency: 2 times a week for 90 days    Plan of Care/Certification Expiration Date: 08/10/23      >PT Visit Info:  Plan Frequency: 2 times a week for 90 days  Plan of Care/Certification Expiration Date: 08/10/23  Total # of Visits to Date: 4  Progress Note Counter: 4  No Show: 0  Canceled Appointment: 0      Visit Count:  4   OUTPATIENT PHYSICAL THERAPY:OP NOTE TYPE: Treatment Note 2023       Episode  }Appt Desk             Treatment Diagnosis:  Pain in Left Shoulder (M25.512)  Stiffness of Left Shoulder, Not elsewhere classified (M25.612)  Medical/Referring Diagnosis:  Osteoarthritis of left AC (acromioclavicular) joint [M19.012]  Left shoulder pain, unspecified chronicity [M25.512]  Referring Physician:  Jcarlos Anguiano MD MD Orders:  PT Eval and Treat   Date of Onset:  Onset Date:  (Chronic)     Allergies:   Amoxicillin, Diazepam, Penicillins, Pineapple, Sumatriptan, Theophylline, Theophyllines, Cephalexin, Sulfa antibiotics, Topiramate, Tramadol, Verapamil, [place] oil, Augmentin [amoxicillin-pot clavulanate], Dermabond, Keflex [cephalexin], Kenalog [triamcinolone acetonide], Nuts [peanut-containing drug products], Pneumococcal vaccine, Sulfa antibiotics, Topiramate, Triamcinolone acetonide, Ultram [tramadol], Valium [diazepam], and Vitamin b12  Restrictions/Precautions:  No data recorded  No data recorded   Interventions Planned (Treatment may consist of any combination of the following):    Current Treatment Recommendations: Strengthening; ROM; Neuromuscular re-education; Home exercise program; Pain management; Modalities; Manual     >Subjective Comments:  Patient reports feeling a little sore after last treatment.   >Initial: Left Shoulder 3/10>Post Session:  Left

## 2023-05-24 ENCOUNTER — APPOINTMENT (RX ONLY)
Dept: URBAN - METROPOLITAN AREA CLINIC 329 | Facility: CLINIC | Age: 43
Setting detail: DERMATOLOGY
End: 2023-05-24

## 2023-05-24 DIAGNOSIS — L98.8 OTHER SPECIFIED DISORDERS OF THE SKIN AND SUBCUTANEOUS TISSUE: ICD-10-CM

## 2023-05-24 PROCEDURE — ? BOTOX (U OR CC)

## 2023-05-24 PROCEDURE — ? OTHER

## 2023-05-24 NOTE — PROCEDURE: OTHER
Other (Free Text): Pt shares her neurologist wanted her to get a test of Botox to see if she has a reaction before they do treatments for migraines. \\nTold if starts having any SOB, swelling needs to go to the ER.
Note Text (......Xxx Chief Complaint.): This diagnosis correlates with the
Render Risk Assessment In Note?: no
Detail Level: Zone

## 2023-05-25 ENCOUNTER — HOSPITAL ENCOUNTER (OUTPATIENT)
Dept: PHYSICAL THERAPY | Age: 43
Setting detail: RECURRING SERIES
Discharge: HOME | End: 2023-05-28
Payer: MEDICARE

## 2023-05-25 PROCEDURE — 97140 MANUAL THERAPY 1/> REGIONS: CPT

## 2023-05-25 PROCEDURE — 97110 THERAPEUTIC EXERCISES: CPT

## 2023-05-25 ASSESSMENT — PAIN DESCRIPTION - ORIENTATION: ORIENTATION: LEFT

## 2023-05-25 ASSESSMENT — PAIN DESCRIPTION - DESCRIPTORS: DESCRIPTORS: ACHING

## 2023-05-25 ASSESSMENT — PAIN DESCRIPTION - LOCATION: LOCATION: SHOULDER

## 2023-05-25 ASSESSMENT — PAIN SCALES - GENERAL: PAINLEVEL_OUTOF10: 5

## 2023-05-30 ENCOUNTER — HOSPITAL ENCOUNTER (OUTPATIENT)
Dept: PHYSICAL THERAPY | Age: 43
Setting detail: RECURRING SERIES
Discharge: HOME | End: 2023-06-02
Payer: MEDICARE

## 2023-05-30 PROCEDURE — 97140 MANUAL THERAPY 1/> REGIONS: CPT

## 2023-05-30 PROCEDURE — 97110 THERAPEUTIC EXERCISES: CPT

## 2023-05-30 ASSESSMENT — PAIN DESCRIPTION - ORIENTATION: ORIENTATION: LEFT

## 2023-05-30 ASSESSMENT — PAIN SCALES - GENERAL: PAINLEVEL_OUTOF10: 2

## 2023-05-30 ASSESSMENT — PAIN DESCRIPTION - DESCRIPTORS: DESCRIPTORS: ACHING

## 2023-05-30 ASSESSMENT — PAIN DESCRIPTION - LOCATION: LOCATION: SHOULDER

## 2023-05-30 NOTE — PROGRESS NOTES
Curtistine Show  : 1980  Primary: Mali Reich Ppo (Medicare Managed)  Secondary:  Rima Goddard [address] [address]  Phone: [phone]  Fax: [phone] Plan Frequency: 2 times a week for 90 days    Plan of Care/Certification Expiration Date: 08/10/23      >PT Visit Info:  Plan Frequency: 2 times a week for 90 days  Plan of Care/Certification Expiration Date: 08/10/23  Total # of Visits to Date: 6  Progress Note Counter: 6  No Show: 0  Canceled Appointment: 0      Visit Count:  6   OUTPATIENT PHYSICAL THERAPY:OP NOTE TYPE: Treatment Note 2023       Episode  }Appt Desk             Treatment Diagnosis:  Pain in Left Shoulder (M25.512)  Stiffness of Left Shoulder, Not elsewhere classified (M25.612)  Medical/Referring Diagnosis:  Osteoarthritis of left AC (acromioclavicular) joint [M19.012]  Left shoulder pain, unspecified chronicity [M25.512]  Referring Physician:  Kamran Godinez MD MD Orders:  PT Eval and Treat   Date of Onset:  Onset Date:  (Chronic)     Allergies:   Amoxicillin, Diazepam, Penicillins, Pineapple, Sumatriptan, Theophylline, Theophyllines, Cephalexin, Sulfa antibiotics, Topiramate, Tramadol, Verapamil, [place] oil, Augmentin [amoxicillin-pot clavulanate], Dermabond, Keflex [cephalexin], Kenalog [triamcinolone acetonide], Nuts [peanut-containing drug products], Pneumococcal vaccine, Sulfa antibiotics, Topiramate, Triamcinolone acetonide, Ultram [tramadol], Valium [diazepam], and Vitamin b12  Restrictions/Precautions:  No data recorded  No data recorded   Interventions Planned (Treatment may consist of any combination of the following):    Current Treatment Recommendations: Strengthening; ROM; Neuromuscular re-education; Home exercise program; Pain management; Modalities; Manual     >Subjective Comments:  Patient reports she has had a headache since yesterday.   >Initial: Left Shoulder 2/10>Post Session:  Left

## 2023-06-01 ENCOUNTER — HOSPITAL ENCOUNTER (OUTPATIENT)
Dept: PHYSICAL THERAPY | Age: 43
Setting detail: RECURRING SERIES
Discharge: HOME | End: 2023-06-04
Payer: MEDICARE

## 2023-06-01 PROCEDURE — 97110 THERAPEUTIC EXERCISES: CPT

## 2023-06-01 PROCEDURE — 97140 MANUAL THERAPY 1/> REGIONS: CPT

## 2023-06-01 ASSESSMENT — PAIN DESCRIPTION - ORIENTATION: ORIENTATION: LEFT

## 2023-06-01 ASSESSMENT — PAIN DESCRIPTION - DESCRIPTORS: DESCRIPTORS: ACHING

## 2023-06-01 ASSESSMENT — PAIN SCALES - GENERAL: PAINLEVEL_OUTOF10: 2

## 2023-06-01 ASSESSMENT — PAIN DESCRIPTION - LOCATION: LOCATION: SHOULDER

## 2023-06-06 ENCOUNTER — HOSPITAL ENCOUNTER (OUTPATIENT)
Dept: PHYSICAL THERAPY | Age: 43
Setting detail: RECURRING SERIES
Discharge: HOME | End: 2023-06-09
Payer: MEDICARE

## 2023-06-06 PROCEDURE — 97110 THERAPEUTIC EXERCISES: CPT

## 2023-06-06 PROCEDURE — 97140 MANUAL THERAPY 1/> REGIONS: CPT

## 2023-06-06 ASSESSMENT — PAIN DESCRIPTION - LOCATION: LOCATION: SHOULDER

## 2023-06-06 ASSESSMENT — PAIN DESCRIPTION - DESCRIPTORS: DESCRIPTORS: ACHING

## 2023-06-06 ASSESSMENT — PAIN DESCRIPTION - ORIENTATION: ORIENTATION: LEFT

## 2023-06-06 ASSESSMENT — PAIN SCALES - GENERAL: PAINLEVEL_OUTOF10: 2

## 2023-06-06 NOTE — PROGRESS NOTES
evaluation. Communication/Consultation:  None today  Equipment provided today:  None today. Recommendations/Intent for next treatment session: Next visit will focus on range of motion, strengthening, stretching, modalities, and manual therapy.     >Total Treatment Billable Duration:  40 minutes + ice  Time In: 1100  Time Out: 1140    CHIRAG HOPE, PT       Charge Capture  }Post Session Pain  PT Visit Info  [facility] Portal  MD Guidelines  Scanned Media  Benefits  MyChart    Future Appointments   Date Time Provider Aye Caputo   6/8/2023 11:00 AM Dawit Roth, PT [facility]   6/9/2023  9:15 AM LUISA Marinelli [facility]   6/13/2023 11:00 AM Dawit Campi, PT [facility]   6/15/2023 11:00 AM Dawit Campi, PT [facility]   6/20/2023 11:00 AM Dawit Campi, PT [facility]   6/22/2023 11:00 AM Dawit Campi, PT [facility]   6/27/2023 11:00 AM Dawit Campi, PT [facility]   6/29/2023 11:00 AM Beba Hope, PT [facility]   7/14/2023  9:00 AM [facility]   7/21/2023 11:20 AM Reg Ramos MD [facility]   8/9/2023  2:20 PM Gregory Salmon MD [id number]

## 2023-06-08 ENCOUNTER — HOSPITAL ENCOUNTER (OUTPATIENT)
Dept: PHYSICAL THERAPY | Age: 43
Setting detail: RECURRING SERIES
End: 2023-06-08
Payer: MEDICARE

## 2023-06-15 ENCOUNTER — HOSPITAL ENCOUNTER (OUTPATIENT)
Dept: PHYSICAL THERAPY | Age: 43
Setting detail: RECURRING SERIES
Discharge: HOME | End: 2023-06-18
Payer: MEDICARE

## 2023-06-15 PROCEDURE — 97110 THERAPEUTIC EXERCISES: CPT

## 2023-06-15 PROCEDURE — 97140 MANUAL THERAPY 1/> REGIONS: CPT

## 2023-06-15 ASSESSMENT — PAIN DESCRIPTION - ORIENTATION: ORIENTATION: LEFT

## 2023-06-15 ASSESSMENT — PAIN SCALES - GENERAL: PAINLEVEL_OUTOF10: 2

## 2023-06-15 ASSESSMENT — PAIN DESCRIPTION - LOCATION: LOCATION: SHOULDER

## 2023-06-15 ASSESSMENT — PAIN DESCRIPTION - DESCRIPTORS: DESCRIPTORS: ACHING

## 2023-06-20 ENCOUNTER — HOSPITAL ENCOUNTER (OUTPATIENT)
Dept: PHYSICAL THERAPY | Age: 43
Setting detail: RECURRING SERIES
Discharge: HOME | End: 2023-06-23
Payer: MEDICARE

## 2023-06-20 PROCEDURE — 97110 THERAPEUTIC EXERCISES: CPT

## 2023-06-20 PROCEDURE — 97140 MANUAL THERAPY 1/> REGIONS: CPT

## 2023-06-20 ASSESSMENT — PAIN DESCRIPTION - ORIENTATION: ORIENTATION: LEFT

## 2023-06-20 ASSESSMENT — PAIN SCALES - GENERAL: PAINLEVEL_OUTOF10: 4

## 2023-06-20 ASSESSMENT — PAIN DESCRIPTION - LOCATION: LOCATION: SHOULDER

## 2023-06-20 ASSESSMENT — PAIN DESCRIPTION - DESCRIPTORS: DESCRIPTORS: ACHING

## 2023-06-20 NOTE — PROGRESS NOTES
Next visit will focus on range of motion, strengthening, stretching, modalities, and manual therapy.     >Total Treatment Billable Duration:  40 minutes + ice  Time In: 1100  Time Out: 1140    CHIRAG HOPE, PT       Charge Capture  }Post Session Pain  PT Visit Info  [facility] Portal  MD Guidelines  Scanned Media  Benefits  MyChart    Future Appointments   Date Time Provider Aye Caputo   6/22/2023 11:00 AM Dorise Putt, PT [facility]   6/27/2023 11:00 AM Dorise Putt, PT [facility]   6/29/2023 11:00 AM Dorise Putt, PT [facility]   7/11/2023  1:45 PM Meghan Badillosage, PT [facility]   7/13/2023 11:00 AM Meghan Rivera Vissage, PT [facility]   7/14/2023  9:00 AM [facility]   7/18/2023  1:00 PM Dorise Putt, PT [facility]   7/20/2023 11:00 AM Dorise Putt, PT [facility]   7/21/2023 11:20 AM Vern Rehman MD [facility]   7/21/2023  2:30 PM LUISA Pruett [facility]   7/25/2023 11:00 AM Dorise Putt, PT [facility]   7/27/2023 11:00 AM Dorise Putt, PT [facility]   8/9/2023  2:20 PM Melissa Pelayo MD [id number]

## 2023-06-22 ENCOUNTER — HOSPITAL ENCOUNTER (OUTPATIENT)
Dept: PHYSICAL THERAPY | Age: 43
Setting detail: RECURRING SERIES
Discharge: HOME | End: 2023-06-25
Payer: MEDICARE

## 2023-06-22 PROCEDURE — 97140 MANUAL THERAPY 1/> REGIONS: CPT

## 2023-06-22 PROCEDURE — 97110 THERAPEUTIC EXERCISES: CPT

## 2023-06-22 ASSESSMENT — PAIN DESCRIPTION - DESCRIPTORS: DESCRIPTORS: ACHING

## 2023-06-22 ASSESSMENT — PAIN SCALES - GENERAL: PAINLEVEL_OUTOF10: 4

## 2023-06-22 ASSESSMENT — PAIN DESCRIPTION - LOCATION: LOCATION: SHOULDER

## 2023-06-22 ASSESSMENT — PAIN DESCRIPTION - ORIENTATION: ORIENTATION: LEFT

## 2023-06-27 ENCOUNTER — HOSPITAL ENCOUNTER (OUTPATIENT)
Dept: PHYSICAL THERAPY | Age: 43
Setting detail: RECURRING SERIES
Discharge: HOME | End: 2023-06-30
Payer: MEDICARE

## 2023-06-27 PROCEDURE — 97140 MANUAL THERAPY 1/> REGIONS: CPT

## 2023-06-27 PROCEDURE — 97110 THERAPEUTIC EXERCISES: CPT

## 2023-06-27 ASSESSMENT — PAIN SCALES - GENERAL: PAINLEVEL_OUTOF10: 2

## 2023-06-27 ASSESSMENT — PAIN DESCRIPTION - ORIENTATION: ORIENTATION: LEFT

## 2023-06-27 ASSESSMENT — PAIN DESCRIPTION - LOCATION: LOCATION: SHOULDER

## 2023-06-27 ASSESSMENT — PAIN DESCRIPTION - DESCRIPTORS: DESCRIPTORS: ACHING

## 2023-06-29 ENCOUNTER — HOSPITAL ENCOUNTER (OUTPATIENT)
Dept: PHYSICAL THERAPY | Age: 43
Setting detail: RECURRING SERIES
End: 2023-06-29
Payer: MEDICARE

## 2023-06-29 PROCEDURE — 97140 MANUAL THERAPY 1/> REGIONS: CPT

## 2023-06-29 PROCEDURE — 97110 THERAPEUTIC EXERCISES: CPT

## 2023-06-29 ASSESSMENT — PAIN DESCRIPTION - DESCRIPTORS: DESCRIPTORS: ACHING

## 2023-06-29 ASSESSMENT — PAIN DESCRIPTION - LOCATION: LOCATION: SHOULDER

## 2023-06-29 ASSESSMENT — PAIN DESCRIPTION - ORIENTATION: ORIENTATION: LEFT

## 2023-06-29 ASSESSMENT — PAIN SCALES - GENERAL: PAINLEVEL_OUTOF10: 3

## 2023-07-10 ENCOUNTER — TELEMEDICINE (OUTPATIENT)
Dept: INTERNAL MEDICINE CLINIC | Facility: CLINIC | Age: 43
End: 2023-07-10
Payer: MEDICARE

## 2023-07-10 ENCOUNTER — NURSE TRIAGE (OUTPATIENT)
Dept: OTHER | Facility: CLINIC | Age: 43
End: 2023-07-10

## 2023-07-10 DIAGNOSIS — J06.9 UPPER RESPIRATORY TRACT INFECTION, UNSPECIFIED TYPE: Primary | ICD-10-CM

## 2023-07-10 DIAGNOSIS — J45.20 MILD INTERMITTENT ASTHMA WITHOUT COMPLICATION: ICD-10-CM

## 2023-07-10 PROCEDURE — 99213 OFFICE O/P EST LOW 20 MIN: CPT | Performed by: FAMILY MEDICINE

## 2023-07-10 RX ORDER — PREDNISONE 20 MG/1
20 TABLET ORAL 2 TIMES DAILY
Qty: 10 TABLET | Refills: 0 | Status: SHIPPED | OUTPATIENT
Start: 2023-07-10 | End: 2023-07-15

## 2023-07-10 RX ORDER — ALBUTEROL SULFATE 90 UG/1
2 AEROSOL, METERED RESPIRATORY (INHALATION) EVERY 4 HOURS PRN
Qty: 18 G | Refills: 1 | Status: SHIPPED | OUTPATIENT
Start: 2023-07-10

## 2023-07-10 RX ORDER — AZITHROMYCIN 250 MG/1
250 TABLET, FILM COATED ORAL SEE ADMIN INSTRUCTIONS
Qty: 6 TABLET | Refills: 0 | Status: SHIPPED | OUTPATIENT
Start: 2023-07-10 | End: 2023-07-15

## 2023-07-10 SDOH — ECONOMIC STABILITY: FOOD INSECURITY: WITHIN THE PAST 12 MONTHS, THE FOOD YOU BOUGHT JUST DIDN'T LAST AND YOU DIDN'T HAVE MONEY TO GET MORE.: NEVER TRUE

## 2023-07-10 SDOH — ECONOMIC STABILITY: TRANSPORTATION INSECURITY
IN THE PAST 12 MONTHS, HAS LACK OF TRANSPORTATION KEPT YOU FROM MEETINGS, WORK, OR FROM GETTING THINGS NEEDED FOR DAILY LIVING?: NO

## 2023-07-10 SDOH — ECONOMIC STABILITY: INCOME INSECURITY: HOW HARD IS IT FOR YOU TO PAY FOR THE VERY BASICS LIKE FOOD, HOUSING, MEDICAL CARE, AND HEATING?: NOT VERY HARD

## 2023-07-10 SDOH — ECONOMIC STABILITY: FOOD INSECURITY: WITHIN THE PAST 12 MONTHS, YOU WORRIED THAT YOUR FOOD WOULD RUN OUT BEFORE YOU GOT MONEY TO BUY MORE.: NEVER TRUE

## 2023-07-10 SDOH — ECONOMIC STABILITY: HOUSING INSECURITY
IN THE LAST 12 MONTHS, WAS THERE A TIME WHEN YOU DID NOT HAVE A STEADY PLACE TO SLEEP OR SLEPT IN A SHELTER (INCLUDING NOW)?: NO

## 2023-07-10 NOTE — PROGRESS NOTES
Diann Her (:  1980) is a Established patient, presenting virtually for evaluation of the following:  Congestion 1.5 weeks sneezing and shortness of breath  Assessment & Plan   Below is the assessment and plan developed based on review of pertinent history, physical exam, labs, studies, and medications. 1. Upper respiratory tract infection, unspecified type  -     albuterol sulfate HFA (PROVENTIL;VENTOLIN;PROAIR) 108 (90 Base) MCG/ACT inhaler; Inhale 2 puffs into the lungs every 4 hours as needed for Shortness of Breath or Wheezing, Disp-18 g, R-1Normal  -     predniSONE (DELTASONE) 20 MG tablet; Take 1 tablet by mouth 2 times daily for 5 days, Disp-10 tablet, R-0Normal  -     azithromycin (ZITHROMAX) 250 MG tablet; Take 1 tablet by mouth See Admin Instructions for 5 days 500mg on day 1 followed by 250mg on days 2 - 5, Disp-6 tablet, R-0Normal  2. Mild intermittent asthma without complication  -     albuterol sulfate HFA (PROVENTIL;VENTOLIN;PROAIR) 108 (90 Base) MCG/ACT inhaler; Inhale 2 puffs into the lungs every 4 hours as needed for Shortness of Breath or Wheezing, Disp-18 g, R-1Normal  1.  URI with asthma exacerbation. Will place on Z-Phong, prednisone. Albuterol inhaler refilled. Advised to take Flonase over-the-counter. Stop Sudafed nasal spray. Return if symptoms worsen or fail to improve. Subjective   77-year-old female patient of Dr. Toño Felder comes in because of nasal congestion of 10 days duration associated with shortness of breath and sneezing. No fever or chills. Some sinus pain. Some wheezing. Hx of asthma. Patient needs refill of her inhaler. Taking nasal decongestant which has Sudafed. Advised to stop pseudoephedrine.     Review of Systems       Objective   Patient-Reported Vitals  No data recorded     Physical Exam  [INSTRUCTIONS:  \"[x]\" Indicates a positive item  \"[]\" Indicates a negative item  -- DELETE ALL ITEMS NOT EXAMINED]    Constitutional: [x] Appears
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
## 2023-07-11 ENCOUNTER — HOSPITAL ENCOUNTER (OUTPATIENT)
Dept: PHYSICAL THERAPY | Age: 43
Setting detail: RECURRING SERIES
Discharge: HOME | End: 2023-07-14
Payer: MEDICARE

## 2023-07-11 PROCEDURE — 97140 MANUAL THERAPY 1/> REGIONS: CPT

## 2023-07-11 PROCEDURE — 97110 THERAPEUTIC EXERCISES: CPT

## 2023-07-11 ASSESSMENT — PAIN DESCRIPTION - DESCRIPTORS: DESCRIPTORS: ACHING

## 2023-07-11 ASSESSMENT — PAIN DESCRIPTION - ORIENTATION: ORIENTATION: LEFT

## 2023-07-11 ASSESSMENT — PAIN DESCRIPTION - LOCATION: LOCATION: SHOULDER

## 2023-07-11 ASSESSMENT — PAIN SCALES - GENERAL: PAINLEVEL_OUTOF10: 2

## 2023-07-11 NOTE — PROGRESS NOTES
Delbert Wakefield  : 1980  Primary: Abram Fuentes Ppo (Medicare Managed)  Secondary:  [facility] [address] [address]  Phone: [phone]  Fax: [phone] Plan Frequency: 2 times a week for 90 days    Plan of Care/Certification Expiration Date: 08/10/23      >PT Visit Info:  Plan Frequency: 2 times a week for 90 days  Plan of Care/Certification Expiration Date: 08/10/23  Total # of Visits to Date: 15  Progress Note Counter: 8  No Show: 0  Canceled Appointment: 1 (2023)      Visit Count:  15   OUTPATIENT PHYSICAL THERAPY:OP NOTE TYPE: Treatment Note 2023       Episode  }Appt Desk             Treatment Diagnosis:  Pain in Left Shoulder (M25.512)  Stiffness of Left Shoulder, Not elsewhere classified (M25.612)  Medical/Referring Diagnosis:  Osteoarthritis of left AC (acromioclavicular) joint [M19.012]  Left shoulder pain, unspecified chronicity [M25.512]  Referring Physician:  Triston Kolb MD MD Orders:  PT Eval and Treat   Date of Onset:  Onset Date:  (Chronic)     Allergies:   Amoxicillin, Diazepam, Penicillins, Pineapple, Sumatriptan, Theophylline, Theophyllines, Cephalexin, Sulfa antibiotics, Topiramate, Tramadol, Verapamil, [place] oil, Augmentin [amoxicillin-pot clavulanate], Dermabond, Keflex [cephalexin], Kenalog [triamcinolone acetonide], Nuts [peanut-containing drug products], Pneumococcal vaccine, Sulfa antibiotics, Topiramate, Triamcinolone acetonide, Ultram [tramadol], Valium [diazepam], and Vitamin b12  Restrictions/Precautions:  No data recorded  No data recorded   Interventions Planned (Treatment may consist of any combination of the following):    Current Treatment Recommendations: Strengthening; ROM; Neuromuscular re-education; Home exercise program; Pain management; Modalities; Manual     >Subjective Comments:  \"I went to the doctor yesterday because I wasn't feeling good.   The doctor put me on antibiotics,

## 2023-07-13 ENCOUNTER — NURSE TRIAGE (OUTPATIENT)
Dept: OTHER | Facility: CLINIC | Age: 43
End: 2023-07-13

## 2023-07-13 ENCOUNTER — HOSPITAL ENCOUNTER (OUTPATIENT)
Dept: PHYSICAL THERAPY | Age: 43
Setting detail: RECURRING SERIES
End: 2023-07-13
Payer: MEDICARE

## 2023-07-13 ENCOUNTER — TELEMEDICINE (OUTPATIENT)
Dept: INTERNAL MEDICINE CLINIC | Facility: CLINIC | Age: 43
End: 2023-07-13
Payer: MEDICARE

## 2023-07-13 DIAGNOSIS — R06.02 SHORTNESS OF BREATH: Primary | ICD-10-CM

## 2023-07-13 DIAGNOSIS — R49.0 HOARSENESS: ICD-10-CM

## 2023-07-13 DIAGNOSIS — R07.89 CHEST DISCOMFORT: ICD-10-CM

## 2023-07-13 PROCEDURE — 99213 OFFICE O/P EST LOW 20 MIN: CPT | Performed by: FAMILY MEDICINE

## 2023-07-13 ASSESSMENT — ENCOUNTER SYMPTOMS
SINUS PAIN: 0
ABDOMINAL PAIN: 0
VOICE CHANGE: 1
COUGH: 1
WHEEZING: 1
SORE THROAT: 0
CHEST TIGHTNESS: 1
SHORTNESS OF BREATH: 1
SINUS PRESSURE: 0
TROUBLE SWALLOWING: 0
BLOOD IN STOOL: 0

## 2023-07-13 NOTE — TELEPHONE ENCOUNTER
[place]    Received call from [address] at [facility] with [facility] Complaint. Subjective: Caller states \"My symptoms aren't getting better since my appt. I was told I sounded worse yesterday but I don't feel worse. It just isn't better. \"     Current Symptoms: diagnosed with URI. no new symptoms. No cough. Onset: 2 weeks ago; unchanged    Associated Symptoms: SOB with exertion. Pt speaking in complete sentences during call. Chest heaviness and tightness. Expiratory wheeze. Pain Severity: 3/10; worse with ambulation. Heaviness/tightness; constant gets to 5-6/10 with ambulation. Temperature: denies     What has been tried: inhaler. Prednisone. Z-iveth. LMP:  7/6/2023  Pregnant: No    Recommended disposition: Go to ED/UCC Now (Or to Office with PCP Approval)    Care advice provided, patient verbalizes understanding; denies any other questions or concerns; instructed to call back for any new or worsening symptoms. Writer provided warm transfer to [place] [facility] for 2nd level triage. SOB and chest heaviness. recent office visit 7/10/23. Attention Provider: Thank you for allowing me to participate in the care of your patient. The patient was connected to triage in response to information provided to the [facility]/PSC. Please do not respond through this encounter as the response is not directed to a shared pool.         Reason for Disposition   Patient sounds very sick or weak to the triager    Protocols used: Breathing Difficulty-ADULT-[place]

## 2023-07-13 NOTE — PROGRESS NOTES
Tobacco Use    Smoking status: Never    Smokeless tobacco: Never   Vaping Use    Vaping Use: Never used   Substance Use Topics    Alcohol use: Not Currently    Drug use: No            PHYSICAL EXAMINATION:  [ INSTRUCTIONS:  \"[x]\" Indicates a positive item  \"[]\" Indicates a negative item  -- DELETE ALL ITEMS NOT EXAMINED]  Vital Signs: (As obtained by patient/caregiver or practitioner observation)    Blood pressure-  Heart rate-    Respiratory rate-    Temperature-  Pulse oximetry-     Constitutional: [] Appears well-developed and well-nourished [] No apparent distress      [] Abnormal-   Mental status  [x] Alert and awake  [] Oriented to person/place/time []Able to follow commands      Eyes:  EOM    []  Normal  [] Abnormal-  Sclera  []  Normal  [] Abnormal -         Discharge []  None visible  [] Abnormal -    HENT:   [] Normocephalic, atraumatic. [] Abnormal   [] Mouth/Throat: Mucous membranes are moist.     External Ears [] Normal  [] Abnormal-     Neck: [] No visualized mass     Pulmonary/Chest: [x] Respiratory effort normal.  [] No visualized signs of difficulty breathing or respiratory distress        [x] Abnormal-appears short of breath when talking but able to complete sentences. Musculoskeletal:   [] Normal gait with no signs of ataxia         [] Normal range of motion of neck        [] Abnormal-       Neurological:        [] No Facial Asymmetry (Cranial nerve 7 motor function) (limited exam to video visit)          [] No gaze palsy        [] Abnormal-         Skin:        [] No significant exanthematous lesions or discoloration noted on facial skin         [] Abnormal-            Psychiatric:       [] Normal Affect [] No Hallucinations        [] Abnormal-     Other pertinent observable physical exam findings-     ASSESSMENT/PLAN:   Diagnosis Orders   1. Shortness of breath        2. Chest discomfort        3. Hoarseness          Shortness of breath. Complete antibiotics and prednisone.   See me

## 2023-07-13 NOTE — PROGRESS NOTES
Clearance Shai  : 1980  Primary: Monserrat Price Ppo  Secondary:  Isidro Porter  [address] [facility] [address]  Phone: [phone]  Fax: [phone]    PT Visit Info: Total # of Visits to Date: 15  Progress Note Counter: 8  No Show: 0  Canceled Appointment: 2 (2023)     OT Visit Info:  No data recorded    OUTPATIENT THERAPY: 2023  Episode  Appt Desk        Clearance Shai cancelled her appointment for today due to unknown reasons. Will plan to follow up next during next appointment.   Thank you,  Bandar Coyne, PT    Future Appointments   Date Time Provider [address]   2023 11:00 AM Bandar Coyne, PT [facility] SFE   2023  3:00 PM Geovanny Matthew MD CAF [facility] AMB   2023  9:00 AM CAF LAB CAF [facility] AMB   2023  1:00 PM Lisandro Easton, PT [facility] SFE   2023 11:00 AM Manatee Hoe, PT [facility] SFE   2023 11:20 AM Geovanny Matthew MD [facility] AMB   2023  2:30 PM LUISA Hernandez [facility] AMB   2023 11:00 AM Manatee Hoe, PT [facility] SFE   2023 11:00 AM Manatee Hoe, PT [facility] SFE   2023 11:00 AM Bandar Hoe, PT [facility] SFE   2023  2:20 PM Sheridan Garcia MD [id number] AMB   2023 11:00 AM Manatee Hoe, PT [facility] SFE   8/15/2023 11:00 AM Manatee Hoe, PT [facility] SFE   2023 11:00 AM Manatee Hoe, PT [facility] SFE   2023 11:00 AM Manatee Hoe, PT [facility] SFE   2023 11:00 AM Bandar Coyne, PT [facility]

## 2023-07-14 ENCOUNTER — HOSPITAL ENCOUNTER (OUTPATIENT)
Dept: CT IMAGING | Age: 43
End: 2023-07-14
Payer: MEDICARE

## 2023-07-14 ENCOUNTER — OFFICE VISIT (OUTPATIENT)
Dept: INTERNAL MEDICINE CLINIC | Facility: CLINIC | Age: 43
End: 2023-07-14
Payer: MEDICARE

## 2023-07-14 ENCOUNTER — TRANSCRIBE ORDERS (OUTPATIENT)
Dept: SCHEDULING | Age: 43
End: 2023-07-14

## 2023-07-14 VITALS
OXYGEN SATURATION: 99 % | SYSTOLIC BLOOD PRESSURE: 138 MMHG | HEIGHT: 61 IN | DIASTOLIC BLOOD PRESSURE: 92 MMHG | WEIGHT: 223 LBS | HEART RATE: 94 BPM | BODY MASS INDEX: 42.1 KG/M2

## 2023-07-14 DIAGNOSIS — R06.02 SOB (SHORTNESS OF BREATH): Primary | ICD-10-CM

## 2023-07-14 DIAGNOSIS — R07.89 CHEST DISCOMFORT: ICD-10-CM

## 2023-07-14 DIAGNOSIS — R06.02 SHORTNESS OF BREATH: ICD-10-CM

## 2023-07-14 DIAGNOSIS — R06.02 SOB (SHORTNESS OF BREATH): ICD-10-CM

## 2023-07-14 DIAGNOSIS — R06.02 SHORTNESS OF BREATH: Primary | ICD-10-CM

## 2023-07-14 PROCEDURE — 93000 ELECTROCARDIOGRAM COMPLETE: CPT | Performed by: FAMILY MEDICINE

## 2023-07-14 PROCEDURE — 99214 OFFICE O/P EST MOD 30 MIN: CPT | Performed by: FAMILY MEDICINE

## 2023-07-14 PROCEDURE — 2580000003 HC RX 258: Performed by: FAMILY MEDICINE

## 2023-07-14 PROCEDURE — 71260 CT THORAX DX C+: CPT

## 2023-07-14 PROCEDURE — 6360000004 HC RX CONTRAST MEDICATION: Performed by: FAMILY MEDICINE

## 2023-07-14 RX ORDER — SODIUM CHLORIDE 0.9 % (FLUSH) 0.9 %
10 SYRINGE (ML) INJECTION
Status: COMPLETED | OUTPATIENT
Start: 2023-07-14 | End: 2023-07-14

## 2023-07-14 RX ORDER — 0.9 % SODIUM CHLORIDE 0.9 %
100 INTRAVENOUS SOLUTION INTRAVENOUS
Status: COMPLETED | OUTPATIENT
Start: 2023-07-14 | End: 2023-07-14

## 2023-07-14 RX ADMIN — SODIUM CHLORIDE, PRESERVATIVE FREE 10 ML: 5 INJECTION INTRAVENOUS at 13:10

## 2023-07-14 RX ADMIN — IOPAMIDOL 100 ML: 755 INJECTION, SOLUTION INTRAVENOUS at 13:09

## 2023-07-14 RX ADMIN — SODIUM CHLORIDE 100 ML: 9 INJECTION, SOLUTION INTRAVENOUS at 13:10

## 2023-07-14 ASSESSMENT — ENCOUNTER SYMPTOMS
SHORTNESS OF BREATH: 1
WHEEZING: 0
ABDOMINAL PAIN: 0
BLOOD IN STOOL: 0
CHEST TIGHTNESS: 1

## 2023-07-14 NOTE — PROGRESS NOTES
7/14/2023     Subjective:     Chief Complaint   Patient presents with    Shortness of Breath     Pt is having issues with breathing        HPI:   Started with a runny nose and drainage about 2 weeks ago improved but developed into SOB and chest discomfort. Had steroids and Zpak 4 days ago without any improvement. Albuterol just gave her a headache. Symptoms now about 2 weeks. SOB even at rest, worse with movement. SOB associated with chest tightness. Seems to be getting worse. New med was estradiol added to her progesterone and testosterone. Interim History: none     Review of Systems   Constitutional:  Negative for fatigue. HENT:  Negative for congestion. Respiratory:  Positive for chest tightness and shortness of breath. Negative for wheezing. Cardiovascular:  Negative for chest pain. Gastrointestinal:  Negative for abdominal pain and blood in stool. Genitourinary:  Negative for difficulty urinating. Skin:  Negative for rash. Neurological:  Negative for headaches.        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

## 2023-07-18 ENCOUNTER — HOSPITAL ENCOUNTER (OUTPATIENT)
Dept: PHYSICAL THERAPY | Age: 43
Setting detail: RECURRING SERIES
Discharge: HOME | End: 2023-07-21
Payer: MEDICARE

## 2023-07-18 PROCEDURE — 97140 MANUAL THERAPY 1/> REGIONS: CPT

## 2023-07-18 PROCEDURE — 97110 THERAPEUTIC EXERCISES: CPT

## 2023-07-18 ASSESSMENT — PAIN DESCRIPTION - LOCATION: LOCATION: SHOULDER

## 2023-07-18 ASSESSMENT — PAIN DESCRIPTION - DESCRIPTORS: DESCRIPTORS: ACHING

## 2023-07-18 ASSESSMENT — PAIN SCALES - GENERAL: PAINLEVEL_OUTOF10: 3

## 2023-07-18 ASSESSMENT — PAIN DESCRIPTION - ORIENTATION: ORIENTATION: LEFT

## 2023-07-18 NOTE — PROGRESS NOTES
Main Minors  : 1980  Primary: Aldo Michael Ppo (Medicare Managed)  Secondary:   [address]  [address] [place] [address]  Phone: [phone]  Fax: [phone] Plan Frequency: 2 times a week for 90 days    Plan of Care/Certification Expiration Date: 08/10/23      >PT Visit Info:  Plan Frequency: 2 times a week for 90 days  Plan of Care/Certification Expiration Date: 08/10/23  Total # of Visits to Date: 16  Progress Note Counter: 9  No Show: 0  Canceled Appointment: 2 (2023)      Visit Count:  16   OUTPATIENT PHYSICAL THERAPY:OP NOTE TYPE: Treatment Note 2023       Episode  }Appt Desk             Treatment Diagnosis:  Pain in Left Shoulder (M25.512)  Stiffness of Left Shoulder, Not elsewhere classified (M25.612)  Medical/Referring Diagnosis:  Osteoarthritis of left AC (acromioclavicular) joint [M19.012]  Left shoulder pain, unspecified chronicity [M25.512]  Referring Physician:  Sirisha Felder MD MD Orders:  PT Eval and Treat   Date of Onset:  Onset Date:  (Chronic)     Allergies:   Amoxicillin, Diazepam, Penicillins, Pineapple, Sumatriptan, Theophylline, Theophyllines, Cephalexin, Sulfa antibiotics, Topiramate, Tramadol, Verapamil, [place] oil, Augmentin [amoxicillin-pot clavulanate], Dermabond, Keflex [cephalexin], Kenalog [triamcinolone acetonide], Pneumococcal vaccine, Sulfa antibiotics, Topiramate, Triamcinolone acetonide, Ultram [tramadol], Valium [diazepam], and Vitamin b12  Restrictions/Precautions:  No data recorded  No data recorded   Interventions Planned (Treatment may consist of any combination of the following):    Current Treatment Recommendations: Strengthening; ROM; Neuromuscular re-education; Home exercise program; Pain management; Modalities; Manual     >Subjective Comments:  \"I went to have a CT scan on Friday to rule out a PE. I am still really fatigued. Shoulder has been stiff\".   >Initial: Left Shoulder

## 2023-07-20 ENCOUNTER — HOSPITAL ENCOUNTER (OUTPATIENT)
Dept: PHYSICAL THERAPY | Age: 43
Setting detail: RECURRING SERIES
Discharge: HOME | End: 2023-07-23
Payer: MEDICARE

## 2023-07-20 PROCEDURE — 97110 THERAPEUTIC EXERCISES: CPT

## 2023-07-20 PROCEDURE — 97140 MANUAL THERAPY 1/> REGIONS: CPT

## 2023-07-20 ASSESSMENT — PATIENT HEALTH QUESTIONNAIRE - PHQ9
10. IF YOU CHECKED OFF ANY PROBLEMS, HOW DIFFICULT HAVE THESE PROBLEMS MADE IT FOR YOU TO DO YOUR WORK, TAKE CARE OF THINGS AT HOME, OR GET ALONG WITH OTHER PEOPLE: 1
SUM OF ALL RESPONSES TO PHQ QUESTIONS 1-9: 4
6. FEELING BAD ABOUT YOURSELF - OR THAT YOU ARE A FAILURE OR HAVE LET YOURSELF OR YOUR FAMILY DOWN: 0
8. MOVING OR SPEAKING SO SLOWLY THAT OTHER PEOPLE COULD HAVE NOTICED. OR THE OPPOSITE - BEING SO FIDGETY OR RESTLESS THAT YOU HAVE BEEN MOVING AROUND A LOT MORE THAN USUAL: SEVERAL DAYS
SUM OF ALL RESPONSES TO PHQ QUESTIONS 1-9: 4
4. FEELING TIRED OR HAVING LITTLE ENERGY: 1
SUM OF ALL RESPONSES TO PHQ QUESTIONS 1-9: 4
3. TROUBLE FALLING OR STAYING ASLEEP: 1
5. POOR APPETITE OR OVEREATING: NOT AT ALL
SUM OF ALL RESPONSES TO PHQ QUESTIONS 1-9: 4
4. FEELING TIRED OR HAVING LITTLE ENERGY: SEVERAL DAYS
2. FEELING DOWN, DEPRESSED OR HOPELESS: NOT AT ALL
9. THOUGHTS THAT YOU WOULD BE BETTER OFF DEAD, OR OF HURTING YOURSELF: 0
SUM OF ALL RESPONSES TO PHQ9 QUESTIONS 1 & 2: 0
SUM OF ALL RESPONSES TO PHQ QUESTIONS 1-9: 4
6. FEELING BAD ABOUT YOURSELF - OR THAT YOU ARE A FAILURE OR HAVE LET YOURSELF OR YOUR FAMILY DOWN: NOT AT ALL
3. TROUBLE FALLING OR STAYING ASLEEP: SEVERAL DAYS
9. THOUGHTS THAT YOU WOULD BE BETTER OFF DEAD, OR OF HURTING YOURSELF: NOT AT ALL
10. IF YOU CHECKED OFF ANY PROBLEMS, HOW DIFFICULT HAVE THESE PROBLEMS MADE IT FOR YOU TO DO YOUR WORK, TAKE CARE OF THINGS AT HOME, OR GET ALONG WITH OTHER PEOPLE: SOMEWHAT DIFFICULT
1. LITTLE INTEREST OR PLEASURE IN DOING THINGS: 0
7. TROUBLE CONCENTRATING ON THINGS, SUCH AS READING THE NEWSPAPER OR WATCHING TELEVISION: SEVERAL DAYS
5. POOR APPETITE OR OVEREATING: 0
8. MOVING OR SPEAKING SO SLOWLY THAT OTHER PEOPLE COULD HAVE NOTICED. OR THE OPPOSITE, BEING SO FIGETY OR RESTLESS THAT YOU HAVE BEEN MOVING AROUND A LOT MORE THAN USUAL: 1
2. FEELING DOWN, DEPRESSED OR HOPELESS: 0
7. TROUBLE CONCENTRATING ON THINGS, SUCH AS READING THE NEWSPAPER OR WATCHING TELEVISION: 1
1. LITTLE INTEREST OR PLEASURE IN DOING THINGS: NOT AT ALL

## 2023-07-20 ASSESSMENT — PAIN DESCRIPTION - ORIENTATION: ORIENTATION: LEFT

## 2023-07-20 ASSESSMENT — PAIN SCALES - GENERAL: PAINLEVEL_OUTOF10: 3

## 2023-07-20 ASSESSMENT — PAIN DESCRIPTION - LOCATION: LOCATION: SHOULDER

## 2023-07-20 ASSESSMENT — PAIN DESCRIPTION - DESCRIPTORS: DESCRIPTORS: ACHING

## 2023-07-20 NOTE — THERAPY RECERTIFICATION
Michael   : 1980  Primary: Pearley Patches Ppo (Medicare Managed)  Secondary:   [place] [address]  [place] [address]  Phone: [phone]  Fax: [phone] Plan Frequency: 2 times a week for 90 days    Plan of Care/Certification Expiration Date: 23      PT Visit Info:  Plan Frequency: 2 times a week for 90 days  Plan of Care/Certification Expiration Date: 23  Total # of Visits to Date: 17  Progress Note Counter: 1  No Show: 0  Canceled Appointment: 2 (2023)      Visit Count:  17                OUTPATIENT PHYSICAL THERAPY:             OP NOTE TYPE: Recertification                Episode (PT- Left shoulder pain) Appt Desk         Treatment Diagnosis:  Pain in Left Shoulder (M25.512)  Stiffness of Left Shoulder, Not elsewhere classified (M25.612)  Medical/Referring Diagnosis:  Osteoarthritis of left AC (acromioclavicular) joint [M19.012]  Left shoulder pain, unspecified chronicity [M25.512]  Referring Physician:  Manohar Marquez MD MD Orders:  PT Eval and Treat  Return MD Appt: May 19, 2023  Date of Onset:  Onset Date:  (Chronic)      Allergies:  Amoxicillin, Diazepam, Penicillins, Pineapple, Sumatriptan, Theophylline, Theophyllines, Cephalexin, Sulfa antibiotics, Topiramate, Tramadol, Verapamil, [place] oil, Augmentin [amoxicillin-pot clavulanate], Dermabond, Keflex [cephalexin], Kenalog [triamcinolone acetonide], Pneumococcal vaccine, Sulfa antibiotics, Topiramate, Triamcinolone acetonide, Ultram [tramadol], Valium [diazepam], and Vitamin b12  Restrictions/Precautions:           Medications Last Reviewed:  2023     SUBJECTIVE   History of Injury/Illness (Reason for Referral):  Patient report worsening left shoulder pain over the past couple of months. Patient thinks she may have originally injured left shoulder from a fall.   Patient had left shoulder arthroscopy distal clavicle resection with open

## 2023-07-20 NOTE — PROGRESS NOTES
Sana Rothman  : 1980  Primary: Rolo Salcedo Ppo (Medicare Managed)  Secondary:  [facility] [place] [address] [address] [facility] [address]  Phone: [phone]  Fax: [phone] Plan Frequency: 2 times a week for 90 days    Plan of Care/Certification Expiration Date: 23      >PT Visit Info:  Plan Frequency: 2 times a week for 90 days  Plan of Care/Certification Expiration Date: 23  Total # of Visits to Date: 17  Progress Note Counter: 1  No Show: 0  Canceled Appointment: 2 (2023)      Visit Count:  17   OUTPATIENT PHYSICAL THERAPY:OP NOTE TYPE: Treatment Note 2023       Episode  }Appt Desk             Treatment Diagnosis:  Pain in Left Shoulder (M25.512)  Stiffness of Left Shoulder, Not elsewhere classified (M25.612)  Medical/Referring Diagnosis:  Osteoarthritis of left AC (acromioclavicular) joint [M19.012]  Left shoulder pain, unspecified chronicity [M25.512]  Referring Physician:  Tank Kwok MD MD Orders:  PT Eval and Treat   Date of Onset:  Onset Date:  (Chronic)     Allergies:   Amoxicillin, Diazepam, Penicillins, Pineapple, Sumatriptan, Theophylline, Theophyllines, Cephalexin, Sulfa antibiotics, Topiramate, Tramadol, Verapamil, [place] oil, Augmentin [amoxicillin-pot clavulanate], Dermabond, Keflex [cephalexin], Kenalog [triamcinolone acetonide], Pneumococcal vaccine, Sulfa antibiotics, Topiramate, Triamcinolone acetonide, Ultram [tramadol], Valium [diazepam], and Vitamin b12  Restrictions/Precautions:  No data recorded  No data recorded   Interventions Planned (Treatment may consist of any combination of the following):    Current Treatment Recommendations: Strengthening; ROM; Neuromuscular re-education; Home exercise program; Pain management; Modalities; Manual     >Subjective Comments:  Patient reports her left shoulder is a little sore.   >Initial: Left Shoulder 3/10>Post Session:  Left  Shoulder 3/10  Medications Last

## 2023-07-21 ENCOUNTER — OFFICE VISIT (OUTPATIENT)
Dept: ORTHOPEDIC SURGERY | Age: 43
End: 2023-07-21

## 2023-07-21 ENCOUNTER — OFFICE VISIT (OUTPATIENT)
Dept: INTERNAL MEDICINE CLINIC | Facility: CLINIC | Age: 43
End: 2023-07-21
Payer: MEDICARE

## 2023-07-21 VITALS
DIASTOLIC BLOOD PRESSURE: 70 MMHG | BODY MASS INDEX: 43.43 KG/M2 | WEIGHT: 230 LBS | HEART RATE: 122 BPM | OXYGEN SATURATION: 98 % | SYSTOLIC BLOOD PRESSURE: 138 MMHG | HEIGHT: 61 IN

## 2023-07-21 DIAGNOSIS — K12.2 UVULITIS: Primary | ICD-10-CM

## 2023-07-21 DIAGNOSIS — Z09 FOLLOW-UP EXAMINATION FOLLOWING SURGERY: ICD-10-CM

## 2023-07-21 DIAGNOSIS — M75.22 BICEPS TENDINITIS OF LEFT SHOULDER: Primary | ICD-10-CM

## 2023-07-21 DIAGNOSIS — R06.02 SOB (SHORTNESS OF BREATH): ICD-10-CM

## 2023-07-21 PROCEDURE — 99213 OFFICE O/P EST LOW 20 MIN: CPT | Performed by: FAMILY MEDICINE

## 2023-07-21 PROCEDURE — 99024 POSTOP FOLLOW-UP VISIT: CPT | Performed by: PHYSICIAN ASSISTANT

## 2023-07-21 ASSESSMENT — ENCOUNTER SYMPTOMS
SORE THROAT: 1
WHEEZING: 0
ABDOMINAL PAIN: 0
STRIDOR: 0
VOICE CHANGE: 1
BLOOD IN STOOL: 0
SHORTNESS OF BREATH: 1

## 2023-07-24 NOTE — PROGRESS NOTES
Name: Afia Rondon  YOB: 1980  Gender: female  MRN: [id number]    CC:   Chief Complaint   Patient presents with    Follow-up     Recheck s/p left shoulder scope, DCR, open biceps tenodesis  DOS 5/8/23   about 2-3 month out from  Left Shoulder Arthroscopy Distal Clavicle Resection - Left and Open Biceps Tenodesis - Left  5/8/2023    HPI: Patient is status post DCE and biceps tenodesis   Patient feels as if they are doing well. The patient states that of continued superior shoulder pain. She states that the superior shoulder never fully improved. Denies numbness and tingling. She does feel that strength has increased and she continues to progress. The patient feels as if they are progressing as expected.     Allergies   Allergen Reactions    Amoxicillin Shortness Of Breath    Diazepam Other (See Comments)     Suicidal thoughts       Penicillins Anaphylaxis     Has not been able to tolerate cephalosporins     Pineapple Shortness Of Breath     wheezing    Sumatriptan Shortness Of Breath    Theophylline Shortness Of Breath    Theophyllines Shortness Of Breath    Cephalexin Other (See Comments)     Other reaction(s): Dizziness    Sulfa Antibiotics Rash     Other reaction(s): Vomiting    Topiramate Other (See Comments)     Caused disorientation, loss of feeling in left leg (numbness)    Tramadol Rash    Verapamil Other (See Comments)     Lowers blood pressure significantly beyond normal hypotension      [place] Oil Swelling     Causes lips to swell       Augmentin [Amoxicillin-Pot Clavulanate] Hives    Dermabond Hives    Keflex [Cephalexin] Hives    Kenalog [Triamcinolone Acetonide] Hives    Pneumococcal Vaccine Swelling    Sulfa Antibiotics Hives    Topiramate Other (See Comments) and Myalgia    Triamcinolone Acetonide Hives     Kenalog injection caused hives     Ultram [Tramadol] Hives    Valium [Diazepam] Other (See Comments)    Vitamin B12 Other (See Comments)     Injection Only  swelling

## 2023-07-25 ENCOUNTER — HOSPITAL ENCOUNTER (OUTPATIENT)
Dept: PHYSICAL THERAPY | Age: 43
Setting detail: RECURRING SERIES
Discharge: HOME | End: 2023-07-28
Payer: MEDICARE

## 2023-07-25 PROCEDURE — 97110 THERAPEUTIC EXERCISES: CPT

## 2023-07-25 PROCEDURE — 97112 NEUROMUSCULAR REEDUCATION: CPT

## 2023-07-25 ASSESSMENT — PAIN DESCRIPTION - DESCRIPTORS: DESCRIPTORS: ACHING

## 2023-07-25 ASSESSMENT — PAIN DESCRIPTION - ORIENTATION: ORIENTATION: LEFT

## 2023-07-25 ASSESSMENT — PAIN DESCRIPTION - LOCATION: LOCATION: SHOULDER

## 2023-07-25 ASSESSMENT — PAIN SCALES - GENERAL: PAINLEVEL_OUTOF10: 3

## 2023-07-25 NOTE — PROGRESS NOTES
[facility]  : 1980  Primary: Josiah Caldwell Ppo (Medicare Managed)  Secondary:   [place] [address] [address] [facility] [address]  Phone: [phone]  Fax: [phone] Plan Frequency: 2 times a week for 90 days    Plan of Care/Certification Expiration Date: 23      >PT Visit Info:  Plan Frequency: 2 times a week for 90 days  Plan of Care/Certification Expiration Date: 23  Total # of Visits to Date: 18  Progress Note Counter: 2  No Show: 0  Canceled Appointment: 2 (2023)      Visit Count:  18   OUTPATIENT PHYSICAL THERAPY:OP NOTE TYPE: Treatment Note 2023       Episode  }Appt Desk             Treatment Diagnosis:  Pain in Left Shoulder (M25.512)  Stiffness of Left Shoulder, Not elsewhere classified (M25.612)  Medical/Referring Diagnosis:  Osteoarthritis of left AC (acromioclavicular) joint [M19.012]  Left shoulder pain, unspecified chronicity [M25.512]  Referring Physician:  Cierra Rene MD MD Orders:  PT Eval and Treat   Date of Onset:  Onset Date:  (Chronic)     Allergies:   Amoxicillin, Diazepam, Penicillins, Pineapple, Sumatriptan, Theophylline, Theophyllines, Cephalexin, Sulfa antibiotics, Topiramate, Tramadol, Verapamil, [place] oil, Augmentin [amoxicillin-pot clavulanate], Dermabond, Keflex [cephalexin], Kenalog [triamcinolone acetonide], Pneumococcal vaccine, Sulfa antibiotics, Topiramate, Triamcinolone acetonide, Ultram [tramadol], Valium [diazepam], and Vitamin b12  Restrictions/Precautions:  No data recorded  No data recorded   Interventions Planned (Treatment may consist of any combination of the following):    Current Treatment Recommendations: Strengthening; ROM; Neuromuscular re-education; Home exercise program; Pain management; Modalities; Manual     >Subjective Comments: \"Yesterday I was really dizzy. I am not as dizzy as today. Thursday I was a little sore. The doctor was pleased.   She said I could have

## 2023-07-27 ENCOUNTER — HOSPITAL ENCOUNTER (OUTPATIENT)
Dept: PHYSICAL THERAPY | Age: 43
Setting detail: RECURRING SERIES
Discharge: HOME | End: 2023-07-30
Payer: MEDICARE

## 2023-07-27 PROCEDURE — 97140 MANUAL THERAPY 1/> REGIONS: CPT

## 2023-07-27 PROCEDURE — 97110 THERAPEUTIC EXERCISES: CPT

## 2023-07-27 ASSESSMENT — PAIN DESCRIPTION - ORIENTATION: ORIENTATION: LEFT

## 2023-07-27 ASSESSMENT — PAIN DESCRIPTION - DESCRIPTORS: DESCRIPTORS: ACHING

## 2023-07-27 ASSESSMENT — PAIN DESCRIPTION - LOCATION: LOCATION: SHOULDER

## 2023-07-27 ASSESSMENT — PAIN SCALES - GENERAL: PAINLEVEL_OUTOF10: 2

## 2023-07-27 NOTE — PROGRESS NOTES
Clearance Shai  : 1980  Primary: oMnserrat Fuentesfelecia Ppo (Medicare Managed)  [address]:   [address] [facility] [address]  Phone: [phone]  Fax: [phone] Plan Frequency: 2 times a week for 90 days    Plan of Care/Certification Expiration Date: 23      >PT Visit Info:  Plan Frequency: 2 times a week for 90 days  Plan of Care/Certification Expiration Date: 23  Total # of Visits to Date: 19  Progress Note Counter: 3  No Show: 0  Canceled Appointment: 2 (2023)      Visit Count:  19   OUTPATIENT PHYSICAL THERAPY:OP NOTE TYPE: Treatment Note 2023       Episode  }Appt Desk             Treatment Diagnosis:  Pain in Left Shoulder (M25.512)  Stiffness of Left Shoulder, Not elsewhere classified (M25.612)  Medical/Referring Diagnosis:  Osteoarthritis of left AC (acromioclavicular) joint [M19.012]  Left shoulder pain, unspecified chronicity [M25.512]  Referring Physician:  Nayana Stephens MD MD Orders:  PT Eval and Treat   Date of Onset:  Onset Date:  (Chronic)     Allergies:   Amoxicillin, Diazepam, Penicillins, Pineapple, Sumatriptan, Theophylline, Theophyllines, Cephalexin, Sulfa antibiotics, Topiramate, Tramadol, Verapamil, [place] oil, Augmentin [amoxicillin-pot clavulanate], Dermabond, Keflex [cephalexin], Kenalog [triamcinolone acetonide], Pneumococcal vaccine, Sulfa antibiotics, Topiramate, Triamcinolone acetonide, Ultram [tramadol], Valium [diazepam], and Vitamin b12  Restrictions/Precautions:  No data recorded  No data recorded   Interventions Planned (Treatment may consist of any combination of the following):    Current Treatment Recommendations: Strengthening; ROM; Neuromuscular re-education; Home exercise program; Pain management; Modalities; Manual     >Subjective Comments:  \"I managed to do Zoomba yesterday. Zoomba seemed to release some shoulder muscles yesterday\".   >Initial: Left Shoulder 2/10>Post Session:

## 2023-08-07 ENCOUNTER — HOSPITAL ENCOUNTER (OUTPATIENT)
Dept: PHYSICAL THERAPY | Age: 43
Setting detail: RECURRING SERIES
End: 2023-08-07
Payer: MEDICARE

## 2023-08-07 NOTE — PROGRESS NOTES
Jeffrey Silverman  : 1980  Primary: Cynthia Augustin Ppo  Secondary:  Maryana Bartlett  [address] [facility] [address]  Phone: [phone]  Fax: [phone]    PT Visit Info: Total # of Visits to Date: 23  Progress Note Counter: 3  No Show: 0  Canceled Appointment: 3 (2023)     OT Visit Info:  No data recorded    OUTPATIENT THERAPY: 2023  Episode  Appt Desk        Jeffrey Silverman cancelled her appointment for today due to unknown reasons. Will plan to follow up next during next appointment.   Thank you,  Rad Elena, PT    Future Appointments   Date Time Provider [address]   2023 11:00 AM Rad Kassy, PT [facility]   2023 10:30 AM Cyndy Conroy DO ENT [facility] AMB   2023  2:20 PM Idalmis Carey MD [id number] AMB   2023 11:00 AM Rad Meline, PT [facility]   8/15/2023 11:00 AM Rad Meline, PT [facility] SFE   2023 11:00 AM Rad Meline, PT [facility]   2023 11:00 AM Rad Meline, PT [facility] SFE   2023 11:00 AM Rad Meline, PT [facility] SFE   2023 10:15 AM LUISA Greenberg [facility] AMB   10/5/2023  9:45 AM [facility] LAB CAF [facility] AMB   10/12/2023  8:40 AM Denise Wills MD CAF [facility] AMB

## 2023-08-08 ENCOUNTER — OFFICE VISIT (OUTPATIENT)
Dept: ENT CLINIC | Age: 43
End: 2023-08-08
Payer: MEDICARE

## 2023-08-08 VITALS
HEIGHT: 61 IN | WEIGHT: 233 LBS | HEART RATE: 69 BPM | BODY MASS INDEX: 43.99 KG/M2 | RESPIRATION RATE: 18 BRPM | OXYGEN SATURATION: 98 %

## 2023-08-08 DIAGNOSIS — R49.0 DYSPHONIA: Primary | ICD-10-CM

## 2023-08-08 DIAGNOSIS — K13.79 UVULAR SWELLING: ICD-10-CM

## 2023-08-08 PROCEDURE — 99213 OFFICE O/P EST LOW 20 MIN: CPT | Performed by: STUDENT IN AN ORGANIZED HEALTH CARE EDUCATION/TRAINING PROGRAM

## 2023-08-08 ASSESSMENT — ENCOUNTER SYMPTOMS
EYE ITCHING: 0
NAUSEA: 0
CONSTIPATION: 0
DIARRHEA: 0
APNEA: 0
CHOKING: 0
EYE DISCHARGE: 0
COUGH: 0
SHORTNESS OF BREATH: 0
SINUS PAIN: 0
EYE PAIN: 0
STRIDOR: 0
SINUS PRESSURE: 0
WHEEZING: 0
FACIAL SWELLING: 0

## 2023-08-08 NOTE — PROGRESS NOTES
HPI:  Shawna Horne is a 37 y.o. female seen Established   Chief Complaint   Patient presents with    Follow-up     Patient presents today with c/o uvula inflammation ( L sided ) , she states that this has resolved some but she continues to have some irritation . 49-year-old female seen as a follow-up evaluation today with a new concern of uvula palate throat swelling and hoarseness of voice. This event all began approximately 4 weeks ago where she had mild throat pain progressive over a few days. She was given treatment with antibiotic and steroid but unfortunately over the course of a week or so she continued to have progressively worsening swelling of her soft palate uvula and loss of voice and considerable shortness of breath. This persisted for over a week before slow resolution and now over the course of the the is not 5 to 10 days she is almost completely resolved. She is having minimal hoarseness or throat pain and she notes a very slight enlargement to the left uvula. She does have a extensive history of allergies although she has not had testing in many years. She is unsure of any new recent exposure around the beginning of this event 1 month ago. She does not take any blood pressure medications. She has had no recent shortness of breath and there is been no dysphagia. Past Medical History, Past Surgical History, Family history, Social History, and Medications were all reviewed with the patient today and updated as necessary.      Allergies   Allergen Reactions    Amoxicillin Shortness Of Breath    Diazepam Other (See Comments)     Suicidal thoughts       Penicillins Anaphylaxis     Has not been able to tolerate cephalosporins     Pineapple Shortness Of Breath     wheezing    Sumatriptan Shortness Of Breath    Theophylline Shortness Of Breath     Other reaction(s): Wheezing (Reselect Reaction)    Theophyllines Shortness Of Breath    Cephalexin Other (See Comments)     Other

## 2023-08-09 ENCOUNTER — TELEMEDICINE (OUTPATIENT)
Dept: BEHAVIORAL/MENTAL HEALTH CLINIC | Age: 43
End: 2023-08-09
Payer: MEDICARE

## 2023-08-09 DIAGNOSIS — F41.1 GENERALIZED ANXIETY DISORDER: ICD-10-CM

## 2023-08-09 DIAGNOSIS — F33.0 MILD EPISODE OF RECURRENT MAJOR DEPRESSIVE DISORDER (HCC): Primary | ICD-10-CM

## 2023-08-09 DIAGNOSIS — F51.05 INSOMNIA DUE TO MENTAL DISORDER: ICD-10-CM

## 2023-08-09 PROCEDURE — 99213 OFFICE O/P EST LOW 20 MIN: CPT | Performed by: PSYCHIATRY & NEUROLOGY

## 2023-08-09 RX ORDER — VENLAFAXINE HYDROCHLORIDE 75 MG/1
75 CAPSULE, EXTENDED RELEASE ORAL DAILY
Qty: 30 CAPSULE | Refills: 3 | Status: SHIPPED | OUTPATIENT
Start: 2023-08-09

## 2023-08-09 RX ORDER — LAMOTRIGINE 100 MG/1
100 TABLET ORAL DAILY
Qty: 30 TABLET | Refills: 3 | Status: SHIPPED | OUTPATIENT
Start: 2023-08-09

## 2023-08-09 ASSESSMENT — PATIENT HEALTH QUESTIONNAIRE - PHQ9
SUM OF ALL RESPONSES TO PHQ QUESTIONS 1-9: 9
2. FEELING DOWN, DEPRESSED OR HOPELESS: 0
10. IF YOU CHECKED OFF ANY PROBLEMS, HOW DIFFICULT HAVE THESE PROBLEMS MADE IT FOR YOU TO DO YOUR WORK, TAKE CARE OF THINGS AT HOME, OR GET ALONG WITH OTHER PEOPLE: 2
1. LITTLE INTEREST OR PLEASURE IN DOING THINGS: 0
8. MOVING OR SPEAKING SO SLOWLY THAT OTHER PEOPLE COULD HAVE NOTICED. OR THE OPPOSITE, BEING SO FIGETY OR RESTLESS THAT YOU HAVE BEEN MOVING AROUND A LOT MORE THAN USUAL: 1
SUM OF ALL RESPONSES TO PHQ QUESTIONS 1-9: 9
6. FEELING BAD ABOUT YOURSELF - OR THAT YOU ARE A FAILURE OR HAVE LET YOURSELF OR YOUR FAMILY DOWN: 0
SUM OF ALL RESPONSES TO PHQ QUESTIONS 1-9: 9
SUM OF ALL RESPONSES TO PHQ QUESTIONS 1-9: 9
SUM OF ALL RESPONSES TO PHQ9 QUESTIONS 1 & 2: 0
4. FEELING TIRED OR HAVING LITTLE ENERGY: 3
3. TROUBLE FALLING OR STAYING ASLEEP: 3
9. THOUGHTS THAT YOU WOULD BE BETTER OFF DEAD, OR OF HURTING YOURSELF: 0
7. TROUBLE CONCENTRATING ON THINGS, SUCH AS READING THE NEWSPAPER OR WATCHING TELEVISION: 2
5. POOR APPETITE OR OVEREATING: 0

## 2023-08-09 ASSESSMENT — ANXIETY QUESTIONNAIRES
GAD7 TOTAL SCORE: 0
6. BECOMING EASILY ANNOYED OR IRRITABLE: 0
7. FEELING AFRAID AS IF SOMETHING AWFUL MIGHT HAPPEN: 0
4. TROUBLE RELAXING: 0
2. NOT BEING ABLE TO STOP OR CONTROL WORRYING: 0
5. BEING SO RESTLESS THAT IT IS HARD TO SIT STILL: 0
1. FEELING NERVOUS, ANXIOUS, OR ON EDGE: 0
IF YOU CHECKED OFF ANY PROBLEMS ON THIS QUESTIONNAIRE, HOW DIFFICULT HAVE THESE PROBLEMS MADE IT FOR YOU TO DO YOUR WORK, TAKE CARE OF THINGS AT HOME, OR GET ALONG WITH OTHER PEOPLE: NOT DIFFICULT AT ALL
3. WORRYING TOO MUCH ABOUT DIFFERENT THINGS: 0

## 2023-08-09 NOTE — PROGRESS NOTES
8/9/2023 5/17/2023 2/21/2023   Feeling nervous, anxious, or on edge Not at all Several days Not at all   Not being able to stop or control worrying Not at all Not at all Not at all   Worrying too much about different things Not at all Not at all Not at all   Trouble relaxing Not at all Not at all Not at all   Being so restless that it is hard to sit still Not at all Not at all Not at all   Becoming easily annoyed or irritable Not at all Not at all Not at all   Feeling afraid as if something awful might happen Not at all Not at all Not at all   BRENDA-7 Total Score 0 1 0   How difficult have these problems made it for you to do your work, take care of things at home, or get along with other people? Not difficult at all Not difficult at all Not difficult at all   Feeling nervous, anxious, or on edge - - -   BRENDA-7 Total Score - - -            I was at home while conducting this encounter. Consent:  She and/or her healthcare decision maker is aware that this patient-initiated Telehealth encounter is a billable service, with coverage as determined by her insurance carrier. She is aware that she may receive a bill and has provided verbal consent to proceed: YesPatient identification was verified, and a caregiver was present when appropriate. The patient was located in a state where the provider was credentialed to provide care. This virtual visit was conducted via [address] [place] [address]. Pursuant to the emergency declaration under the [place] [address] waiver authority and the [employer] Act, this Virtual  Visit was conducted to reduce the patient's risk of exposure to COVID-19 and provide continuity of care for an established patient. Services were provided through a video synchronous discussion virtually to substitute for in-person clinic visit.   Due to this being a TeleHealth evaluation, many elements of the physical examination are unable to

## 2023-08-11 ENCOUNTER — HOSPITAL ENCOUNTER (OUTPATIENT)
Dept: PHYSICAL THERAPY | Age: 43
Setting detail: RECURRING SERIES
Discharge: HOME | End: 2023-08-14
Payer: MEDICARE

## 2023-08-11 PROCEDURE — 97110 THERAPEUTIC EXERCISES: CPT

## 2023-08-11 PROCEDURE — 97140 MANUAL THERAPY 1/> REGIONS: CPT

## 2023-08-11 ASSESSMENT — PAIN DESCRIPTION - LOCATION: LOCATION: SHOULDER

## 2023-08-11 ASSESSMENT — PAIN DESCRIPTION - ORIENTATION: ORIENTATION: LEFT

## 2023-08-11 ASSESSMENT — PAIN SCALES - GENERAL: PAINLEVEL_OUTOF10: 4

## 2023-08-11 ASSESSMENT — PAIN DESCRIPTION - DESCRIPTORS: DESCRIPTORS: ACHING

## 2023-08-11 NOTE — PROGRESS NOTES
Florian Bentley  : 1980  Primary: Valencia Frazier [facility] (Medicare Managed)  Secondary:  [facility] [address] [address]  Phone: [phone]  Fax: [phone] Plan Frequency: 2 times a week for 90 days    Plan of Care/Certification Expiration Date: 23      >PT Visit Info:  Plan Frequency: 2 times a week for 90 days  Plan of Care/Certification Expiration Date: 23  Total # of Visits to Date: 20  Progress Note Counter: 3  No Show: 0  Canceled Appointment: 4      Visit Count:  20   OUTPATIENT PHYSICAL THERAPY:OP NOTE TYPE: Treatment Note 2023       Episode  }Appt Desk             Treatment Diagnosis:  Pain in Left Shoulder (M25.512)  Stiffness of Left Shoulder, Not elsewhere classified (M25.612)  Medical/Referring Diagnosis:  Osteoarthritis of left AC (acromioclavicular) joint [M19.012]  Left shoulder pain, unspecified chronicity [M25.512]  Referring Physician:  Juvencio Liz MD MD Orders:  PT Eval and Treat   Date of Onset:  Onset Date:  (Chronic)     Allergies:   Amoxicillin, Diazepam, Penicillins, Pineapple, Sumatriptan, Theophylline, Theophyllines, Cephalexin, Sulfa antibiotics, Topiramate, Tramadol, Verapamil, [place] oil, Augmentin [amoxicillin-pot clavulanate], Dermabond, Keflex [cephalexin], Kenalog [triamcinolone acetonide], Pneumococcal vaccine, Sulfa antibiotics, Topiramate, Triamcinolone acetonide, Ultram [tramadol], Valium [diazepam], and Vitamin b12  Restrictions/Precautions:  No data recorded  No data recorded   Interventions Planned (Treatment may consist of any combination of the following):    Current Treatment Recommendations: Strengthening; ROM; Neuromuscular re-education; Home exercise program; Pain management; Modalities; Manual     >Subjective Comments:  \"I pulled my biceps moving my luggage cart\".   >Initial: Left Shoulder 4/10>Post Session:  Left  Shoulder 4/10  Medications Last Reviewed:

## 2023-08-15 ENCOUNTER — HOSPITAL ENCOUNTER (OUTPATIENT)
Dept: PHYSICAL THERAPY | Age: 43
Setting detail: RECURRING SERIES
Discharge: HOME | End: 2023-08-18
Payer: MEDICARE

## 2023-08-15 PROCEDURE — 97140 MANUAL THERAPY 1/> REGIONS: CPT

## 2023-08-15 PROCEDURE — 97110 THERAPEUTIC EXERCISES: CPT

## 2023-08-15 ASSESSMENT — PAIN SCALES - GENERAL: PAINLEVEL_OUTOF10: 2

## 2023-08-15 ASSESSMENT — PAIN DESCRIPTION - LOCATION: LOCATION: SHOULDER

## 2023-08-15 ASSESSMENT — PAIN DESCRIPTION - ORIENTATION: ORIENTATION: LEFT

## 2023-08-15 ASSESSMENT — PAIN DESCRIPTION - DESCRIPTORS: DESCRIPTORS: ACHING

## 2023-08-17 ENCOUNTER — HOSPITAL ENCOUNTER (OUTPATIENT)
Dept: PHYSICAL THERAPY | Age: 43
Setting detail: RECURRING SERIES
Discharge: HOME | End: 2023-08-20
Payer: MEDICARE

## 2023-08-17 PROCEDURE — 97140 MANUAL THERAPY 1/> REGIONS: CPT

## 2023-08-17 PROCEDURE — 97110 THERAPEUTIC EXERCISES: CPT

## 2023-08-17 ASSESSMENT — PAIN SCALES - GENERAL: PAINLEVEL_OUTOF10: 2

## 2023-08-17 ASSESSMENT — PAIN DESCRIPTION - ORIENTATION: ORIENTATION: LEFT

## 2023-08-17 ASSESSMENT — PAIN DESCRIPTION - DESCRIPTORS: DESCRIPTORS: ACHING

## 2023-08-17 ASSESSMENT — PAIN DESCRIPTION - LOCATION: LOCATION: SHOULDER

## 2023-08-22 ENCOUNTER — HOSPITAL ENCOUNTER (OUTPATIENT)
Dept: PHYSICAL THERAPY | Age: 43
Setting detail: RECURRING SERIES
Discharge: HOME | End: 2023-08-25
Payer: MEDICARE

## 2023-08-22 PROCEDURE — 97140 MANUAL THERAPY 1/> REGIONS: CPT

## 2023-08-22 PROCEDURE — 97110 THERAPEUTIC EXERCISES: CPT

## 2023-08-22 ASSESSMENT — PAIN DESCRIPTION - ORIENTATION: ORIENTATION: LEFT

## 2023-08-22 ASSESSMENT — PAIN DESCRIPTION - LOCATION: LOCATION: SHOULDER

## 2023-08-22 ASSESSMENT — PAIN SCALES - GENERAL: PAINLEVEL_OUTOF10: 1

## 2023-08-22 ASSESSMENT — PAIN DESCRIPTION - DESCRIPTORS: DESCRIPTORS: ACHING

## 2023-08-22 NOTE — PROGRESS NOTES
Gracy Barrett  : 1980  Primary: Shonna Blancas [facility] (Medicare Managed)  Secondary:  Shalom Goddard [address] [address]  Phone: [phone]  Fax: [phone] Plan Frequency: 2 times a week for 90 days    Plan of Care/Certification Expiration Date: 23      PT Visit Info:  Plan Frequency: 2 times a week for 90 days  Plan of Care/Certification Expiration Date: 23  Total # of Visits to Date: 23  Progress Note Counter: 3  No Show: 0  Canceled Appointment: 7      Visit Count:  23                OUTPATIENT PHYSICAL THERAPY:             OP NOTE TYPE: Progress Report 2023               Episode (PT- Left shoulder pain) Appt Desk         Treatment Diagnosis:  Pain in Left Shoulder (M25.512)  Stiffness of Left Shoulder, Not elsewhere classified (M25.612)  Medical/Referring Diagnosis:  Osteoarthritis of left AC (acromioclavicular) joint [M19.012]  Left shoulder pain, unspecified chronicity [M25.512]  Referring Physician:  Raquel Fleming MD MD Orders:  PT Eval and Treat  Return MD Appt: May 19, 2023  Date of Onset:  Onset Date:  (Chronic)      Allergies:  Amoxicillin, Diazepam, Penicillins, Pineapple, Sumatriptan, Theophylline, Theophyllines, Cephalexin, Sulfa antibiotics, Topiramate, Tramadol, Verapamil, [place] oil, Augmentin [amoxicillin-pot clavulanate], Dermabond, Keflex [cephalexin], Kenalog [triamcinolone acetonide], Pneumococcal vaccine, Sulfa antibiotics, Topiramate, Triamcinolone acetonide, Ultram [tramadol], Valium [diazepam], and Vitamin b12  Restrictions/Precautions:           Medications Last Reviewed:  2023     SUBJECTIVE   History of Injury/Illness (Reason for Referral):  Patient report worsening left shoulder pain over the past couple of months. Patient thinks she may have originally injured left shoulder from a fall.   Patient had left shoulder arthroscopy distal clavicle resection with open biceps

## 2023-08-22 NOTE — PROGRESS NOTES
Leia Greco  : 1980  Primary: Ally Coker [facility] (Medicare Managed)  Secondary:  [facility] [address] [address]  Phone: [phone]  Fax: [phone] Plan Frequency: 2 times a week for 90 days    Plan of Care/Certification Expiration Date: 23      >PT Visit Info:  Plan Frequency: 2 times a week for 90 days  Plan of Care/Certification Expiration Date: 23  Total # of Visits to Date: 23  Progress Note Counter: 3  No Show: 0  Canceled Appointment: 7      Visit Count:  23   OUTPATIENT PHYSICAL THERAPY:OP NOTE TYPE: Treatment Note 2023       Episode  }Appt Desk             Treatment Diagnosis:  Pain in Left Shoulder (M25.512)  Stiffness of Left Shoulder, Not elsewhere classified (M25.612)  Medical/Referring Diagnosis:  Osteoarthritis of left AC (acromioclavicular) joint [M19.012]  Left shoulder pain, unspecified chronicity [M25.512]  Referring Physician:  Beto Piña MD MD Orders:  PT Eval and Treat   Date of Onset:  Onset Date:  (Chronic)     Allergies:   Amoxicillin, Diazepam, Penicillins, Pineapple, Sumatriptan, Theophylline, Theophyllines, Cephalexin, Sulfa antibiotics, Topiramate, Tramadol, Verapamil, [place] oil, Augmentin [amoxicillin-pot clavulanate], Dermabond, Keflex [cephalexin], Kenalog [triamcinolone acetonide], Pneumococcal vaccine, Sulfa antibiotics, Topiramate, Triamcinolone acetonide, Ultram [tramadol], Valium [diazepam], and Vitamin b12  Restrictions/Precautions:  No data recorded  No data recorded   Interventions Planned (Treatment may consist of any combination of the following):    Current Treatment Recommendations: Strengthening; ROM; Neuromuscular re-education; Home exercise program; Pain management; Modalities; Manual     >Subjective Comments:  \"Not bad. A little achy today\".   >Initial: Left Shoulder 1/10>Post Session:  Left  Shoulder 1/10  Medications Last Reviewed:  2023  Updated

## 2023-08-24 ENCOUNTER — HOSPITAL ENCOUNTER (OUTPATIENT)
Dept: PHYSICAL THERAPY | Age: 43
Setting detail: RECURRING SERIES
Discharge: HOME | End: 2023-08-27
Payer: MEDICARE

## 2023-08-24 PROCEDURE — 97140 MANUAL THERAPY 1/> REGIONS: CPT

## 2023-08-24 PROCEDURE — 97110 THERAPEUTIC EXERCISES: CPT

## 2023-08-24 ASSESSMENT — PAIN DESCRIPTION - ORIENTATION: ORIENTATION: LEFT

## 2023-08-24 ASSESSMENT — PAIN DESCRIPTION - DESCRIPTORS: DESCRIPTORS: ACHING

## 2023-08-24 ASSESSMENT — PAIN DESCRIPTION - LOCATION: LOCATION: SHOULDER

## 2023-08-24 ASSESSMENT — PAIN SCALES - GENERAL: PAINLEVEL_OUTOF10: 1

## 2023-08-24 NOTE — PROGRESS NOTES
1/10  Medications Last Reviewed:  8/24/2023  Updated Objective Findings:  None Today   Treatment   THERAPEUTIC EXERCISE: (30 minutes):    Exercises per grid below to improve mobility and strength. Required minimal verbal cues to promote proper body alignment. Progressed repetitions as indicated. Date:  8/17/2023 Date:  8/22/2023 Date:  8/24/2023   Activity/Exercise Parameters Parameters Parameters   Biceps 3# 2x10 reps 3# 2x10 reps 5# 2x10 reps   Overhead 3# 2x10 reps 3# 2x10 reps 3# 2x10 reps   Scapular retraction Black theraband  2x10 reps Black theraband  2x10 reps Black theraband  2x10 reps   Supine punch   2x10 reps 3# 2x10 reps 3# 2x10 reps 5#   Left shoulder abduction 3# 2x10 reps #3 2x10 reps #3  2x10 reps   Right sidelying ER 2x10 reps 3# X 2x10 reps 3#   UBE Level 4 x 6 minutes Level 4 x 6 minutes Level 4 x 6 minutes   Pulleys X X X   Shoulder external rotation Black theraband  2x10 reps Black  theraband  2x10 reps Black theraband  2x10 reps   Shoulder internal rotation Black theraband  2x10 reps Black theraband  2x10 reps Black theraband  2x10 reps   Shoulder extension Black theraband 2x10 reps Black  theraband 2x10 reps Black theraband 2x10 reps   Left shoulder flexion 2# x10 reps  X 5 reps 2x10 reps 2# 2x10 reps 3#     MANUAL THERAPY: (10 minutes):   Joint mobilization was utilized and necessary because of the patient's restricted joint motion. In supine, patient received passive range of motion to left shoulder with flexion, internal rotation, and external rotation. Scar tissue mobilization to left shoulder. Skin intact after treatment. Treatment/Session Summary:    >Treatment Assessment:  Patient demonstrates improved strength with exercises. Communication/Consultation:  None today  Equipment provided today:  None today. Recommendations/Intent for next treatment session: Next visit will focus on range of motion, strengthening, stretching, modalities, and manual therapy.     >Total

## 2023-09-05 ENCOUNTER — HOSPITAL ENCOUNTER (OUTPATIENT)
Dept: PHYSICAL THERAPY | Age: 43
Setting detail: RECURRING SERIES
Discharge: HOME | End: 2023-09-08
Payer: MEDICARE

## 2023-09-05 PROCEDURE — 97140 MANUAL THERAPY 1/> REGIONS: CPT

## 2023-09-05 PROCEDURE — 97110 THERAPEUTIC EXERCISES: CPT

## 2023-09-05 ASSESSMENT — PAIN DESCRIPTION - LOCATION: LOCATION: SHOULDER

## 2023-09-05 ASSESSMENT — PAIN SCALES - GENERAL: PAINLEVEL_OUTOF10: 2

## 2023-09-05 ASSESSMENT — PAIN DESCRIPTION - DESCRIPTORS: DESCRIPTORS: ACHING

## 2023-09-05 ASSESSMENT — PAIN DESCRIPTION - ORIENTATION: ORIENTATION: LEFT

## 2023-09-05 NOTE — PROGRESS NOTES
In: 1100  Time Out: [address], PT       Charge Capture  }Post Session Pain  PT Visit Info  [facility] Portal  MD Guidelines  Scanned Media  Benefits  MyChart    Future Appointments   Date Time Provider [address]   9/7/2023 11:00 AM Rasheed Horne, PT [facility]   9/21/2023 10:20 AM LUISA Ortiz [facility]   10/5/2023  9:45 AM [facility]   10/12/2023  8:40 AM Vicki Martinez MD [facility]   11/9/2023  3:40 PM Christiano Felix MD [id number]

## 2023-09-07 ENCOUNTER — HOSPITAL ENCOUNTER (OUTPATIENT)
Dept: PHYSICAL THERAPY | Age: 43
Setting detail: RECURRING SERIES
Discharge: HOME | End: 2023-09-10
Payer: MEDICARE

## 2023-09-07 PROCEDURE — 97110 THERAPEUTIC EXERCISES: CPT

## 2023-09-07 PROCEDURE — 97140 MANUAL THERAPY 1/> REGIONS: CPT

## 2023-09-07 ASSESSMENT — PAIN DESCRIPTION - DESCRIPTORS: DESCRIPTORS: ACHING

## 2023-09-07 ASSESSMENT — PAIN DESCRIPTION - ORIENTATION: ORIENTATION: LEFT

## 2023-09-07 ASSESSMENT — PAIN DESCRIPTION - LOCATION: LOCATION: SHOULDER

## 2023-09-07 NOTE — DISCHARGE SUMMARY
Semaj Collazo  : 1980  Primary: Susan Horton [facility] (Medicare Managed)  Secondary:  [facility] [address] [address]  Phone: [phone]  Fax: [phone] Plan Frequency: 2 times a week for 90 days    Plan of Care/Certification Expiration Date: 23      PT Visit Info:  Plan Frequency: 2 times a week for 90 days  Plan of Care/Certification Expiration Date: 23  Total # of Visits to Date: 26  Progress Note Counter: 3  No Show: 0  Canceled Appointment: 7      Visit Count:  26                OUTPATIENT PHYSICAL THERAPY:             OP NOTE TYPE: Discharge Summary 2023               Episode (PT- Left shoulder pain) Appt Desk         Treatment Diagnosis:  Pain in Left Shoulder (M25.512)  Stiffness of Left Shoulder, Not elsewhere classified (M25.612)  Medical/Referring Diagnosis:  Osteoarthritis of left AC (acromioclavicular) joint [M19.012]  Left shoulder pain, unspecified chronicity [M25.512]  Referring Physician:  Henrietta Andersen MD MD Orders:  PT Eval and Treat  Return MD Appt: May 19, 2023  Date of Onset:  Onset Date:  (Chronic)      Allergies:  Amoxicillin, Diazepam, Penicillins, Pineapple, Sumatriptan, Theophylline, Theophyllines, Cephalexin, Sulfa antibiotics, Topiramate, Tramadol, Verapamil, [place] oil, Augmentin [amoxicillin-pot clavulanate], Dermabond, Keflex [cephalexin], Kenalog [triamcinolone acetonide], Pneumococcal vaccine, Sulfa antibiotics, Topiramate, Triamcinolone acetonide, Ultram [tramadol], Valium [diazepam], and Vitamin b12  Restrictions/Precautions:           Medications Last Reviewed:  2023     SUBJECTIVE   History of Injury/Illness (Reason for Referral):  Patient report worsening left shoulder pain over the past couple of months. Patient thinks she may have originally injured left shoulder from a fall.   Patient had left shoulder arthroscopy distal clavicle resection with open biceps

## 2023-09-07 NOTE — PROGRESS NOTES
Toya Leeroy  : 1980  Primary: [place] [facility] (Medicare Managed)  Secondary:   [address] [address]  Phone: [phone]  Fax: [phone] Plan Frequency: 2 times a week for 90 days    Plan of Care/Certification Expiration Date: 23      >PT Visit Info:  Plan Frequency: 2 times a week for 90 days  Plan of Care/Certification Expiration Date: 23  Total # of Visits to Date: 26  Progress Note Counter: 3  No Show: 0  Canceled Appointment: 7      Visit Count:  26   OUTPATIENT PHYSICAL THERAPY:OP NOTE TYPE: Treatment Note 2023       Episode  }Appt Desk             Treatment Diagnosis:  Pain in Left Shoulder (M25.512)  Stiffness of Left Shoulder, Not elsewhere classified (M25.612)  Medical/Referring Diagnosis:  Osteoarthritis of left AC (acromioclavicular) joint [M19.012]  Left shoulder pain, unspecified chronicity [M25.512]  Referring Physician:  Aayush Crouch MD MD Orders:  PT Eval and Treat   Date of Onset:  Onset Date:  (Chronic)     Allergies:   Amoxicillin, Diazepam, Penicillins, Pineapple, Sumatriptan, Theophylline, Theophyllines, Cephalexin, Sulfa antibiotics, Topiramate, Tramadol, Verapamil, [place] oil, Augmentin [amoxicillin-pot clavulanate], Dermabond, Keflex [cephalexin], Kenalog [triamcinolone acetonide], Pneumococcal vaccine, Sulfa antibiotics, Topiramate, Triamcinolone acetonide, Ultram [tramadol], Valium [diazepam], and Vitamin b12  Restrictions/Precautions:  No data recorded  No data recorded   Interventions Planned (Treatment may consist of any combination of the following):    Current Treatment Recommendations: Strengthening; ROM; Neuromuscular re-education; Home exercise program; Pain management; Modalities; Manual     >Subjective Comments:  \"I am doing okay\". >Initial: Left Shoulder  . 5/10>Post Session:  Left  Shoulder 0/10  Medications Last Reviewed:  2023  Updated Objective

## 2023-09-15 ENCOUNTER — TRANSCRIBE ORDERS (OUTPATIENT)
Dept: SCHEDULING | Age: 43
End: 2023-09-15

## 2023-09-15 DIAGNOSIS — Z12.31 SCREENING MAMMOGRAM FOR HIGH-RISK PATIENT: Primary | ICD-10-CM

## 2023-09-21 ENCOUNTER — OFFICE VISIT (OUTPATIENT)
Dept: ORTHOPEDIC SURGERY | Age: 43
End: 2023-09-21

## 2023-09-21 VITALS — BODY MASS INDEX: 43.99 KG/M2 | WEIGHT: 233 LBS | HEIGHT: 61 IN

## 2023-09-21 DIAGNOSIS — M19.012 OSTEOARTHRITIS OF LEFT AC (ACROMIOCLAVICULAR) JOINT: Primary | ICD-10-CM

## 2023-09-21 RX ORDER — METHYLPREDNISOLONE ACETATE 40 MG/ML
40 INJECTION, SUSPENSION INTRA-ARTICULAR; INTRALESIONAL; INTRAMUSCULAR; SOFT TISSUE ONCE
Status: DISCONTINUED | OUTPATIENT
Start: 2023-09-21 | End: 2023-09-23

## 2023-09-21 RX ORDER — METHYLPREDNISOLONE ACETATE 40 MG/ML
80 INJECTION, SUSPENSION INTRA-ARTICULAR; INTRALESIONAL; INTRAMUSCULAR; SOFT TISSUE ONCE
Status: COMPLETED | OUTPATIENT
Start: 2023-09-21 | End: 2023-09-21

## 2023-09-21 RX ADMIN — METHYLPREDNISOLONE ACETATE 80 MG: 40 INJECTION, SUSPENSION INTRA-ARTICULAR; INTRALESIONAL; INTRAMUSCULAR; SOFT TISSUE at 10:27

## 2023-09-23 NOTE — PROGRESS NOTES
Name: Jorge L Syed  YOB: 1980  Gender: female  MRN: [id number]    CC:   Chief Complaint   Patient presents with    Follow-up     Left Shoulder Arthroscopy Distal Clavicle Resection - Left and Open Biceps Tenodesis - Left  5/8/2023   about 4-5 month out  Left Shoulder Arthroscopy Distal Clavicle Resection - Left and Open Biceps Tenodesis - Left  5/8/2023    HPI: The patient is doing well status post Left Shoulder Arthroscopy Distal Clavicle Resection - Left and Open Biceps Tenodesis - Left. They are are working on a Home Exercise Program. The patient has been continuing therapy. She states still some pain anteriorly. She states she feels the popping and clicking has improved. Denies numbness and tingling. Their pain has decreased and they feel as if they have improved from the pre-surgery state. PmHx: Unchanged since our previous evaluation    ROS: A 12 point review of systems is positive for mild joint pain status post the above mentioned procedure. It is otherwise negative. Allergies   Allergen Reactions    [place] Oil Swelling, Other (See Comments) and Shortness Of Breath     Causes lips to swell     Lips swell    Any stone fruits, for ex peaches, cherries, plums, Mangoes are ok    Lips swell  Any stone fruits, for ex peaches, cherries, plums, Mangoes are ok    Lip swelling    Amoxicillin Shortness Of Breath    Diazepam Other (See Comments)     Suicidal thoughts       Diazepam Other (See Comments)     Suicidal ideation    Suicidal thoughts  Other reaction(s):  Other (See Comments) Suicidal ideation    Penicillins Anaphylaxis and Other (See Comments)     Has not been able to tolerate cephalosporins   Has not been able to tolerate cephalosporins    Pineapple Shortness Of Breath and Other (See Comments)     wheezing  wheezing    Sumatriptan Shortness Of Breath    Theophylline Shortness Of Breath and Other (See Comments)     Other reaction(s): Wheezing (Reselect Reaction)

## 2023-09-26 ENCOUNTER — APPOINTMENT (OUTPATIENT)
Dept: GENERAL RADIOLOGY | Age: 43
End: 2023-09-26
Payer: MEDICARE

## 2023-09-26 ENCOUNTER — HOSPITAL ENCOUNTER (EMERGENCY)
Age: 43
Discharge: HOME | End: 2023-09-26
Attending: STUDENT IN AN ORGANIZED HEALTH CARE EDUCATION/TRAINING PROGRAM
Payer: MEDICARE

## 2023-09-26 VITALS
WEIGHT: 230 LBS | TEMPERATURE: 98.8 F | OXYGEN SATURATION: 98 % | SYSTOLIC BLOOD PRESSURE: 162 MMHG | BODY MASS INDEX: 43.43 KG/M2 | HEART RATE: 90 BPM | RESPIRATION RATE: 17 BRPM | DIASTOLIC BLOOD PRESSURE: 97 MMHG | HEIGHT: 61 IN

## 2023-09-26 DIAGNOSIS — M25.572 ACUTE LEFT ANKLE PAIN: Primary | ICD-10-CM

## 2023-09-26 PROCEDURE — 99283 EMERGENCY DEPT VISIT LOW MDM: CPT

## 2023-09-26 PROCEDURE — 73610 X-RAY EXAM OF ANKLE: CPT

## 2023-09-26 ASSESSMENT — PAIN DESCRIPTION - LOCATION: LOCATION: LEG

## 2023-09-26 ASSESSMENT — PAIN SCALES - GENERAL: PAINLEVEL_OUTOF10: 4

## 2023-09-26 ASSESSMENT — PAIN - FUNCTIONAL ASSESSMENT: PAIN_FUNCTIONAL_ASSESSMENT: 0-10

## 2023-09-26 ASSESSMENT — PAIN DESCRIPTION - ORIENTATION: ORIENTATION: LEFT;LOWER

## 2023-09-26 NOTE — ED TRIAGE NOTES
Pt ambulatory to triage with cane. Pt reports a knot to outer left lower leg that she noticed Friday morning and a second knot at left ankle that she noticed today. Pt reports an \"ache at the posterior tendon. \" Pt states she went to urgent care and was told to come to the ED

## 2023-09-26 NOTE — DISCHARGE INSTRUCTIONS
Wear Ace wrap for compression and comfort. Rest, ice and elevate as well. Alternate Tylenol and Motrin for discomfort. Follow-up with family medicine within the week. Return to the ER for worsening or worrisome symptoms.

## 2023-10-02 ENCOUNTER — PATIENT MESSAGE (OUTPATIENT)
Dept: INTERNAL MEDICINE CLINIC | Facility: CLINIC | Age: 43
End: 2023-10-02

## 2023-10-02 ENCOUNTER — OFFICE VISIT (OUTPATIENT)
Dept: ORTHOPEDIC SURGERY | Age: 43
End: 2023-10-02

## 2023-10-02 DIAGNOSIS — M84.372A STRESS FRACTURE OF LEFT ANKLE, INITIAL ENCOUNTER: Primary | ICD-10-CM

## 2023-10-02 NOTE — PROGRESS NOTES
The patient was prescribed a walker boot for the patient's left foot. The patient wears a size 6 shoe and I fitted them with a S size boot. The patient was fitted and instructed on the use of prescribed walker boot. I explained how to fit themselves and that the plastic flexible piece should always be on the front of the boot and secured by the Velcro straps on top. The air bladder in the boot was adjusted according to proper fit and comfort. The patient walked a short distance and acknowledged satisfaction with current fit. I also explained that they need a heel lift or a higher heeled shoe for the uninvolved LE to help normalize gait and avoid excessive low back stress/strain due to leg length inequality created from walker boot. Patient read and signed documenting they understand and agree to [facility]'s current DME return policy.

## 2023-10-05 DIAGNOSIS — E78.00 PURE HYPERCHOLESTEROLEMIA: ICD-10-CM

## 2023-10-05 DIAGNOSIS — E55.9 VITAMIN D DEFICIENCY: ICD-10-CM

## 2023-10-05 DIAGNOSIS — R73.01 IMPAIRED FASTING BLOOD SUGAR: ICD-10-CM

## 2023-10-05 LAB
25(OH)D3 SERPL-MCNC: 31 NG/ML (ref 30–100)
ALBUMIN SERPL-MCNC: 3.7 G/DL (ref 3.5–5)
ALBUMIN/GLOB SERPL: 1.1 (ref 0.4–1.6)
ALP SERPL-CCNC: 85 U/L (ref 50–136)
ALT SERPL-CCNC: 23 U/L (ref 12–65)
ANION GAP SERPL CALC-SCNC: 7 MMOL/L (ref 2–11)
AST SERPL-CCNC: 10 U/L (ref 15–37)
BILIRUB SERPL-MCNC: 0.4 MG/DL (ref 0.2–1.1)
BUN SERPL-MCNC: 15 MG/DL (ref 6–23)
CALCIUM SERPL-MCNC: 9.1 MG/DL (ref 8.3–10.4)
CHLORIDE SERPL-SCNC: 110 MMOL/L (ref 101–110)
CHOLEST SERPL-MCNC: 243 MG/DL
CO2 SERPL-SCNC: 25 MMOL/L (ref 21–32)
CREAT SERPL-MCNC: 0.8 MG/DL (ref 0.6–1)
ERYTHROCYTE [DISTWIDTH] IN BLOOD BY AUTOMATED COUNT: 12.3 % (ref 11.9–14.6)
GLOBULIN SER CALC-MCNC: 3.5 G/DL (ref 2.8–4.5)
GLUCOSE SERPL-MCNC: 104 MG/DL (ref 65–100)
HCT VFR BLD AUTO: 43.3 % (ref 35.8–46.3)
HDLC SERPL-MCNC: 61 MG/DL (ref 40–60)
HDLC SERPL: 4
HGB BLD-MCNC: 14.6 G/DL (ref 11.7–15.4)
LDLC SERPL CALC-MCNC: 152.2 MG/DL
MCH RBC QN AUTO: 29.3 PG (ref 26.1–32.9)
MCHC RBC AUTO-ENTMCNC: 33.7 G/DL (ref 31.4–35)
MCV RBC AUTO: 86.8 FL (ref 82–102)
NRBC # BLD: 0 K/UL (ref 0–0.2)
PLATELET # BLD AUTO: 269 K/UL (ref 150–450)
PMV BLD AUTO: 9.2 FL (ref 9.4–12.3)
POTASSIUM SERPL-SCNC: 4.3 MMOL/L (ref 3.5–5.1)
PROT SERPL-MCNC: 7.2 G/DL (ref 6.3–8.2)
RBC # BLD AUTO: 4.99 M/UL (ref 4.05–5.2)
SODIUM SERPL-SCNC: 142 MMOL/L (ref 133–143)
TRIGL SERPL-MCNC: 149 MG/DL (ref 35–150)
VLDLC SERPL CALC-MCNC: 29.8 MG/DL (ref 6–23)
WBC # BLD AUTO: 11 K/UL (ref 4.3–11.1)

## 2023-10-06 LAB
EST. AVERAGE GLUCOSE BLD GHB EST-MCNC: 105 MG/DL
HBA1C MFR BLD: 5.3 % (ref 4.8–5.6)

## 2023-10-11 SDOH — HEALTH STABILITY: PHYSICAL HEALTH: ON AVERAGE, HOW MANY MINUTES DO YOU ENGAGE IN EXERCISE AT THIS LEVEL?: 40 MIN

## 2023-10-11 SDOH — HEALTH STABILITY: PHYSICAL HEALTH: ON AVERAGE, HOW MANY DAYS PER WEEK DO YOU ENGAGE IN MODERATE TO STRENUOUS EXERCISE (LIKE A BRISK WALK)?: 2 DAYS

## 2023-10-11 ASSESSMENT — PATIENT HEALTH QUESTIONNAIRE - PHQ9
SUM OF ALL RESPONSES TO PHQ QUESTIONS 1-9: 0
1. LITTLE INTEREST OR PLEASURE IN DOING THINGS: 0
SUM OF ALL RESPONSES TO PHQ9 QUESTIONS 1 & 2: 0
SUM OF ALL RESPONSES TO PHQ QUESTIONS 1-9: 0
SUM OF ALL RESPONSES TO PHQ QUESTIONS 1-9: 0
2. FEELING DOWN, DEPRESSED OR HOPELESS: 0
SUM OF ALL RESPONSES TO PHQ QUESTIONS 1-9: 0

## 2023-10-11 ASSESSMENT — LIFESTYLE VARIABLES
HOW OFTEN DO YOU HAVE A DRINK CONTAINING ALCOHOL: 2
HOW MANY STANDARD DRINKS CONTAINING ALCOHOL DO YOU HAVE ON A TYPICAL DAY: 1
HOW OFTEN DO YOU HAVE SIX OR MORE DRINKS ON ONE OCCASION: 1
HOW OFTEN DO YOU HAVE A DRINK CONTAINING ALCOHOL: MONTHLY OR LESS
HOW MANY STANDARD DRINKS CONTAINING ALCOHOL DO YOU HAVE ON A TYPICAL DAY: 1 OR 2

## 2023-10-12 ENCOUNTER — OFFICE VISIT (OUTPATIENT)
Dept: INTERNAL MEDICINE CLINIC | Facility: CLINIC | Age: 43
End: 2023-10-12
Payer: MEDICARE

## 2023-10-12 VITALS
RESPIRATION RATE: 20 BRPM | OXYGEN SATURATION: 98 % | BODY MASS INDEX: 43.43 KG/M2 | TEMPERATURE: 97.1 F | DIASTOLIC BLOOD PRESSURE: 88 MMHG | SYSTOLIC BLOOD PRESSURE: 128 MMHG | HEIGHT: 61 IN | WEIGHT: 230 LBS | HEART RATE: 99 BPM

## 2023-10-12 DIAGNOSIS — E55.9 VITAMIN D DEFICIENCY: ICD-10-CM

## 2023-10-12 DIAGNOSIS — E03.4 HYPOTHYROIDISM DUE TO ACQUIRED ATROPHY OF THYROID: ICD-10-CM

## 2023-10-12 DIAGNOSIS — M85.862 OTHER SPECIFIED DISORDERS OF BONE DENSITY AND STRUCTURE, LEFT LOWER LEG: ICD-10-CM

## 2023-10-12 DIAGNOSIS — R73.01 IMPAIRED FASTING BLOOD SUGAR: ICD-10-CM

## 2023-10-12 DIAGNOSIS — F33.0 MILD EPISODE OF RECURRENT MAJOR DEPRESSIVE DISORDER (HCC): ICD-10-CM

## 2023-10-12 DIAGNOSIS — Z12.4 SCREENING FOR CERVICAL CANCER: ICD-10-CM

## 2023-10-12 DIAGNOSIS — J45.20 MILD INTERMITTENT ASTHMA WITHOUT COMPLICATION: ICD-10-CM

## 2023-10-12 DIAGNOSIS — M84.364S STRESS FRACTURE OF LEFT FIBULA, SEQUELA: ICD-10-CM

## 2023-10-12 DIAGNOSIS — G43.809 VESTIBULAR MIGRAINE: ICD-10-CM

## 2023-10-12 DIAGNOSIS — E78.00 PURE HYPERCHOLESTEROLEMIA: ICD-10-CM

## 2023-10-12 DIAGNOSIS — Z00.00 MEDICARE ANNUAL WELLNESS VISIT, SUBSEQUENT: Primary | ICD-10-CM

## 2023-10-12 PROCEDURE — 99214 OFFICE O/P EST MOD 30 MIN: CPT | Performed by: FAMILY MEDICINE

## 2023-10-12 PROCEDURE — G0439 PPPS, SUBSEQ VISIT: HCPCS | Performed by: FAMILY MEDICINE

## 2023-10-12 RX ORDER — ERGOCALCIFEROL 1.25 MG/1
50000 CAPSULE ORAL WEEKLY
Qty: 12 CAPSULE | Refills: 1 | Status: SHIPPED | OUTPATIENT
Start: 2023-10-12

## 2023-10-12 SDOH — ECONOMIC STABILITY: FOOD INSECURITY: WITHIN THE PAST 12 MONTHS, YOU WORRIED THAT YOUR FOOD WOULD RUN OUT BEFORE YOU GOT MONEY TO BUY MORE.: NEVER TRUE

## 2023-10-12 SDOH — ECONOMIC STABILITY: INCOME INSECURITY: HOW HARD IS IT FOR YOU TO PAY FOR THE VERY BASICS LIKE FOOD, HOUSING, MEDICAL CARE, AND HEATING?: NOT HARD AT ALL

## 2023-10-12 SDOH — ECONOMIC STABILITY: FOOD INSECURITY: WITHIN THE PAST 12 MONTHS, THE FOOD YOU BOUGHT JUST DIDN'T LAST AND YOU DIDN'T HAVE MONEY TO GET MORE.: NEVER TRUE

## 2023-10-12 ASSESSMENT — PATIENT HEALTH QUESTIONNAIRE - PHQ9
SUM OF ALL RESPONSES TO PHQ QUESTIONS 1-9: 0
SUM OF ALL RESPONSES TO PHQ QUESTIONS 1-9: 0
1. LITTLE INTEREST OR PLEASURE IN DOING THINGS: 0
SUM OF ALL RESPONSES TO PHQ9 QUESTIONS 1 & 2: 0
2. FEELING DOWN, DEPRESSED OR HOPELESS: 0
SUM OF ALL RESPONSES TO PHQ QUESTIONS 1-9: 0
SUM OF ALL RESPONSES TO PHQ QUESTIONS 1-9: 0

## 2023-10-12 NOTE — PATIENT INSTRUCTIONS
are included within your After Visit Summary for your review. Other Preventive Recommendations:    A preventive eye exam performed by an eye specialist is recommended every 1-2 years to screen for glaucoma; cataracts, macular degeneration, and other eye disorders. A preventive dental visit is recommended every 6 months. Try to get at least 150 minutes of exercise per week or 10,000 steps per day on a pedometer . Order or download the FREE \"Exercise & Physical Activity: Your Everyday Guide\" from [employer]. Call 4-620.947.3129 or search [employer] online. You need 5740-0095 mg of calcium and 3792-5590 IU of vitamin D per day. It is possible to meet your calcium requirement with diet alone, but a vitamin D supplement is usually necessary to meet this goal.  When exposed to the sun, use a sunscreen that protects against both UVA and UVB radiation with an SPF of 30 or greater. Reapply every 2 to 3 hours or after sweating, drying off with a towel, or swimming. Always wear a seat belt when traveling in a car. Always wear a helmet when riding a bicycle or motorcycle.

## 2023-10-12 NOTE — PROGRESS NOTES
Medicare Annual Wellness Visit    Armani Aragon is here for Medicare AWV (Stress fracture in left foot; wearing a boot; here for PAP today)    Assessment & Plan    Diagnosis Orders   1. Medicare annual wellness visit, subsequent        2. Mild intermittent asthma without complication        3. Pure hypercholesterolemia  CBC    Lipid Panel    Comprehensive Metabolic Panel      4. Impaired fasting blood sugar  Hemoglobin A1C      5. Vestibular migraine  External Referral to Physical Medicine Rehab      6. Mild episode of recurrent major depressive disorder ([address])        7. Hypothyroidism due to acquired atrophy of thyroid  TSH      8. Stress fracture of left fibula, sequela  DEXA BONE DENSITY AXIAL SKELETON      9. Screening for cervical cancer  PAP IG, Aptima HPV and rfx 16/18,45 (101563)    PAP IG, Aptima HPV and rfx 16/18,45 (216417)      10. Vitamin D deficiency  vitamin D (ERGOCALCIFEROL) 1.25 MG (72375 UT) CAPS capsule    Vitamin D 25 Hydroxy      11. Other specified disorders of bone density and structure, left lower leg  DEXA BONE DENSITY AXIAL SKELETON        Data reviewed:  Previous notes, labs and Specialists notes, if any, reviewed and discussed with patient. AWV today,  reviewed. Discussed immunization recommendations. Pap smear was done today. Asthma is well controlled. Hyperlipidemia reinforced lifestyle changes. IFG. Her A1c is in the normal range. Continue low processed carbohydrate diet. Vestibular migraine. She is being followed by neurology and gets Botox injections. I will also refer her to 's rehab at [facility]. Depression is stable. TSH is therapeutic. Recent history of a stress fracture of the left fibula without any recurrent stressful activities. We will do a bone density study. Start vitamin D 50,000 units once a week. Follow-up will be in 6 months with blood work prior to visit.   Opportunity to ask questions was offered and were answered to the best of my

## 2023-10-13 ENCOUNTER — HOSPITAL ENCOUNTER (OUTPATIENT)
Dept: MAMMOGRAPHY | Age: 43
End: 2023-10-13
Attending: FAMILY MEDICINE
Payer: MEDICARE

## 2023-10-13 VITALS — HEIGHT: 61 IN | WEIGHT: 230 LBS | BODY MASS INDEX: 43.43 KG/M2

## 2023-10-13 DIAGNOSIS — Z12.31 SCREENING MAMMOGRAM FOR HIGH-RISK PATIENT: ICD-10-CM

## 2023-10-13 PROCEDURE — 77063 BREAST TOMOSYNTHESIS BI: CPT

## 2023-10-18 LAB
CYTOLOGIST CVX/VAG CYTO: NORMAL
CYTOLOGY CVX/VAG DOC THIN PREP: NORMAL
HPV APTIMA: NEGATIVE
HPV GENOTYPE REFLEX: NORMAL
Lab: NORMAL
PATH REPORT.FINAL DX SPEC: NORMAL
STAT OF ADQ CVX/VAG CYTO-IMP: NORMAL

## 2023-11-07 ASSESSMENT — PATIENT HEALTH QUESTIONNAIRE - PHQ9
3. TROUBLE FALLING OR STAYING ASLEEP: MORE THAN HALF THE DAYS
6. FEELING BAD ABOUT YOURSELF - OR THAT YOU ARE A FAILURE OR HAVE LET YOURSELF OR YOUR FAMILY DOWN: 1
9. THOUGHTS THAT YOU WOULD BE BETTER OFF DEAD, OR OF HURTING YOURSELF: NOT AT ALL
SUM OF ALL RESPONSES TO PHQ QUESTIONS 1-9: 6
4. FEELING TIRED OR HAVING LITTLE ENERGY: MORE THAN HALF THE DAYS
6. FEELING BAD ABOUT YOURSELF - OR THAT YOU ARE A FAILURE OR HAVE LET YOURSELF OR YOUR FAMILY DOWN: SEVERAL DAYS
4. FEELING TIRED OR HAVING LITTLE ENERGY: 2
3. TROUBLE FALLING OR STAYING ASLEEP: 2
9. THOUGHTS THAT YOU WOULD BE BETTER OFF DEAD, OR OF HURTING YOURSELF: 0
7. TROUBLE CONCENTRATING ON THINGS, SUCH AS READING THE NEWSPAPER OR WATCHING TELEVISION: NOT AT ALL
SUM OF ALL RESPONSES TO PHQ QUESTIONS 1-9: 6
SUM OF ALL RESPONSES TO PHQ QUESTIONS 1-9: 6
10. IF YOU CHECKED OFF ANY PROBLEMS, HOW DIFFICULT HAVE THESE PROBLEMS MADE IT FOR YOU TO DO YOUR WORK, TAKE CARE OF THINGS AT HOME, OR GET ALONG WITH OTHER PEOPLE: 2
2. FEELING DOWN, DEPRESSED OR HOPELESS: 0
1. LITTLE INTEREST OR PLEASURE IN DOING THINGS: NOT AT ALL
2. FEELING DOWN, DEPRESSED OR HOPELESS: NOT AT ALL
10. IF YOU CHECKED OFF ANY PROBLEMS, HOW DIFFICULT HAVE THESE PROBLEMS MADE IT FOR YOU TO DO YOUR WORK, TAKE CARE OF THINGS AT HOME, OR GET ALONG WITH OTHER PEOPLE: VERY DIFFICULT
1. LITTLE INTEREST OR PLEASURE IN DOING THINGS: 0
SUM OF ALL RESPONSES TO PHQ QUESTIONS 1-9: 6
5. POOR APPETITE OR OVEREATING: 0
SUM OF ALL RESPONSES TO PHQ QUESTIONS 1-9: 6
8. MOVING OR SPEAKING SO SLOWLY THAT OTHER PEOPLE COULD HAVE NOTICED. OR THE OPPOSITE, BEING SO FIGETY OR RESTLESS THAT YOU HAVE BEEN MOVING AROUND A LOT MORE THAN USUAL: 1
5. POOR APPETITE OR OVEREATING: NOT AT ALL
7. TROUBLE CONCENTRATING ON THINGS, SUCH AS READING THE NEWSPAPER OR WATCHING TELEVISION: 0
SUM OF ALL RESPONSES TO PHQ9 QUESTIONS 1 & 2: 0
8. MOVING OR SPEAKING SO SLOWLY THAT OTHER PEOPLE COULD HAVE NOTICED. OR THE OPPOSITE - BEING SO FIDGETY OR RESTLESS THAT YOU HAVE BEEN MOVING AROUND A LOT MORE THAN USUAL: SEVERAL DAYS

## 2023-11-07 ASSESSMENT — ANXIETY QUESTIONNAIRES
2. NOT BEING ABLE TO STOP OR CONTROL WORRYING: NOT AT ALL
6. BECOMING EASILY ANNOYED OR IRRITABLE: NOT AT ALL
7. FEELING AFRAID AS IF SOMETHING AWFUL MIGHT HAPPEN: 0
5. BEING SO RESTLESS THAT IT IS HARD TO SIT STILL: NOT AT ALL
6. BECOMING EASILY ANNOYED OR IRRITABLE: 0
3. WORRYING TOO MUCH ABOUT DIFFERENT THINGS: NOT AT ALL
4. TROUBLE RELAXING: NOT AT ALL
1. FEELING NERVOUS, ANXIOUS, OR ON EDGE: 0
4. TROUBLE RELAXING: 0
GAD7 TOTAL SCORE: 0
7. FEELING AFRAID AS IF SOMETHING AWFUL MIGHT HAPPEN: NOT AT ALL
2. NOT BEING ABLE TO STOP OR CONTROL WORRYING: 0
3. WORRYING TOO MUCH ABOUT DIFFERENT THINGS: 0
1. FEELING NERVOUS, ANXIOUS, OR ON EDGE: NOT AT ALL
5. BEING SO RESTLESS THAT IT IS HARD TO SIT STILL: 0

## 2023-11-09 ENCOUNTER — HOSPITAL ENCOUNTER (OUTPATIENT)
Dept: MAMMOGRAPHY | Age: 43
Discharge: HOME | End: 2023-11-09
Attending: FAMILY MEDICINE
Payer: MEDICARE

## 2023-11-09 ENCOUNTER — TELEMEDICINE (OUTPATIENT)
Dept: BEHAVIORAL/MENTAL HEALTH CLINIC | Age: 43
End: 2023-11-09
Payer: MEDICARE

## 2023-11-09 DIAGNOSIS — F51.05 INSOMNIA DUE TO MENTAL DISORDER: ICD-10-CM

## 2023-11-09 DIAGNOSIS — M85.862 OTHER SPECIFIED DISORDERS OF BONE DENSITY AND STRUCTURE, LEFT LOWER LEG: ICD-10-CM

## 2023-11-09 DIAGNOSIS — M84.364S STRESS FRACTURE OF LEFT FIBULA, SEQUELA: ICD-10-CM

## 2023-11-09 DIAGNOSIS — F33.41 RECURRENT MAJOR DEPRESSIVE DISORDER, IN PARTIAL REMISSION (HCC): Primary | ICD-10-CM

## 2023-11-09 DIAGNOSIS — F41.1 GENERALIZED ANXIETY DISORDER: ICD-10-CM

## 2023-11-09 PROCEDURE — 99214 OFFICE O/P EST MOD 30 MIN: CPT | Performed by: PSYCHIATRY & NEUROLOGY

## 2023-11-09 PROCEDURE — 77080 DXA BONE DENSITY AXIAL: CPT

## 2023-11-09 RX ORDER — LAMOTRIGINE 100 MG/1
100 TABLET ORAL DAILY
Qty: 30 TABLET | Refills: 3 | Status: SHIPPED | OUTPATIENT
Start: 2023-11-09

## 2023-11-09 RX ORDER — VENLAFAXINE HYDROCHLORIDE 75 MG/1
75 CAPSULE, EXTENDED RELEASE ORAL DAILY
Qty: 30 CAPSULE | Refills: 3 | Status: SHIPPED | OUTPATIENT
Start: 2023-11-09

## 2023-11-09 NOTE — PROGRESS NOTES
Patient:  Afia Rondon  Age:  37 y.o.  :  1980     SEX:  female MRN:  [id number]     RACE: White (non-)     SEEN:  [x]  PATIENT  []  SPOUSE []  OTHER:                  2023    12:06 PM 10/12/2023     8:52 AM 10/11/2023    11:28 AM   PHQ-9    Little interest or pleasure in doing things 0 0 0   Feeling down, depressed, or hopeless 0 0 0   Trouble falling or staying asleep, or sleeping too much 2     Feeling tired or having little energy 2     Poor appetite or overeating 0     Feeling bad about yourself - or that you are a failure or have let yourself or your family down 1     Trouble concentrating on things, such as reading the newspaper or watching television 0     Moving or speaking so slowly that other people could have noticed. Or the opposite - being so fidgety or restless that you have been moving around a lot more than usual 1     Thoughts that you would be better off dead, or of hurting yourself in some way 0     PHQ-2 Score 0 0 0   PHQ-9 Total Score 6 0 0   If you checked off any problems, how difficult have these problems made it for you to do your work, take care of things at home, or get along with other people? 2             2023    12:04 PM 2023     2:03 PM 2023     1:06 PM   BRENDA-7 SCREENING   Feeling nervous, anxious, or on edge Not at all Not at all Several days   Not being able to stop or control worrying Not at all Not at all Not at all   Worrying too much about different things Not at all Not at all Not at all   Trouble relaxing Not at all Not at all Not at all   Being so restless that it is hard to sit still Not at all Not at all Not at all   Becoming easily annoyed or irritable Not at all Not at all Not at all   Feeling afraid as if something awful might happen Not at all Not at all Not at all   BRENDA-7 Total Score 0 0 1   How difficult have these problems made it for you to do your work, take care of things at home, or get along with other people?

## 2023-11-10 ENCOUNTER — TELEPHONE (OUTPATIENT)
Dept: INTERNAL MEDICINE CLINIC | Facility: CLINIC | Age: 43
End: 2023-11-10

## 2023-11-13 ENCOUNTER — OFFICE VISIT (OUTPATIENT)
Dept: ORTHOPEDIC SURGERY | Age: 43
End: 2023-11-13

## 2023-11-13 DIAGNOSIS — M84.372A STRESS FRACTURE OF LEFT ANKLE, INITIAL ENCOUNTER: Primary | ICD-10-CM

## 2023-11-13 NOTE — PROGRESS NOTES
Name: Angely Martinez  YOB: 1980  Gender: female  MRN: [id number]    Summary:     Left resolving fibular stress fracture     CC: Follow-up (Recheck with xrays Left suspected fibular stress fracture)       HPI: Angely Martinez is a 37 y.o. female who presents with Follow-up (Recheck with xrays Left suspected fibular stress fracture)  . Presents back to the office today for follow-up of her left fibular stress fracture. She still having some pain and stiffness but her pain is gotten significantly better than last visit. History was obtained by Patient     ROS/Meds/PSH/PMH/FH/SH: I personally reviewed the patients standard intake form. Below are the pertinents    Tobacco:  reports that she has never smoked. She has never used smokeless tobacco.  Diabetes: None      Physical Examination:    Dejuan of the left foot and ankle shows less swelling and tenderness palpation over the distal fibula. Peroneal tendons are intact. Imaging:   Interpretation of imaging  Left ankle XR: AP, Lateral, Oblique views     ICD-10-CM    1. Stress fracture of left ankle, initial encounter  M84.372A XR ANKLE LEFT (MIN 3 VIEWS)         Report: AP, lateral, oblique x-ray of the left ankle demonstrates healing fibular stress fracture    Impression: Healing fibular stress fracture   Wilian Jones III, MD           Assessment:   Left healing fibular stress fracture    Treatment Plan:   4 This is a chronic illness/condition with exacerbation and progression  Treatment at this time: Physical Therapy  Studies ordered: NO XR needed @ Next Visit    Weight-bearing status: WBAT        Return to work/work restrictions: none  No medications given    She will wean out of the walker boot and start home exercise program and return as needed.

## 2023-11-16 ENCOUNTER — OFFICE VISIT (OUTPATIENT)
Dept: ORTHOPEDIC SURGERY | Age: 43
End: 2023-11-16
Payer: MEDICARE

## 2023-11-16 VITALS — WEIGHT: 230 LBS | BODY MASS INDEX: 42.33 KG/M2 | HEIGHT: 62 IN

## 2023-11-16 DIAGNOSIS — M25.512 LEFT SHOULDER PAIN, UNSPECIFIED CHRONICITY: ICD-10-CM

## 2023-11-16 DIAGNOSIS — M67.922 TENDINOPATHY OF LEFT BICEPS: ICD-10-CM

## 2023-11-16 DIAGNOSIS — M19.012 OSTEOARTHRITIS OF LEFT AC (ACROMIOCLAVICULAR) JOINT: ICD-10-CM

## 2023-11-16 DIAGNOSIS — M75.22 BICEPS TENDINITIS OF LEFT SHOULDER: ICD-10-CM

## 2023-11-16 DIAGNOSIS — M25.512 LEFT SHOULDER PAIN, UNSPECIFIED CHRONICITY: Primary | ICD-10-CM

## 2023-11-16 DIAGNOSIS — S46.012A TRAUMATIC TEAR OF LEFT ROTATOR CUFF, UNSPECIFIED TEAR EXTENT, INITIAL ENCOUNTER: ICD-10-CM

## 2023-11-16 LAB
CRP SERPL-MCNC: 1 MG/DL (ref 0–0.9)
ERYTHROCYTE [SEDIMENTATION RATE] IN BLOOD: 23 MM/HR (ref 0–20)
URATE SERPL-MCNC: 6 MG/DL (ref 2.6–6)

## 2023-11-16 PROCEDURE — 99213 OFFICE O/P EST LOW 20 MIN: CPT | Performed by: PHYSICIAN ASSISTANT

## 2023-11-16 NOTE — PROGRESS NOTES
Name: Jorge L Syed  YOB: 1980  Gender: female  MRN: [id number]    CC:   Chief Complaint   Patient presents with    Follow-up     Left Shoulder Arthroscopy Distal Clavicle Resection - Left and Open Biceps Tenodesis - Left  5/8/2023     Left Shoulder Arthroscopy Distal Clavicle Resection - Left and Open Biceps Tenodesis - Left  5/8/2023    HPI: The patient is doing well status post Left Shoulder Arthroscopy Distal Clavicle Resection - Left and Open Biceps Tenodesis - Left. The patient was given a biceps tendon injection on 9-. She states fantastic relief with injection. She states overall in comparison to before surgery sees approximately 80% better. She does have some continued aching intermittently. Denies numbness and tingling. Does note occasional popping. PmHx: Unchanged since our previous evaluation    ROS: A 12 point review of systems is positive for mild joint pain status post the above mentioned procedure. It is otherwise negative. Allergies   Allergen Reactions    [place] Oil Swelling, Other (See Comments) and Shortness Of Breath     Causes lips to swell     Lips swell    Any stone fruits, for ex peaches, cherries, plums, Mangoes are ok    Lips swell  Any stone fruits, for ex peaches, cherries, plums, Mangoes are ok    Lip swelling    Amoxicillin Shortness Of Breath    Diazepam Other (See Comments)     Suicidal ideation    Suicidal thoughts  Other reaction(s):  Other (See Comments) Suicidal ideation    Penicillins Anaphylaxis and Other (See Comments)     Has not been able to tolerate cephalosporins   Has not been able to tolerate cephalosporins    Pineapple Shortness Of Breath and Other (See Comments)     wheezing  wheezing    Sumatriptan Shortness Of Breath    Theophylline Shortness Of Breath and Other (See Comments)     Other reaction(s): Wheezing (Reselect Reaction)    Triptans Anaphylaxis and Shortness Of Breath     - wheezing, not breathing well,

## 2023-11-17 LAB
CCP IGA+IGG SERPL IA-ACNC: 2 UNITS (ref 0–19)
RHEUMATOID FACT SER QL LA: NEGATIVE

## 2023-11-22 LAB
ANA BY IFA: POSITIVE
Lab: ABNORMAL
SPECKLED PATTERN: ABNORMAL

## 2023-12-06 ENCOUNTER — OFFICE VISIT (OUTPATIENT)
Dept: NEUROLOGY | Age: 43
End: 2023-12-06
Payer: MEDICARE

## 2023-12-06 VITALS
BODY MASS INDEX: 42.99 KG/M2 | OXYGEN SATURATION: 97 % | WEIGHT: 233.6 LBS | HEART RATE: 92 BPM | DIASTOLIC BLOOD PRESSURE: 84 MMHG | SYSTOLIC BLOOD PRESSURE: 158 MMHG | HEIGHT: 62 IN

## 2023-12-06 DIAGNOSIS — G43.809 VESTIBULAR MIGRAINE: Primary | ICD-10-CM

## 2023-12-06 PROCEDURE — 99214 OFFICE O/P EST MOD 30 MIN: CPT | Performed by: NURSE PRACTITIONER

## 2023-12-06 ASSESSMENT — PATIENT HEALTH QUESTIONNAIRE - PHQ9
1. LITTLE INTEREST OR PLEASURE IN DOING THINGS: 0
SUM OF ALL RESPONSES TO PHQ QUESTIONS 1-9: 0
2. FEELING DOWN, DEPRESSED OR HOPELESS: 0
SUM OF ALL RESPONSES TO PHQ QUESTIONS 1-9: 0
SUM OF ALL RESPONSES TO PHQ9 QUESTIONS 1 & 2: 0

## 2023-12-06 ASSESSMENT — ENCOUNTER SYMPTOMS
NAUSEA: 1
VOMITING: 0
PHOTOPHOBIA: 1

## 2023-12-06 NOTE — ASSESSMENT & PLAN NOTE
Proceed with Botox with Gypsy Kusum. Will plan to defer medical management to [facility] per patient preference.  Could possibly consider using Eilleen Toledo as prevention, will hold for now

## 2023-12-11 ENCOUNTER — TELEPHONE (OUTPATIENT)
Dept: NEUROLOGY | Age: 43
End: 2023-12-11

## 2024-01-07 NOTE — PROGRESS NOTES
[facility]UROLOG[facility]  [address] [place] [address], [address]  [place] [address]  Phone: [phone] Fax [phone]  URIAH Bonner          Patient:  Amanda Celaya   Provider: URIAH Bonner       Procedure note:  Botulinum Toxin injections     Indication: Chronic Migraine        Subjective:      Patient presents for chemodenervation for chronic migraine. The patient received her last Botox injections in August of 2023. She received her first Botox treatment at [facility], but has recently decided to transition Botox treatments locally. Recently seen in clinic by Leonela Griffiths NP. These notes have been reviewed. Will defer to [facility], per patient preference, for for medication management.    According to a review of the patient's medical record, this will the the patient's 2nd treatment. Notes that severity of headaches has improved since her last treatment. She reports that her headaches are primarily located right parietal and radiate frontal, occasionally located occipital radiating frontal.  Symptoms reported to be associated with headaches include nausea, dizziness, photophobia, and phonophobia. No vomiting.    The patient has previously tolerated the injections without difficulty or adverse effects.    She has previously failed numerous other preventive therapies therapies and highlighted in her office visit note in December 2023.      Past medical history, surgical history, social history, family history, medications and allergies were all reviewed and updated as appropriate.      Outpatient Encounter Medications as of 1/8/2024   Medication Sig Dispense Refill    lamoTRIgine (LAMICTAL) 100 MG tablet Take 1 tablet by mouth daily 30 tablet 3    venlafaxine (EFFEXOR XR) 75 MG extended release capsule Take 1 capsule by mouth daily 30 capsule 3    vitamin D (ERGOCALCIFEROL) 1.25 MG (18600 UT) CAPS capsule Take 1 capsule by mouth once a week 12 capsule 1    UNABLE TO FIND ESTRIOL/ESTRADIOL (80/20)

## 2024-01-08 ENCOUNTER — OFFICE VISIT (OUTPATIENT)
Dept: NEUROLOGY | Age: 44
End: 2024-01-08
Payer: MEDICARE

## 2024-01-08 VITALS
DIASTOLIC BLOOD PRESSURE: 83 MMHG | HEIGHT: 62 IN | WEIGHT: 233 LBS | SYSTOLIC BLOOD PRESSURE: 137 MMHG | HEART RATE: 82 BPM | BODY MASS INDEX: 42.88 KG/M2

## 2024-01-08 DIAGNOSIS — G43.E09 CHRONIC MIGRAINE WITH AURA WITHOUT STATUS MIGRAINOSUS, NOT INTRACTABLE: Primary | ICD-10-CM

## 2024-01-08 PROCEDURE — 64615 CHEMODENERV MUSC MIGRAINE: CPT

## 2024-01-22 ENCOUNTER — OFFICE VISIT (OUTPATIENT)
Dept: ORTHOPEDIC SURGERY | Age: 44
End: 2024-01-22
Payer: MEDICARE

## 2024-01-22 DIAGNOSIS — M95.8 OSTEOCHONDRAL DEFECT OF TALUS: ICD-10-CM

## 2024-01-22 DIAGNOSIS — M25.372 LEFT ANKLE INSTABILITY: Primary | ICD-10-CM

## 2024-01-22 PROCEDURE — 99214 OFFICE O/P EST MOD 30 MIN: CPT | Performed by: ORTHOPAEDIC SURGERY

## 2024-01-22 NOTE — PROGRESS NOTES
Name: Amanda Celaya  YOB: 1980  Gender: female  MRN: [id number]    Summary:     Left lateral ankle instability     CC: Follow-up (Left resolving fibular stress fracture)       HPI: Amanda Celaya is a 43 y.o. female who presents with Follow-up (Left resolving fibular stress fracture)  .  This patient presents back to the office today with consistent persistent pain located lateral aspect of her left ankle.  She had another fall and has also had no ongoing giving way type sensations.    History was obtained by Patient     ROS/Meds/PSH/PMH/FH/SH: I personally reviewed the patients standard intake form.  Below are the pertinents    Tobacco:  reports that she has never smoked. She has never used smokeless tobacco.  Diabetes: None      Physical Examination:  Exam of the left foot and ankle shows tenderness palpation over the ATFL region with some instability to talar tilt testing and anterior drawer testing.  The contralateral extremity.  She does have some pain and fullness over the peroneal tendons.  She has palpable pulses and intact sensation.      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  Discussed treatment based on result.

## 2024-02-12 ENCOUNTER — OFFICE VISIT (OUTPATIENT)
Dept: ORTHOPEDIC SURGERY | Age: 44
End: 2024-02-12
Payer: MEDICARE

## 2024-02-12 DIAGNOSIS — E66.01 OBESITY, CLASS III, BMI 40-49.9 (MORBID OBESITY) (HCC): ICD-10-CM

## 2024-02-12 DIAGNOSIS — M95.8 OSTEOCHONDRAL DEFECT OF TALUS: ICD-10-CM

## 2024-02-12 DIAGNOSIS — M25.372 LEFT ANKLE INSTABILITY: Primary | ICD-10-CM

## 2024-02-12 PROCEDURE — 99214 OFFICE O/P EST MOD 30 MIN: CPT | Performed by: ORTHOPAEDIC SURGERY

## 2024-02-12 NOTE — PROGRESS NOTES
Name: Amanda Celaya  YOB: 1980  Gender: female  MRN: [id number]    Summary:   Left ankle instability       CC: Follow-up and Results (MRI left ankle)       HPI: Amanda Celaya is a 43 y.o. female who presents with Follow-up and Results (MRI left ankle)  .  This patient presents to the office today as a follow-up with continued pain in her left ankle.  She still wearing a walker boot and using crutches.  She is tried supervised PT as well as an ASO brace in the past and is also here to discuss her MRI of the left ankle.    History was obtained by Patient     ROS/Meds/PSH/PMH/FH/SH: I personally reviewed the patients standard intake form.  Below are the pertinents    Tobacco:  reports that she has never smoked. She has never used smokeless tobacco.  Diabetes: None      Physical Examination:  Exam of the left lower extremity shows pretty good stability to talar tilt testing and intra drawer testing with some pain located to the anterolateral and anterior joint.  She has palpable pulses and intact sensation.  There is some tenderness to palpation over the posterior tibial tendon more so over the peroneal tendons with no obvious fullness or tear.      Imaging:   I independently interpreted the MRI I ordered of the left ankle which shows peroneus longus and tib posterior tibial tendon tendinitis with no obvious tear or obvious ligamentous instability          Assessment:   Left lateral ankle instability with peroneal tendinitis    Treatment Plan:   4 This is a chronic illness/condition with exacerbation and progression  Treatment at this time: [place] Brace  Studies ordered: NO XR needed @ Next Visit    Weight-bearing status: WBAT        Return to work/work restrictions: none  No medications given and OTC NSAIDs - We discussed using OTC NSAID's or tylenol for inflammation and pain.  I discussed reading the bottle and following the instructions.  I also briefly discussed how NSAIDs can cause GI

## 2024-02-20 LAB
ESTRADIOL SERPL-MCNC: 82.72 PG/ML
PROGEST SERPL-MCNC: 2.25 NG/ML

## 2024-02-21 RX ORDER — LAMOTRIGINE 100 MG/1
100 TABLET ORAL DAILY
Qty: 90 TABLET | Refills: 1 | OUTPATIENT
Start: 2024-02-21

## 2024-02-22 LAB — DHEA-S SERPL-MCNC: 96.5 UG/DL (ref 57.3–279.2)

## 2024-02-26 LAB — TESTOST SERPL-MCNC: 15 NG/DL

## 2024-03-06 RX ORDER — LAMOTRIGINE 100 MG/1
100 TABLET ORAL DAILY
Qty: 90 TABLET | Refills: 1 | OUTPATIENT
Start: 2024-03-06

## 2024-03-06 RX ORDER — VENLAFAXINE HYDROCHLORIDE 75 MG/1
CAPSULE, EXTENDED RELEASE ORAL DAILY
Qty: 30 CAPSULE | Refills: 3 | OUTPATIENT
Start: 2024-03-06

## 2024-03-18 ENCOUNTER — TELEPHONE (OUTPATIENT)
Dept: BEHAVIORAL/MENTAL HEALTH CLINIC | Age: 44
End: 2024-03-18

## 2024-03-18 RX ORDER — LAMOTRIGINE 100 MG/1
100 TABLET ORAL DAILY
Qty: 30 TABLET | Refills: 3 | OUTPATIENT
Start: 2024-03-18

## 2024-03-18 RX ORDER — VENLAFAXINE HYDROCHLORIDE 75 MG/1
75 CAPSULE, EXTENDED RELEASE ORAL DAILY
Qty: 30 CAPSULE | Refills: 1 | Status: SHIPPED | OUTPATIENT
Start: 2024-03-18

## 2024-03-18 RX ORDER — VENLAFAXINE HYDROCHLORIDE 75 MG/1
75 CAPSULE, EXTENDED RELEASE ORAL DAILY
Qty: 30 CAPSULE | Refills: 3 | OUTPATIENT
Start: 2024-03-18

## 2024-03-18 RX ORDER — LAMOTRIGINE 100 MG/1
100 TABLET ORAL DAILY
Qty: 30 TABLET | Refills: 1 | Status: SHIPPED | OUTPATIENT
Start: 2024-03-18

## 2024-03-18 NOTE — TELEPHONE ENCOUNTER
Patient is requesting refills for Lamictal and Effexor to last until 5/9 appt.     [facility]

## 2024-03-27 ENCOUNTER — TELEPHONE (OUTPATIENT)
Dept: FAMILY MEDICINE CLINIC | Facility: CLINIC | Age: 44
End: 2024-03-27

## 2024-03-27 NOTE — TELEPHONE ENCOUNTER
----- Message from Lisa Betts sent at 3/21/2024 11:46 AM EDT -----  Subject: Message to Provider    QUESTIONS  Information for Provider? Patient has her physical on 4/18 and would like   labs orders placed now please call when labs are submitted.   ---------------------------------------------------------------------------  --------------  CALL BACK INFO  [phone]; OK to leave message on voicemail  ---------------------------------------------------------------------------  --------------  SCRIPT ANSWERS  Relationship to Patient? Self

## 2024-04-11 DIAGNOSIS — E78.00 PURE HYPERCHOLESTEROLEMIA: ICD-10-CM

## 2024-04-11 DIAGNOSIS — E55.9 VITAMIN D DEFICIENCY: ICD-10-CM

## 2024-04-11 DIAGNOSIS — R73.01 IMPAIRED FASTING BLOOD SUGAR: ICD-10-CM

## 2024-04-11 DIAGNOSIS — E03.4 HYPOTHYROIDISM DUE TO ACQUIRED ATROPHY OF THYROID: ICD-10-CM

## 2024-04-11 LAB
25(OH)D3 SERPL-MCNC: 62.9 NG/ML (ref 30–100)
ALBUMIN SERPL-MCNC: 3.8 G/DL (ref 3.5–5)
ALBUMIN/GLOB SERPL: 1.2 (ref 0.4–1.6)
ALP SERPL-CCNC: 77 U/L (ref 50–136)
ALT SERPL-CCNC: 43 U/L (ref 12–65)
ANION GAP SERPL CALC-SCNC: 7 MMOL/L (ref 2–11)
AST SERPL-CCNC: 24 U/L (ref 15–37)
BILIRUB SERPL-MCNC: 0.4 MG/DL (ref 0.2–1.1)
BUN SERPL-MCNC: 11 MG/DL (ref 6–23)
CALCIUM SERPL-MCNC: 9.2 MG/DL (ref 8.3–10.4)
CHLORIDE SERPL-SCNC: 107 MMOL/L (ref 103–113)
CHOLEST SERPL-MCNC: 262 MG/DL
CO2 SERPL-SCNC: 23 MMOL/L (ref 21–32)
CREAT SERPL-MCNC: 0.8 MG/DL (ref 0.6–1)
ERYTHROCYTE [DISTWIDTH] IN BLOOD BY AUTOMATED COUNT: 12.6 % (ref 11.9–14.6)
GLOBULIN SER CALC-MCNC: 3.2 G/DL (ref 2.8–4.5)
GLUCOSE SERPL-MCNC: 120 MG/DL (ref 65–100)
HCT VFR BLD AUTO: 40.1 % (ref 35.8–46.3)
HDLC SERPL-MCNC: 60 MG/DL (ref 40–60)
HDLC SERPL: 4.4
HGB BLD-MCNC: 14 G/DL (ref 11.7–15.4)
LDLC SERPL CALC-MCNC: 178 MG/DL
MCH RBC QN AUTO: 29.6 PG (ref 26.1–32.9)
MCHC RBC AUTO-ENTMCNC: 34.9 G/DL (ref 31.4–35)
MCV RBC AUTO: 84.8 FL (ref 82–102)
NRBC # BLD: 0 K/UL (ref 0–0.2)
PLATELET # BLD AUTO: 254 K/UL (ref 150–450)
PMV BLD AUTO: 9.3 FL (ref 9.4–12.3)
POTASSIUM SERPL-SCNC: 3.8 MMOL/L (ref 3.5–5.1)
PROT SERPL-MCNC: 7 G/DL (ref 6.3–8.2)
RBC # BLD AUTO: 4.73 M/UL (ref 4.05–5.2)
SODIUM SERPL-SCNC: 137 MMOL/L (ref 136–146)
T4 FREE SERPL-MCNC: 0.8 NG/DL (ref 0.78–1.46)
TRIGL SERPL-MCNC: 120 MG/DL (ref 35–150)
TSH, 3RD GENERATION: 2.48 UIU/ML (ref 0.36–3.74)
VLDLC SERPL CALC-MCNC: 24 MG/DL (ref 6–23)
WBC # BLD AUTO: 7.6 K/UL (ref 4.3–11.1)

## 2024-04-12 LAB
EST. AVERAGE GLUCOSE BLD GHB EST-MCNC: 105 MG/DL
HBA1C MFR BLD: 5.3 % (ref 4.8–5.6)

## 2024-04-13 LAB — T3FREE SERPL-MCNC: 2.7 PG/ML (ref 2–4.4)

## 2024-04-15 ASSESSMENT — PATIENT HEALTH QUESTIONNAIRE - PHQ9
5. POOR APPETITE OR OVEREATING: NOT AT ALL
7. TROUBLE CONCENTRATING ON THINGS, SUCH AS READING THE NEWSPAPER OR WATCHING TELEVISION: MORE THAN HALF THE DAYS
6. FEELING BAD ABOUT YOURSELF - OR THAT YOU ARE A FAILURE OR HAVE LET YOURSELF OR YOUR FAMILY DOWN: NOT AT ALL
SUM OF ALL RESPONSES TO PHQ QUESTIONS 1-9: 7
SUM OF ALL RESPONSES TO PHQ QUESTIONS 1-9: 7
10. IF YOU CHECKED OFF ANY PROBLEMS, HOW DIFFICULT HAVE THESE PROBLEMS MADE IT FOR YOU TO DO YOUR WORK, TAKE CARE OF THINGS AT HOME, OR GET ALONG WITH OTHER PEOPLE: VERY DIFFICULT
5. POOR APPETITE OR OVEREATING: NOT AT ALL
2. FEELING DOWN, DEPRESSED OR HOPELESS: SEVERAL DAYS
1. LITTLE INTEREST OR PLEASURE IN DOING THINGS: NOT AT ALL
6. FEELING BAD ABOUT YOURSELF - OR THAT YOU ARE A FAILURE OR HAVE LET YOURSELF OR YOUR FAMILY DOWN: NOT AT ALL
10. IF YOU CHECKED OFF ANY PROBLEMS, HOW DIFFICULT HAVE THESE PROBLEMS MADE IT FOR YOU TO DO YOUR WORK, TAKE CARE OF THINGS AT HOME, OR GET ALONG WITH OTHER PEOPLE: VERY DIFFICULT
7. TROUBLE CONCENTRATING ON THINGS, SUCH AS READING THE NEWSPAPER OR WATCHING TELEVISION: MORE THAN HALF THE DAYS
2. FEELING DOWN, DEPRESSED OR HOPELESS: SEVERAL DAYS
SUM OF ALL RESPONSES TO PHQ QUESTIONS 1-9: 7
8. MOVING OR SPEAKING SO SLOWLY THAT OTHER PEOPLE COULD HAVE NOTICED. OR THE OPPOSITE - BEING SO FIDGETY OR RESTLESS THAT YOU HAVE BEEN MOVING AROUND A LOT MORE THAN USUAL: SEVERAL DAYS
SUM OF ALL RESPONSES TO PHQ QUESTIONS 1-9: 7
4. FEELING TIRED OR HAVING LITTLE ENERGY: MORE THAN HALF THE DAYS
SUM OF ALL RESPONSES TO PHQ9 QUESTIONS 1 & 2: 1
4. FEELING TIRED OR HAVING LITTLE ENERGY: MORE THAN HALF THE DAYS
SUM OF ALL RESPONSES TO PHQ QUESTIONS 1-9: 7
9. THOUGHTS THAT YOU WOULD BE BETTER OFF DEAD, OR OF HURTING YOURSELF: NOT AT ALL
3. TROUBLE FALLING OR STAYING ASLEEP: SEVERAL DAYS
3. TROUBLE FALLING OR STAYING ASLEEP: SEVERAL DAYS
8. MOVING OR SPEAKING SO SLOWLY THAT OTHER PEOPLE COULD HAVE NOTICED. OR THE OPPOSITE, BEING SO FIGETY OR RESTLESS THAT YOU HAVE BEEN MOVING AROUND A LOT MORE THAN USUAL: SEVERAL DAYS
9. THOUGHTS THAT YOU WOULD BE BETTER OFF DEAD, OR OF HURTING YOURSELF: NOT AT ALL
1. LITTLE INTEREST OR PLEASURE IN DOING THINGS: NOT AT ALL

## 2024-04-16 NOTE — PROGRESS NOTES
4/18/2024     Subjective:     Chief Complaint   Patient presents with    Follow-up     6 month       HPI:     Hyperlipidemia  The patient is following a low fat diet and is not exercising regularly.  She is not on meds at this time. Patient is not having associated symptoms of shortness of breath, chest pain, rapid heart rate, irregular heart rate, and dizziness    Patient labs are YOUR LAST LIPID PROFILE:   Lab Results   Component Value Date    CHOL 262 (H) 04/11/2024    CHOL 243 (H) 10/05/2023    CHOL 214 (H) 01/13/2023     Lab Results   Component Value Date    TRIG 120 04/11/2024    TRIG 149 10/05/2023    TRIG 116 01/13/2023     Lab Results   Component Value Date    HDL 60 04/11/2024    HDL 61 (H) 10/05/2023    HDL 53 01/13/2023     Lab Results   Component Value Date    LDLCALC 178 (H) 04/11/2024    LDLCALC 152.2 (H) 10/05/2023    LDLCALC 137.8 (H) 01/13/2023     No results found for: \"VLDL\"  Lab Results   Component Value Date    CHOLHDLRATIO 4.4 04/11/2024    CHOLHDLRATIO 4.0 10/05/2023    CHOLHDLRATIO 4.0 01/13/2023          Hypothyroidism  The patient is a 44 y.o. female who is seen for follow up of hypothyroidism. Current symptoms: none. Patient denies change in energy level, diarrhea, heat / cold intolerance, and nervousness. Symptoms are stable and well controlled. The patient is compliant.  Most recent lab:   Lab Results   Component Value Date    OJQ7FSI 2.480 04/11/2024           Impaired Fasting Glucose    Patient is following up on Impaired fasting glucose .   Compliant with exercise : No  Current treatment : none  Compliant with diet : Yes  Current diet : Low salt, Low sugar, and Low fat  C/ O : none  Lab Results   Component Value Date    LABA1C 5.3 04/11/2024     Lab Results   Component Value Date     04/11/2024        Asthma  No flare ups      Vitamin D deficiency  Taking supplement on a regular basis. Current level:   Lab Results   Component Value Date/Time    VITD25 62.9 04/11/2024 11:38 AM

## 2024-04-18 ENCOUNTER — OFFICE VISIT (OUTPATIENT)
Dept: INTERNAL MEDICINE CLINIC | Facility: CLINIC | Age: 44
End: 2024-04-18
Payer: MEDICARE

## 2024-04-18 VITALS
HEART RATE: 98 BPM | WEIGHT: 239 LBS | DIASTOLIC BLOOD PRESSURE: 88 MMHG | BODY MASS INDEX: 45.12 KG/M2 | HEIGHT: 61 IN | SYSTOLIC BLOOD PRESSURE: 132 MMHG

## 2024-04-18 DIAGNOSIS — E55.9 VITAMIN D DEFICIENCY: ICD-10-CM

## 2024-04-18 DIAGNOSIS — F32.1 MAJOR DEPRESSIVE DISORDER, SINGLE EPISODE, MODERATE (HCC): ICD-10-CM

## 2024-04-18 DIAGNOSIS — E78.5 HYPERLIPIDEMIA, UNSPECIFIED HYPERLIPIDEMIA TYPE: Primary | ICD-10-CM

## 2024-04-18 DIAGNOSIS — J45.20 MILD INTERMITTENT ASTHMA WITHOUT COMPLICATION: ICD-10-CM

## 2024-04-18 DIAGNOSIS — E03.4 HYPOTHYROIDISM DUE TO ACQUIRED ATROPHY OF THYROID: ICD-10-CM

## 2024-04-18 DIAGNOSIS — R73.01 IMPAIRED FASTING BLOOD SUGAR: ICD-10-CM

## 2024-04-18 DIAGNOSIS — F33.41 RECURRENT MAJOR DEPRESSIVE DISORDER, IN PARTIAL REMISSION (HCC): ICD-10-CM

## 2024-04-18 DIAGNOSIS — I11.9 HYPERTENSIVE HEART DISEASE WITHOUT HEART FAILURE: ICD-10-CM

## 2024-04-18 PROCEDURE — 99214 OFFICE O/P EST MOD 30 MIN: CPT | Performed by: FAMILY MEDICINE

## 2024-04-18 RX ORDER — ERGOCALCIFEROL 1.25 MG/1
50000 CAPSULE ORAL WEEKLY
Qty: 12 CAPSULE | Refills: 3 | Status: SHIPPED | OUTPATIENT
Start: 2024-04-18

## 2024-04-18 RX ORDER — LISINOPRIL 10 MG/1
10 TABLET ORAL DAILY
COMMUNITY
Start: 2024-04-09

## 2024-04-18 ASSESSMENT — ENCOUNTER SYMPTOMS
ABDOMINAL PAIN: 0
SHORTNESS OF BREATH: 0
BLOOD IN STOOL: 0

## 2024-05-07 ASSESSMENT — PATIENT HEALTH QUESTIONNAIRE - PHQ9
10. IF YOU CHECKED OFF ANY PROBLEMS, HOW DIFFICULT HAVE THESE PROBLEMS MADE IT FOR YOU TO DO YOUR WORK, TAKE CARE OF THINGS AT HOME, OR GET ALONG WITH OTHER PEOPLE: SOMEWHAT DIFFICULT
8. MOVING OR SPEAKING SO SLOWLY THAT OTHER PEOPLE COULD HAVE NOTICED. OR THE OPPOSITE - BEING SO FIDGETY OR RESTLESS THAT YOU HAVE BEEN MOVING AROUND A LOT MORE THAN USUAL: SEVERAL DAYS
4. FEELING TIRED OR HAVING LITTLE ENERGY: MORE THAN HALF THE DAYS
3. TROUBLE FALLING OR STAYING ASLEEP: MORE THAN HALF THE DAYS
9. THOUGHTS THAT YOU WOULD BE BETTER OFF DEAD, OR OF HURTING YOURSELF: NOT AT ALL
SUM OF ALL RESPONSES TO PHQ QUESTIONS 1-9: 6
SUM OF ALL RESPONSES TO PHQ9 QUESTIONS 1 & 2: 0
8. MOVING OR SPEAKING SO SLOWLY THAT OTHER PEOPLE COULD HAVE NOTICED. OR THE OPPOSITE, BEING SO FIGETY OR RESTLESS THAT YOU HAVE BEEN MOVING AROUND A LOT MORE THAN USUAL: SEVERAL DAYS
2. FEELING DOWN, DEPRESSED OR HOPELESS: NOT AT ALL
2. FEELING DOWN, DEPRESSED OR HOPELESS: NOT AT ALL
4. FEELING TIRED OR HAVING LITTLE ENERGY: MORE THAN HALF THE DAYS
9. THOUGHTS THAT YOU WOULD BE BETTER OFF DEAD, OR OF HURTING YOURSELF: NOT AT ALL
SUM OF ALL RESPONSES TO PHQ QUESTIONS 1-9: 6
3. TROUBLE FALLING OR STAYING ASLEEP: MORE THAN HALF THE DAYS
5. POOR APPETITE OR OVEREATING: NOT AT ALL
7. TROUBLE CONCENTRATING ON THINGS, SUCH AS READING THE NEWSPAPER OR WATCHING TELEVISION: SEVERAL DAYS
6. FEELING BAD ABOUT YOURSELF - OR THAT YOU ARE A FAILURE OR HAVE LET YOURSELF OR YOUR FAMILY DOWN: NOT AT ALL
6. FEELING BAD ABOUT YOURSELF - OR THAT YOU ARE A FAILURE OR HAVE LET YOURSELF OR YOUR FAMILY DOWN: NOT AT ALL
SUM OF ALL RESPONSES TO PHQ QUESTIONS 1-9: 6
7. TROUBLE CONCENTRATING ON THINGS, SUCH AS READING THE NEWSPAPER OR WATCHING TELEVISION: SEVERAL DAYS
1. LITTLE INTEREST OR PLEASURE IN DOING THINGS: NOT AT ALL
5. POOR APPETITE OR OVEREATING: NOT AT ALL

## 2024-05-07 ASSESSMENT — ANXIETY QUESTIONNAIRES
7. FEELING AFRAID AS IF SOMETHING AWFUL MIGHT HAPPEN: NOT AT ALL
5. BEING SO RESTLESS THAT IT IS HARD TO SIT STILL: NOT AT ALL
6. BECOMING EASILY ANNOYED OR IRRITABLE: NOT AT ALL
3. WORRYING TOO MUCH ABOUT DIFFERENT THINGS: NOT AT ALL
4. TROUBLE RELAXING: NOT AT ALL
IF YOU CHECKED OFF ANY PROBLEMS ON THIS QUESTIONNAIRE, HOW DIFFICULT HAVE THESE PROBLEMS MADE IT FOR YOU TO DO YOUR WORK, TAKE CARE OF THINGS AT HOME, OR GET ALONG WITH OTHER PEOPLE: NOT DIFFICULT AT ALL
1. FEELING NERVOUS, ANXIOUS, OR ON EDGE: NOT AT ALL
7. FEELING AFRAID AS IF SOMETHING AWFUL MIGHT HAPPEN: NOT AT ALL
2. NOT BEING ABLE TO STOP OR CONTROL WORRYING: NOT AT ALL
IF YOU CHECKED OFF ANY PROBLEMS ON THIS QUESTIONNAIRE, HOW DIFFICULT HAVE THESE PROBLEMS MADE IT FOR YOU TO DO YOUR WORK, TAKE CARE OF THINGS AT HOME, OR GET ALONG WITH OTHER PEOPLE: NOT DIFFICULT AT ALL
4. TROUBLE RELAXING: NOT AT ALL
2. NOT BEING ABLE TO STOP OR CONTROL WORRYING: NOT AT ALL
GAD7 TOTAL SCORE: 0
6. BECOMING EASILY ANNOYED OR IRRITABLE: NOT AT ALL
1. FEELING NERVOUS, ANXIOUS, OR ON EDGE: NOT AT ALL
5. BEING SO RESTLESS THAT IT IS HARD TO SIT STILL: NOT AT ALL
3. WORRYING TOO MUCH ABOUT DIFFERENT THINGS: NOT AT ALL

## 2024-05-09 ENCOUNTER — TELEMEDICINE (OUTPATIENT)
Dept: BEHAVIORAL/MENTAL HEALTH CLINIC | Age: 44
End: 2024-05-09
Payer: MEDICARE

## 2024-05-09 DIAGNOSIS — F41.1 GENERALIZED ANXIETY DISORDER: ICD-10-CM

## 2024-05-09 DIAGNOSIS — F51.05 INSOMNIA DUE TO MENTAL DISORDER: ICD-10-CM

## 2024-05-09 DIAGNOSIS — F33.41 RECURRENT MAJOR DEPRESSIVE DISORDER, IN PARTIAL REMISSION (HCC): Primary | ICD-10-CM

## 2024-05-09 PROCEDURE — 99214 OFFICE O/P EST MOD 30 MIN: CPT | Performed by: PSYCHIATRY & NEUROLOGY

## 2024-05-09 RX ORDER — LAMOTRIGINE 100 MG/1
100 TABLET ORAL DAILY
Qty: 30 TABLET | Refills: 3 | Status: SHIPPED | OUTPATIENT
Start: 2024-05-09

## 2024-05-09 RX ORDER — VENLAFAXINE HYDROCHLORIDE 75 MG/1
75 CAPSULE, EXTENDED RELEASE ORAL DAILY
Qty: 30 CAPSULE | Refills: 3 | Status: SHIPPED | OUTPATIENT
Start: 2024-05-09

## 2024-05-09 NOTE — PROGRESS NOTES
people? Not difficult at all  Not difficult at all            I was in the office while conducting this encounter.    Consent:  She and/or her healthcare decision maker is aware that this patient-initiated Telehealth encounter is a billable service, with coverage as determined by her insurance carrier. She is aware that she may receive a bill and has provided verbal consent to proceed: YesPatient identification was verified, and a caregiver was present when appropriate. The patient was located in a state where the provider was credentialed to provide care.    This virtual visit was conducted via remocean. Pursuant to the emergency declaration under the Clark Act and the National Emergencies Act, 1135 waiver authority and the Coronavirus Preparedness and Response Supplemental Appropriations Act, this Virtual  Visit was conducted to reduce the patient's risk of exposure to COVID-19 and provide continuity of care for an established patient. Services were provided through a video synchronous discussion virtually to substitute for in-person clinic visit.  Due to this being a TeleHealth evaluation, many elements of the physical examination are unable to be assessed.     Total Time: minutes: 11-20 minutes.    Chief complaint:  Pt says she has been doing okay.    Subjective:  Seen virtually for follow-up.  States doing okay.  Lives by herself.  Ankle has not healed.  It is in an [place] brace.  Has been taking vitamin D.  Weight management discussed.  States she does do exercises that she can do safely.  Tries to eat healthy.  Weight has been static and she has not lost any.  Parents are doing well.  Parents live in the same apartment complex as she does.  Mother might need surgery on her back.  Patient states she gets Botox for chronic migraine.  Supportive psychotherapy provided.  Patient denies suicidal ideation/homicidal ideations.  Denies symptoms of psychosis.      Patient Active Problem List   Diagnosis    Left leg

## 2024-05-13 ENCOUNTER — OFFICE VISIT (OUTPATIENT)
Dept: ORTHOPEDIC SURGERY | Age: 44
End: 2024-05-13
Payer: MEDICARE

## 2024-05-13 DIAGNOSIS — M25.372 LEFT ANKLE INSTABILITY: Primary | ICD-10-CM

## 2024-05-13 PROCEDURE — 99214 OFFICE O/P EST MOD 30 MIN: CPT | Performed by: ORTHOPAEDIC SURGERY

## 2024-05-13 NOTE — PROGRESS NOTES
Name: Amanda Celaya  YOB: 1980  Gender: female  MRN: [id number]    Summary:   Left functional ankle instability       CC: Follow-up (Left ankle instability, updated xrays today in office./Patient states she heard a pop in the left ankle. )       HPI: Amanda Celaya is a 44 y.o. female who presents with Follow-up (Left ankle instability, updated xrays today in office./Patient states she heard a pop in the left ankle. )  .  This patient returns back to the office today with continued complaints of left ankle pain.  She felt a pop in the lateral aspect of her ankle.  She now has [place] brace.    History was obtained by Patient     ROS/Meds/PSH/PMH/FH/SH: I personally reviewed the patients standard intake form.  Below are the pertinents    Tobacco:  reports that she has never smoked. She has never used smokeless tobacco.  Diabetes: None      Physical Examination:  Exam of left lower extremity shows some tenderness palpation over the peroneal tendons but no obvious tear is identified.  She has palpable pulses intact sensation.  She does have some global hypersensitivity.      Imaging:   Interpretation of imaging  Left ankle XR: AP, Lateral, Oblique views     ICD-10-CM    1. Left ankle instability  M25.372 XR ANKLE LEFT (MIN 3 VIEWS)         Report: AP, lateral, oblique x-ray of the left ankle demonstrates no definite fracture    Impression: No definite fracture   ALEXUS VACA III, MD           Assessment:   Left functional ankle instability with hypersensitivity    Treatment Plan:   4 This is a chronic illness/condition with exacerbation and progression  Treatment at this time: Physical Therapy  Studies ordered: NO XR needed @ Next Visit    Weight-bearing status: WBAT        Return to work/work restrictions: none  No medications given    She will continue with the [place] brace.  I recommended some physical therapy for desensitization therapy.  She can return as needed

## 2024-05-21 LAB
ESTRADIOL SERPL-MCNC: 98.9 PG/ML
PROGEST SERPL-MCNC: 7.77 NG/ML

## 2024-05-23 LAB — DHEA-S SERPL-MCNC: 144 UG/DL (ref 57.3–279.2)

## 2024-05-25 LAB — TESTOST SERPL-MCNC: 18.8 NG/DL

## 2024-06-03 ENCOUNTER — HOSPITAL ENCOUNTER (OUTPATIENT)
Dept: PHYSICAL THERAPY | Age: 44
Setting detail: RECURRING SERIES
Discharge: HOME | End: 2024-06-06
Attending: ORTHOPAEDIC SURGERY
Payer: MEDICARE

## 2024-06-03 DIAGNOSIS — M25.572 PAIN IN LEFT ANKLE AND JOINTS OF LEFT FOOT: Primary | ICD-10-CM

## 2024-06-03 DIAGNOSIS — R26.2 DIFFICULTY IN WALKING, NOT ELSEWHERE CLASSIFIED: ICD-10-CM

## 2024-06-03 PROCEDURE — 97161 PT EVAL LOW COMPLEX 20 MIN: CPT

## 2024-06-03 PROCEDURE — 97140 MANUAL THERAPY 1/> REGIONS: CPT

## 2024-06-03 PROCEDURE — 97110 THERAPEUTIC EXERCISES: CPT

## 2024-06-03 ASSESSMENT — PAIN DESCRIPTION - ORIENTATION: ORIENTATION: LEFT

## 2024-06-03 ASSESSMENT — PAIN DESCRIPTION - DESCRIPTORS: DESCRIPTORS: ACHING;HYPERSENSITIVITY

## 2024-06-03 ASSESSMENT — PAIN DESCRIPTION - LOCATION: LOCATION: ANKLE

## 2024-06-03 ASSESSMENT — PAIN SCALES - GENERAL: PAINLEVEL_OUTOF10: 3

## 2024-06-03 NOTE — PROGRESS NOTES
Amanda Celaya  : 1980  Primary: [employer]-advantage [employer] (Medicare Managed)  Secondary:  [facility] @ [address] ESQUIVEL Mireya  [facility] [address]  Phone: [phone]  Fax: [phone] Plan Frequency: 1-2 times a week for 90 days    Plan of Care/Certification Expiration Date: 24        Plan of Care/Certification Expiration Date:  Plan of Care/Certification Expiration Date: 24    Frequency/Duration:   Plan Frequency: 1-2 times a week for 90 days      Time In/Out:   Time In: 930  Time Out: 1015      PT Visit Info:    Progress Note Due Date: 24  Total # of Visits to Date: 1  Progress Note Counter: 1      Visit Count:  1    OUTPATIENT PHYSICAL THERAPY:   Treatment Note 6/3/2024       Episode  (PT-Left ankle instability)               Treatment Diagnosis:    Pain in left ankle and joints of left foot  Difficulty in walking, not elsewhere classified  Medical/Referring Diagnosis:    Left ankle instability    Referring Physician:  Frank Zhao III, MD MD Orders:  PT Eval and Treat   Return MD Appt:  Unknown  Date of Onset:  Onset Date:  (2023)   Allergies:   [place] oil, Amoxicillin, Diazepam, Penicillins, Pineapple, Sumatriptan, Theophylline, Triptans, Cephalexin, Sulfa antibiotics, Topiramate, Tramadol, Verapamil, Adhesive tape, Augmentin [amoxicillin-pot clavulanate], Dermabond, Other, Pneumococcal vaccine, Pollen extract, Triamcinolone acetonide, and Vitamin b12  Restrictions/Precautions:   None      Interventions Planned (Treatment may consist of any combination of the following):     See Assessment Note    Subjective Comments:   Patient complains of left ankle pain, swelling, and weakness.    Initial Pain Level:: Left Ankle 3/10  Post Session Pain Level:  Left  Ankle 3/10  Medications Last Reviewed:  6/3/2024  Updated Objective Findings:  See Evaluation Note from today  Treatment   THERAPEUTIC EXERCISE: (10 minutes):    Exercises per

## 2024-06-03 NOTE — THERAPY EVALUATION
adduction 5/5, quadriceps 5/5, hamstrings 5/5, ankle dorsiflexion right 5/5 and left 3/5, ankle plantarflexion right 5/5 and left 3+/5, ankle inversion right 5/5 and left 3/5, ankle eversion right 5/5 and left 3/5.    Range of Motion:    Left ankle measured in supine: dorsiflexion 5 degrees with pain, plantarflexion 40 degrees, inversion 18 degrees, and eversion 30 degrees.   Palpation:   Increased tenderness along lateral aspect of left ankle along peroneal tendons.   Sensation:   Within normal limits bilateral lower extremity.   ASSESSMENT   Initial Assessment:  Patient presents with increased pain, decreased strength, and difficulty walking.  Patient would benefit from skilled physical therapy to address problems and goals.  Thank you for this referral.    Therapy Problem List: (Impacting functional limitations):    Increased Pain, Decreased Strength, Decreased ROM, Decreased Ambulation Ability/Technique, Decreased Functional Mobility, Decreased [place] with Home Exercise Program, Decreased Balance, and Decreased Activity Tolerance/Endurance*   Therapy Prognosis:   Good     Initial Assessment Complexity:   Low Complexity       PLAN   Effective Dates: 6/3/2024 TO Plan of Care/Certification Expiration Date: 09/01/24     Frequency/Duration: Plan Frequency: 1-2 times a week for 90 days      Interventions Planned (Treatment may consist of any combination of the following):    Balance Training, Home Exercise Program (HEP), Manual Therapy, Modalities:,   Heat/Cold,   Ultrasound, and   E-stim - unattended, Range of Motion (ROM), Therapeutic Activites, and Therapeutic Exercise/Strengthening   Goals: (Goals have been discussed and agreed upon with patient.)  Short-Term Functional Goals: Time Frame: 30 days  Patient will be independent with home exercise program to improve ankle strength.   Discharge Goals: Time Frame: 90 days   Patient will increase left ankle strength greater than or equal to 4/5 to improve ease with

## 2024-06-06 ENCOUNTER — OFFICE VISIT (OUTPATIENT)
Dept: ORTHOPEDIC SURGERY | Age: 44
End: 2024-06-06
Payer: MEDICARE

## 2024-06-06 DIAGNOSIS — M19.012 OSTEOARTHRITIS OF LEFT AC (ACROMIOCLAVICULAR) JOINT: Primary | ICD-10-CM

## 2024-06-06 DIAGNOSIS — M25.512 LEFT SHOULDER PAIN, UNSPECIFIED CHRONICITY: ICD-10-CM

## 2024-06-06 PROCEDURE — 20605 DRAIN/INJ JOINT/BURSA W/O US: CPT | Performed by: PHYSICIAN ASSISTANT

## 2024-06-06 RX ORDER — METHYLPREDNISOLONE ACETATE 40 MG/ML
40 INJECTION, SUSPENSION INTRA-ARTICULAR; INTRALESIONAL; INTRAMUSCULAR; SOFT TISSUE ONCE
Status: COMPLETED | OUTPATIENT
Start: 2024-06-06 | End: 2024-06-06

## 2024-06-06 RX ADMIN — METHYLPREDNISOLONE ACETATE 40 MG: 40 INJECTION, SUSPENSION INTRA-ARTICULAR; INTRALESIONAL; INTRAMUSCULAR; SOFT TISSUE at 16:03

## 2024-06-11 ENCOUNTER — HOSPITAL ENCOUNTER (OUTPATIENT)
Dept: PHYSICAL THERAPY | Age: 44
Setting detail: RECURRING SERIES
Discharge: HOME | End: 2024-06-14
Attending: ORTHOPAEDIC SURGERY
Payer: MEDICARE

## 2024-06-11 PROCEDURE — 97110 THERAPEUTIC EXERCISES: CPT

## 2024-06-11 PROCEDURE — 97140 MANUAL THERAPY 1/> REGIONS: CPT

## 2024-06-11 ASSESSMENT — PAIN DESCRIPTION - LOCATION: LOCATION: ANKLE

## 2024-06-11 ASSESSMENT — PAIN SCALES - GENERAL: PAINLEVEL_OUTOF10: 1

## 2024-06-11 ASSESSMENT — PAIN DESCRIPTION - ORIENTATION: ORIENTATION: LEFT

## 2024-06-11 ASSESSMENT — PAIN DESCRIPTION - DESCRIPTORS: DESCRIPTORS: ACHING;HYPERSENSITIVITY

## 2024-06-11 NOTE — PROGRESS NOTES
Name: Amanda Celaya  YOB: 1980  Gender: female  MRN: [id number]    CC:   Chief Complaint   Patient presents with    Follow-up     Left shoulder CSI        HPI: Patient presents for follow-up of left shoulder AC pain.  Patient presents today requesting repeat.  No new injuries comparison to previous visit.    Allergies   Allergen Reactions    [place] Oil Swelling, Other (See Comments) and Shortness Of Breath     Causes lips to swell     Lips swell    Any stone fruits, for ex peaches, cherries, plums, Mangoes are ok    Lips swell  Any stone fruits, for ex peaches, cherries, plums, Mangoes are ok    Lip swelling    Amoxicillin Shortness Of Breath    Diazepam Other (See Comments)     Suicidal ideation    Suicidal thoughts  Other reaction(s): Other (See Comments) Suicidal ideation    Penicillins Anaphylaxis and Other (See Comments)     Has not been able to tolerate cephalosporins   Has not been able to tolerate cephalosporins    Pineapple Shortness Of Breath and Other (See Comments)     wheezing  wheezing    Sumatriptan Shortness Of Breath    Theophylline Shortness Of Breath and Other (See Comments)     Other reaction(s): Wheezing (Reselect Reaction)    Triptans Anaphylaxis and Shortness Of Breath     - wheezing, not breathing well, hoarseness    Looses her voice    Cephalexin Hives and Other (See Comments)     Other reaction(s): Dizziness, Unknown  Severe vertigo    Increased vertigo    Other reaction(s): Dizziness    Sulfa Antibiotics Hives and Rash     Other reaction(s): Unknown, Vomiting  vomiting    Other reaction(s): Vomiting    Topiramate Other (See Comments) and Myalgia     Other reaction(s): Unknown  Caused disorientation, loss of feeling in left leg (numbness)    dizziness    Dizziness; left leg numbness    Other reaction(s): Other (See Comments) Caused disorientation, loss of feeling in left leg (numbness)    Tramadol Hives, Itching and Rash    Verapamil Other (See Comments)     Lowers

## 2024-06-14 ENCOUNTER — HOSPITAL ENCOUNTER (OUTPATIENT)
Dept: PHYSICAL THERAPY | Age: 44
Setting detail: RECURRING SERIES
Discharge: HOME | End: 2024-06-17
Attending: ORTHOPAEDIC SURGERY
Payer: MEDICARE

## 2024-06-14 PROCEDURE — 97140 MANUAL THERAPY 1/> REGIONS: CPT

## 2024-06-14 PROCEDURE — 97110 THERAPEUTIC EXERCISES: CPT

## 2024-06-14 ASSESSMENT — PAIN DESCRIPTION - ORIENTATION: ORIENTATION: LEFT

## 2024-06-14 ASSESSMENT — PAIN DESCRIPTION - LOCATION: LOCATION: ANKLE

## 2024-06-14 ASSESSMENT — PAIN SCALES - GENERAL: PAINLEVEL_OUTOF10: 3

## 2024-06-14 NOTE — PROGRESS NOTES
Amanda Celaya  : 1980  Primary: [employer] Medicare-advantage Ppo (Medicare Managed)  Secondary:  [facility] @ [address]  [facility] [address]  Phone: [phone]  Fax: [phone] Plan Frequency: 1-2 times a week for 90 days    Plan of Care/Certification Expiration Date: 24        Plan of Care/Certification Expiration Date:  Plan of Care/Certification Expiration Date: 24    Frequency/Duration:   Plan Frequency: 1-2 times a week for 90 days      Time In/Out:   Time In: 1015  Time Out: 1100      PT Visit Info:    Progress Note Due Date: 24  Total # of Visits to Date: 3  Progress Note Counter: 3      Visit Count:  3    OUTPATIENT PHYSICAL THERAPY:   Treatment Note 2024       Episode  (PT-Left ankle instability)               Treatment Diagnosis:    Pain in left ankle and joints of left foot  Difficulty in walking, not elsewhere classified  Medical/Referring Diagnosis:    Left ankle instability    Referring Physician:  Frank Zhao III, MD MD Orders:  PT Eval and Treat   Return MD Appt:  Unknown  Date of Onset:  Onset Date:  (2023)   Allergies:   [place] oil, Amoxicillin, Diazepam, Penicillins, Pineapple, Sumatriptan, Theophylline, Triptans, Cephalexin, Sulfa antibiotics, Topiramate, Tramadol, Verapamil, Adhesive tape, Augmentin [amoxicillin-pot clavulanate], Dermabond, Other, Pneumococcal vaccine, Pollen extract, Triamcinolone acetonide, and Vitamin b12  Restrictions/Precautions:   None      Interventions Planned (Treatment may consist of any combination of the following):     See Assessment Note    Subjective Comments:   Patient reports having increased nerve pain after last therapy.  \"It is feeling better today\".    Initial Pain Level:: Left Ankle 3/10  Post Session Pain Level:  Left  Ankle 3/10  Medications Last Reviewed:  2024  Updated Objective Findings:  None Today  Treatment   THERAPEUTIC EXERCISE: (25 minutes):

## 2024-06-18 ENCOUNTER — HOSPITAL ENCOUNTER (OUTPATIENT)
Dept: PHYSICAL THERAPY | Age: 44
Setting detail: RECURRING SERIES
Discharge: HOME | End: 2024-06-21
Attending: ORTHOPAEDIC SURGERY
Payer: MEDICARE

## 2024-06-18 PROCEDURE — 97140 MANUAL THERAPY 1/> REGIONS: CPT

## 2024-06-18 PROCEDURE — 97110 THERAPEUTIC EXERCISES: CPT

## 2024-06-18 ASSESSMENT — PAIN SCALES - GENERAL: PAINLEVEL_OUTOF10: 5

## 2024-06-18 ASSESSMENT — PAIN DESCRIPTION - ORIENTATION: ORIENTATION: LEFT

## 2024-06-18 ASSESSMENT — PAIN DESCRIPTION - LOCATION: LOCATION: ANKLE

## 2024-06-18 NOTE — PROGRESS NOTES
Aamnda Celaya  : 1980  Primary: [employer] Medicare-advantage Ppo (Medicare Managed)  Secondary:  [facility] @ [address]  [facility] [address]  Phone: [phone]  Fax: [phone] Plan Frequency: 1-2 times a week for 90 days    Plan of Care/Certification Expiration Date: 24        Plan of Care/Certification Expiration Date:  Plan of Care/Certification Expiration Date: 24    Frequency/Duration:   Plan Frequency: 1-2 times a week for 90 days      Time In/Out:   Time In: 1015  Time Out: 1100      PT Visit Info:    Progress Note Due Date: 24  Total # of Visits to Date: 4  Progress Note Counter: 4      Visit Count:  4    OUTPATIENT PHYSICAL THERAPY:   Treatment Note 2024       Episode  (PT-Left ankle instability)               Treatment Diagnosis:    Pain in left ankle and joints of left foot  Difficulty in walking, not elsewhere classified  Medical/Referring Diagnosis:    Left ankle instability    Referring Physician:  Frank Zhao III, MD MD Orders:  PT Eval and Treat   Return MD Appt:  Unknown  Date of Onset:  Onset Date:  (2023)   Allergies:   [place] oil, Amoxicillin, Diazepam, Penicillins, Pineapple, Sumatriptan, Theophylline, Triptans, Cephalexin, Sulfa antibiotics, Topiramate, Tramadol, Verapamil, Adhesive tape, Augmentin [amoxicillin-pot clavulanate], Dermabond, Other, Pneumococcal vaccine, Pollen extract, Triamcinolone acetonide, and Vitamin b12  Restrictions/Precautions:   None      Interventions Planned (Treatment may consist of any combination of the following):     See Assessment Note    Subjective Comments:   Patient reports  her ankle is really angry today.  \"It is more swollen.  I stepped and turned it coming out of bedroom\".  Initial Pain Level:: Left Ankle 5/10  Post Session Pain Level:  Left  Ankle 5/10  Medications Last Reviewed:  2024  Updated Objective Findings:  None Today  Treatment   THERAPEUTIC

## 2024-06-21 ENCOUNTER — HOSPITAL ENCOUNTER (OUTPATIENT)
Dept: PHYSICAL THERAPY | Age: 44
Setting detail: RECURRING SERIES
Discharge: HOME | End: 2024-06-24
Attending: ORTHOPAEDIC SURGERY
Payer: MEDICARE

## 2024-06-21 PROCEDURE — 97110 THERAPEUTIC EXERCISES: CPT

## 2024-06-21 PROCEDURE — 97140 MANUAL THERAPY 1/> REGIONS: CPT

## 2024-06-21 ASSESSMENT — PAIN DESCRIPTION - ORIENTATION: ORIENTATION: LEFT

## 2024-06-21 ASSESSMENT — PAIN SCALES - GENERAL: PAINLEVEL_OUTOF10: 2

## 2024-06-21 ASSESSMENT — PAIN DESCRIPTION - LOCATION: LOCATION: ANKLE

## 2024-06-28 ENCOUNTER — TELEPHONE (OUTPATIENT)
Dept: INTERNAL MEDICINE CLINIC | Facility: CLINIC | Age: 44
End: 2024-06-28

## 2024-06-28 NOTE — TELEPHONE ENCOUNTER
----- Message from Nova Garcia DO sent at 6/24/2024 10:16 AM EDT -----  Please inform calcium score is 0

## 2024-07-08 ENCOUNTER — OFFICE VISIT (OUTPATIENT)
Age: 44
End: 2024-07-08
Payer: MEDICARE

## 2024-07-08 ENCOUNTER — HOSPITAL ENCOUNTER (OUTPATIENT)
Dept: PHYSICAL THERAPY | Age: 44
Setting detail: RECURRING SERIES
Discharge: HOME | End: 2024-07-11
Attending: ORTHOPAEDIC SURGERY
Payer: MEDICARE

## 2024-07-08 DIAGNOSIS — M54.2 NECK PAIN: Primary | ICD-10-CM

## 2024-07-08 DIAGNOSIS — R29.2 GENERALIZED HYPERREFLEXIA: ICD-10-CM

## 2024-07-08 DIAGNOSIS — Z98.1 STATUS POST CERVICAL ARTHRODESIS: ICD-10-CM

## 2024-07-08 DIAGNOSIS — M54.12 CERVICAL RADICULOPATHY: ICD-10-CM

## 2024-07-08 PROCEDURE — 97140 MANUAL THERAPY 1/> REGIONS: CPT

## 2024-07-08 PROCEDURE — 99214 OFFICE O/P EST MOD 30 MIN: CPT | Performed by: PHYSICIAN ASSISTANT

## 2024-07-08 PROCEDURE — 97110 THERAPEUTIC EXERCISES: CPT

## 2024-07-08 RX ORDER — TIZANIDINE 4 MG/1
2-4 TABLET ORAL EVERY 8 HOURS PRN
Qty: 24 TABLET | Refills: 0 | Status: SHIPPED | OUTPATIENT
Start: 2024-07-08

## 2024-07-08 RX ORDER — LISINOPRIL 5 MG/1
5 TABLET ORAL DAILY
COMMUNITY
Start: 2024-06-19

## 2024-07-08 RX ORDER — GALCANEZUMAB 120 MG/ML
INJECTION, SOLUTION SUBCUTANEOUS
COMMUNITY
Start: 2024-06-10

## 2024-07-08 RX ORDER — GALCANEZUMAB 120 MG/ML
INJECTION, SOLUTION SUBCUTANEOUS
COMMUNITY
Start: 2024-07-05

## 2024-07-08 ASSESSMENT — PAIN SCALES - GENERAL: PAINLEVEL_OUTOF10: 1

## 2024-07-08 ASSESSMENT — PAIN DESCRIPTION - LOCATION: LOCATION: ANKLE

## 2024-07-08 ASSESSMENT — PAIN DESCRIPTION - ORIENTATION: ORIENTATION: LEFT

## 2024-07-08 NOTE — PROGRESS NOTES
An MRI of the Cervical spine has been ordered.   
congestion    Triamcinolone Acetonide Hives     Kenalog injection caused hives     Vitamin B12 Other (See Comments)     Injection Only  swelling  Other reaction(s): Unknown  Injection Only swelling         Current Outpatient Medications:     galcanezumab-gnlm (EMGALITY) 120 MG/ML SOSY injection, , Disp: , Rfl:     EMGALITY 120 MG/ML SOAJ, , Disp: , Rfl:     lisinopril (PRINIVIL;ZESTRIL) 5 MG tablet, Take 1 tablet by mouth daily, Disp: , Rfl:     tiZANidine (ZANAFLEX) 4 MG tablet, Take 0.5-1 tablets by mouth every 8 hours as needed (muscle spasm), Disp: 24 tablet, Rfl: 0    venlafaxine (EFFEXOR XR) 75 MG extended release capsule, Take 1 capsule by mouth daily, Disp: 30 capsule, Rfl: 3    lamoTRIgine (LAMICTAL) 100 MG tablet, Take 1 tablet by mouth daily, Disp: 30 tablet, Rfl: 3    lisinopril (PRINIVIL;ZESTRIL) 10 MG tablet, Take 1 tablet by mouth daily, Disp: , Rfl:     vitamin D (ERGOCALCIFEROL) 1.25 MG (32210 UT) CAPS capsule, Take 1 capsule by mouth once a week, Disp: 12 capsule, Rfl: 3    UNABLE TO FIND, ESTRIOL/ESTRADIOL (80/20) 0.5MG/PROGESTERONE 60MG/TESTOSTERONE 1.1MG GELATIN CIELO (SPEARMINT)-7808, Disp: , Rfl:     albuterol sulfate HFA (PROVENTIL;VENTOLIN;PROAIR) 108 (90 Base) MCG/ACT inhaler, Inhale 2 puffs into the lungs every 4 hours as needed for Shortness of Breath or Wheezing, Disp: 18 g, Rfl: 1    hydrOXYzine HCl (ATARAX) 25 MG tablet, TAKE 1 TABLET BY MOUTH EVERYDAY AT BEDTIME, Disp: , Rfl:     acetaminophen (TYLENOL) 500 MG tablet, Take 1 tablet by mouth 4 times daily as needed for Pain, Disp: 40 tablet, Rfl: 0    Betahistine HCl (BETAHISTINE DIHYDROCHLORIDE) POWD, 24 mg in the morning and at bedtime, Disp: , Rfl:     Coenzyme Q10 (CO Q 10 PO), Take by mouth daily, Disp: , Rfl:     Ginger, Zingiber officinalis, (GINGER PO), Take by mouth daily, Disp: , Rfl:     Turmeric (QC TUMERIC COMPLEX PO), Take by mouth daily, Disp: , Rfl:     ibuprofen (ADVIL;MOTRIN) 200 MG tablet, Take 1 tablet by mouth as

## 2024-07-08 NOTE — PROGRESS NOTES
Aamnda Celaya  : 1980  Primary: [employer] Medicare-advantage Ppo (Medicare Managed)  Secondary:  [facility] @ [address]  [facility] [address]  Phone: [phone]  Fax: [phone] Plan Frequency: 1-2 times a week for 90 days  Plan of Care/Certification Expiration Date: 24        Plan of Care/Certification Expiration Date:  Plan of Care/Certification Expiration Date: 24    Frequency/Duration: Plan Frequency: 1-2 times a week for 90 days      Time In/Out:   Time In: 1030  Time Out: 1110      PT Visit Info:    Progress Note Due Date: 24  Total # of Visits to Date: 6  Progress Note Counter: 1      Visit Count:  6                OUTPATIENT PHYSICAL THERAPY:             Progress Report 2024               Episode (PT-Left ankle instability)         Treatment Diagnosis:     Pain in left ankle and joints of left foot  Difficulty in walking, not elsewhere classified  Medical/Referring Diagnosis:    Left ankle instability    Referring Physician:  Frank Zhao III, MD MD Orders:  PT Eval and Treat   Return MD Appt:  Unknown   Date of Onset:  Onset Date:  (2023)     Allergies:  [place] oil, Amoxicillin, Diazepam, Penicillins, Pineapple, Sumatriptan, Theophylline, Triptans, Cephalexin, Sulfa antibiotics, Topiramate, Tramadol, Verapamil, Adhesive tape, Augmentin [amoxicillin-pot clavulanate], Dermabond, Other, Pneumococcal vaccine, Pollen extract, Triamcinolone acetonide, and Vitamin b12  Restrictions/Precautions:    None      Medications Last Reviewed:  2024     SUBJECTIVE   History of Injury/Illness (Reason for Referral):  Patient reports receiving a stress fracture in her left ankle in 2023.  Patient reports hearing a pop in her left ankle 2024.  Patient began wearing a [place] brace in April.  MD wants her to wear it for six months per patient report.  Patient rates current left ankle pain as 3/10 and worst ankle 
11:20 AM Nova Garcia,  [facility]

## 2024-07-11 ENCOUNTER — HOSPITAL ENCOUNTER (OUTPATIENT)
Dept: PHYSICAL THERAPY | Age: 44
Setting detail: RECURRING SERIES
Discharge: HOME | End: 2024-07-14
Attending: ORTHOPAEDIC SURGERY
Payer: MEDICARE

## 2024-07-11 PROCEDURE — 97110 THERAPEUTIC EXERCISES: CPT

## 2024-07-11 PROCEDURE — 97140 MANUAL THERAPY 1/> REGIONS: CPT

## 2024-07-11 ASSESSMENT — PAIN SCALES - GENERAL: PAINLEVEL_OUTOF10: 2

## 2024-07-11 ASSESSMENT — ANXIETY QUESTIONNAIRES
3. WORRYING TOO MUCH ABOUT DIFFERENT THINGS: NOT AT ALL
2. NOT BEING ABLE TO STOP OR CONTROL WORRYING: NOT AT ALL
IF YOU CHECKED OFF ANY PROBLEMS ON THIS QUESTIONNAIRE, HOW DIFFICULT HAVE THESE PROBLEMS MADE IT FOR YOU TO DO YOUR WORK, TAKE CARE OF THINGS AT HOME, OR GET ALONG WITH OTHER PEOPLE: NOT DIFFICULT AT ALL
6. BECOMING EASILY ANNOYED OR IRRITABLE: NOT AT ALL
6. BECOMING EASILY ANNOYED OR IRRITABLE: NOT AT ALL
1. FEELING NERVOUS, ANXIOUS, OR ON EDGE: NOT AT ALL
5. BEING SO RESTLESS THAT IT IS HARD TO SIT STILL: NOT AT ALL
IF YOU CHECKED OFF ANY PROBLEMS ON THIS QUESTIONNAIRE, HOW DIFFICULT HAVE THESE PROBLEMS MADE IT FOR YOU TO DO YOUR WORK, TAKE CARE OF THINGS AT HOME, OR GET ALONG WITH OTHER PEOPLE: NOT DIFFICULT AT ALL
1. FEELING NERVOUS, ANXIOUS, OR ON EDGE: NOT AT ALL
7. FEELING AFRAID AS IF SOMETHING AWFUL MIGHT HAPPEN: NOT AT ALL
GAD7 TOTAL SCORE: 0
7. FEELING AFRAID AS IF SOMETHING AWFUL MIGHT HAPPEN: NOT AT ALL
3. WORRYING TOO MUCH ABOUT DIFFERENT THINGS: NOT AT ALL
5. BEING SO RESTLESS THAT IT IS HARD TO SIT STILL: NOT AT ALL
4. TROUBLE RELAXING: NOT AT ALL
2. NOT BEING ABLE TO STOP OR CONTROL WORRYING: NOT AT ALL
4. TROUBLE RELAXING: NOT AT ALL

## 2024-07-11 ASSESSMENT — PAIN DESCRIPTION - ORIENTATION: ORIENTATION: LEFT

## 2024-07-11 ASSESSMENT — PAIN DESCRIPTION - LOCATION: LOCATION: ANKLE

## 2024-07-11 NOTE — PROGRESS NOTES
Amanda Celaya  : 1980  Primary: [employer] Medicare-advantage Ppo (Medicare Managed)  Secondary:  [facility] @ [address]  [facility] [address]  Phone: [phone]  Fax: [phone] Plan Frequency: 1-2 times a week for 90 days    Plan of Care/Certification Expiration Date: 24        Plan of Care/Certification Expiration Date:  Plan of Care/Certification Expiration Date: 24    Frequency/Duration:   Plan Frequency: 1-2 times a week for 90 days      Time In/Out:   Time In: 1030  Time Out: 1110      PT Visit Info:    Progress Note Due Date: 24  Total # of Visits to Date: 7  Progress Note Counter: 2      Visit Count:  7    OUTPATIENT PHYSICAL THERAPY:   Treatment Note 2024       Episode  (PT-Left ankle instability)               Treatment Diagnosis:    Pain in left ankle and joints of left foot  Difficulty in walking, not elsewhere classified  Medical/Referring Diagnosis:    Left ankle instability    Referring Physician:  Frank Zhao III, MD MD Orders:  PT Eval and Treat   Return MD Appt:  Unknown  Date of Onset:  Onset Date:  (2023)   Allergies:   [place] oil, Amoxicillin, Diazepam, Penicillins, Pineapple, Sumatriptan, Theophylline, Triptans, Cephalexin, Sulfa antibiotics, Topiramate, Tramadol, Verapamil, Adhesive tape, Augmentin [amoxicillin-pot clavulanate], Dermabond, Other, Pneumococcal vaccine, Pollen extract, Triamcinolone acetonide, and Vitamin b12  Restrictions/Precautions:   None      Interventions Planned (Treatment may consist of any combination of the following):     See Assessment Note    Subjective Comments:   Patient reports her ankle is okay.  Patient reports having a little pain in her ankle.   Initial Pain Level:: Left Ankle 2/10  Post Session Pain Level:  Left  Ankle 2/10  Medications Last Reviewed:  2024  Updated Objective Findings:  None Today  Treatment   THERAPEUTIC EXERCISE: (25 minutes):    Exercises

## 2024-07-14 ASSESSMENT — PATIENT HEALTH QUESTIONNAIRE - PHQ9
1. LITTLE INTEREST OR PLEASURE IN DOING THINGS: NOT AT ALL
SUM OF ALL RESPONSES TO PHQ9 QUESTIONS 1 & 2: 0
SUM OF ALL RESPONSES TO PHQ QUESTIONS 1-9: 8
5. POOR APPETITE OR OVEREATING: NOT AT ALL
SUM OF ALL RESPONSES TO PHQ QUESTIONS 1-9: 8
8. MOVING OR SPEAKING SO SLOWLY THAT OTHER PEOPLE COULD HAVE NOTICED. OR THE OPPOSITE - BEING SO FIDGETY OR RESTLESS THAT YOU HAVE BEEN MOVING AROUND A LOT MORE THAN USUAL: SEVERAL DAYS
6. FEELING BAD ABOUT YOURSELF - OR THAT YOU ARE A FAILURE OR HAVE LET YOURSELF OR YOUR FAMILY DOWN: SEVERAL DAYS
6. FEELING BAD ABOUT YOURSELF - OR THAT YOU ARE A FAILURE OR HAVE LET YOURSELF OR YOUR FAMILY DOWN: SEVERAL DAYS
2. FEELING DOWN, DEPRESSED OR HOPELESS: NOT AT ALL
7. TROUBLE CONCENTRATING ON THINGS, SUCH AS READING THE NEWSPAPER OR WATCHING TELEVISION: SEVERAL DAYS
9. THOUGHTS THAT YOU WOULD BE BETTER OFF DEAD, OR OF HURTING YOURSELF: NOT AT ALL
SUM OF ALL RESPONSES TO PHQ QUESTIONS 1-9: 8
SUM OF ALL RESPONSES TO PHQ QUESTIONS 1-9: 8
10. IF YOU CHECKED OFF ANY PROBLEMS, HOW DIFFICULT HAVE THESE PROBLEMS MADE IT FOR YOU TO DO YOUR WORK, TAKE CARE OF THINGS AT HOME, OR GET ALONG WITH OTHER PEOPLE: SOMEWHAT DIFFICULT
SUM OF ALL RESPONSES TO PHQ QUESTIONS 1-9: 8
8. MOVING OR SPEAKING SO SLOWLY THAT OTHER PEOPLE COULD HAVE NOTICED. OR THE OPPOSITE, BEING SO FIGETY OR RESTLESS THAT YOU HAVE BEEN MOVING AROUND A LOT MORE THAN USUAL: SEVERAL DAYS
1. LITTLE INTEREST OR PLEASURE IN DOING THINGS: NOT AT ALL
10. IF YOU CHECKED OFF ANY PROBLEMS, HOW DIFFICULT HAVE THESE PROBLEMS MADE IT FOR YOU TO DO YOUR WORK, TAKE CARE OF THINGS AT HOME, OR GET ALONG WITH OTHER PEOPLE: SOMEWHAT DIFFICULT
3. TROUBLE FALLING OR STAYING ASLEEP: MORE THAN HALF THE DAYS
7. TROUBLE CONCENTRATING ON THINGS, SUCH AS READING THE NEWSPAPER OR WATCHING TELEVISION: SEVERAL DAYS
2. FEELING DOWN, DEPRESSED OR HOPELESS: NOT AT ALL
3. TROUBLE FALLING OR STAYING ASLEEP: MORE THAN HALF THE DAYS
4. FEELING TIRED OR HAVING LITTLE ENERGY: NEARLY EVERY DAY
9. THOUGHTS THAT YOU WOULD BE BETTER OFF DEAD, OR OF HURTING YOURSELF: NOT AT ALL
5. POOR APPETITE OR OVEREATING: NOT AT ALL
4. FEELING TIRED OR HAVING LITTLE ENERGY: NEARLY EVERY DAY

## 2024-07-15 ENCOUNTER — TELEMEDICINE (OUTPATIENT)
Dept: BEHAVIORAL/MENTAL HEALTH CLINIC | Age: 44
End: 2024-07-15
Payer: MEDICARE

## 2024-07-15 DIAGNOSIS — F33.0 MILD EPISODE OF RECURRENT MAJOR DEPRESSIVE DISORDER (HCC): Primary | ICD-10-CM

## 2024-07-15 DIAGNOSIS — F41.1 GENERALIZED ANXIETY DISORDER: ICD-10-CM

## 2024-07-15 DIAGNOSIS — F51.05 INSOMNIA DUE TO MENTAL DISORDER: ICD-10-CM

## 2024-07-15 PROCEDURE — 99214 OFFICE O/P EST MOD 30 MIN: CPT | Performed by: PSYCHIATRY & NEUROLOGY

## 2024-07-15 RX ORDER — LAMOTRIGINE 100 MG/1
100 TABLET ORAL DAILY
Qty: 30 TABLET | Refills: 3 | Status: SHIPPED | OUTPATIENT
Start: 2024-07-15

## 2024-07-15 RX ORDER — HYDROXYCHLOROQUINE SULFATE 200 MG/1
400 TABLET, FILM COATED ORAL DAILY
COMMUNITY
Start: 2024-07-11

## 2024-07-15 RX ORDER — VENLAFAXINE HYDROCHLORIDE 75 MG/1
75 CAPSULE, EXTENDED RELEASE ORAL DAILY
Qty: 30 CAPSULE | Refills: 3 | Status: SHIPPED | OUTPATIENT
Start: 2024-07-15

## 2024-07-15 RX ORDER — VENLAFAXINE HYDROCHLORIDE 75 MG/1
CAPSULE, EXTENDED RELEASE ORAL DAILY
Qty: 90 CAPSULE | Refills: 2 | OUTPATIENT
Start: 2024-07-15

## 2024-07-15 RX ORDER — LAMOTRIGINE 100 MG/1
100 TABLET ORAL DAILY
Qty: 90 TABLET | Refills: 1 | OUTPATIENT
Start: 2024-07-15

## 2024-07-15 NOTE — PROGRESS NOTES
Unknown  Injection Only swelling       Past Medical History, Past Surgical History, Family history, Social History, and Medications were all reviewed with the patient today and updated as necessary. Where available I have reviewed outside charts in epic and I have referenced care everywhere where possible.     Compliant with medication: Yes   Side effects from medications:  No           No data to display                   EXAMINATION  Musculoskeletal    GAIT AND STATION   [] WNL   [] RESTRICTED   [] UNSTEADY WALK        [] ABNORMAL   [] UNBALANCED  [] WHEELCHAIR/  WALKER/CANE     PSYCHIATRIC    PSYCHOMOTOR   [x]  WNL   [] RETARDATION   [] AGITATION            GENERAL APPEARANCE:   []  WELL GROOMED []     DISHEVELED   []  UNKEMPT      []  UNUSUAL/BIZZARE    [x] WNL       ATTITUDE:   [x] COOPERATIVE   [] GUARDED   [] SUSPICIOUS      [] HOSTILE                            BEHAVIOR:   [x] CALM   [] HYPERACTIVE   [] MANNERISMS      [] BIZZARE         SPEECH:   [x] NORMAL FOR CLIENT   [] SPONTANEOUS   [] SLURRED   [] WHISPERING      [] LOUD   [] PRESSURED   [] ARTICULATE        EYE CONTACT:   [x] WNL   [] BLANK STARE   [] INTENSE      [] AVOIDANT         MOOD:   [x] EUTHYMIC   [] ANXIOUS   [] DEPRESSED      [] IRRITABLE   [] ANGRY   [] APATHETIC     AFFECT:   [x] CONGRUENT WITH MOOD   [] FLAT   [] CONSTRICTED      [] INAPPROPRIATE   [] LABILE           THOUGHT PROCESS:   [x] LOGICAL/GOAL-DIRECTED   [] FOI   [] CIRCUMSANTIAL      [] INCOHERENT   [] TANGENTIAL   [] CONCRETE      [] PERSEVERATION           THOUGHT CONTENT:                DELUSIONS  [x] DENIES  [] GRANDIOSE  [] PERSECUTORY  [] Adventism  [] REFERENCE   HALLUCINATIONS  [x] DENIES  [] AUDITORY  [] VISUAL  [] OLFACTORY  [] TACTILE     [] GUSTATORY  [] SOMATIC         OBSESSIONS  [x] DENIES  [] PRESENT         SUICIDAL IDEATION  [x] DENIES  [] PRESENT W/O PLAN  [] PRESENT W/ PLAN       HOMICIDAL IDEATION  [x] DENIES  [] PRESENT W/O PLAN  [] PRESENT W/ PLAN

## 2024-07-16 ENCOUNTER — APPOINTMENT (OUTPATIENT)
Dept: PHYSICAL THERAPY | Age: 44
End: 2024-07-16
Attending: ORTHOPAEDIC SURGERY
Payer: MEDICARE

## 2024-07-18 ENCOUNTER — HOSPITAL ENCOUNTER (OUTPATIENT)
Dept: PHYSICAL THERAPY | Age: 44
Setting detail: RECURRING SERIES
Discharge: HOME | End: 2024-07-21
Attending: ORTHOPAEDIC SURGERY
Payer: MEDICARE

## 2024-07-18 PROCEDURE — 97140 MANUAL THERAPY 1/> REGIONS: CPT

## 2024-07-18 PROCEDURE — 97110 THERAPEUTIC EXERCISES: CPT

## 2024-07-18 NOTE — PROGRESS NOTES
Amanda Celaya  : 1980  Primary: [employer] Medicare-advantage Ppo (Medicare Managed)  Secondary:  [facility] @ [address]  [facility] [address]  Phone: [phone]  Fax: [phone] Plan Frequency: 1-2 times a week for 90 days    Plan of Care/Certification Expiration Date: 24        Plan of Care/Certification Expiration Date:  Plan of Care/Certification Expiration Date: 24    Frequency/Duration:   Plan Frequency: 1-2 times a week for 90 days      Time In/Out:   Time In: 1030  Time Out: 1115      PT Visit Info:    Progress Note Due Date: 24  Total # of Visits to Date: 8  Progress Note Counter: 3      Visit Count:  8    OUTPATIENT PHYSICAL THERAPY:   Treatment Note 2024       Episode  (PT-Left ankle instability)               Treatment Diagnosis:    Pain in left ankle and joints of left foot  Difficulty in walking, not elsewhere classified  Medical/Referring Diagnosis:    Left ankle instability    Referring Physician:  Frank Zhao III, MD MD Orders:  PT Eval and Treat   Return MD Appt:  Unknown  Date of Onset:  Onset Date:  (2023)   Allergies:   [place] oil, Amoxicillin, Diazepam, Penicillins, Pineapple, Sumatriptan, Theophylline, Triptans, Cephalexin, Sulfa antibiotics, Topiramate, Tramadol, Verapamil, Adhesive tape, Augmentin [amoxicillin-pot clavulanate], Dermabond, Other, Pneumococcal vaccine, Pollen extract, Triamcinolone acetonide, and Vitamin b12  Restrictions/Precautions:   None      Interventions Planned (Treatment may consist of any combination of the following):     See Assessment Note    Subjective Comments:   Patient reports her ankle doing okay.     Initial Pain Level:: Left Ankle 2/10  Post Session Pain Level:  Left  Ankle 2/10  Medications Last Reviewed:  2024  Updated Objective Findings:  None Today  Treatment   THERAPEUTIC EXERCISE: (25 minutes):    Exercises per grid below to improve mobility, strength,

## 2024-07-25 ENCOUNTER — OFFICE VISIT (OUTPATIENT)
Age: 44
End: 2024-07-25
Payer: MEDICARE

## 2024-07-25 DIAGNOSIS — M48.02 FORAMINAL STENOSIS OF CERVICAL REGION: Primary | ICD-10-CM

## 2024-07-25 DIAGNOSIS — Z98.1 STATUS POST CERVICAL ARTHRODESIS: ICD-10-CM

## 2024-07-25 PROCEDURE — 99214 OFFICE O/P EST MOD 30 MIN: CPT | Performed by: PHYSICIAN ASSISTANT

## 2024-07-25 NOTE — PROGRESS NOTES
tobacco.  Alcohol:   Social History     Substance and Sexual Activity   Alcohol Use Yes        Physical Exam:     GENERAL:  Adult in no acute distress, severely obese . Patient is appropriately conversant  CERVICAL SPINE:  Inspection of the neck reveals no evidence of rash or skin lesion.  Examination of the cervical spine reveals full ROM  Spurling's sign painful but negative side for reproduction of radicular symptoms.    Gait does not suggest myelopathy.    Sensory testing reveals intact sensation to light touch in the distribution of the C5-T1 dermatomes bilaterally.    Reflexes     Right Left   Biceps (C5) 2 2   Brachio radialis (C6) 2 2    Triceps (C7) 2 2     Hernandez's is positive  Ankle jerk is negative   Finger escape test is negative  Inverted radial reflex is negative    Tinel's and Ruperto testing over the cubital and carpal tunnels do not reproduce the symptoms.    Shoulder examination is not consistent with adhesive capsulitis or acute rotator cuff tendinitis.    The patient does not have difficulty with rapid alternating hand movements.    Strength testing in the upper extremity reveals the following based on the 5 point grading scale:     Delt(C5) Bicep(C6) WE(C6) Tricep (C7) WF(C7) (C8) Int (T1)   Right 5 5 5 5 5 5 5   Left 5 5 5 5 5 5 5     Pulses are palpable over bilateral radial arteries.    Radiographic Studies:     Independent interpretation of AP lateral of the cervical spine July 2024 shows normal lumbar on the AP view with instrumentation intact see 5 to7.  Sagittal view of the cervical spine does show reversal of normal lordotic curve at C4-5.  Anterior instrumentation intact at C5-7.  Mild degenerative changes C7-T1.    X-ray impression: Stable appearing C5-7 fusion with slight kyphosis at C4-5 and degenerative change at C7 and T1.    MRI Result (most recent):  MRI CERVICAL SPINE WO CONTRAST 07/19/2024    Narrative  History: Cervicalgia; Radiculopathy, cervical region; Abnormal

## 2024-07-30 ENCOUNTER — HOSPITAL ENCOUNTER (OUTPATIENT)
Dept: PHYSICAL THERAPY | Age: 44
Setting detail: RECURRING SERIES
Discharge: HOME | End: 2024-08-02
Attending: ORTHOPAEDIC SURGERY
Payer: MEDICARE

## 2024-07-30 PROCEDURE — 97110 THERAPEUTIC EXERCISES: CPT

## 2024-07-30 PROCEDURE — 97140 MANUAL THERAPY 1/> REGIONS: CPT

## 2024-07-30 ASSESSMENT — PAIN DESCRIPTION - LOCATION: LOCATION: ANKLE

## 2024-07-30 ASSESSMENT — PAIN DESCRIPTION - DESCRIPTORS: DESCRIPTORS: ACHING;HYPERSENSITIVITY

## 2024-07-30 ASSESSMENT — PAIN SCALES - GENERAL: PAINLEVEL_OUTOF10: 1

## 2024-07-30 ASSESSMENT — PAIN DESCRIPTION - ORIENTATION: ORIENTATION: LEFT

## 2024-07-30 NOTE — PROGRESS NOTES
(25 minutes):    Exercises per grid below to improve mobility, strength, and balance.  Required minimal verbal cues to promote proper body alignment.  Progressed repetitions as indicated.   Date:  7/11/2024 Date:  7/18/2024 Date:  7/30/2024   Activity/Exercise Parameters Parameters Parameters   Toe scrunches 20 reps 30 reps 30 reps   Left ankle-plantarflexion, dorsiflexion, eversion, and inversion Blue theraband 2x10 reps Yellow Theraband  2x10 reps Blue resistance theraband  2x10 reps   Nustep Level 6 x 6 minutes Level 6 x 6 minutes Level 6 x 6 minutes   Ankle pumps 20 reps 20 reps 20 reps   Ankle inversion/eversion 20 reps 20 reps 20 reps                 MANUAL THERAPY: (20 minutes):   Soft tissue mobilization and densensitization  was utilized and necessary because of the patient's painful spasm and hypersensitivity .   Trigger point release along left gastroc.  Skin intact after treatment.       Treatment/Session Summary:    Treatment Assessment:   Patient tolerated exercises without complaints.           Communication/Consultation:  None today  Equipment provided today:  None  Recommendations/Intent for next treatment session: Next visit will focus on range of motion, strengthening, manual therapy, and modalities as needed.  Will discharge patient after next appointment.     >Total Treatment Billable Duration:  45 minutes   Time In: 1030  Time Out: 1115    MOLLY HOPE PT         Charge Capture  Events  [facility] Portal  Appt Desk  Attendance Report     Future Appointments   Date Time Provider Department Center   8/1/2024 10:30 AM Molly Hope PT [facility]   9/23/2024 10:20 AM Melissa Gomez MD [id number]   10/11/2024 10:30 AM [facility]   10/18/2024 11:20 AM Nova Garcia DO [facility]

## 2024-08-01 ENCOUNTER — HOSPITAL ENCOUNTER (OUTPATIENT)
Dept: PHYSICAL THERAPY | Age: 44
Setting detail: RECURRING SERIES
Discharge: HOME | End: 2024-08-04
Attending: ORTHOPAEDIC SURGERY
Payer: MEDICARE

## 2024-08-01 PROCEDURE — 97110 THERAPEUTIC EXERCISES: CPT

## 2024-08-01 PROCEDURE — 97140 MANUAL THERAPY 1/> REGIONS: CPT

## 2024-08-01 ASSESSMENT — PAIN DESCRIPTION - DESCRIPTORS: DESCRIPTORS: ACHING;HYPERSENSITIVITY

## 2024-08-01 ASSESSMENT — PAIN SCALES - GENERAL: PAINLEVEL_OUTOF10: 1

## 2024-08-01 ASSESSMENT — PAIN DESCRIPTION - ORIENTATION: ORIENTATION: LEFT

## 2024-08-01 ASSESSMENT — PAIN DESCRIPTION - LOCATION: LOCATION: ANKLE

## 2024-08-01 NOTE — DISCHARGE SUMMARY
Amanda Celaya  : 1980  Primary: [employer] Medicare-advantage Ppo (Medicare Managed)  Secondary:  [facility] @ [address]  [facility] [address]  Phone: [phone]  Fax: [phone] Plan Frequency: 1-2 times a week for 90 days  Plan of Care/Certification Expiration Date: 24        Plan of Care/Certification Expiration Date:  Plan of Care/Certification Expiration Date: 24    Frequency/Duration: Plan Frequency: 1-2 times a week for 90 days      Time In/Out:   Time In: 1030  Time Out: 1115      PT Visit Info:    Progress Note Due Date: 24  Total # of Visits to Date: 10  Progress Note Counter: 5      Visit Count:  10                OUTPATIENT PHYSICAL THERAPY:             Discharge Summary 2024               Episode (PT-Left ankle instability)         Treatment Diagnosis:     Pain in left ankle and joints of left foot  Difficulty in walking, not elsewhere classified  Medical/Referring Diagnosis:    Left ankle instability    Referring Physician:  Frank Zhao III, MD MD Orders:  PT Eval and Treat   Return MD Appt:  Unknown   Date of Onset:  Onset Date:  (2023)     Allergies:  [place] oil, Amoxicillin, Diazepam, Penicillins, Pineapple, Sumatriptan, Theophylline, Triptans, Cephalexin, Sulfa antibiotics, Topiramate, Tramadol, Verapamil, Adhesive tape, Augmentin [amoxicillin-pot clavulanate], Dermabond, Other, Pneumococcal vaccine, Pollen extract, Triamcinolone acetonide, and Vitamin b12  Restrictions/Precautions:    None      Medications Last Reviewed:  2024     SUBJECTIVE   History of Injury/Illness (Reason for Referral):  Patient reports receiving a stress fracture in her left ankle in 2023.  Patient reports hearing a pop in her left ankle 2024.  Patient began wearing a [place] brace in April.  MD wants her to wear it for six months per patient report.  Patient rates current left ankle pain as 3/10 and worst ankle

## 2024-08-01 NOTE — PROGRESS NOTES
Amanda Celaya  : 1980  Primary: [employer] Medicare-advantage Ppo (Medicare Managed)  Secondary:  [facility] @ [address]  [facility] [address]  Phone: [phone]  Fax: [phone] Plan Frequency: 1-2 times a week for 90 days    Plan of Care/Certification Expiration Date: 24        Plan of Care/Certification Expiration Date:  Plan of Care/Certification Expiration Date: 24    Frequency/Duration:   Plan Frequency: 1-2 times a week for 90 days      Time In/Out:   Time In: 1030  Time Out: 1115      PT Visit Info:    Progress Note Due Date: 24  Total # of Visits to Date: 10  Progress Note Counter: 5      Visit Count:  10    OUTPATIENT PHYSICAL THERAPY:   Treatment Note 2024       Episode  (PT-Left ankle instability)               Treatment Diagnosis:    Pain in left ankle and joints of left foot  Difficulty in walking, not elsewhere classified  Medical/Referring Diagnosis:    Left ankle instability    Referring Physician:  Frank Zhao III, MD MD Orders:  PT Eval and Treat   Return MD Appt:  Unknown  Date of Onset:  Onset Date:  (2023)   Allergies:   [place] oil, Amoxicillin, Diazepam, Penicillins, Pineapple, Sumatriptan, Theophylline, Triptans, Cephalexin, Sulfa antibiotics, Topiramate, Tramadol, Verapamil, Adhesive tape, Augmentin [amoxicillin-pot clavulanate], Dermabond, Other, Pneumococcal vaccine, Pollen extract, Triamcinolone acetonide, and Vitamin b12  Restrictions/Precautions:   None      Interventions Planned (Treatment may consist of any combination of the following):     See Assessment Note    Subjective Comments:   Patient reports she is not bad today.  Initial Pain Level:: Left Ankle 1/10  Post Session Pain Level:  Left  Ankle 1/10  Medications Last Reviewed:  2024  Updated Objective Findings:  See Discharge Note from today  Treatment   THERAPEUTIC EXERCISE: (25 minutes):    Exercises per grid below to improve

## 2024-08-30 NOTE — PROGRESS NOTES
Keep splint in place.  May use Norco for severe pain.  Use ibuprofen as needed for pain and swelling.  Referral sent to [facility] they should call you with an appointment if you have not heard from them call them on Tuesday to check on referral.  Follow up with PCP in 7-10 days as needed.   on 6/6/2023. Left shoulder flexion 4-/5, internal rotation 4/5, and external rotation 4-/5  Quality of Movement:   Right shoulder range of motion within normal limits. Left shoulder range of motion measured in supine. Passive range of motion with left shoulder: flexion 125 degrees, internal rotation 70 degrees, and external rotation 75 degrees. Active assistive range of motion: flexion 130 degrees and external rotation 80 degrees. Retested on 6/6/2023. Left active shoulder flexion 150 degrees measured in seated position. Left active shoulder external rotation 90 degrees and shoulder internal rotation 90 degrees- both measured in supine. Sensation:    Bilateral upper extremity within normal limits. ASSESSMENT   Initial Assessment:  Patient presents with increased pain, decreased range of motion, and decreased strength due to recent surgery. Patient would benefit from skilled physical therapy to address problems and goals. Thank you for this referral.    Progress note (6/6/2023):  Patient has attended eight scheduled physical therapy appointments from 5/12/2023 to 6/6/2023. Patient demonstrates improved range of motion and improved strength. Patient would benefit from continuing physical therapy to address problems and goals. Thank you for this referral.    Problem List: (Impacting functional limitations): Body Structures, Functions, Activity Limitations Requiring Skilled Therapeutic Intervention: Decreased ROM; Decreased ADL status; Decreased strength; Decreased endurance;  Increased pain; Decreased posture     Therapy Prognosis:   Therapy Prognosis: Excellent     Initial Assessment Complexity:   Decision Making: Low Complexity    PLAN   Effective Dates: 5/12/2023 TO Plan of Care/Certification Expiration Date: 08/10/23     Frequency/Duration: Plan Frequency: 2 times a week for 90 days     Interventions Planned (Treatment may consist of any combination of the following):    Current Treatment

## 2024-09-16 LAB — TESTOST SERPL-MCNC: 16.9 NG/DL

## 2024-09-19 ASSESSMENT — PATIENT HEALTH QUESTIONNAIRE - PHQ9
5. POOR APPETITE OR OVEREATING: NOT AT ALL
2. FEELING DOWN, DEPRESSED OR HOPELESS: NOT AT ALL
6. FEELING BAD ABOUT YOURSELF - OR THAT YOU ARE A FAILURE OR HAVE LET YOURSELF OR YOUR FAMILY DOWN: NOT AT ALL
10. IF YOU CHECKED OFF ANY PROBLEMS, HOW DIFFICULT HAVE THESE PROBLEMS MADE IT FOR YOU TO DO YOUR WORK, TAKE CARE OF THINGS AT HOME, OR GET ALONG WITH OTHER PEOPLE: VERY DIFFICULT
3. TROUBLE FALLING OR STAYING ASLEEP: MORE THAN HALF THE DAYS
1. LITTLE INTEREST OR PLEASURE IN DOING THINGS: NOT AT ALL
6. FEELING BAD ABOUT YOURSELF - OR THAT YOU ARE A FAILURE OR HAVE LET YOURSELF OR YOUR FAMILY DOWN: NOT AT ALL
SUM OF ALL RESPONSES TO PHQ9 QUESTIONS 1 & 2: 0
9. THOUGHTS THAT YOU WOULD BE BETTER OFF DEAD, OR OF HURTING YOURSELF: NOT AT ALL
SUM OF ALL RESPONSES TO PHQ QUESTIONS 1-9: 4
SUM OF ALL RESPONSES TO PHQ QUESTIONS 1-9: 4
7. TROUBLE CONCENTRATING ON THINGS, SUCH AS READING THE NEWSPAPER OR WATCHING TELEVISION: NOT AT ALL
8. MOVING OR SPEAKING SO SLOWLY THAT OTHER PEOPLE COULD HAVE NOTICED. OR THE OPPOSITE, BEING SO FIGETY OR RESTLESS THAT YOU HAVE BEEN MOVING AROUND A LOT MORE THAN USUAL: SEVERAL DAYS
4. FEELING TIRED OR HAVING LITTLE ENERGY: SEVERAL DAYS
9. THOUGHTS THAT YOU WOULD BE BETTER OFF DEAD, OR OF HURTING YOURSELF: NOT AT ALL
1. LITTLE INTEREST OR PLEASURE IN DOING THINGS: NOT AT ALL
4. FEELING TIRED OR HAVING LITTLE ENERGY: SEVERAL DAYS
8. MOVING OR SPEAKING SO SLOWLY THAT OTHER PEOPLE COULD HAVE NOTICED. OR THE OPPOSITE - BEING SO FIDGETY OR RESTLESS THAT YOU HAVE BEEN MOVING AROUND A LOT MORE THAN USUAL: SEVERAL DAYS
3. TROUBLE FALLING OR STAYING ASLEEP: MORE THAN HALF THE DAYS
SUM OF ALL RESPONSES TO PHQ QUESTIONS 1-9: 4
5. POOR APPETITE OR OVEREATING: NOT AT ALL
2. FEELING DOWN, DEPRESSED OR HOPELESS: NOT AT ALL
7. TROUBLE CONCENTRATING ON THINGS, SUCH AS READING THE NEWSPAPER OR WATCHING TELEVISION: NOT AT ALL
10. IF YOU CHECKED OFF ANY PROBLEMS, HOW DIFFICULT HAVE THESE PROBLEMS MADE IT FOR YOU TO DO YOUR WORK, TAKE CARE OF THINGS AT HOME, OR GET ALONG WITH OTHER PEOPLE: VERY DIFFICULT
SUM OF ALL RESPONSES TO PHQ QUESTIONS 1-9: 4
SUM OF ALL RESPONSES TO PHQ QUESTIONS 1-9: 4

## 2024-09-19 ASSESSMENT — ANXIETY QUESTIONNAIRES
6. BECOMING EASILY ANNOYED OR IRRITABLE: NOT AT ALL
IF YOU CHECKED OFF ANY PROBLEMS ON THIS QUESTIONNAIRE, HOW DIFFICULT HAVE THESE PROBLEMS MADE IT FOR YOU TO DO YOUR WORK, TAKE CARE OF THINGS AT HOME, OR GET ALONG WITH OTHER PEOPLE: NOT DIFFICULT AT ALL
1. FEELING NERVOUS, ANXIOUS, OR ON EDGE: NOT AT ALL
5. BEING SO RESTLESS THAT IT IS HARD TO SIT STILL: NOT AT ALL
7. FEELING AFRAID AS IF SOMETHING AWFUL MIGHT HAPPEN: NOT AT ALL
2. NOT BEING ABLE TO STOP OR CONTROL WORRYING: NOT AT ALL
6. BECOMING EASILY ANNOYED OR IRRITABLE: NOT AT ALL
4. TROUBLE RELAXING: NOT AT ALL
1. FEELING NERVOUS, ANXIOUS, OR ON EDGE: NOT AT ALL
2. NOT BEING ABLE TO STOP OR CONTROL WORRYING: NOT AT ALL
5. BEING SO RESTLESS THAT IT IS HARD TO SIT STILL: NOT AT ALL
7. FEELING AFRAID AS IF SOMETHING AWFUL MIGHT HAPPEN: NOT AT ALL
IF YOU CHECKED OFF ANY PROBLEMS ON THIS QUESTIONNAIRE, HOW DIFFICULT HAVE THESE PROBLEMS MADE IT FOR YOU TO DO YOUR WORK, TAKE CARE OF THINGS AT HOME, OR GET ALONG WITH OTHER PEOPLE: NOT DIFFICULT AT ALL
3. WORRYING TOO MUCH ABOUT DIFFERENT THINGS: NOT AT ALL
4. TROUBLE RELAXING: NOT AT ALL
3. WORRYING TOO MUCH ABOUT DIFFERENT THINGS: NOT AT ALL
GAD7 TOTAL SCORE: 0

## 2024-09-23 ENCOUNTER — TELEMEDICINE (OUTPATIENT)
Dept: BEHAVIORAL/MENTAL HEALTH CLINIC | Age: 44
End: 2024-09-23
Payer: MEDICARE

## 2024-09-23 DIAGNOSIS — F51.05 INSOMNIA DUE TO MENTAL DISORDER: ICD-10-CM

## 2024-09-23 DIAGNOSIS — F33.41 RECURRENT MAJOR DEPRESSIVE DISORDER, IN PARTIAL REMISSION (HCC): Primary | ICD-10-CM

## 2024-09-23 DIAGNOSIS — F41.1 GENERALIZED ANXIETY DISORDER: ICD-10-CM

## 2024-09-23 PROCEDURE — 99214 OFFICE O/P EST MOD 30 MIN: CPT | Performed by: PSYCHIATRY & NEUROLOGY

## 2024-09-23 RX ORDER — LEFLUNOMIDE 20 MG/1
TABLET ORAL
COMMUNITY
Start: 2024-08-16

## 2024-09-23 RX ORDER — LAMOTRIGINE 100 MG/1
100 TABLET ORAL DAILY
Qty: 30 TABLET | Refills: 3 | Status: SHIPPED | OUTPATIENT
Start: 2024-09-23

## 2024-09-23 RX ORDER — VENLAFAXINE HYDROCHLORIDE 75 MG/1
75 CAPSULE, EXTENDED RELEASE ORAL DAILY
Qty: 30 CAPSULE | Refills: 3 | Status: SHIPPED | OUTPATIENT
Start: 2024-09-23

## 2024-10-11 ENCOUNTER — LAB (OUTPATIENT)
Dept: INTERNAL MEDICINE CLINIC | Facility: CLINIC | Age: 44
End: 2024-10-11

## 2024-10-11 DIAGNOSIS — F33.41 RECURRENT MAJOR DEPRESSIVE DISORDER, IN PARTIAL REMISSION (HCC): ICD-10-CM

## 2024-10-11 DIAGNOSIS — E78.5 HYPERLIPIDEMIA, UNSPECIFIED HYPERLIPIDEMIA TYPE: ICD-10-CM

## 2024-10-11 DIAGNOSIS — R73.01 IMPAIRED FASTING BLOOD SUGAR: ICD-10-CM

## 2024-10-11 DIAGNOSIS — E55.9 VITAMIN D DEFICIENCY: ICD-10-CM

## 2024-10-11 LAB
25(OH)D3 SERPL-MCNC: 47.5 NG/ML (ref 30–100)
ALBUMIN SERPL-MCNC: 3.9 G/DL (ref 3.5–5)
ALBUMIN/GLOB SERPL: 1.2 (ref 1–1.9)
ALP SERPL-CCNC: 85 U/L (ref 35–104)
ALT SERPL-CCNC: 46 U/L (ref 8–45)
ANION GAP SERPL CALC-SCNC: 11 MMOL/L (ref 9–18)
AST SERPL-CCNC: 30 U/L (ref 15–37)
BILIRUB SERPL-MCNC: 0.3 MG/DL (ref 0–1.2)
BUN SERPL-MCNC: 11 MG/DL (ref 6–23)
CALCIUM SERPL-MCNC: 9.5 MG/DL (ref 8.8–10.2)
CHLORIDE SERPL-SCNC: 104 MMOL/L (ref 98–107)
CHOLEST SERPL-MCNC: 252 MG/DL (ref 0–200)
CO2 SERPL-SCNC: 25 MMOL/L (ref 20–28)
CREAT SERPL-MCNC: 0.76 MG/DL (ref 0.6–1.1)
ERYTHROCYTE [DISTWIDTH] IN BLOOD BY AUTOMATED COUNT: 12.7 % (ref 11.9–14.6)
EST. AVERAGE GLUCOSE BLD GHB EST-MCNC: 112 MG/DL
GLOBULIN SER CALC-MCNC: 3.2 G/DL (ref 2.3–3.5)
GLUCOSE SERPL-MCNC: 114 MG/DL (ref 70–99)
HBA1C MFR BLD: 5.5 % (ref 0–5.6)
HCT VFR BLD AUTO: 42.4 % (ref 35.8–46.3)
HDLC SERPL-MCNC: 44 MG/DL (ref 40–60)
HDLC SERPL: 5.8 (ref 0–5)
HGB BLD-MCNC: 14.3 G/DL (ref 11.7–15.4)
LDLC SERPL CALC-MCNC: 172 MG/DL (ref 0–100)
MCH RBC QN AUTO: 29.9 PG (ref 26.1–32.9)
MCHC RBC AUTO-ENTMCNC: 33.7 G/DL (ref 31.4–35)
MCV RBC AUTO: 88.7 FL (ref 82–102)
NRBC # BLD: 0 K/UL (ref 0–0.2)
PLATELET # BLD AUTO: 253 K/UL (ref 150–450)
PMV BLD AUTO: 10.2 FL (ref 9.4–12.3)
POTASSIUM SERPL-SCNC: 4.6 MMOL/L (ref 3.5–5.1)
PROT SERPL-MCNC: 7 G/DL (ref 6.3–8.2)
RBC # BLD AUTO: 4.78 M/UL (ref 4.05–5.2)
SODIUM SERPL-SCNC: 139 MMOL/L (ref 136–145)
TRIGL SERPL-MCNC: 182 MG/DL (ref 0–150)
TSH, 3RD GENERATION: 2.29 UIU/ML (ref 0.27–4.2)
VLDLC SERPL CALC-MCNC: 36 MG/DL (ref 6–23)
WBC # BLD AUTO: 9.4 K/UL (ref 4.3–11.1)

## 2024-10-14 ENCOUNTER — TRANSCRIBE ORDERS (OUTPATIENT)
Dept: SCHEDULING | Age: 44
End: 2024-10-14

## 2024-10-14 DIAGNOSIS — Z12.31 VISIT FOR SCREENING MAMMOGRAM: Primary | ICD-10-CM

## 2024-10-18 ENCOUNTER — OFFICE VISIT (OUTPATIENT)
Dept: INTERNAL MEDICINE CLINIC | Facility: CLINIC | Age: 44
End: 2024-10-18
Payer: MEDICARE

## 2024-10-18 VITALS
OXYGEN SATURATION: 98 % | DIASTOLIC BLOOD PRESSURE: 88 MMHG | WEIGHT: 238 LBS | HEIGHT: 62 IN | BODY MASS INDEX: 43.79 KG/M2 | TEMPERATURE: 98.1 F | HEART RATE: 100 BPM | SYSTOLIC BLOOD PRESSURE: 133 MMHG

## 2024-10-18 DIAGNOSIS — J06.9 ACUTE URI: ICD-10-CM

## 2024-10-18 DIAGNOSIS — I11.9 HYPERTENSIVE HEART DISEASE WITHOUT HEART FAILURE: Primary | ICD-10-CM

## 2024-10-18 DIAGNOSIS — E78.00 PURE HYPERCHOLESTEROLEMIA: ICD-10-CM

## 2024-10-18 DIAGNOSIS — R05.1 ACUTE COUGH: ICD-10-CM

## 2024-10-18 DIAGNOSIS — E55.9 VITAMIN D DEFICIENCY: ICD-10-CM

## 2024-10-18 DIAGNOSIS — F32.1 MAJOR DEPRESSIVE DISORDER, SINGLE EPISODE, MODERATE (HCC): ICD-10-CM

## 2024-10-18 PROCEDURE — 99213 OFFICE O/P EST LOW 20 MIN: CPT | Performed by: FAMILY MEDICINE

## 2024-10-18 RX ORDER — HYDROXYZINE HYDROCHLORIDE 10 MG/1
10 TABLET, FILM COATED ORAL PRN
COMMUNITY
Start: 2024-10-13

## 2024-10-18 SDOH — ECONOMIC STABILITY: FOOD INSECURITY: WITHIN THE PAST 12 MONTHS, YOU WORRIED THAT YOUR FOOD WOULD RUN OUT BEFORE YOU GOT MONEY TO BUY MORE.: NEVER TRUE

## 2024-10-18 SDOH — ECONOMIC STABILITY: INCOME INSECURITY: HOW HARD IS IT FOR YOU TO PAY FOR THE VERY BASICS LIKE FOOD, HOUSING, MEDICAL CARE, AND HEATING?: SOMEWHAT HARD

## 2024-10-18 SDOH — ECONOMIC STABILITY: FOOD INSECURITY: WITHIN THE PAST 12 MONTHS, THE FOOD YOU BOUGHT JUST DIDN'T LAST AND YOU DIDN'T HAVE MONEY TO GET MORE.: NEVER TRUE

## 2024-10-18 NOTE — PROGRESS NOTES
Nova Garcia DO  [facility] [place] [address]  Phone [phone]  Fax [phone]      Amanda Celaya (: 1980) is a 44 y.o. female, here for evaluation of the following chief complaint(s):  Follow-up       ASSESSMENT/PLAN:  1. Hypertensive heart disease without heart failure  Overview:  Tolerating medication well for HTN. Achieving desired therapeutic response with   Hypertension Medications       ACE Inhibitors       lisinopril (PRINIVIL;ZESTRIL) 5 MG tablet Take 2 tablets by mouth in the morning and at bedtime     lisinopril (PRINIVIL;ZESTRIL) 10 MG tablet Take 1 tablet by mouth every morning         --will continue. Will periodically review and adjust if needed.  Encourage home monitoring.   2. Pure hypercholesterolemia  Overview:  Diet and exercise reviewed  3. Vitamin D deficiency  Overview:  Normal level  Advised to take over-the-counter daily  4. Major depressive disorder, single episode, moderate (HCC)  Overview:  Denies SI/HI  Follows with psychiatrist  Educated on relaxation techniques. Avoid stressful situations. Monitor and report any worsening depressive symptoms. Call suicide hotline for any suicidal thoughts or go directly to the emergency room or call 911.  5. Acute cough  -     azithromycin (ZITHROMAX) 250 MG tablet; 500mg on day 1 followed by 250mg on days 2 - 5, Disp-6 tablet, R-0Normal  6. Acute URI  Overview:  Placed on Z-Phong  Use medication as prescribed, SE informed. Nature and course of illness reviewed. Supportive care including OTC meds, increase in fluids, steam inhalation, and rest reviewed. Call office/RTC if any s/s worsen or do not improve. Pt agreed.   Orders:  -     azithromycin (ZITHROMAX) 250 MG tablet; 500mg on day 1 followed by 250mg on days 2 - 5, Disp-6 tablet, R-0Normal    Labs reviewed in office.  All questions and concerns answered. Patient voiced understanding and agrees with POC.    FOLLOW UP:  Return in

## 2024-10-24 ENCOUNTER — PATIENT MESSAGE (OUTPATIENT)
Dept: INTERNAL MEDICINE CLINIC | Facility: CLINIC | Age: 44
End: 2024-10-24

## 2024-10-25 RX ORDER — AZITHROMYCIN 250 MG/1
TABLET, FILM COATED ORAL
Qty: 6 TABLET | Refills: 0 | Status: SHIPPED | OUTPATIENT
Start: 2024-10-25 | End: 2024-11-04

## 2024-10-29 PROBLEM — I11.9 HYPERTENSIVE HEART DISEASE WITHOUT HEART FAILURE: Status: ACTIVE | Noted: 2024-10-29

## 2024-10-29 PROBLEM — J06.9 ACUTE URI: Status: ACTIVE | Noted: 2024-10-29

## 2024-11-18 NOTE — PROGRESS NOTES
Dr. Guaman,     Please see below Personal [employer] message and advise.   Pt questioning when to complete COVID vaccine given her history.    Thanks,   Lady GOFF RN     Name: Tejas Solomon  YOB: 1980  Gender: female  MRN: [id number]    CC:   Chief Complaint   Patient presents with    Follow-up     1st post op left shoulder scope DCR, open biceps tenodesis DOS 5-8-23   1-2 weeks status post   Left Shoulder Arthroscopy Distal Clavicle Resection - Left and Open Biceps Tenodesis - Left  5/8/2023    HPI: Patient is status post biceps tenodesis and DCR. Patient is doing well. Patient came in with a sling on the affected side. Review of Systems  Noncontributory      PE:  Wounds are Clean, Dry and Intact. There is no sign of infection. The patient is neurovascularly intact. Swelling is within normal limits. A/P:     ICD-10-CM    1. Osteoarthritis of left AC (acromioclavicular) joint  M19.012       2. Left shoulder pain, unspecified chronicity  M25.512       3. Surgery follow-up  Z09         Sutures were removed and were covered with Steri-strips. Discussed HEP. They will continue with use of the sling at this time. Patient prescribed with Non-steroidal anti-inflammatories after review of risks and benefits. We discussed additional activity modification to help reduce pain for initial conservative treatment. Discussed bacitracin    Return in about 3 weeks (around 6/9/2023) for post op on Barber's schedule.       Junaid Laughlin MD  05/19/23

## 2024-11-26 ENCOUNTER — OFFICE VISIT (OUTPATIENT)
Dept: INTERNAL MEDICINE CLINIC | Facility: CLINIC | Age: 44
End: 2024-11-26

## 2024-11-26 VITALS
HEART RATE: 96 BPM | TEMPERATURE: 97.2 F | OXYGEN SATURATION: 98 % | SYSTOLIC BLOOD PRESSURE: 130 MMHG | BODY MASS INDEX: 44.53 KG/M2 | DIASTOLIC BLOOD PRESSURE: 86 MMHG | WEIGHT: 242 LBS | HEIGHT: 62 IN

## 2024-11-26 DIAGNOSIS — R06.2 WHEEZING: ICD-10-CM

## 2024-11-26 DIAGNOSIS — J20.8 ACUTE BRONCHITIS DUE TO OTHER SPECIFIED ORGANISMS: ICD-10-CM

## 2024-11-26 DIAGNOSIS — R05.1 ACUTE COUGH: Primary | ICD-10-CM

## 2024-11-26 LAB
EXP DATE SOLUTION: NORMAL
EXP DATE SWAB: NORMAL
EXPIRATION DATE: NORMAL
INFLUENZA A ANTIGEN, POC: NEGATIVE
INFLUENZA B ANTIGEN, POC: NEGATIVE
LOT NUMBER POC: 9753
LOT NUMBER SOLUTION: NORMAL
LOT NUMBER SWAB: NORMAL
SARS-COV-2 RNA, POC: NEGATIVE
VALID INTERNAL CONTROL, POC: YES

## 2024-11-26 RX ORDER — AZITHROMYCIN 250 MG/1
TABLET, FILM COATED ORAL
Qty: 6 TABLET | Refills: 0 | Status: SHIPPED | OUTPATIENT
Start: 2024-11-26 | End: 2024-12-06

## 2024-11-26 RX ORDER — PREDNISONE 20 MG/1
20 TABLET ORAL 2 TIMES DAILY
Qty: 10 TABLET | Refills: 0 | Status: SHIPPED | OUTPATIENT
Start: 2024-11-26 | End: 2024-12-01

## 2024-11-26 RX ORDER — BENZONATATE 100 MG/1
100-200 CAPSULE ORAL 3 TIMES DAILY PRN
Qty: 60 CAPSULE | Refills: 0 | Status: SHIPPED | OUTPATIENT
Start: 2024-11-26 | End: 2024-12-03

## 2024-11-26 RX ORDER — MEDROXYPROGESTERONE ACETATE 150 MG/ML
INJECTION, SUSPENSION INTRAMUSCULAR
COMMUNITY
Start: 2024-10-31 | End: 2025-10-26

## 2024-11-26 NOTE — PROGRESS NOTES
Mucous membranes are moist.      Pharynx: Oropharynx is clear. Uvula midline. Posterior oropharyngeal erythema and postnasal drip present. No pharyngeal swelling, oropharyngeal exudate or uvula swelling.      Tonsils: No tonsillar exudate or tonsillar abscesses.   Eyes:      General: No scleral icterus.     Conjunctiva/sclera: Conjunctivae normal.      Pupils: Pupils are equal, round, and reactive to light.   Cardiovascular:      Rate and Rhythm: Normal rate and regular rhythm.      Pulses: Normal pulses.      Heart sounds: Normal heart sounds. No murmur heard.     No friction rub. No gallop.   Pulmonary:      Effort: Pulmonary effort is normal. No respiratory distress.      Breath sounds: Wheezing (bilateral upper lung fields) present. No rhonchi or rales.   Musculoskeletal:      Cervical back: Normal range of motion and neck supple. No rigidity or tenderness.   Lymphadenopathy:      Cervical: No cervical adenopathy.   Neurological:      General: No focal deficit present.      Mental Status: She is alert and oriented to person, place, and time. Mental status is at baseline.   Psychiatric:         Mood and Affect: Mood normal.         Behavior: Behavior normal. Behavior is cooperative.         Thought Content: Thought content normal.         Judgment: Judgment normal.       An electronic signature was used to authenticate this note.  Nova Garcia,   Elements of this note have been dictated via voice recognition software.  Text and phrases may be limited by the accuracy and autoconversion of the software.  The chart has been reviewed, but errors may still be present.  Medical Student present during office visit, Red FLOR IV.

## 2024-12-02 ENCOUNTER — PATIENT MESSAGE (OUTPATIENT)
Dept: INTERNAL MEDICINE CLINIC | Facility: CLINIC | Age: 44
End: 2024-12-02

## 2024-12-02 ENCOUNTER — HOSPITAL ENCOUNTER (OUTPATIENT)
Dept: GENERAL RADIOLOGY | Age: 44
Discharge: HOME | End: 2024-12-04
Payer: MEDICARE

## 2024-12-02 DIAGNOSIS — J20.8 ACUTE BRONCHITIS DUE TO OTHER SPECIFIED ORGANISMS: ICD-10-CM

## 2024-12-02 DIAGNOSIS — R06.2 WHEEZING: Primary | ICD-10-CM

## 2024-12-02 DIAGNOSIS — R05.1 ACUTE COUGH: ICD-10-CM

## 2024-12-02 DIAGNOSIS — R06.2 WHEEZING: ICD-10-CM

## 2024-12-02 PROCEDURE — 71046 X-RAY EXAM CHEST 2 VIEWS: CPT

## 2024-12-03 NOTE — TELEPHONE ENCOUNTER
Patient notified about results and verbalized understanding    Patient has not seen a pulmonologist since 20 yrs ago in [place] , she is currently using Albuterol sulfate HFA inhaler with mild relief.

## 2024-12-03 NOTE — TELEPHONE ENCOUNTER
CXR is normal. No pneumonia or infection seen. Please find out if patient sees pulmonologist or allergy specialist, advise to follow up with them. If not, find out what inhaler she is using? Any inhalers she has used for asthma in the past?

## 2024-12-04 RX ORDER — FLUTICASONE PROPIONATE AND SALMETEROL 250; 50 UG/1; UG/1
1 POWDER RESPIRATORY (INHALATION) EVERY 12 HOURS
Qty: 60 EACH | Refills: 3 | Status: SHIPPED | OUTPATIENT
Start: 2024-12-04

## 2024-12-04 RX ORDER — DOXYCYCLINE HYCLATE 100 MG
100 TABLET ORAL 2 TIMES DAILY
Qty: 20 TABLET | Refills: 0 | Status: SHIPPED | OUTPATIENT
Start: 2024-12-04 | End: 2024-12-14

## 2024-12-04 NOTE — TELEPHONE ENCOUNTER
Inhaler + antibiotic sent. No allergy to prednisone/steroid or doxycycline.   Reviewed side effects, interactions, and safety precautions  Warning si/sx reviewed with patient, advised to go to the ER if any occurs  F/u as scheduled

## 2024-12-16 SDOH — HEALTH STABILITY: PHYSICAL HEALTH: ON AVERAGE, HOW MANY MINUTES DO YOU ENGAGE IN EXERCISE AT THIS LEVEL?: 10 MIN

## 2024-12-16 SDOH — HEALTH STABILITY: PHYSICAL HEALTH: ON AVERAGE, HOW MANY DAYS PER WEEK DO YOU ENGAGE IN MODERATE TO STRENUOUS EXERCISE (LIKE A BRISK WALK)?: 3 DAYS

## 2024-12-16 ASSESSMENT — PATIENT HEALTH QUESTIONNAIRE - PHQ9
SUM OF ALL RESPONSES TO PHQ QUESTIONS 1-9: 1
SUM OF ALL RESPONSES TO PHQ QUESTIONS 1-9: 1
2. FEELING DOWN, DEPRESSED OR HOPELESS: SEVERAL DAYS
SUM OF ALL RESPONSES TO PHQ9 QUESTIONS 1 & 2: 1
SUM OF ALL RESPONSES TO PHQ QUESTIONS 1-9: 1
1. LITTLE INTEREST OR PLEASURE IN DOING THINGS: NOT AT ALL
SUM OF ALL RESPONSES TO PHQ QUESTIONS 1-9: 1

## 2024-12-16 ASSESSMENT — LIFESTYLE VARIABLES
HOW OFTEN DO YOU HAVE A DRINK CONTAINING ALCOHOL: 2
HOW MANY STANDARD DRINKS CONTAINING ALCOHOL DO YOU HAVE ON A TYPICAL DAY: 1
HOW OFTEN DO YOU HAVE SIX OR MORE DRINKS ON ONE OCCASION: 1
HOW MANY STANDARD DRINKS CONTAINING ALCOHOL DO YOU HAVE ON A TYPICAL DAY: 1 OR 2
HOW OFTEN DO YOU HAVE A DRINK CONTAINING ALCOHOL: MONTHLY OR LESS

## 2024-12-17 ASSESSMENT — PATIENT HEALTH QUESTIONNAIRE - PHQ9
10. IF YOU CHECKED OFF ANY PROBLEMS, HOW DIFFICULT HAVE THESE PROBLEMS MADE IT FOR YOU TO DO YOUR WORK, TAKE CARE OF THINGS AT HOME, OR GET ALONG WITH OTHER PEOPLE: SOMEWHAT DIFFICULT
8. MOVING OR SPEAKING SO SLOWLY THAT OTHER PEOPLE COULD HAVE NOTICED. OR THE OPPOSITE, BEING SO FIGETY OR RESTLESS THAT YOU HAVE BEEN MOVING AROUND A LOT MORE THAN USUAL: NOT AT ALL
4. FEELING TIRED OR HAVING LITTLE ENERGY: MORE THAN HALF THE DAYS
7. TROUBLE CONCENTRATING ON THINGS, SUCH AS READING THE NEWSPAPER OR WATCHING TELEVISION: NOT AT ALL
SUM OF ALL RESPONSES TO PHQ QUESTIONS 1-9: 3
SUM OF ALL RESPONSES TO PHQ9 QUESTIONS 1 & 2: 1
9. THOUGHTS THAT YOU WOULD BE BETTER OFF DEAD, OR OF HURTING YOURSELF: NOT AT ALL
6. FEELING BAD ABOUT YOURSELF - OR THAT YOU ARE A FAILURE OR HAVE LET YOURSELF OR YOUR FAMILY DOWN: NOT AT ALL
SUM OF ALL RESPONSES TO PHQ QUESTIONS 1-9: 3
10. IF YOU CHECKED OFF ANY PROBLEMS, HOW DIFFICULT HAVE THESE PROBLEMS MADE IT FOR YOU TO DO YOUR WORK, TAKE CARE OF THINGS AT HOME, OR GET ALONG WITH OTHER PEOPLE: SOMEWHAT DIFFICULT
6. FEELING BAD ABOUT YOURSELF - OR THAT YOU ARE A FAILURE OR HAVE LET YOURSELF OR YOUR FAMILY DOWN: NOT AT ALL
SUM OF ALL RESPONSES TO PHQ QUESTIONS 1-9: 3
4. FEELING TIRED OR HAVING LITTLE ENERGY: MORE THAN HALF THE DAYS
3. TROUBLE FALLING OR STAYING ASLEEP: NOT AT ALL
9. THOUGHTS THAT YOU WOULD BE BETTER OFF DEAD, OR OF HURTING YOURSELF: NOT AT ALL
1. LITTLE INTEREST OR PLEASURE IN DOING THINGS: NOT AT ALL
SUM OF ALL RESPONSES TO PHQ QUESTIONS 1-9: 3
3. TROUBLE FALLING OR STAYING ASLEEP: NOT AT ALL
8. MOVING OR SPEAKING SO SLOWLY THAT OTHER PEOPLE COULD HAVE NOTICED. OR THE OPPOSITE - BEING SO FIDGETY OR RESTLESS THAT YOU HAVE BEEN MOVING AROUND A LOT MORE THAN USUAL: NOT AT ALL
2. FEELING DOWN, DEPRESSED OR HOPELESS: SEVERAL DAYS
1. LITTLE INTEREST OR PLEASURE IN DOING THINGS: NOT AT ALL
7. TROUBLE CONCENTRATING ON THINGS, SUCH AS READING THE NEWSPAPER OR WATCHING TELEVISION: NOT AT ALL
SUM OF ALL RESPONSES TO PHQ QUESTIONS 1-9: 3
5. POOR APPETITE OR OVEREATING: NOT AT ALL
2. FEELING DOWN, DEPRESSED OR HOPELESS: SEVERAL DAYS
5. POOR APPETITE OR OVEREATING: NOT AT ALL

## 2024-12-17 ASSESSMENT — ANXIETY QUESTIONNAIRES
2. NOT BEING ABLE TO STOP OR CONTROL WORRYING: NOT AT ALL
2. NOT BEING ABLE TO STOP OR CONTROL WORRYING: NOT AT ALL
4. TROUBLE RELAXING: NOT AT ALL
4. TROUBLE RELAXING: NOT AT ALL
3. WORRYING TOO MUCH ABOUT DIFFERENT THINGS: NOT AT ALL
6. BECOMING EASILY ANNOYED OR IRRITABLE: NOT AT ALL
7. FEELING AFRAID AS IF SOMETHING AWFUL MIGHT HAPPEN: NOT AT ALL
3. WORRYING TOO MUCH ABOUT DIFFERENT THINGS: NOT AT ALL
GAD7 TOTAL SCORE: 0
6. BECOMING EASILY ANNOYED OR IRRITABLE: NOT AT ALL
5. BEING SO RESTLESS THAT IT IS HARD TO SIT STILL: NOT AT ALL
1. FEELING NERVOUS, ANXIOUS, OR ON EDGE: NOT AT ALL
1. FEELING NERVOUS, ANXIOUS, OR ON EDGE: NOT AT ALL
7. FEELING AFRAID AS IF SOMETHING AWFUL MIGHT HAPPEN: NOT AT ALL
5. BEING SO RESTLESS THAT IT IS HARD TO SIT STILL: NOT AT ALL

## 2024-12-18 ENCOUNTER — TELEMEDICINE (OUTPATIENT)
Dept: BEHAVIORAL/MENTAL HEALTH CLINIC | Age: 44
End: 2024-12-18

## 2024-12-18 DIAGNOSIS — F41.1 GENERALIZED ANXIETY DISORDER: ICD-10-CM

## 2024-12-18 DIAGNOSIS — F51.05 INSOMNIA DUE TO MENTAL DISORDER: ICD-10-CM

## 2024-12-18 DIAGNOSIS — R53.82 CHRONIC FATIGUE: ICD-10-CM

## 2024-12-18 DIAGNOSIS — E03.4 HYPOTHYROIDISM DUE TO ACQUIRED ATROPHY OF THYROID: ICD-10-CM

## 2024-12-18 DIAGNOSIS — F33.41 RECURRENT MAJOR DEPRESSIVE DISORDER, IN PARTIAL REMISSION (HCC): Primary | ICD-10-CM

## 2024-12-18 RX ORDER — LAMOTRIGINE 100 MG/1
100 TABLET ORAL DAILY
Qty: 30 TABLET | Refills: 3 | Status: SHIPPED | OUTPATIENT
Start: 2024-12-18

## 2024-12-18 RX ORDER — VENLAFAXINE HYDROCHLORIDE 75 MG/1
75 CAPSULE, EXTENDED RELEASE ORAL DAILY
Qty: 30 CAPSULE | Refills: 3 | Status: SHIPPED | OUTPATIENT
Start: 2024-12-18

## 2024-12-18 NOTE — PROGRESS NOTES
patient’s controlled substance prescription history, as maintained in the [place] prescription monitoring program, so that the prescription(s) for a  controlled substance can be given.    Recommendations and Referrals:    Follow up with : MD, requires monitoring of response to medication, requires monitoring of medication side effects.    Time until next PMA: 3 months    Follow up with Mental Health Clinicians recommended for : psychotherapy interventions,  monitoring to prevent decompensation /hospitalization, monitoring to maintain therapeutic gains, monitoring symptoms (resolving and controlled), to improve level of functioning,         Psychotherapy note:                                __10_ Minutes of psychotherapy     [x]  Supportive psychotherapy provided to improve self-esteem, psychological functioning, and adaptive skills. Patient discussed certain situational and personal stressors ongoing in her life at this time, weight management d/w the patient. Sleep hygiene d/w patient. Patient allowed to vent out her emotions.  Scenarios were reviewed using CBT  techniques in order to increase insight and decrease anxiety.    Dysfunctional cognitions challenged and alternate options dicussed.      []  Disposition planning      []  Dangerous and will not contract for safety in the community    **Pateint has been notified: They are to call 911 or go to their nearest E.R. if they are experiencing a medical emergency or suicidal ideations/homicidal ideations.**  All ancillary documentation entered reviewed by provider.      PLEASE NOTE:  This document has been produced in part or whole using voice recognition software. Proofread however unrecognized errors in transcription may be present.    MD Amanda Singletary, was evaluated through a synchronous (real-time) audio-video encounter. The patient (or guardian if applicable) is aware that this is a billable service, which includes applicable

## 2024-12-19 ENCOUNTER — TELEMEDICINE (OUTPATIENT)
Dept: INTERNAL MEDICINE CLINIC | Facility: CLINIC | Age: 44
End: 2024-12-19

## 2024-12-19 DIAGNOSIS — E55.9 VITAMIN D DEFICIENCY: ICD-10-CM

## 2024-12-19 DIAGNOSIS — Z00.00 MEDICARE ANNUAL WELLNESS VISIT, SUBSEQUENT: Primary | ICD-10-CM

## 2024-12-19 DIAGNOSIS — I11.9 HYPERTENSIVE HEART DISEASE WITHOUT HEART FAILURE: ICD-10-CM

## 2024-12-19 DIAGNOSIS — R07.0 THROAT DISCOMFORT: ICD-10-CM

## 2024-12-19 DIAGNOSIS — E78.00 PURE HYPERCHOLESTEROLEMIA: ICD-10-CM

## 2024-12-19 RX ORDER — PREDNISONE 5 MG/1
TABLET ORAL
Qty: 21 EACH | Refills: 0 | Status: SHIPPED | OUTPATIENT
Start: 2024-12-19

## 2024-12-19 NOTE — PROGRESS NOTES
Medicare Annual Wellness Visit    Amanda Celaya is here for Medicare AWV    Assessment & Plan   1. Medicare annual wellness visit, subsequent  2. Hypertensive heart disease without heart failure  Overview:  Tolerating medication well for HTN. Achieving desired therapeutic response with   Hypertension Medications       ACE Inhibitors       lisinopril (PRINIVIL;ZESTRIL) 10 MG tablet Take 1 tablet by mouth in the morning and at bedtime         --will continue. Will periodically review and adjust if needed.  Encourage home monitoring.   Orders:  -     Comprehensive Metabolic Panel; Future  -     CBC with Auto Differential; Future  -     Lipid Panel; Future  3. Pure hypercholesterolemia  Overview:  Diet and exercise reviewed  Orders:  -     Comprehensive Metabolic Panel; Future  -     CBC with Auto Differential; Future  -     Lipid Panel; Future  4. Vitamin D deficiency  Overview:  Normal level  Advised to take over-the-counter daily  Orders:  -     Vitamin D 25 Hydroxy; Future  5. Throat discomfort  Overview:  Placed on steroid  -Instructed on how to take the steroids properly as well as potential side effects of the medication.  Advised to let me know for any problems.  Advised if si/sx persists, then to follow up with specialist  Orders:  -     predniSONE 5 MG (21) TBPK; Take as directed on the package, Disp-21 each, R-0Normal    Recommendations for Preventive Services Due: see orders and patient instructions/AVS.  Recommended screening schedule for the next 5-10 years is provided to the patient in written form: see Patient Instructions/AVS.    Stable. PE findings discussed. Preventative medicine and health maintenance discussed. Diet and exercise discussed. Healthy lifestyle advised.    The patient was seen for the annual preventive health visit.  The patient was provided anticipatory guidance and counseling regarding age / sex appropriate health maintenance issues including vaccinations, blood pressure

## 2024-12-25 PROBLEM — R07.0 THROAT DISCOMFORT: Status: ACTIVE | Noted: 2024-12-25

## 2024-12-26 ENCOUNTER — HOSPITAL ENCOUNTER (OUTPATIENT)
Dept: MAMMOGRAPHY | Age: 44
Discharge: HOME | End: 2024-12-29
Attending: FAMILY MEDICINE
Payer: MEDICARE

## 2024-12-26 VITALS — BODY MASS INDEX: 46.44 KG/M2 | HEIGHT: 61 IN | WEIGHT: 246 LBS

## 2024-12-26 DIAGNOSIS — Z12.31 VISIT FOR SCREENING MAMMOGRAM: ICD-10-CM

## 2024-12-26 PROCEDURE — 77063 BREAST TOMOSYNTHESIS BI: CPT

## 2024-12-31 ENCOUNTER — TELEPHONE (OUTPATIENT)
Dept: INTERNAL MEDICINE CLINIC | Facility: CLINIC | Age: 44
End: 2024-12-31

## 2024-12-31 NOTE — TELEPHONE ENCOUNTER
----- Message from Dr. Nova Garcia DO sent at 12/31/2024 11:53 AM EST -----  Please inform that mammogram was negative, repeat in one year.

## 2025-01-05 ENCOUNTER — HOSPITAL ENCOUNTER (EMERGENCY)
Age: 45
Discharge: HOME | End: 2025-01-05
Payer: MEDICARE

## 2025-01-05 VITALS
HEART RATE: 96 BPM | SYSTOLIC BLOOD PRESSURE: 139 MMHG | DIASTOLIC BLOOD PRESSURE: 86 MMHG | OXYGEN SATURATION: 97 % | WEIGHT: 245 LBS | BODY MASS INDEX: 46.29 KG/M2 | RESPIRATION RATE: 14 BRPM | TEMPERATURE: 98 F

## 2025-01-05 DIAGNOSIS — T78.40XA ALLERGIC REACTION TO DRUG, INITIAL ENCOUNTER: Primary | ICD-10-CM

## 2025-01-05 LAB
ANION GAP SERPL CALC-SCNC: 13 MMOL/L (ref 7–16)
BASOPHILS # BLD: 0.1 K/UL (ref 0–0.2)
BASOPHILS NFR BLD: 1 % (ref 0–2)
BUN SERPL-MCNC: 13 MG/DL (ref 6–23)
CALCIUM SERPL-MCNC: 9.3 MG/DL (ref 8.8–10.2)
CHLORIDE SERPL-SCNC: 102 MMOL/L (ref 98–107)
CO2 SERPL-SCNC: 23 MMOL/L (ref 20–29)
CREAT SERPL-MCNC: 0.81 MG/DL (ref 0.6–1.1)
DIFFERENTIAL METHOD BLD: ABNORMAL
EOSINOPHIL # BLD: 0.1 K/UL (ref 0–0.8)
EOSINOPHIL NFR BLD: 1 % (ref 0.5–7.8)
ERYTHROCYTE [DISTWIDTH] IN BLOOD BY AUTOMATED COUNT: 12.7 % (ref 11.9–14.6)
GLUCOSE SERPL-MCNC: 151 MG/DL (ref 70–99)
HCG UR QL: NEGATIVE
HCT VFR BLD AUTO: 39.9 % (ref 35.8–46.3)
HGB BLD-MCNC: 13.6 G/DL (ref 11.7–15.4)
IMM GRANULOCYTES # BLD AUTO: 0.5 K/UL (ref 0–0.5)
IMM GRANULOCYTES NFR BLD AUTO: 3 % (ref 0–5)
LYMPHOCYTES # BLD: 4.1 K/UL (ref 0.5–4.6)
LYMPHOCYTES NFR BLD: 23 % (ref 13–44)
MCH RBC QN AUTO: 28.8 PG (ref 26.1–32.9)
MCHC RBC AUTO-ENTMCNC: 34.1 G/DL (ref 31.4–35)
MCV RBC AUTO: 84.5 FL (ref 82–102)
MONOCYTES # BLD: 1.4 K/UL (ref 0.1–1.3)
MONOCYTES NFR BLD: 8 % (ref 4–12)
NEUTS SEG # BLD: 11.2 K/UL (ref 1.7–8.2)
NEUTS SEG NFR BLD: 64 % (ref 43–78)
NRBC # BLD: 0 K/UL (ref 0–0.2)
PLATELET # BLD AUTO: 289 K/UL (ref 150–450)
PMV BLD AUTO: 9.5 FL (ref 9.4–12.3)
POTASSIUM SERPL-SCNC: 3.8 MMOL/L (ref 3.5–5.1)
RBC # BLD AUTO: 4.72 M/UL (ref 4.05–5.2)
SODIUM SERPL-SCNC: 138 MMOL/L (ref 136–145)
WBC # BLD AUTO: 17.3 K/UL (ref 4.3–11.1)

## 2025-01-05 PROCEDURE — 85025 COMPLETE CBC W/AUTO DIFF WBC: CPT

## 2025-01-05 PROCEDURE — 6360000002 HC RX W HCPCS: Performed by: NURSE PRACTITIONER

## 2025-01-05 PROCEDURE — 96376 TX/PRO/DX INJ SAME DRUG ADON: CPT

## 2025-01-05 PROCEDURE — 96375 TX/PRO/DX INJ NEW DRUG ADDON: CPT

## 2025-01-05 PROCEDURE — 80048 BASIC METABOLIC PNL TOTAL CA: CPT

## 2025-01-05 PROCEDURE — 99284 EMERGENCY DEPT VISIT MOD MDM: CPT

## 2025-01-05 PROCEDURE — 81025 URINE PREGNANCY TEST: CPT

## 2025-01-05 PROCEDURE — 2580000003 HC RX 258: Performed by: NURSE PRACTITIONER

## 2025-01-05 PROCEDURE — 2500000003 HC RX 250 WO HCPCS: Performed by: NURSE PRACTITIONER

## 2025-01-05 PROCEDURE — 96374 THER/PROPH/DIAG INJ IV PUSH: CPT

## 2025-01-05 RX ORDER — DIPHENHYDRAMINE HYDROCHLORIDE 50 MG/ML
25 INJECTION INTRAMUSCULAR; INTRAVENOUS
Status: DISCONTINUED | OUTPATIENT
Start: 2025-01-05 | End: 2025-01-05 | Stop reason: SDUPTHER

## 2025-01-05 RX ORDER — DIPHENHYDRAMINE HYDROCHLORIDE 50 MG/ML
25 INJECTION INTRAMUSCULAR; INTRAVENOUS
Status: COMPLETED | OUTPATIENT
Start: 2025-01-05 | End: 2025-01-05

## 2025-01-05 RX ORDER — FAMOTIDINE 20 MG/1
20 TABLET, FILM COATED ORAL 2 TIMES DAILY
Qty: 20 TABLET | Refills: 0 | Status: SHIPPED | OUTPATIENT
Start: 2025-01-05

## 2025-01-05 RX ADMIN — FAMOTIDINE 20 MG: 10 INJECTION, SOLUTION INTRAVENOUS at 10:12

## 2025-01-05 RX ADMIN — DIPHENHYDRAMINE HYDROCHLORIDE 25 MG: 50 INJECTION INTRAMUSCULAR; INTRAVENOUS at 10:12

## 2025-01-05 RX ADMIN — WATER 60 MG: 1 INJECTION INTRAMUSCULAR; INTRAVENOUS; SUBCUTANEOUS at 11:49

## 2025-01-05 RX ADMIN — DIPHENHYDRAMINE HYDROCHLORIDE 25 MG: 50 INJECTION INTRAMUSCULAR; INTRAVENOUS at 12:42

## 2025-01-05 ASSESSMENT — PAIN - FUNCTIONAL ASSESSMENT: PAIN_FUNCTIONAL_ASSESSMENT: NONE - DENIES PAIN

## 2025-01-05 NOTE — DISCHARGE INSTRUCTIONS
Rest is much as possible  Push plenty of clear fluids 64 to 80 ounces daily  Continue the Benadryl 50 mg as directed  Continue the prednisone as directed  Start the Pepcid you will take that twice daily for 7 to 10 days  Make sure to follow-up with your PCP tomorrow  If you have any worsening in your condition return to the ER

## 2025-01-05 NOTE — ED TRIAGE NOTES
Pt c/o allergic reaction to embrel since Tuesday, states her throat has been feeling like it is closing up and voice getting high pitched. Pt states she feels tight but denies SOB. Also c/o some trouble swallowing. Pt states she went to urgent care and they gave steroids with mild relief.

## 2025-01-05 NOTE — ED NOTES
Pt states she feels like her throat is getting a little tight again and her voice  is getting high. Provider notified.

## 2025-01-05 NOTE — ED PROVIDER NOTES
(ATARAX) 25 MG tablet TAKE 1 TABLET BY MOUTH EVERYDAY AT BEDTIMEHistorical Med      acetaminophen (TYLENOL) 500 MG tablet Take 1 tablet by mouth 4 times daily as needed for Pain, Disp-40 tablet, R-0Normal      Betahistine HCl (BETAHISTINE DIHYDROCHLORIDE) POWD 24 mg in the morning and at bedtimeHistorical Med      Coenzyme Q10 (CO Q 10 PO) Take 1 tablet by mouth dailyHistorical Med      Ginger, Zingiber officinalis, (GINGER PO) Take by mouth dailyHistorical Med      Turmeric (QC TUMERIC COMPLEX PO) Take by mouth dailyHistorical Med      ibuprofen (ADVIL;MOTRIN) 200 MG tablet Take 1 tablet by mouth as needed for PainHistorical Med      thyroid (ARMOUR) 30 MG tablet Take 1 tablet by mouth dailyHistorical Med      fexofenadine (ALLEGRA) 180 MG tablet Take 1 tablet by mouth every eveningHistorical Med      ondansetron (ZOFRAN) 4 MG tablet Take 1 tablet by mouth every 8 hours as needed for Nausea or VomitingHistorical Med      MAGNESIUM PO Take 250 mg by mouth dailyHistorical Med      mometasone (NASONEX) 50 MCG/ACT nasal spray 1 spray by Nasal route at bedtime, Nasal, Nightly, Historical Med       !! - Potential duplicate medications found. Please discuss with provider.           Results from this emergency department visit:      Results for orders placed or performed during the hospital encounter of 01/05/25   CBC with Auto Differential   Result Value Ref Range    WBC 17.3 (H) 4.3 - 11.1 K/uL    RBC 4.72 4.05 - 5.2 M/uL    Hemoglobin 13.6 11.7 - 15.4 g/dL    Hematocrit 39.9 35.8 - 46.3 %    MCV 84.5 82.0 - 102.0 FL    MCH 28.8 26.1 - 32.9 PG    MCHC 34.1 31.4 - 35.0 g/dL    RDW 12.7 11.9 - 14.6 %    Platelets 289 150 - 450 K/uL    MPV 9.5 9.4 - 12.3 FL    nRBC 0.00 0.0 - 0.2 K/uL    Differential Type AUTOMATED      Neutrophils % 64 43 - 78 %    Lymphocytes % 23 13 - 44 %    Monocytes % 8 4.0 - 12.0 %    Eosinophils % 1 0.5 - 7.8 %    Basophils % 1 0.0 - 2.0 %    Immature Granulocytes % 3 0.0 - 5.0 %    Neutrophils

## 2025-01-06 ENCOUNTER — CARE COORDINATION (OUTPATIENT)
Dept: CARE COORDINATION | Facility: CLINIC | Age: 45
End: 2025-01-06

## 2025-01-06 NOTE — CARE COORDINATION
Ambulatory Care Coordination Note     2025 11:23 AM     Patient Current Location:  [place]     This patient was received as a referral from Ambulatory Care Manager .    ACM contacted the patient by telephone. Verified name and  with patient as identifiers. Provided introduction to self, and explanation of the ACM role.   Patient declined care management services at this time.

## 2025-01-07 ENCOUNTER — HOSPITAL ENCOUNTER (EMERGENCY)
Age: 45
Discharge: HOME | End: 2025-01-07
Payer: MEDICARE

## 2025-01-07 VITALS
DIASTOLIC BLOOD PRESSURE: 90 MMHG | OXYGEN SATURATION: 96 % | BODY MASS INDEX: 46.26 KG/M2 | HEART RATE: 85 BPM | RESPIRATION RATE: 22 BRPM | SYSTOLIC BLOOD PRESSURE: 150 MMHG | TEMPERATURE: 98 F | HEIGHT: 61 IN | WEIGHT: 245 LBS

## 2025-01-07 DIAGNOSIS — F41.1 ANXIETY STATE: Primary | ICD-10-CM

## 2025-01-07 PROCEDURE — 99284 EMERGENCY DEPT VISIT MOD MDM: CPT

## 2025-01-07 PROCEDURE — 6360000002 HC RX W HCPCS: Performed by: PHYSICIAN ASSISTANT

## 2025-01-07 PROCEDURE — 2580000003 HC RX 258: Performed by: PHYSICIAN ASSISTANT

## 2025-01-07 PROCEDURE — 96374 THER/PROPH/DIAG INJ IV PUSH: CPT

## 2025-01-07 RX ADMIN — SODIUM CHLORIDE 1 MG: 9 INJECTION INTRAMUSCULAR; INTRAVENOUS; SUBCUTANEOUS at 21:09

## 2025-01-07 ASSESSMENT — PAIN SCALES - GENERAL: PAINLEVEL_OUTOF10: 5

## 2025-01-07 ASSESSMENT — PAIN - FUNCTIONAL ASSESSMENT: PAIN_FUNCTIONAL_ASSESSMENT: 0-10

## 2025-01-07 ASSESSMENT — PAIN DESCRIPTION - LOCATION: LOCATION: THROAT

## 2025-01-08 NOTE — DISCHARGE INSTRUCTIONS
At today's visit, we did not see any overt evidence of allergic reaction.  There were no signs of wheezing or abnormal lung sounds, or skin findings suggestive of rash.  Recommend following up with primary care doctor and rheumatologist.    Overall today, I did not find any life-threatening causes for your symptoms. However, this does not mean that something serious could not develop. If you experience any new or worsening symptoms, it is important that you come back for further evaluation. This includes any symptoms that worry you, especially chest pain, shortness of breath, or signs of a stroke or heart attack. Not returning for re-evaluation could lead to severe complications, including death.

## 2025-01-08 NOTE — ED TRIAGE NOTES
Pt ambulatory to triage. Pt states she had an enbrel shot on 12/28/24 and has been to urgent care and the ER multiple times for allergic reaction sx since the shot. Pt CO neck/facial swelling and shortness of breath. Pt breathing even and unlabored in triage. Pt oxygen saturation in triage 98% on RA.     Pt reports she took 50 mg of benadryl at 6pm.

## 2025-01-14 ENCOUNTER — OFFICE VISIT (OUTPATIENT)
Dept: ENT CLINIC | Age: 45
End: 2025-01-14
Payer: MEDICARE

## 2025-01-14 VITALS — BODY MASS INDEX: 46.26 KG/M2 | WEIGHT: 245 LBS | HEIGHT: 61 IN

## 2025-01-14 DIAGNOSIS — K21.9 LARYNGOPHARYNGEAL REFLUX (LPR): Chronic | ICD-10-CM

## 2025-01-14 DIAGNOSIS — J34.89 NASAL VESTIBULITIS: Chronic | ICD-10-CM

## 2025-01-14 DIAGNOSIS — R49.0 DYSPHONIA: Primary | Chronic | ICD-10-CM

## 2025-01-14 PROCEDURE — 31575 DIAGNOSTIC LARYNGOSCOPY: CPT | Performed by: PHYSICIAN ASSISTANT

## 2025-01-14 PROCEDURE — 99204 OFFICE O/P NEW MOD 45 MIN: CPT | Performed by: PHYSICIAN ASSISTANT

## 2025-01-14 RX ORDER — EPINEPHRINE 0.3 MG/.3ML
INJECTION SUBCUTANEOUS
COMMUNITY
Start: 2025-01-02

## 2025-01-14 RX ORDER — MUPIROCIN 20 MG/G
OINTMENT TOPICAL
Qty: 1 EACH | Refills: 0 | Status: SHIPPED | OUTPATIENT
Start: 2025-01-14

## 2025-01-14 RX ORDER — GALCANEZUMAB 120 MG/ML
INJECTION, SOLUTION SUBCUTANEOUS
COMMUNITY
Start: 2024-12-23

## 2025-01-14 ASSESSMENT — ENCOUNTER SYMPTOMS
TROUBLE SWALLOWING: 1
VOICE CHANGE: 1
COUGH: 1
EYES NEGATIVE: 1
GASTROINTESTINAL NEGATIVE: 1
ALLERGIC/IMMUNOLOGIC NEGATIVE: 1

## 2025-01-14 NOTE — PATIENT INSTRUCTIONS
Omeprazole 20 mg every morning  Pepcid 20 mg before bed  Barrier therapy 1 tsp after every meal and before bed    Barrier therapy is available online as Reflux Gourmet, Gaviscon Advanced  ([employer] product), or Reflux Raft.   [email or web address]   [employer]wwR[employer]

## 2025-01-14 NOTE — PROGRESS NOTES
Trachea:  no visible lesions    Indication: Adequate visualization of the larynx with connected speech not possible without endoscopic evaluation.    Consent: The patient verbally consented to the recording of their face and upper aerodigestive tract.    Anesthesia: Phenylephrine, Lidocaine    Details: A flexible fiberoptic laryngoscope was passed through the most patent nasal cavity into the nasopharynx which was examined. The scope was then passed into the oropharynx. The hypopharynx, base of tongue, vallecula, epiglottis, aryepiglottic folds, arytenoids, false vocal cords, true vocal cords, subglottic area, pyriform sinuses, and the post cricoid area was examined.        ICD-10-CM    1. Dysphonia  R49.0 LARYNGOSCOPY,FLEX FIBER,DIAGNOSTIC     [facility]      2. Laryngopharyngeal reflux (LPR)  K21.9       3. Nasal vestibulitis  J34.89          Assessment & Plan  1. Vocal cord inflammation.  The right vocal cord exhibits inflammation, potentially due to acid reflux. It is unclear if there is a cyst or just inflammation. Speech therapy is recommended to improve vocal clarity and strength. She will start over-the-counter omeprazole 20 mg every morning and Pepcid 20 mg every night before bedtime. Barrier therapy will be used as needed, particularly while symptomatic, and nightly with Pepcid before bed for the next few weeks. If symptoms persist, further evaluation with laryngoscope and stroboscopy will be considered.    2. Nasal sore.  A sore on the inside of the nose has been present since the upper respiratory infections. Mupirocin ointment will be applied inside the nose every morning and night. A prescription will be sent to her pharmacy.    Follow-up  The patient will follow up in 4 to 6 weeks.        The patient (or guardian, if applicable) and other individuals in attendance with the patient were advised that Artificial Intelligence will be utilized during this visit to

## 2025-01-15 ENCOUNTER — PATIENT MESSAGE (OUTPATIENT)
Dept: ENT CLINIC | Age: 45
End: 2025-01-15

## 2025-01-17 DIAGNOSIS — K21.9 LARYNGOPHARYNGEAL REFLUX (LPR): Primary | ICD-10-CM

## 2025-01-17 RX ORDER — FAMOTIDINE 20 MG/1
20 TABLET, FILM COATED ORAL NIGHTLY
Qty: 60 TABLET | Refills: 3 | Status: SHIPPED | OUTPATIENT
Start: 2025-01-17

## 2025-01-21 RX ORDER — FAMOTIDINE 20 MG/1
20 TABLET, FILM COATED ORAL NIGHTLY
Qty: 60 TABLET | Refills: 3 | Status: SHIPPED | OUTPATIENT
Start: 2025-01-21

## 2025-01-21 NOTE — THERAPY EVALUATION
Amanda L Yomi  : 1980  Primary: [employer] Medicare-advantage Ppo  Secondary:  [facility] @ [address]  [facility] [address]  Phone: [phone]  Fax: [phone]    Visit Info:    Effective Dates  2025 TO 2025   Frequency/Duration    1 time(s) a week for 60-90 days   SPEECH LANGUAGE PATHOLOGY: COMMUNICATION    Initial Assessment 2025     Appt Desk   Episode  Charges Attendance Report   Events        Treatment Diagnosis:    Dysphonia  Medical/Referring Diagnosis:   Dysphonia [R49.0]   Referring Physician:  Ellie Mann PA MD Orders:  Eval and Treat   Return MD Appt:  25  Date of Onset:  2024  Allergies:  [place] oil, Amoxicillin, Diazepam, Methylprednisolone acetate, Penicillins, Pineapple, Sumatriptan, Theophylline, Triptans, Cephalexin, Sulfa antibiotics, Topiramate, Tramadol, Verapamil, Adhesive tape, Augmentin [amoxicillin-pot clavulanate], Dermabond, Enbrel [etanercept], Methylprednisolone, Other, Pneumococcal vaccine, Pollen extract, Triamcinolone acetonide, Vitamin b12, and Hydroxychloroquine  Medications Last Reviewed:  2025     SUBJECTIVE    History of Injury/Illness (Reason for Referral):  Pt referred by ENT with c/o throat tightness and voice changes. Had 2 upper respiratory infections between Oct.-Dec. Cough subsided but voice changes persisting. Laryngoscopy revealed, \"Inflammation and erythema to the arytenoid cartilages bilaterally. No evidence of vocal fold weakness. normal abduction, adduction, and stretch. Inflammation noted on the R TVC, unable to note if a mucosal lesion is present due to coughing.\" ENT recommended omeprazole 20 mg every morning and Pepcid 20 mg every night before bedtime, barrier therapy as needed. Pt reports she just got the barrier therapy. Pt reports voice is worse when not taking the prednisone. Gets wheezy, breathy, increased coughing. Has h/o voice difficulties, though most

## 2025-01-23 ENCOUNTER — HOSPITAL ENCOUNTER (OUTPATIENT)
Dept: PHYSICAL THERAPY | Age: 45
Setting detail: RECURRING SERIES
Discharge: HOME | End: 2025-01-26
Payer: MEDICARE

## 2025-01-23 DIAGNOSIS — R49.0 DYSPHONIA: Primary | ICD-10-CM

## 2025-01-23 PROCEDURE — 92507 TX SP LANG VOICE COMM INDIV: CPT

## 2025-01-23 PROCEDURE — 92523 SPEECH SOUND LANG COMPREHEN: CPT

## 2025-01-23 NOTE — PROGRESS NOTES
Amanda Celaya  : 1980  Primary: [employer] Medicare-advantage Ppo  Secondary:  [facility] [place] 2[facility] @ [address]  [facility] [address]  Phone: [phone]  Fax: [phone]    Effective Dates:    2025 TO 2025    Frequency/Duration    1 time(s) a week for 60-90 days  SLP Visit Info:  Total # of Visits to Date: 1 (25)        OUTPATIENT SPEECH PATHOLOGY NOTE:Daily Note 2025  Appt Desk   Episode  Charges Attendance Report   Events        Treatment Diagnosis:    Dysphonia  Medical/Referring Diagnosis:    Dysphonia [R49.0]   Referring Physician:  Ellie Mann PA MD Orders:  Eval and Treat   Allergies:  [place] oil, Amoxicillin, Diazepam, Methylprednisolone acetate, Penicillins, Pineapple, Sumatriptan, Theophylline, Triptans, Cephalexin, Sulfa antibiotics, Topiramate, Tramadol, Verapamil, Adhesive tape, Augmentin [amoxicillin-pot clavulanate], Dermabond, Enbrel [etanercept], Methylprednisolone, Other, Pneumococcal vaccine, Pollen extract, Triamcinolone acetonide, Vitamin b12, and Hydroxychloroquine  Medications Last Reviewed:  2025  Subjective Comments:  Pt seen for IE and tx today.  Pain:  Patient does not c/o pain.  Interventions Planned: (Treatment may consist of any combination of the following)        See Assessment Note  GOALS: (Goals have been discussed and agreed upon with patient.)  Short-Term Functional Goals: Time Frame: 8 weeks  Patient will demonstrate implementation of vocal hygiene techniques including improved hydration, reduced habitual coughing, throat clearing, and adherence to reflux precautions and medications regiment, reduction of vocally abusive behaviors with 90% adherence on a daily basis to reduce laryngeal inflammation.  Patient will complete laryngeal massage and voice relaxation techniques on a daily basis with 80% accuracy.    Patient will complete abdominal breathing, respiratory retraining exercises such as closed

## 2025-01-24 LAB — T4 FREE SERPL-MCNC: 0.8 NG/DL (ref 0.9–1.7)

## 2025-01-26 LAB — T3FREE SERPL-MCNC: 3.3 PG/ML (ref 2–4.4)

## 2025-01-27 LAB — TESTOST SERPL-MCNC: 9.4 NG/DL

## 2025-01-28 ENCOUNTER — HOSPITAL ENCOUNTER (OUTPATIENT)
Dept: PHYSICAL THERAPY | Age: 45
Setting detail: RECURRING SERIES
Discharge: HOME | End: 2025-01-31
Payer: MEDICARE

## 2025-01-28 PROCEDURE — 92507 TX SP LANG VOICE COMM INDIV: CPT

## 2025-01-28 NOTE — PROGRESS NOTES
Amanda Celaya  : 1980  Primary: [employer] Medicare-advantage Ppo  Secondary:  [facility] @ [address]  [facility] [address]  Phone: [phone]  Fax: [phone]    Effective Dates:    2025 TO 2025    Frequency/Duration    1 time(s) a week for 60-90 days  SLP Visit Info:  Total # of Visits to Date: 2        OUTPATIENT SPEECH PATHOLOGY NOTE:Daily Note 2025  Appt Desk   Episode  Charges Attendance Report   Events        Treatment Diagnosis:    Dysphonia  Medical/Referring Diagnosis:    Dysphonia [R49.0]   Referring Physician:  Ellie Mann PA MD Orders:  Eval and Treat   Allergies:  [place] oil, Amoxicillin, Diazepam, Methylprednisolone acetate, Penicillins, Pineapple, Sumatriptan, Theophylline, Triptans, Cephalexin, Sulfa antibiotics, Topiramate, Tramadol, Verapamil, Adhesive tape, Augmentin [amoxicillin-pot clavulanate], Dermabond, Enbrel [etanercept], Methylprednisolone, Other, Pneumococcal vaccine, Pollen extract, Triamcinolone acetonide, Vitamin b12, and Hydroxychloroquine  Medications Last Reviewed:  2025  Subjective Comments:  Pt had fall on  d/t with high vertigo day.   Pain:  Patient does not c/o pain.  Interventions Planned: (Treatment may consist of any combination of the following)        See Assessment Note  GOALS: (Goals have been discussed and agreed upon with patient.)  Short-Term Functional Goals: Time Frame: 8 weeks  Patient will demonstrate implementation of vocal hygiene techniques including improved hydration, reduced habitual coughing, throat clearing, and adherence to reflux precautions and medications regiment, reduction of vocally abusive behaviors with 90% adherence on a daily basis to reduce laryngeal inflammation.  Patient will complete laryngeal massage and voice relaxation techniques on a daily basis with 80% accuracy.    Patient will complete abdominal breathing, respiratory retraining exercises such

## 2025-02-04 ENCOUNTER — HOSPITAL ENCOUNTER (OUTPATIENT)
Dept: PHYSICAL THERAPY | Age: 45
Setting detail: RECURRING SERIES
Discharge: HOME | End: 2025-02-07
Payer: MEDICARE

## 2025-02-04 PROCEDURE — 92507 TX SP LANG VOICE COMM INDIV: CPT

## 2025-02-04 NOTE — PROGRESS NOTES
Amanda Celaya  : 1980  Primary: [employer] Medicare-advantage Ppo  Secondary:  [facility] @ [address]  [facility] [address]  Phone: [phone]  Fax: [phone]    Effective Dates:    2025 TO 2025    Frequency/Duration    1 time(s) a week for 60-90 days  SLP Visit Info:  Total # of Visits to Date: 3        OUTPATIENT SPEECH PATHOLOGY NOTE:Daily Note 2025  Appt Desk   Episode  Charges Attendance Report   Events        Treatment Diagnosis:    Dysphonia  Medical/Referring Diagnosis:    Dysphonia [R49.0]   Referring Physician:  Ellie Mann PA MD Orders:  Eval and Treat   Allergies:  [place] oil, Amoxicillin, Diazepam, Methylprednisolone acetate, Penicillins, Pineapple, Sumatriptan, Theophylline, Triptans, Cephalexin, Sulfa antibiotics, Topiramate, Tramadol, Verapamil, Adhesive tape, Augmentin [amoxicillin-pot clavulanate], Dermabond, Enbrel [etanercept], Methylprednisolone, Other, Pneumococcal vaccine, Pollen extract, Triamcinolone acetonide, Vitamin b12, and Hydroxychloroquine  Medications Last Reviewed:  2025  Subjective Comments:  Pt reports she over-pushed with voice and just in general the last 2 days. Noticed it was harder to breathe as she became more fatigued.  Pain:  Patient does not c/o pain.  Interventions Planned: (Treatment may consist of any combination of the following)        See Assessment Note  GOALS: (Goals have been discussed and agreed upon with patient.)  Short-Term Functional Goals: Time Frame: 8 weeks  Patient will demonstrate implementation of vocal hygiene techniques including improved hydration, reduced habitual coughing, throat clearing, and adherence to reflux precautions and medications regiment, reduction of vocally abusive behaviors with 90% adherence on a daily basis to reduce laryngeal inflammation.  Patient will complete laryngeal massage and voice relaxation techniques on a daily basis with 80%

## 2025-02-11 ENCOUNTER — HOSPITAL ENCOUNTER (OUTPATIENT)
Dept: PHYSICAL THERAPY | Age: 45
Setting detail: RECURRING SERIES
Discharge: HOME | End: 2025-02-14
Payer: MEDICARE

## 2025-02-11 PROCEDURE — 92507 TX SP LANG VOICE COMM INDIV: CPT

## 2025-02-11 NOTE — PROGRESS NOTES
Amanda ARABELLA Celaya  : 1980  Primary: [employer] Medicare-advantage Ppo  Secondary:  [facility] @ [facility]  [address]  [facility] [address]  Phone: [phone]  Fax: [phone]    Effective Dates:    2025 TO 2025    Frequency/Duration    1 time(s) a week for 60-90 days  SLP Visit Info:  Total # of Visits to Date: 4        OUTPATIENT SPEECH PATHOLOGY NOTE:Daily Note 2025  Appt Desk   Episode  Charges Attendance Report   Events        Treatment Diagnosis:    Dysphonia  Medical/Referring Diagnosis:    Dysphonia [R49.0]   Referring Physician:  Ellie Mann PA MD Orders:  Eval and Treat   Allergies:  [place] oil, Amoxicillin, Diazepam, Methylprednisolone acetate, Penicillins, Pineapple, Sumatriptan, Theophylline, Triptans, Cephalexin, Sulfa antibiotics, Topiramate, Tramadol, Verapamil, Adhesive tape, Augmentin [amoxicillin-pot clavulanate], Dermabond, Enbrel [etanercept], Methylprednisolone, Other, Pneumococcal vaccine, Pollen extract, Triamcinolone acetonide, Vitamin b12, and Hydroxychloroquine  Medications Last Reviewed:  2025  Subjective Comments:  Pt reports she had inflamed uvula for a couple days after having a slight difficulty eating chip and guac. Has been using the reflux medications and gourmet.  Does endorse more drainage recently. Has ENT appointment .  Pain:  Patient does not c/o pain.  Interventions Planned: (Treatment may consist of any combination of the following)        See Assessment Note  GOALS: (Goals have been discussed and agreed upon with patient.)  Short-Term Functional Goals: Time Frame: 8 weeks  Patient will demonstrate implementation of vocal hygiene techniques including improved hydration, reduced habitual coughing, throat clearing, and adherence to reflux precautions and medications regiment, reduction of vocally abusive behaviors with 90% adherence on a daily basis to reduce laryngeal inflammation.  Patient will

## 2025-02-18 ENCOUNTER — OFFICE VISIT (OUTPATIENT)
Dept: ENT CLINIC | Age: 45
End: 2025-02-18
Payer: MEDICARE

## 2025-02-18 VITALS — HEIGHT: 61 IN | BODY MASS INDEX: 46.26 KG/M2 | WEIGHT: 245 LBS

## 2025-02-18 DIAGNOSIS — K21.9 LARYNGOPHARYNGEAL REFLUX (LPR): ICD-10-CM

## 2025-02-18 DIAGNOSIS — J34.89 NASAL VESTIBULITIS: ICD-10-CM

## 2025-02-18 DIAGNOSIS — R49.0 DYSPHONIA: Primary | ICD-10-CM

## 2025-02-18 DIAGNOSIS — R06.2 WHEEZING: ICD-10-CM

## 2025-02-18 PROCEDURE — 99213 OFFICE O/P EST LOW 20 MIN: CPT | Performed by: PHYSICIAN ASSISTANT

## 2025-02-18 RX ORDER — DEXTROAMPHETAMINE SACCHARATE, AMPHETAMINE ASPARTATE MONOHYDRATE, DEXTROAMPHETAMINE SULFATE AND AMPHETAMINE SULFATE 5; 5; 5; 5 MG/1; MG/1; MG/1; MG/1
CAPSULE, EXTENDED RELEASE ORAL
COMMUNITY
Start: 2025-02-04

## 2025-02-18 RX ORDER — TOFACITINIB 11 MG/1
TABLET, FILM COATED, EXTENDED RELEASE ORAL
COMMUNITY
Start: 2025-02-17

## 2025-02-18 ASSESSMENT — ENCOUNTER SYMPTOMS
ALLERGIC/IMMUNOLOGIC NEGATIVE: 1
RESPIRATORY NEGATIVE: 1
GASTROINTESTINAL NEGATIVE: 1
VOICE CHANGE: 1
EYES NEGATIVE: 1

## 2025-02-18 NOTE — ED PROVIDER NOTES
Chronic, stable.  Did go back on duloxetine 30mg daily.  Feeling better.    Interval history 8/2024:  Chronic, controlled.  Weaning cymbalta 30mg --> now down to 1 every 3 days.    Tolerating/continue alprazolam 0.5mg at night and sometimes during the day for anxiety, maybe twice/week.  Has been on alprazolam for years.     Also on oxycodone 10mg, #120/month from [facility].  Understands risks of opioid therapy with benzodiazepine use.     Chronic pain is managed by [facility].  Oxycodone 10 mg every 6 hours and Belbuca 600 mg twice daily.    Most recent fill: 2/28/2024, alprazolam 0.5 mg, 135 tablets (in clinic 8/13/2024 for refill).   hydrOXYzine HCl (ATARAX) 10 MG tablet Take 1 tablet by mouth as needed (Migrains)Historical Med      galcanezumab-gnlm (EMGALITY) 120 MG/ML SOSY injection Inject 1 mL into the skin every 30 daysHistorical Med      tiZANidine (ZANAFLEX) 4 MG tablet Take 0.5-1 tablets by mouth every 8 hours as needed (muscle spasm), Disp-24 tablet, R-0Normal      lisinopril (PRINIVIL;ZESTRIL) 10 MG tablet Take 1 tablet by mouth in the morning and at bedtimeHistorical Med      vitamin D (ERGOCALCIFEROL) 1.25 MG (34172 UT) CAPS capsule Take 1 capsule by mouth once a week, Disp-12 capsule, R-3Normal      UNABLE TO FIND ESTRIOL/ESTRADIOL (80/20) 0.5MG/PROGESTERONE 60MG/TESTOSTERONE 1.1MG GELATIN CIELO (SPEARMINT)-7808Historical Med      albuterol sulfate HFA (PROVENTIL;VENTOLIN;PROAIR) 108 (90 Base) MCG/ACT inhaler Inhale 2 puffs into the lungs every 4 hours as needed for Shortness of Breath or Wheezing, Disp-18 g, R-1Normal      !! hydrOXYzine HCl (ATARAX) 25 MG tablet TAKE 1 TABLET BY MOUTH EVERYDAY AT BEDTIMEHistorical Med      acetaminophen (TYLENOL) 500 MG tablet Take 1 tablet by mouth 4 times daily as needed for Pain, Disp-40 tablet, R-0Normal      Betahistine HCl (BETAHISTINE DIHYDROCHLORIDE) POWD 24 mg in the morning and at bedtimeHistorical Med      Coenzyme Q10 (CO Q 10 PO) Take 1 tablet by mouth dailyHistorical Med      Ginger, Zingiber officinalis, (GINGER PO) Take by mouth dailyHistorical Med      Turmeric (QC TUMERIC COMPLEX PO) Take by mouth dailyHistorical Med      ibuprofen (ADVIL;MOTRIN) 200 MG tablet Take 1 tablet by mouth as needed for PainHistorical Med      thyroid (ARMOUR) 30 MG tablet Take 1 tablet by mouth dailyHistorical Med      fexofenadine (ALLEGRA) 180 MG tablet Take 1 tablet by mouth every eveningHistorical Med      ondansetron (ZOFRAN) 4 MG tablet Take 1 tablet by mouth every 8 hours as needed for Nausea or VomitingHistorical Med      MAGNESIUM PO Take 250 mg by mouth dailyHistorical Med      mometasone

## 2025-02-18 NOTE — PROGRESS NOTES
History of Present Illness  The patient is a 44-year-old female returning today for a recheck of laryngopharyngeal reflux (LPR).    She reports persistent symptoms, including an itchy throat and hoarseness, which have impacted her singing ability. She has to be sure to separate her exercises with eating because she will start to choke on this coordination. Her speech therapist has expressed concern over her wheezing during rescue breathing exercises. The therapist also suspects occasional vocal cord collapse. She recalls an incident where she experienced a choking episode, followed by uvular inflammation the next day, which subsided after 3 days. She has not yet consulted a pulmonologist. She has not contracted any upper respiratory infections recently.    She has asthma generally exercise induced.    LV: 1/14/25 The patient is a 44-year-old female presenting with throat tightness and voice changes.     She experienced two upper respiratory infections in October, November, and December, which were accompanied by a persistent cough and alterations in her voice. The cough has since subsided but continues to be present, although it does not appear to originate from her lungs. She also had an allergic reaction to a medication, resulting in throat muscle constriction. Her treatment regimen included two different antibiotics, prednisone, Benadryl, and Pepcid. She suspects the development of reflux issues but is not currently on any acid-reducing medications such as omeprazole. She reports no specific food triggers and admits to a preference for spicy foods. She has experienced difficulty swallowing, a symptom she has previously encountered due to incomplete closure of her vocal cords during swallowing. She has undergone speech therapy several years ago and reports no history of vocal cord injury. She reports feeling well overall, with the exception of the lingering cough and intermittent voice changes. She has been

## 2025-02-20 ENCOUNTER — APPOINTMENT (OUTPATIENT)
Dept: PHYSICAL THERAPY | Age: 45
End: 2025-02-20
Payer: MEDICARE

## 2025-02-25 ENCOUNTER — HOSPITAL ENCOUNTER (OUTPATIENT)
Dept: PHYSICAL THERAPY | Age: 45
Setting detail: RECURRING SERIES
Discharge: HOME | End: 2025-02-28
Payer: MEDICARE

## 2025-02-25 PROCEDURE — 92507 TX SP LANG VOICE COMM INDIV: CPT

## 2025-02-25 NOTE — PROGRESS NOTES
Amanda Celaya  : 1980  Primary: [employer] Medicare-advantage Ppo  Secondary:  [facility] @ [facility]  [address]  [place] [address]  Phone: [phone]  Fax: [phone]    Effective Dates:    2025 TO 2025    Frequency/Duration    1 time(s) a week for 60-90 days  SLP Visit Info:  Canceled Appointment: 1  Total # of Visits to Date: 5        OUTPATIENT SPEECH PATHOLOGY NOTE:Daily Note 2025  Appt Desk   Episode  Charges Attendance Report   Events        Treatment Diagnosis:    Dysphonia  Medical/Referring Diagnosis:    Dysphonia [R49.0]   Referring Physician:  Ellie Mann PA MD Orders:  Eval and Treat   Allergies:  [place] oil, Amoxicillin, Diazepam, Methylprednisolone acetate, Penicillins, Pineapple, Sumatriptan, Theophylline, Triptans, Cephalexin, Sulfa antibiotics, Topiramate, Tramadol, Verapamil, Adhesive tape, Augmentin [amoxicillin-pot clavulanate], Dermabond, Enbrel [etanercept], Methylprednisolone, Other, Pneumococcal vaccine, Pollen extract, Triamcinolone acetonide, Vitamin b12, and Hydroxychloroquine  Medications Last Reviewed:  2025  Subjective Comments:  Pt reports following f/u with Ellie Mann at [facility], she will see [facility] and pulmonary to determine any asthma component and assess further vocal cord function. Pt reports she has had some burning sensation at her soft palate after exercises, so she has been avoiding them. Also notes feeling less control with swallowing if eating after exercises. Pt does endorse recent sinus issues.   Pain:  Patient does not c/o pain.  Interventions Planned: (Treatment may consist of any combination of the following)        See Assessment Note  GOALS: (Goals have been discussed and agreed upon with patient.)  Short-Term Functional Goals: Time Frame: 8 weeks  Patient will demonstrate implementation of vocal hygiene techniques including improved hydration, reduced habitual coughing,

## 2025-03-04 ENCOUNTER — HOSPITAL ENCOUNTER (OUTPATIENT)
Dept: PHYSICAL THERAPY | Age: 45
Setting detail: RECURRING SERIES
Discharge: HOME | End: 2025-03-07
Payer: MEDICARE

## 2025-03-04 PROCEDURE — 92507 TX SP LANG VOICE COMM INDIV: CPT

## 2025-03-04 NOTE — PROGRESS NOTES
Amanda Celaya  : 1980  Primary: [employer] Medicare-advantage Ppo  Secondary:  [facility] @ [facility]  [address]  [facility] [address]  Phone: [phone]  Fax: [phone]    Effective Dates:    2025 TO 2025    Frequency/Duration    1 time(s) a week for 60-90 days - decreasing 1x/every other week awaiting transition to [facility]ENT services  SLP Visit Info:  Canceled Appointment: 1  Total # of Visits to Date: 6        OUTPATIENT SPEECH PATHOLOGY NOTE:Daily Note 3/4/2025  Appt Desk   Episode  Charges Attendance Report   Events        Treatment Diagnosis:    Dysphonia  Medical/Referring Diagnosis:    Dysphonia [R49.0]   Referring Physician:  Ellie Mann PA MD Orders:  Eval and Treat   Allergies:  [place] oil, Amoxicillin, Diazepam, Methylprednisolone acetate, Penicillins, Pineapple, Sumatriptan, Theophylline, Triptans, Cephalexin, Sulfa antibiotics, Topiramate, Tramadol, Verapamil, Adhesive tape, Augmentin [amoxicillin-pot clavulanate], Dermabond, Enbrel [etanercept], Methylprednisolone, Other, Pneumococcal vaccine, Pollen extract, Triamcinolone acetonide, Vitamin b12, and Hydroxychloroquine  Medications Last Reviewed:  3/4/2025  Subjective Comments:  Pt reports she saw Domonique MORAN with [facility]. States the inflammation was down, but had a lot of drainage. States they are recommending transition to their in house speech and thinks it's more muscular issue. Cannot transition to their in house speech until April. Has had intermittent soft palate irritation. Able to sing on  with a little wavering. Cough has decreased.   Pain:  Patient does not c/o pain.  Interventions Planned: (Treatment may consist of any combination of the following)        See Assessment Note  GOALS: (Goals have been discussed and agreed upon with patient.)  Short-Term Functional Goals: Time Frame: 8 weeks  Patient will demonstrate implementation of vocal hygiene techniques including improved

## 2025-03-11 ENCOUNTER — HOSPITAL ENCOUNTER (OUTPATIENT)
Dept: PHYSICAL THERAPY | Age: 45
Setting detail: RECURRING SERIES
Discharge: HOME | End: 2025-03-14
Payer: MEDICARE

## 2025-03-11 PROCEDURE — 92507 TX SP LANG VOICE COMM INDIV: CPT

## 2025-03-11 NOTE — PROGRESS NOTES
Amanda Celaya  : 1980  Primary: [employer] Medicare-advantage Ppo  Secondary:  [facility] @ [facility]  [address]  [facility] [address]  Phone: [phone]  Fax: [phone]    Effective Dates:    2025 TO 2025    Frequency/Duration    1 time(s) a week for 60-90 days - decreasing 1x/every other week awaiting transition to [facility] services  SLP Visit Info:  Canceled Appointment: 1  Total # of Visits to Date: 7      OUTPATIENT SPEECH PATHOLOGY NOTE:Daily Note 3/11/2025  Appt Desk   Episode  Charges Attendance Report   Events        Treatment Diagnosis:    Dysphonia  Medical/Referring Diagnosis:    Dysphonia [R49.0]   Referring Physician:  Ellie Mann PA MD Orders:  Eval and Treat   Allergies:  [place] oil, Amoxicillin, Diazepam, Methylprednisolone acetate, Penicillins, Pineapple, Sumatriptan, Theophylline, Triptans, Cephalexin, Sulfa antibiotics, Topiramate, Tramadol, Verapamil, Adhesive tape, Augmentin [amoxicillin-pot clavulanate], Dermabond, Enbrel [etanercept], Methylprednisolone, Other, Pneumococcal vaccine, Pollen extract, Triamcinolone acetonide, Vitamin b12, and Hydroxychloroquine  Medications Last Reviewed:  3/11/2025  Subjective Comments:  Pt reports having some allergies. Was able to sing on  with a couple of voice breaks.   Pain:  Patient does not c/o pain.  Interventions Planned: (Treatment may consist of any combination of the following)        See Assessment Note  GOALS: (Goals have been discussed and agreed upon with patient.)  Short-Term Functional Goals: Time Frame: 8 weeks  Patient will demonstrate implementation of vocal hygiene techniques including improved hydration, reduced habitual coughing, throat clearing, and adherence to reflux precautions and medications regiment, reduction of vocally abusive behaviors with 90% adherence on a daily basis to reduce laryngeal inflammation.  Patient will complete laryngeal massage and voice

## 2025-03-18 ASSESSMENT — PATIENT HEALTH QUESTIONNAIRE - PHQ9
SUM OF ALL RESPONSES TO PHQ QUESTIONS 1-9: 7
9. THOUGHTS THAT YOU WOULD BE BETTER OFF DEAD, OR OF HURTING YOURSELF: NOT AT ALL
1. LITTLE INTEREST OR PLEASURE IN DOING THINGS: NOT AT ALL
SUM OF ALL RESPONSES TO PHQ QUESTIONS 1-9: 7
1. LITTLE INTEREST OR PLEASURE IN DOING THINGS: NOT AT ALL
6. FEELING BAD ABOUT YOURSELF - OR THAT YOU ARE A FAILURE OR HAVE LET YOURSELF OR YOUR FAMILY DOWN: NOT AT ALL
3. TROUBLE FALLING OR STAYING ASLEEP: MORE THAN HALF THE DAYS
SUM OF ALL RESPONSES TO PHQ QUESTIONS 1-9: 7
3. TROUBLE FALLING OR STAYING ASLEEP: MORE THAN HALF THE DAYS
2. FEELING DOWN, DEPRESSED OR HOPELESS: SEVERAL DAYS
8. MOVING OR SPEAKING SO SLOWLY THAT OTHER PEOPLE COULD HAVE NOTICED. OR THE OPPOSITE - BEING SO FIDGETY OR RESTLESS THAT YOU HAVE BEEN MOVING AROUND A LOT MORE THAN USUAL: NOT AT ALL
10. IF YOU CHECKED OFF ANY PROBLEMS, HOW DIFFICULT HAVE THESE PROBLEMS MADE IT FOR YOU TO DO YOUR WORK, TAKE CARE OF THINGS AT HOME, OR GET ALONG WITH OTHER PEOPLE: SOMEWHAT DIFFICULT
5. POOR APPETITE OR OVEREATING: NOT AT ALL
2. FEELING DOWN, DEPRESSED OR HOPELESS: SEVERAL DAYS
7. TROUBLE CONCENTRATING ON THINGS, SUCH AS READING THE NEWSPAPER OR WATCHING TELEVISION: MORE THAN HALF THE DAYS
4. FEELING TIRED OR HAVING LITTLE ENERGY: MORE THAN HALF THE DAYS
SUM OF ALL RESPONSES TO PHQ QUESTIONS 1-9: 7
5. POOR APPETITE OR OVEREATING: NOT AT ALL
10. IF YOU CHECKED OFF ANY PROBLEMS, HOW DIFFICULT HAVE THESE PROBLEMS MADE IT FOR YOU TO DO YOUR WORK, TAKE CARE OF THINGS AT HOME, OR GET ALONG WITH OTHER PEOPLE: SOMEWHAT DIFFICULT
SUM OF ALL RESPONSES TO PHQ QUESTIONS 1-9: 7
8. MOVING OR SPEAKING SO SLOWLY THAT OTHER PEOPLE COULD HAVE NOTICED. OR THE OPPOSITE, BEING SO FIGETY OR RESTLESS THAT YOU HAVE BEEN MOVING AROUND A LOT MORE THAN USUAL: NOT AT ALL
9. THOUGHTS THAT YOU WOULD BE BETTER OFF DEAD, OR OF HURTING YOURSELF: NOT AT ALL
7. TROUBLE CONCENTRATING ON THINGS, SUCH AS READING THE NEWSPAPER OR WATCHING TELEVISION: MORE THAN HALF THE DAYS
4. FEELING TIRED OR HAVING LITTLE ENERGY: MORE THAN HALF THE DAYS
6. FEELING BAD ABOUT YOURSELF - OR THAT YOU ARE A FAILURE OR HAVE LET YOURSELF OR YOUR FAMILY DOWN: NOT AT ALL

## 2025-03-18 ASSESSMENT — ANXIETY QUESTIONNAIRES
6. BECOMING EASILY ANNOYED OR IRRITABLE: NOT AT ALL
6. BECOMING EASILY ANNOYED OR IRRITABLE: NOT AT ALL
3. WORRYING TOO MUCH ABOUT DIFFERENT THINGS: NOT AT ALL
2. NOT BEING ABLE TO STOP OR CONTROL WORRYING: NOT AT ALL
2. NOT BEING ABLE TO STOP OR CONTROL WORRYING: NOT AT ALL
1. FEELING NERVOUS, ANXIOUS, OR ON EDGE: SEVERAL DAYS
7. FEELING AFRAID AS IF SOMETHING AWFUL MIGHT HAPPEN: SEVERAL DAYS
IF YOU CHECKED OFF ANY PROBLEMS ON THIS QUESTIONNAIRE, HOW DIFFICULT HAVE THESE PROBLEMS MADE IT FOR YOU TO DO YOUR WORK, TAKE CARE OF THINGS AT HOME, OR GET ALONG WITH OTHER PEOPLE: NOT DIFFICULT AT ALL
7. FEELING AFRAID AS IF SOMETHING AWFUL MIGHT HAPPEN: SEVERAL DAYS
5. BEING SO RESTLESS THAT IT IS HARD TO SIT STILL: NOT AT ALL
3. WORRYING TOO MUCH ABOUT DIFFERENT THINGS: NOT AT ALL
IF YOU CHECKED OFF ANY PROBLEMS ON THIS QUESTIONNAIRE, HOW DIFFICULT HAVE THESE PROBLEMS MADE IT FOR YOU TO DO YOUR WORK, TAKE CARE OF THINGS AT HOME, OR GET ALONG WITH OTHER PEOPLE: NOT DIFFICULT AT ALL
1. FEELING NERVOUS, ANXIOUS, OR ON EDGE: SEVERAL DAYS
4. TROUBLE RELAXING: NOT AT ALL
5. BEING SO RESTLESS THAT IT IS HARD TO SIT STILL: NOT AT ALL
GAD7 TOTAL SCORE: 2
4. TROUBLE RELAXING: NOT AT ALL

## 2025-03-19 ENCOUNTER — TELEMEDICINE (OUTPATIENT)
Dept: BEHAVIORAL/MENTAL HEALTH CLINIC | Age: 45
End: 2025-03-19

## 2025-03-19 DIAGNOSIS — E03.4 HYPOTHYROIDISM DUE TO ACQUIRED ATROPHY OF THYROID: ICD-10-CM

## 2025-03-19 DIAGNOSIS — F33.41 RECURRENT MAJOR DEPRESSIVE DISORDER, IN PARTIAL REMISSION: Primary | ICD-10-CM

## 2025-03-19 DIAGNOSIS — F51.05 INSOMNIA DUE TO MENTAL DISORDER: ICD-10-CM

## 2025-03-19 DIAGNOSIS — F41.1 GENERALIZED ANXIETY DISORDER: ICD-10-CM

## 2025-03-19 DIAGNOSIS — R53.82 CHRONIC FATIGUE: ICD-10-CM

## 2025-03-19 RX ORDER — LAMOTRIGINE 100 MG/1
100 TABLET ORAL DAILY
Qty: 30 TABLET | Refills: 3 | Status: SHIPPED | OUTPATIENT
Start: 2025-03-19

## 2025-03-19 RX ORDER — VENLAFAXINE HYDROCHLORIDE 75 MG/1
75 CAPSULE, EXTENDED RELEASE ORAL DAILY
Qty: 30 CAPSULE | Refills: 3 | Status: SHIPPED | OUTPATIENT
Start: 2025-03-19

## 2025-03-19 NOTE — PROGRESS NOTES
confirm.        Total time spent for this encounter: Not billed by time    --Melissa Gomez MD on 3/19/2025 at 4:02 PM    An electronic signature was used to authenticate this note.

## 2025-03-25 ENCOUNTER — HOSPITAL ENCOUNTER (OUTPATIENT)
Dept: PHYSICAL THERAPY | Age: 45
Setting detail: RECURRING SERIES
Discharge: HOME | End: 2025-03-28
Payer: MEDICARE

## 2025-03-25 ENCOUNTER — TELEPHONE (OUTPATIENT)
Dept: ORTHOPEDIC SURGERY | Age: 45
End: 2025-03-25

## 2025-03-25 PROCEDURE — 92507 TX SP LANG VOICE COMM INDIV: CPT

## 2025-03-25 NOTE — PROGRESS NOTES
Amanda Celaya  : 1980  Primary: [employer] Medicare-advantage Ppo  Secondary:  [facility] @ [facility]  [address]  [facility] [address]  Phone: [phone]  Fax: [phone]    Effective Dates:    2025 TO 2025    Frequency/Duration    1 time(s) a week for 60-90 days - discharge today to transition to [facility] services  SLP Visit Info:  Canceled Appointment: 1  Total # of Visits to Date: 8      OUTPATIENT SPEECH PATHOLOGY NOTE:Daily Note and Discontinuation Summary 3/25/2025  Appt Desk   Episode  Charges Attendance Report   Events        Treatment Diagnosis:    Dysphonia  Medical/Referring Diagnosis:    Dysphonia [R49.0]   Referring Physician:  Ellie Mann PA MD Orders:  Eval and Treat   Allergies:  [place] oil, Amoxicillin, Diazepam, Methylprednisolone acetate, Penicillins, Pineapple, Sumatriptan, Theophylline, Triptans, Cephalexin, Sulfa antibiotics, Topiramate, Tramadol, Verapamil, Adhesive tape, Augmentin [amoxicillin-pot clavulanate], Dermabond, Enbrel [etanercept], Methylprednisolone, Other, Pneumococcal vaccine, Pollen extract, Triamcinolone acetonide, Vitamin b12, and Hydroxychloroquine  Medications Last Reviewed:  3/25/2025  Subjective Comments:  Pt reports pain at shoulder, feeling like she subluxed it and that it's back in place. Has contacted [facility] for further f/u. Feels that with increased pain and tension affecting voice. Some good and bad days with voice and having to avoid exercises some days. Late start to session d/t pt needing to talk to another staff member at clinic.   Pain:  Patient does c/o pain. Shoulder   Interventions Planned: (Treatment may consist of any combination of the following)        See Assessment Note  GOALS: (Goals have been discussed and agreed upon with patient.)  Short-Term Functional Goals: Time Frame: 8 weeks  Patient will demonstrate implementation of vocal hygiene techniques including improved hydration,

## 2025-03-25 NOTE — TELEPHONE ENCOUNTER
I scheduled patient apt 5/19 rt shoulder inject w/ art zhang but patient wants to know if can be seen sooner? Is in a lot of pain

## 2025-04-01 ENCOUNTER — OFFICE VISIT (OUTPATIENT)
Dept: ORTHOPEDIC SURGERY | Age: 45
End: 2025-04-01

## 2025-04-01 DIAGNOSIS — M19.012 OSTEOARTHRITIS OF LEFT AC (ACROMIOCLAVICULAR) JOINT: Primary | ICD-10-CM

## 2025-04-06 RX ORDER — METHYLPREDNISOLONE ACETATE 40 MG/ML
40 INJECTION, SUSPENSION INTRA-ARTICULAR; INTRALESIONAL; INTRAMUSCULAR; SOFT TISSUE ONCE
Status: SHIPPED | OUTPATIENT
Start: 2025-04-06

## 2025-04-06 RX ORDER — LIDOCAINE HYDROCHLORIDE 10 MG/ML
2 INJECTION, SOLUTION EPIDURAL; INFILTRATION; INTRACAUDAL; PERINEURAL ONCE
Status: SHIPPED | OUTPATIENT
Start: 2025-04-06

## 2025-04-10 ENCOUNTER — CLINICAL SUPPORT (OUTPATIENT)
Dept: PULMONOLOGY | Age: 45
End: 2025-04-10

## 2025-04-10 ENCOUNTER — OFFICE VISIT (OUTPATIENT)
Dept: PULMONOLOGY | Age: 45
End: 2025-04-10
Payer: MEDICARE

## 2025-04-10 VITALS
HEART RATE: 99 BPM | RESPIRATION RATE: 19 BRPM | HEIGHT: 61 IN | BODY MASS INDEX: 47.46 KG/M2 | SYSTOLIC BLOOD PRESSURE: 143 MMHG | OXYGEN SATURATION: 99 % | DIASTOLIC BLOOD PRESSURE: 91 MMHG | WEIGHT: 251.4 LBS | TEMPERATURE: 99.1 F

## 2025-04-10 DIAGNOSIS — R06.2 WHEEZING: Primary | ICD-10-CM

## 2025-04-10 DIAGNOSIS — R49.0 DYSPHONIA: ICD-10-CM

## 2025-04-10 LAB
FEV 1 , POC: 2.33 L
FEV1 % PRED, POC: 91 %
FEV1/FVC, POC: NORMAL
FVC % PRED, POC: 98 %
FVC, POC: NORMAL

## 2025-04-10 PROCEDURE — 99204 OFFICE O/P NEW MOD 45 MIN: CPT | Performed by: INTERNAL MEDICINE

## 2025-04-10 ASSESSMENT — PULMONARY FUNCTION TESTS
FEV1_PERCENT_PREDICTED_POC: 91
FVC_PERCENT_PREDICTED_POC: 98

## 2025-04-10 NOTE — PROGRESS NOTES
VW) 12/02/2024    Narrative  STUDY:   X-RAY CHEST    REASON FOR EXAM:   Female, 44 years old. Acute cough.    TECHNIQUE:   Frontal and lateral views of the chest.    COMPARISON:   None.  ___________________________________    FINDINGS:      The lungs are clear and expanded.  There is no demonstrated pleural abnormality.    Normal size heart.   Normal mediastinum and shiloh.  Normal visualized pulmonary  arteries.  Normal visualized aortic arch and descending thoracic aorta.    Normal visualized thoracic spine.  Normal visualized ribs, clavicles, and  shoulders. Incidentally noted is lower cervical spine fusion.    No abnormality of the visualized soft tissue structures of the upper abdomen.  ___________________________________    Impression  No active or acute cardiopulmonary disease.    Electronically signed by Huseyin Roldan    CT Chest:   CT CARDIAC CALCIUM SCORING 05/01/2024    Narrative  CT calcium scoring study    INDICATION:  High cholesterol      TECHNIQUE: Multiple 2D gated axial images were obtained through the chest.  Radiation dose reduction techniques were used for this study:  All CT scans  performed at this facility use one or all of the following: Automated exposure  control, adjustment of the mA and/or kVp according to patient's size, iterative  reconstruction.    COMPARISON: None    FINDINGS: The calcium score is 0.  No calcification is seen in the coronary  arteries.    The visualized portions of the lungs are clear. There are no masses or  infiltrates. There is no significant adenopathy.    Impression  The calcium score is 0    Nuclear Medicine: No results found for this or any previous visit from the past 3650 days.    PFTs:        No data to display              Results for orders placed or performed in visit on 04/10/25   AMB POC SPIROMETRY W/BRONCHODILATOR    Collection Time: 04/10/25 12:00 AM   Result Value Ref Range    FEV 1 , POC 2.33 L    FEV1 % Pred POC 91 %    FVC, POC 2.99L     FVC % Pred

## 2025-05-01 NOTE — PROGRESS NOTES
Name: Amanda Celaya  YOB: 1980  Gender: female  MRN: [id number]    CC:   Chief Complaint   Patient presents with    Follow-up     Left shoulder   Left shoulder pain    History of Present Illness              CSI AC 4-1-25    Allergies   Allergen Reactions    [place] Oil Swelling, Other (See Comments) and Shortness Of Breath     Causes lips to swell     Lips swell    Any stone fruits, for ex peaches, cherries, plums, Mangoes are ok    Lips swell  Any stone fruits, for ex peaches, cherries, plums, Mangoes are ok    Lip swelling    Amoxicillin Shortness Of Breath    Diazepam Other (See Comments)     Suicidal ideation    Suicidal thoughts  Other reaction(s): Other (See Comments) Suicidal ideation    Methylprednisolone Acetate Anaphylaxis    Penicillins Anaphylaxis and Other (See Comments)     Has not been able to tolerate cephalosporins   Has not been able to tolerate cephalosporins    Pineapple Shortness Of Breath and Other (See Comments)     wheezing  wheezing    Sumatriptan Shortness Of Breath    Theophylline Shortness Of Breath and Other (See Comments)     Other reaction(s): Wheezing (Reselect Reaction)    Triptans Anaphylaxis and Shortness Of Breath     - wheezing, not breathing well, hoarseness    Looses her voice    Cephalexin Hives and Other (See Comments)     Other reaction(s): Dizziness, Unknown  Severe vertigo    Increased vertigo    Other reaction(s): Dizziness    Sulfa Antibiotics Hives and Rash     Other reaction(s): Unknown, Vomiting  vomiting    Other reaction(s): Vomiting    Topiramate Other (See Comments) and Myalgia     Other reaction(s): Unknown  Caused disorientation, loss of feeling in left leg (numbness)    dizziness    Dizziness; left leg numbness    Other reaction(s): Other (See Comments) Caused disorientation, loss of feeling in left leg (numbness)    Tramadol Hives, Itching and Rash    Verapamil Other (See Comments)     Lowers blood pressure significantly beyond normal

## 2025-05-06 ENCOUNTER — OFFICE VISIT (OUTPATIENT)
Dept: ORTHOPEDIC SURGERY | Age: 45
End: 2025-05-06
Payer: MEDICARE

## 2025-05-06 ENCOUNTER — TELEPHONE (OUTPATIENT)
Dept: ORTHOPEDIC SURGERY | Age: 45
End: 2025-05-06

## 2025-05-06 DIAGNOSIS — M19.012 OSTEOARTHRITIS OF LEFT AC (ACROMIOCLAVICULAR) JOINT: Primary | ICD-10-CM

## 2025-05-06 DIAGNOSIS — M75.102 TEAR OF LEFT ROTATOR CUFF, UNSPECIFIED TEAR EXTENT, UNSPECIFIED WHETHER TRAUMATIC: ICD-10-CM

## 2025-05-06 DIAGNOSIS — M25.512 LEFT SHOULDER PAIN, UNSPECIFIED CHRONICITY: ICD-10-CM

## 2025-05-06 PROCEDURE — 99214 OFFICE O/P EST MOD 30 MIN: CPT | Performed by: ORTHOPAEDIC SURGERY

## 2025-05-06 NOTE — TELEPHONE ENCOUNTER
MRI LEFT SHOULDER APPROVAL     Summary of Your Request   Please review the details of your request below and if everything looks correct click CONTINUE  Your case has been Approved.  The prior authorization you submitted, Case [id number], has been received. Additional case status notifications will be sent if you opted in for email notifications. Thank you.          Provider Name: KARISSA PRUITT Contact: celeste   Provider Address: [address] [place] [address] Phone Number: [phone]      Fax Number: [phone]      Patient Name: Amanda Celaya Patient Id: [id number]   Insurance Carrier: [facility]        Site Name: [facility] Site ID: [id number]   Site Address: [address] [place] [address]          Primary Diagnosis Code: M19.012 Description: Primary osteoarthritis, left shoulder   Secondary Diagnosis Code:  Description:    Date of Service: Not provided     CPT Code: 70885 Description: MRI UPPER EXTREMITY JOINT W/O   Authorization Number: [id number]     Case Number: [id number]     Review Date: 5/6/2025 12:55:17 PM     Expiration Date: 11/2/2025     Status: Your case has been Approved.  The prior authorization you submitted, Case [id number], has been received. Additional case status notifications will be sent if you opted in for email notifications. Thank you

## 2025-05-28 RX ORDER — LISDEXAMFETAMINE DIMESYLATE 60 MG/1
1 CAPSULE ORAL EVERY MORNING
COMMUNITY
Start: 2025-05-12 | End: 2025-06-04

## 2025-05-28 RX ORDER — OMEPRAZOLE 40 MG/1
CAPSULE, DELAYED RELEASE ORAL
COMMUNITY
Start: 2025-03-28

## 2025-05-28 RX ORDER — ESCITALOPRAM OXALATE 10 MG/1
TABLET ORAL
COMMUNITY
Start: 2025-05-09 | End: 2025-06-04

## 2025-05-28 RX ORDER — CYCLOBENZAPRINE HCL 10 MG
10 TABLET ORAL 2 TIMES DAILY
COMMUNITY
Start: 2025-03-27

## 2025-05-28 NOTE — PROGRESS NOTES
Name: Amanda Celaya  YOB: 1980  Gender: female  MRN: [id number]    CC:   Chief Complaint   Patient presents with    Follow-up     Left shoulder   Left shoulder pain    History of Present Illness  The patient is a 45-year-old individual who came in today to have their left shoulder pain checked out. They mentioned that they haven't had any recent falls or injuries.  She has had pain over the AC joint and somewhat anteriorly for quite some time.  Does not refer any pain laterally.  No recent new falls.        Recall:  Pt is a well established patient that has been seen for continued left shoulder pain. The patient has been diagnosed with rheumatoid arthritis after a referral was sent by my PA in the interim which has been helping her generalized pain. Recall she does have underlying neurological conditions and is a fall risk as well. Also, is s/p Left Shoulder Arthroscopy Distal Clavicle Resection - Left and Open Biceps Tenodesis - Left  5/8/2023    [facility] 4-1-25 and 6-11-25     Allergies   Allergen Reactions    [place] Oil Swelling, Other (See Comments) and Shortness Of Breath     Causes lips to swell     Lips swell    Any stone fruits, for ex peaches, cherries, plums, Mangoes are ok    Lips swell  Any stone fruits, for ex peaches, cherries, plums, Mangoes are ok    Lip swelling    Amoxicillin Shortness Of Breath    Diazepam Other (See Comments)     Suicidal ideation    Suicidal thoughts  Other reaction(s): Other (See Comments) Suicidal ideation    Methylprednisolone Acetate Anaphylaxis    Penicillins Anaphylaxis and Other (See Comments)     Has not been able to tolerate cephalosporins   Has not been able to tolerate cephalosporins    Pineapple Shortness Of Breath and Other (See Comments)     wheezing  wheezing    Sumatriptan Shortness Of Breath    Theophylline Shortness Of Breath and Other (See Comments)     Other reaction(s): Wheezing (Reselect Reaction)    Triptans Anaphylaxis and Shortness

## 2025-06-01 ASSESSMENT — PATIENT HEALTH QUESTIONNAIRE - PHQ9
4. FEELING TIRED OR HAVING LITTLE ENERGY: SEVERAL DAYS
SUM OF ALL RESPONSES TO PHQ QUESTIONS 1-9: 4
8. MOVING OR SPEAKING SO SLOWLY THAT OTHER PEOPLE COULD HAVE NOTICED. OR THE OPPOSITE, BEING SO FIGETY OR RESTLESS THAT YOU HAVE BEEN MOVING AROUND A LOT MORE THAN USUAL: SEVERAL DAYS
3. TROUBLE FALLING OR STAYING ASLEEP: MORE THAN HALF THE DAYS
SUM OF ALL RESPONSES TO PHQ QUESTIONS 1-9: 4
SUM OF ALL RESPONSES TO PHQ QUESTIONS 1-9: 4
9. THOUGHTS THAT YOU WOULD BE BETTER OFF DEAD, OR OF HURTING YOURSELF: NOT AT ALL
6. FEELING BAD ABOUT YOURSELF - OR THAT YOU ARE A FAILURE OR HAVE LET YOURSELF OR YOUR FAMILY DOWN: NOT AT ALL
6. FEELING BAD ABOUT YOURSELF - OR THAT YOU ARE A FAILURE OR HAVE LET YOURSELF OR YOUR FAMILY DOWN: NOT AT ALL
3. TROUBLE FALLING OR STAYING ASLEEP: MORE THAN HALF THE DAYS
9. THOUGHTS THAT YOU WOULD BE BETTER OFF DEAD, OR OF HURTING YOURSELF: NOT AT ALL
SUM OF ALL RESPONSES TO PHQ QUESTIONS 1-9: 4
4. FEELING TIRED OR HAVING LITTLE ENERGY: SEVERAL DAYS
1. LITTLE INTEREST OR PLEASURE IN DOING THINGS: NOT AT ALL
5. POOR APPETITE OR OVEREATING: NOT AT ALL
10. IF YOU CHECKED OFF ANY PROBLEMS, HOW DIFFICULT HAVE THESE PROBLEMS MADE IT FOR YOU TO DO YOUR WORK, TAKE CARE OF THINGS AT HOME, OR GET ALONG WITH OTHER PEOPLE: SOMEWHAT DIFFICULT
7. TROUBLE CONCENTRATING ON THINGS, SUCH AS READING THE NEWSPAPER OR WATCHING TELEVISION: NOT AT ALL
2. FEELING DOWN, DEPRESSED OR HOPELESS: NOT AT ALL
5. POOR APPETITE OR OVEREATING: NOT AT ALL
10. IF YOU CHECKED OFF ANY PROBLEMS, HOW DIFFICULT HAVE THESE PROBLEMS MADE IT FOR YOU TO DO YOUR WORK, TAKE CARE OF THINGS AT HOME, OR GET ALONG WITH OTHER PEOPLE: SOMEWHAT DIFFICULT
1. LITTLE INTEREST OR PLEASURE IN DOING THINGS: NOT AT ALL
2. FEELING DOWN, DEPRESSED OR HOPELESS: NOT AT ALL
8. MOVING OR SPEAKING SO SLOWLY THAT OTHER PEOPLE COULD HAVE NOTICED. OR THE OPPOSITE - BEING SO FIDGETY OR RESTLESS THAT YOU HAVE BEEN MOVING AROUND A LOT MORE THAN USUAL: SEVERAL DAYS
7. TROUBLE CONCENTRATING ON THINGS, SUCH AS READING THE NEWSPAPER OR WATCHING TELEVISION: NOT AT ALL
SUM OF ALL RESPONSES TO PHQ QUESTIONS 1-9: 4

## 2025-06-01 ASSESSMENT — ANXIETY QUESTIONNAIRES
6. BECOMING EASILY ANNOYED OR IRRITABLE: NOT AT ALL
GAD7 TOTAL SCORE: 1
7. FEELING AFRAID AS IF SOMETHING AWFUL MIGHT HAPPEN: NOT AT ALL
2. NOT BEING ABLE TO STOP OR CONTROL WORRYING: NOT AT ALL
2. NOT BEING ABLE TO STOP OR CONTROL WORRYING: NOT AT ALL
IF YOU CHECKED OFF ANY PROBLEMS ON THIS QUESTIONNAIRE, HOW DIFFICULT HAVE THESE PROBLEMS MADE IT FOR YOU TO DO YOUR WORK, TAKE CARE OF THINGS AT HOME, OR GET ALONG WITH OTHER PEOPLE: NOT DIFFICULT AT ALL
7. FEELING AFRAID AS IF SOMETHING AWFUL MIGHT HAPPEN: NOT AT ALL
3. WORRYING TOO MUCH ABOUT DIFFERENT THINGS: NOT AT ALL
6. BECOMING EASILY ANNOYED OR IRRITABLE: NOT AT ALL
5. BEING SO RESTLESS THAT IT IS HARD TO SIT STILL: NOT AT ALL
4. TROUBLE RELAXING: NOT AT ALL
5. BEING SO RESTLESS THAT IT IS HARD TO SIT STILL: NOT AT ALL
4. TROUBLE RELAXING: NOT AT ALL
1. FEELING NERVOUS, ANXIOUS, OR ON EDGE: SEVERAL DAYS
IF YOU CHECKED OFF ANY PROBLEMS ON THIS QUESTIONNAIRE, HOW DIFFICULT HAVE THESE PROBLEMS MADE IT FOR YOU TO DO YOUR WORK, TAKE CARE OF THINGS AT HOME, OR GET ALONG WITH OTHER PEOPLE: NOT DIFFICULT AT ALL
3. WORRYING TOO MUCH ABOUT DIFFERENT THINGS: NOT AT ALL
1. FEELING NERVOUS, ANXIOUS, OR ON EDGE: SEVERAL DAYS

## 2025-06-03 ENCOUNTER — OFFICE VISIT (OUTPATIENT)
Dept: ORTHOPEDIC SURGERY | Age: 45
End: 2025-06-03
Payer: MEDICARE

## 2025-06-03 DIAGNOSIS — M19.012 OSTEOARTHRITIS OF LEFT AC (ACROMIOCLAVICULAR) JOINT: Primary | ICD-10-CM

## 2025-06-03 DIAGNOSIS — M75.102 TEAR OF LEFT ROTATOR CUFF, UNSPECIFIED TEAR EXTENT, UNSPECIFIED WHETHER TRAUMATIC: ICD-10-CM

## 2025-06-03 PROCEDURE — 99214 OFFICE O/P EST MOD 30 MIN: CPT | Performed by: ORTHOPAEDIC SURGERY

## 2025-06-04 ENCOUNTER — TELEMEDICINE (OUTPATIENT)
Dept: BEHAVIORAL/MENTAL HEALTH CLINIC | Age: 45
End: 2025-06-04

## 2025-06-04 DIAGNOSIS — F33.41 RECURRENT MAJOR DEPRESSIVE DISORDER, IN PARTIAL REMISSION: Primary | ICD-10-CM

## 2025-06-04 DIAGNOSIS — G89.4 CHRONIC PAIN DISORDER: ICD-10-CM

## 2025-06-04 DIAGNOSIS — E03.4 HYPOTHYROIDISM DUE TO ACQUIRED ATROPHY OF THYROID: ICD-10-CM

## 2025-06-04 DIAGNOSIS — F51.05 INSOMNIA DUE TO MENTAL DISORDER: ICD-10-CM

## 2025-06-04 DIAGNOSIS — R53.82 CHRONIC FATIGUE: ICD-10-CM

## 2025-06-04 DIAGNOSIS — F41.1 GENERALIZED ANXIETY DISORDER: ICD-10-CM

## 2025-06-04 RX ORDER — LAMOTRIGINE 100 MG/1
100 TABLET ORAL DAILY
Qty: 30 TABLET | Refills: 3 | Status: SHIPPED | OUTPATIENT
Start: 2025-07-17

## 2025-06-04 RX ORDER — VENLAFAXINE HYDROCHLORIDE 75 MG/1
75 CAPSULE, EXTENDED RELEASE ORAL DAILY
Qty: 30 CAPSULE | Refills: 3 | Status: SHIPPED | OUTPATIENT
Start: 2025-07-17

## 2025-06-04 NOTE — PROGRESS NOTES
Patient:  Amanda Celaya  Age:  45 y.o.  :  1980     SEX:  female MRN:  [id number]     RACE: White (non-)     SEEN:  [x]  PATIENT  []  SPOUSE []  OTHER:                  2025     8:35 PM 3/18/2025     9:18 PM 2024    10:14 AM   PHQ-9    Little interest or pleasure in doing things 0 0 0   Feeling down, depressed, or hopeless 0 1 1   Trouble falling or staying asleep, or sleeping too much 2 2 0   Feeling tired or having little energy 1 2 2   Poor appetite or overeating 0 0 0   Feeling bad about yourself - or that you are a failure or have let yourself or your family down 0 0 0   Trouble concentrating on things, such as reading the newspaper or watching television 0 2 0   Moving or speaking so slowly that other people could have noticed. Or the opposite - being so fidgety or restless that you have been moving around a lot more than usual 1 0 0   Thoughts that you would be better off dead, or of hurting yourself in some way 0 0 0   PHQ-2 Score 0  1  1    PHQ-9 Total Score 4  7  3    If you checked off any problems, how difficult have these problems made it for you to do your work, take care of things at home, or get along with other people? 1 1 1       Patient-reported           2025     8:33 PM 3/18/2025     9:17 PM 2024    10:13 AM   BRENDA-7 SCREENING   Feeling nervous, anxious, or on edge Several days Several days Not at all   Not being able to stop or control worrying Not at all Not at all Not at all   Worrying too much about different things Not at all Not at all Not at all   Trouble relaxing Not at all Not at all Not at all   Being so restless that it is hard to sit still Not at all Not at all Not at all   Becoming easily annoyed or irritable Not at all Not at all Not at all   Feeling afraid as if something awful might happen Not at all Several days Not at all   BRENDA-7 Total Score 1  2  0    How difficult have these problems made it for you to do your work, take care

## 2025-06-17 DIAGNOSIS — M19.012 OSTEOARTHRITIS OF LEFT AC (ACROMIOCLAVICULAR) JOINT: Primary | ICD-10-CM

## 2025-06-17 DIAGNOSIS — M25.512 LEFT SHOULDER PAIN, UNSPECIFIED CHRONICITY: ICD-10-CM

## 2025-06-17 DIAGNOSIS — M75.102 TEAR OF LEFT ROTATOR CUFF, UNSPECIFIED TEAR EXTENT, UNSPECIFIED WHETHER TRAUMATIC: ICD-10-CM

## 2025-06-30 ENCOUNTER — TELEPHONE (OUTPATIENT)
Dept: ORTHOPEDIC SURGERY | Age: 45
End: 2025-06-30

## 2025-07-07 DIAGNOSIS — I11.9 HYPERTENSIVE HEART DISEASE WITHOUT HEART FAILURE: ICD-10-CM

## 2025-07-07 DIAGNOSIS — E55.9 VITAMIN D DEFICIENCY: ICD-10-CM

## 2025-07-07 DIAGNOSIS — E78.00 PURE HYPERCHOLESTEROLEMIA: ICD-10-CM

## 2025-07-07 LAB
25(OH)D3 SERPL-MCNC: 39.3 NG/ML (ref 30–100)
ALBUMIN SERPL-MCNC: 3.5 G/DL (ref 3.5–5)
ALBUMIN/GLOB SERPL: 1.1 (ref 1–1.9)
ALP SERPL-CCNC: 75 U/L (ref 35–104)
ALT SERPL-CCNC: 34 U/L (ref 8–45)
ANION GAP SERPL CALC-SCNC: 14 MMOL/L (ref 7–16)
AST SERPL-CCNC: 25 U/L (ref 15–37)
BASOPHILS # BLD: 0.06 K/UL (ref 0–0.2)
BASOPHILS NFR BLD: 0.7 % (ref 0–2)
BILIRUB SERPL-MCNC: 0.3 MG/DL (ref 0–1.2)
BUN SERPL-MCNC: 9 MG/DL (ref 6–23)
CALCIUM SERPL-MCNC: 9.4 MG/DL (ref 8.8–10.2)
CHLORIDE SERPL-SCNC: 102 MMOL/L (ref 98–107)
CHOLEST SERPL-MCNC: 235 MG/DL (ref 0–200)
CO2 SERPL-SCNC: 22 MMOL/L (ref 20–29)
CREAT SERPL-MCNC: 0.72 MG/DL (ref 0.6–1.1)
DIFFERENTIAL METHOD BLD: NORMAL
EOSINOPHIL # BLD: 0.33 K/UL (ref 0–0.8)
EOSINOPHIL NFR BLD: 3.6 % (ref 0.5–7.8)
ERYTHROCYTE [DISTWIDTH] IN BLOOD BY AUTOMATED COUNT: 13.4 % (ref 11.9–14.6)
ESTRADIOL SERPL-MCNC: 228 PG/ML
GLOBULIN SER CALC-MCNC: 3.3 G/DL (ref 2.3–3.5)
GLUCOSE SERPL-MCNC: 115 MG/DL (ref 70–99)
HCT VFR BLD AUTO: 38.7 % (ref 35.8–46.3)
HDLC SERPL-MCNC: 59 MG/DL (ref 40–60)
HDLC SERPL: 4 (ref 0–5)
HGB BLD-MCNC: 13.1 G/DL (ref 11.7–15.4)
IMM GRANULOCYTES # BLD AUTO: 0.14 K/UL (ref 0–0.5)
IMM GRANULOCYTES NFR BLD AUTO: 1.5 % (ref 0–5)
LDLC SERPL CALC-MCNC: 146 MG/DL (ref 0–100)
LYMPHOCYTES # BLD: 1.78 K/UL (ref 0.5–4.6)
LYMPHOCYTES NFR BLD: 19.4 % (ref 13–44)
MCH RBC QN AUTO: 29.6 PG (ref 26.1–32.9)
MCHC RBC AUTO-ENTMCNC: 33.9 G/DL (ref 31.4–35)
MCV RBC AUTO: 87.6 FL (ref 82–102)
MONOCYTES # BLD: 0.75 K/UL (ref 0.1–1.3)
MONOCYTES NFR BLD: 8.2 % (ref 4–12)
NEUTS SEG # BLD: 6.1 K/UL (ref 1.7–8.2)
NEUTS SEG NFR BLD: 66.6 % (ref 43–78)
NRBC # BLD: 0 K/UL (ref 0–0.2)
PLATELET # BLD AUTO: 286 K/UL (ref 150–450)
PMV BLD AUTO: 10 FL (ref 9.4–12.3)
POTASSIUM SERPL-SCNC: 4.4 MMOL/L (ref 3.5–5.1)
PROGEST SERPL-MCNC: 0.55 NG/ML
PROT SERPL-MCNC: 6.7 G/DL (ref 6.3–8.2)
RBC # BLD AUTO: 4.42 M/UL (ref 4.05–5.2)
SODIUM SERPL-SCNC: 138 MMOL/L (ref 136–145)
TRIGL SERPL-MCNC: 152 MG/DL (ref 0–150)
VLDLC SERPL CALC-MCNC: 30 MG/DL (ref 6–23)
WBC # BLD AUTO: 9.2 K/UL (ref 4.3–11.1)

## 2025-07-10 LAB — TESTOST SERPL-MCNC: 36.4 NG/DL

## 2025-07-14 DIAGNOSIS — M25.512 LEFT SHOULDER PAIN, UNSPECIFIED CHRONICITY: ICD-10-CM

## 2025-07-14 DIAGNOSIS — M19.012 OSTEOARTHRITIS OF LEFT AC (ACROMIOCLAVICULAR) JOINT: Primary | ICD-10-CM

## 2025-07-14 DIAGNOSIS — M75.102 TEAR OF LEFT ROTATOR CUFF, UNSPECIFIED TEAR EXTENT, UNSPECIFIED WHETHER TRAUMATIC: ICD-10-CM

## 2025-08-03 ENCOUNTER — ANESTHESIA EVENT (OUTPATIENT)
Dept: SURGERY | Age: 45
End: 2025-08-03
Payer: MEDICARE

## 2025-08-03 DIAGNOSIS — G89.18 POST-OP PAIN: Primary | ICD-10-CM

## 2025-08-03 RX ORDER — OXYCODONE HYDROCHLORIDE 5 MG/1
5-10 TABLET ORAL
Qty: 30 TABLET | Refills: 0 | Status: SHIPPED | OUTPATIENT
Start: 2025-08-03 | End: 2025-08-06

## 2025-08-03 RX ORDER — ACETAMINOPHEN 500 MG
500 TABLET ORAL 4 TIMES DAILY PRN
Qty: 40 TABLET | Refills: 0 | Status: SHIPPED | OUTPATIENT
Start: 2025-08-03 | End: 2025-08-13

## 2025-08-03 RX ORDER — AMOXICILLIN 250 MG
1 CAPSULE ORAL DAILY
Qty: 21 TABLET | Refills: 0 | Status: SHIPPED | OUTPATIENT
Start: 2025-08-03

## 2025-08-03 RX ORDER — ASPIRIN 325 MG
325 TABLET ORAL DAILY
Qty: 7 TABLET | Refills: 0 | Status: SHIPPED | OUTPATIENT
Start: 2025-08-03 | End: 2025-08-10

## 2025-08-03 RX ORDER — MELOXICAM 7.5 MG/1
7.5 TABLET ORAL 2 TIMES DAILY
Qty: 28 TABLET | Refills: 0 | Status: SHIPPED | OUTPATIENT
Start: 2025-08-03 | End: 2025-08-17

## 2025-08-03 RX ORDER — ONDANSETRON 8 MG/1
4 TABLET, ORALLY DISINTEGRATING ORAL EVERY 6 HOURS
Qty: 16 TABLET | Refills: 0 | Status: SHIPPED | OUTPATIENT
Start: 2025-08-03

## 2025-08-04 ENCOUNTER — ANESTHESIA (OUTPATIENT)
Dept: SURGERY | Age: 45
End: 2025-08-04
Payer: MEDICARE

## 2025-08-04 ENCOUNTER — HOSPITAL ENCOUNTER (OUTPATIENT)
Age: 45
Setting detail: OUTPATIENT SURGERY
Discharge: HOME | End: 2025-08-04
Attending: ORTHOPAEDIC SURGERY | Admitting: ORTHOPAEDIC SURGERY
Payer: MEDICARE

## 2025-08-04 VITALS
BODY MASS INDEX: 47.2 KG/M2 | SYSTOLIC BLOOD PRESSURE: 146 MMHG | WEIGHT: 250 LBS | TEMPERATURE: 97.7 F | RESPIRATION RATE: 12 BRPM | HEART RATE: 101 BPM | DIASTOLIC BLOOD PRESSURE: 64 MMHG | HEIGHT: 61 IN | OXYGEN SATURATION: 94 %

## 2025-08-04 PROCEDURE — 2580000003 HC RX 258: Performed by: ANESTHESIOLOGY

## 2025-08-04 PROCEDURE — 2500000003 HC RX 250 WO HCPCS

## 2025-08-04 PROCEDURE — 6360000002 HC RX W HCPCS

## 2025-08-04 PROCEDURE — 3700000001 HC ADD 15 MINUTES (ANESTHESIA): Performed by: ORTHOPAEDIC SURGERY

## 2025-08-04 PROCEDURE — 6360000002 HC RX W HCPCS: Performed by: PHYSICIAN ASSISTANT

## 2025-08-04 PROCEDURE — 6360000002 HC RX W HCPCS: Performed by: ANESTHESIOLOGY

## 2025-08-04 PROCEDURE — 2580000003 HC RX 258

## 2025-08-04 PROCEDURE — 3600000014 HC SURGERY LEVEL 4 ADDTL 15MIN: Performed by: ORTHOPAEDIC SURGERY

## 2025-08-04 PROCEDURE — 3700000000 HC ANESTHESIA ATTENDED CARE: Performed by: ORTHOPAEDIC SURGERY

## 2025-08-04 PROCEDURE — 3600000004 HC SURGERY LEVEL 4 BASE: Performed by: ORTHOPAEDIC SURGERY

## 2025-08-04 PROCEDURE — 2580000003 HC RX 258: Performed by: PHYSICIAN ASSISTANT

## 2025-08-04 PROCEDURE — 2500000003 HC RX 250 WO HCPCS: Performed by: ANESTHESIOLOGY

## 2025-08-04 PROCEDURE — 7100000000 HC PACU RECOVERY - FIRST 15 MIN: Performed by: ORTHOPAEDIC SURGERY

## 2025-08-04 PROCEDURE — 7100000010 HC PHASE II RECOVERY - FIRST 15 MIN: Performed by: ORTHOPAEDIC SURGERY

## 2025-08-04 PROCEDURE — 64415 NJX AA&/STRD BRCH PLXS IMG: CPT | Performed by: ANESTHESIOLOGY

## 2025-08-04 PROCEDURE — 2709999900 HC NON-CHARGEABLE SUPPLY: Performed by: ORTHOPAEDIC SURGERY

## 2025-08-04 PROCEDURE — 6370000000 HC RX 637 (ALT 250 FOR IP): Performed by: ANESTHESIOLOGY

## 2025-08-04 PROCEDURE — 7100000001 HC PACU RECOVERY - ADDTL 15 MIN: Performed by: ORTHOPAEDIC SURGERY

## 2025-08-04 RX ORDER — PROPOFOL 10 MG/ML
INJECTION, EMULSION INTRAVENOUS
Status: DISCONTINUED | OUTPATIENT
Start: 2025-08-04 | End: 2025-08-04 | Stop reason: SDUPTHER

## 2025-08-04 RX ORDER — FENTANYL CITRATE 50 UG/ML
INJECTION, SOLUTION INTRAMUSCULAR; INTRAVENOUS
Status: DISCONTINUED | OUTPATIENT
Start: 2025-08-04 | End: 2025-08-04 | Stop reason: SDUPTHER

## 2025-08-04 RX ORDER — SODIUM CHLORIDE 0.9 % (FLUSH) 0.9 %
5-40 SYRINGE (ML) INJECTION EVERY 12 HOURS SCHEDULED
Status: DISCONTINUED | OUTPATIENT
Start: 2025-08-04 | End: 2025-08-04 | Stop reason: HOSPADM

## 2025-08-04 RX ORDER — LIDOCAINE HYDROCHLORIDE 10 MG/ML
1 INJECTION, SOLUTION INFILTRATION; PERINEURAL
Status: DISCONTINUED | OUTPATIENT
Start: 2025-08-04 | End: 2025-08-04 | Stop reason: HOSPADM

## 2025-08-04 RX ORDER — BUPIVACAINE HYDROCHLORIDE 2.5 MG/ML
INJECTION, SOLUTION EPIDURAL; INFILTRATION; INTRACAUDAL; PERINEURAL
Status: COMPLETED | OUTPATIENT
Start: 2025-08-04 | End: 2025-08-04

## 2025-08-04 RX ORDER — OXYCODONE HYDROCHLORIDE 5 MG/1
10 TABLET ORAL PRN
Status: DISCONTINUED | OUTPATIENT
Start: 2025-08-04 | End: 2025-08-04 | Stop reason: HOSPADM

## 2025-08-04 RX ORDER — DIPHENHYDRAMINE HYDROCHLORIDE 50 MG/ML
12.5 INJECTION, SOLUTION INTRAMUSCULAR; INTRAVENOUS
Status: DISCONTINUED | OUTPATIENT
Start: 2025-08-04 | End: 2025-08-04 | Stop reason: HOSPADM

## 2025-08-04 RX ORDER — ROCURONIUM BROMIDE 10 MG/ML
INJECTION, SOLUTION INTRAVENOUS
Status: DISCONTINUED | OUTPATIENT
Start: 2025-08-04 | End: 2025-08-04 | Stop reason: SDUPTHER

## 2025-08-04 RX ORDER — ONDANSETRON 2 MG/ML
4 INJECTION INTRAMUSCULAR; INTRAVENOUS
Status: COMPLETED | OUTPATIENT
Start: 2025-08-04 | End: 2025-08-04

## 2025-08-04 RX ORDER — SODIUM CHLORIDE 9 MG/ML
INJECTION, SOLUTION INTRAVENOUS PRN
Status: DISCONTINUED | OUTPATIENT
Start: 2025-08-04 | End: 2025-08-04 | Stop reason: HOSPADM

## 2025-08-04 RX ORDER — GLYCOPYRROLATE 0.2 MG/ML
INJECTION INTRAMUSCULAR; INTRAVENOUS
Status: DISCONTINUED | OUTPATIENT
Start: 2025-08-04 | End: 2025-08-04 | Stop reason: SDUPTHER

## 2025-08-04 RX ORDER — SODIUM CHLORIDE, SODIUM LACTATE, POTASSIUM CHLORIDE, CALCIUM CHLORIDE 600; 310; 30; 20 MG/100ML; MG/100ML; MG/100ML; MG/100ML
INJECTION, SOLUTION INTRAVENOUS CONTINUOUS
Status: DISCONTINUED | OUTPATIENT
Start: 2025-08-04 | End: 2025-08-04 | Stop reason: HOSPADM

## 2025-08-04 RX ORDER — NEOSTIGMINE METHYLSULFATE 1 MG/ML
INJECTION INTRAVENOUS
Status: DISCONTINUED | OUTPATIENT
Start: 2025-08-04 | End: 2025-08-04 | Stop reason: SDUPTHER

## 2025-08-04 RX ORDER — SODIUM CHLORIDE 0.9 % (FLUSH) 0.9 %
5-40 SYRINGE (ML) INJECTION PRN
Status: DISCONTINUED | OUTPATIENT
Start: 2025-08-04 | End: 2025-08-04 | Stop reason: HOSPADM

## 2025-08-04 RX ORDER — BUPIVACAINE HYDROCHLORIDE AND EPINEPHRINE 5; 5 MG/ML; UG/ML
INJECTION, SOLUTION EPIDURAL; INTRACAUDAL; PERINEURAL
Status: COMPLETED | OUTPATIENT
Start: 2025-08-04 | End: 2025-08-04

## 2025-08-04 RX ORDER — PHENYLEPHRINE HYDROCHLORIDE 10 MG/ML
INJECTION INTRAVENOUS
Status: DISCONTINUED | OUTPATIENT
Start: 2025-08-04 | End: 2025-08-04 | Stop reason: SDUPTHER

## 2025-08-04 RX ORDER — PROCHLORPERAZINE EDISYLATE 5 MG/ML
5 INJECTION INTRAMUSCULAR; INTRAVENOUS
Status: COMPLETED | OUTPATIENT
Start: 2025-08-04 | End: 2025-08-04

## 2025-08-04 RX ORDER — ACETAMINOPHEN 500 MG
1000 TABLET ORAL ONCE
Status: COMPLETED | OUTPATIENT
Start: 2025-08-04 | End: 2025-08-04

## 2025-08-04 RX ORDER — OXYCODONE HYDROCHLORIDE 5 MG/1
5 TABLET ORAL PRN
Status: DISCONTINUED | OUTPATIENT
Start: 2025-08-04 | End: 2025-08-04 | Stop reason: HOSPADM

## 2025-08-04 RX ORDER — LIDOCAINE HYDROCHLORIDE 20 MG/ML
INJECTION, SOLUTION EPIDURAL; INFILTRATION; INTRACAUDAL; PERINEURAL
Status: DISCONTINUED | OUTPATIENT
Start: 2025-08-04 | End: 2025-08-04 | Stop reason: SDUPTHER

## 2025-08-04 RX ADMIN — BUPIVACAINE HYDROCHLORIDE 10 ML: 2.5 INJECTION, SOLUTION EPIDURAL; INFILTRATION; INTRACAUDAL; PERINEURAL at 10:37

## 2025-08-04 RX ADMIN — PHENYLEPHRINE HYDROCHLORIDE 40 MCG/MIN: 10 INJECTION INTRAVENOUS at 11:58

## 2025-08-04 RX ADMIN — ACETAMINOPHEN 1000 MG: 500 TABLET, FILM COATED ORAL at 08:02

## 2025-08-04 RX ADMIN — LIDOCAINE HYDROCHLORIDE 80 MG: 20 INJECTION, SOLUTION EPIDURAL; INFILTRATION; INTRACAUDAL; PERINEURAL at 11:41

## 2025-08-04 RX ADMIN — GENTAMICIN SULFATE 560 MG: 40 INJECTION, SOLUTION INTRAMUSCULAR; INTRAVENOUS at 11:53

## 2025-08-04 RX ADMIN — FENTANYL CITRATE 50 MCG: 50 INJECTION, SOLUTION INTRAMUSCULAR; INTRAVENOUS at 11:55

## 2025-08-04 RX ADMIN — PHENYLEPHRINE HYDROCHLORIDE 200 MCG: 10 INJECTION INTRAVENOUS at 11:58

## 2025-08-04 RX ADMIN — SODIUM CHLORIDE, SODIUM LACTATE, POTASSIUM CHLORIDE, AND CALCIUM CHLORIDE: .6; .31; .03; .02 INJECTION, SOLUTION INTRAVENOUS at 07:59

## 2025-08-04 RX ADMIN — FENTANYL CITRATE 50 MCG: 50 INJECTION, SOLUTION INTRAMUSCULAR; INTRAVENOUS at 11:41

## 2025-08-04 RX ADMIN — MEPIVACAINE HYDROCHLORIDE 10 ML: 15 INJECTION, SOLUTION EPIDURAL; INFILTRATION at 10:37

## 2025-08-04 RX ADMIN — GLYCOPYRROLATE 0.6 MG: 0.2 INJECTION INTRAMUSCULAR; INTRAVENOUS at 12:24

## 2025-08-04 RX ADMIN — PROPOFOL 200 MG: 10 INJECTION, EMULSION INTRAVENOUS at 11:41

## 2025-08-04 RX ADMIN — NEOSTIGMINE METHYLSULFATE 4 MG: 1 INJECTION INTRAVENOUS at 12:24

## 2025-08-04 RX ADMIN — ROCURONIUM BROMIDE 40 MG: 10 INJECTION, SOLUTION INTRAVENOUS at 11:41

## 2025-08-04 RX ADMIN — VANCOMYCIN HYDROCHLORIDE 1500 MG: 10 INJECTION, POWDER, LYOPHILIZED, FOR SOLUTION INTRAVENOUS at 08:46

## 2025-08-04 RX ADMIN — PROCHLORPERAZINE EDISYLATE 5 MG: 5 INJECTION INTRAMUSCULAR; INTRAVENOUS at 12:49

## 2025-08-04 RX ADMIN — BUPIVACAINE HYDROCHLORIDE AND EPINEPHRINE BITARTRATE 10 ML: 5; .005 INJECTION, SOLUTION EPIDURAL; INTRACAUDAL; PERINEURAL at 10:37

## 2025-08-04 RX ADMIN — ONDANSETRON 4 MG: 2 INJECTION, SOLUTION INTRAMUSCULAR; INTRAVENOUS at 12:58

## 2025-08-04 ASSESSMENT — PAIN - FUNCTIONAL ASSESSMENT
PAIN_FUNCTIONAL_ASSESSMENT: PREVENTS OR INTERFERES SOME ACTIVE ACTIVITIES AND ADLS
PAIN_FUNCTIONAL_ASSESSMENT: 0-10

## 2025-08-04 ASSESSMENT — PAIN DESCRIPTION - DESCRIPTORS: DESCRIPTORS: ACHING

## 2025-08-06 DIAGNOSIS — M19.012 OSTEOARTHRITIS OF LEFT AC (ACROMIOCLAVICULAR) JOINT: Primary | ICD-10-CM

## 2025-08-11 ENCOUNTER — TELEPHONE (OUTPATIENT)
Dept: ORTHOPEDIC SURGERY | Age: 45
End: 2025-08-11

## 2025-08-11 DIAGNOSIS — Z98.890 POST-OPERATIVE STATE: Primary | ICD-10-CM

## 2025-08-11 RX ORDER — OXYCODONE HYDROCHLORIDE 5 MG/1
5-10 TABLET ORAL EVERY 8 HOURS PRN
Qty: 18 TABLET | Refills: 0 | Status: SHIPPED | OUTPATIENT
Start: 2025-08-11 | End: 2025-08-14

## 2025-08-12 ENCOUNTER — OFFICE VISIT (OUTPATIENT)
Dept: ORTHOPEDIC SURGERY | Age: 45
End: 2025-08-12

## 2025-08-12 DIAGNOSIS — Z09 SURGERY FOLLOW-UP: Primary | ICD-10-CM

## 2025-08-12 PROCEDURE — 99024 POSTOP FOLLOW-UP VISIT: CPT | Performed by: ORTHOPAEDIC SURGERY

## 2025-08-12 RX ORDER — DOXYCYCLINE HYCLATE 100 MG
100 TABLET ORAL 2 TIMES DAILY
Qty: 10 TABLET | Refills: 0 | Status: SHIPPED | OUTPATIENT
Start: 2025-08-12 | End: 2025-08-17

## 2025-08-14 ENCOUNTER — HOSPITAL ENCOUNTER (OUTPATIENT)
Dept: PHYSICAL THERAPY | Age: 45
Setting detail: RECURRING SERIES
Discharge: HOME | End: 2025-08-17
Attending: ORTHOPAEDIC SURGERY
Payer: MEDICARE

## 2025-08-14 ENCOUNTER — TELEPHONE (OUTPATIENT)
Dept: ORTHOPEDIC SURGERY | Age: 45
End: 2025-08-14

## 2025-08-14 DIAGNOSIS — M25.512 LEFT SHOULDER PAIN, UNSPECIFIED CHRONICITY: Primary | ICD-10-CM

## 2025-08-14 DIAGNOSIS — R29.898 WEAKNESS OF LEFT SHOULDER: ICD-10-CM

## 2025-08-14 PROCEDURE — 97162 PT EVAL MOD COMPLEX 30 MIN: CPT

## 2025-08-14 PROCEDURE — 97110 THERAPEUTIC EXERCISES: CPT

## 2025-08-14 ASSESSMENT — PAIN SCALES - GENERAL: PAINLEVEL_OUTOF10: 5

## 2025-08-14 ASSESSMENT — PAIN DESCRIPTION - LOCATION: LOCATION: SHOULDER

## 2025-08-14 ASSESSMENT — PAIN DESCRIPTION - ORIENTATION: ORIENTATION: LEFT

## 2025-08-14 ASSESSMENT — PAIN DESCRIPTION - DESCRIPTORS: DESCRIPTORS: ACHING

## 2025-08-15 DIAGNOSIS — M19.012 OSTEOARTHRITIS OF LEFT AC (ACROMIOCLAVICULAR) JOINT: ICD-10-CM

## 2025-08-15 DIAGNOSIS — M75.102 TEAR OF LEFT ROTATOR CUFF, UNSPECIFIED TEAR EXTENT, UNSPECIFIED WHETHER TRAUMATIC: ICD-10-CM

## 2025-08-15 DIAGNOSIS — Z09 SURGERY FOLLOW-UP: Primary | ICD-10-CM

## 2025-08-18 ENCOUNTER — CLINICAL DOCUMENTATION (OUTPATIENT)
Dept: ORTHOPEDIC SURGERY | Age: 45
End: 2025-08-18

## 2025-08-18 DIAGNOSIS — Z09 SURGERY FOLLOW-UP: Primary | ICD-10-CM

## 2025-08-18 DIAGNOSIS — M75.102 TEAR OF LEFT ROTATOR CUFF, UNSPECIFIED TEAR EXTENT, UNSPECIFIED WHETHER TRAUMATIC: ICD-10-CM

## 2025-08-18 DIAGNOSIS — M19.012 OSTEOARTHRITIS OF LEFT AC (ACROMIOCLAVICULAR) JOINT: ICD-10-CM

## 2025-08-18 RX ORDER — MEDROXYPROGESTERONE ACETATE 150 MG/ML
INJECTION, SUSPENSION INTRAMUSCULAR
COMMUNITY
Start: 2024-10-31 | End: 2025-10-26

## 2025-08-18 RX ORDER — SENNOSIDES 8.6 MG
CAPSULE ORAL
COMMUNITY
Start: 2025-08-03 | End: 2025-08-19

## 2025-08-18 RX ORDER — IBUPROFEN 800 MG/1
TABLET, FILM COATED ORAL
COMMUNITY
Start: 2025-08-06 | End: 2025-08-19

## 2025-08-18 RX ORDER — ESCITALOPRAM OXALATE 20 MG/1
TABLET ORAL
COMMUNITY
Start: 2025-06-10

## 2025-08-18 RX ORDER — MISOPROSTOL 200 UG/1
TABLET ORAL
COMMUNITY
Start: 2025-08-06

## 2025-08-18 RX ORDER — LISDEXAMFETAMINE DIMESYLATE 60 MG/1
1 CAPSULE ORAL EVERY MORNING
COMMUNITY
Start: 2025-06-11

## 2025-08-18 RX ORDER — BUPROPION HYDROCHLORIDE 150 MG/1
TABLET ORAL
COMMUNITY
Start: 2025-08-06

## 2025-08-19 ENCOUNTER — HOSPITAL ENCOUNTER (OUTPATIENT)
Dept: PHYSICAL THERAPY | Age: 45
Setting detail: RECURRING SERIES
Discharge: HOME | End: 2025-08-22
Attending: ORTHOPAEDIC SURGERY
Payer: MEDICARE

## 2025-08-19 ENCOUNTER — OFFICE VISIT (OUTPATIENT)
Dept: ORTHOPEDIC SURGERY | Age: 45
End: 2025-08-19

## 2025-08-19 DIAGNOSIS — Z09 SURGERY FOLLOW-UP: Primary | ICD-10-CM

## 2025-08-19 PROCEDURE — 97140 MANUAL THERAPY 1/> REGIONS: CPT

## 2025-08-19 PROCEDURE — 99024 POSTOP FOLLOW-UP VISIT: CPT | Performed by: PHYSICIAN ASSISTANT

## 2025-08-19 PROCEDURE — 97110 THERAPEUTIC EXERCISES: CPT

## 2025-08-19 RX ORDER — MELOXICAM 7.5 MG/1
7.5 TABLET ORAL 2 TIMES DAILY
Qty: 28 TABLET | Refills: 0 | Status: SHIPPED | OUTPATIENT
Start: 2025-08-19 | End: 2025-09-02

## 2025-08-19 ASSESSMENT — PAIN DESCRIPTION - ORIENTATION: ORIENTATION: LEFT

## 2025-08-19 ASSESSMENT — PAIN DESCRIPTION - DESCRIPTORS: DESCRIPTORS: ACHING

## 2025-08-19 ASSESSMENT — PAIN DESCRIPTION - LOCATION: LOCATION: SHOULDER

## 2025-08-19 ASSESSMENT — PAIN SCALES - GENERAL: PAINLEVEL_OUTOF10: 5

## 2025-08-26 ENCOUNTER — TELEPHONE (OUTPATIENT)
Dept: INTERNAL MEDICINE CLINIC | Facility: CLINIC | Age: 45
End: 2025-08-26

## 2025-08-26 ENCOUNTER — HOSPITAL ENCOUNTER (OUTPATIENT)
Dept: PHYSICAL THERAPY | Age: 45
Setting detail: RECURRING SERIES
Discharge: HOME | End: 2025-08-29
Attending: ORTHOPAEDIC SURGERY
Payer: MEDICARE

## 2025-08-26 PROCEDURE — 97140 MANUAL THERAPY 1/> REGIONS: CPT

## 2025-08-26 PROCEDURE — 97110 THERAPEUTIC EXERCISES: CPT

## 2025-08-26 ASSESSMENT — PAIN DESCRIPTION - DESCRIPTORS: DESCRIPTORS: ACHING

## 2025-08-26 ASSESSMENT — PAIN SCALES - GENERAL: PAINLEVEL_OUTOF10: 6

## 2025-08-26 ASSESSMENT — PAIN DESCRIPTION - LOCATION: LOCATION: SHOULDER

## 2025-08-26 ASSESSMENT — PAIN DESCRIPTION - ORIENTATION: ORIENTATION: LEFT

## 2025-08-27 RX ORDER — TIZANIDINE 2 MG/1
2 TABLET ORAL EVERY 8 HOURS PRN
Qty: 30 TABLET | Refills: 0 | Status: SHIPPED | OUTPATIENT
Start: 2025-08-27

## 2025-08-28 ENCOUNTER — APPOINTMENT (OUTPATIENT)
Dept: PHYSICAL THERAPY | Age: 45
End: 2025-08-28
Attending: ORTHOPAEDIC SURGERY
Payer: MEDICARE

## 2025-09-01 ASSESSMENT — ANXIETY QUESTIONNAIRES
7. FEELING AFRAID AS IF SOMETHING AWFUL MIGHT HAPPEN: NOT AT ALL
2. NOT BEING ABLE TO STOP OR CONTROL WORRYING: NOT AT ALL
3. WORRYING TOO MUCH ABOUT DIFFERENT THINGS: NOT AT ALL
6. BECOMING EASILY ANNOYED OR IRRITABLE: SEVERAL DAYS
GAD7 TOTAL SCORE: 1
4. TROUBLE RELAXING: NOT AT ALL
4. TROUBLE RELAXING: NOT AT ALL
1. FEELING NERVOUS, ANXIOUS, OR ON EDGE: NOT AT ALL
1. FEELING NERVOUS, ANXIOUS, OR ON EDGE: NOT AT ALL
5. BEING SO RESTLESS THAT IT IS HARD TO SIT STILL: NOT AT ALL
7. FEELING AFRAID AS IF SOMETHING AWFUL MIGHT HAPPEN: NOT AT ALL
2. NOT BEING ABLE TO STOP OR CONTROL WORRYING: NOT AT ALL
IF YOU CHECKED OFF ANY PROBLEMS ON THIS QUESTIONNAIRE, HOW DIFFICULT HAVE THESE PROBLEMS MADE IT FOR YOU TO DO YOUR WORK, TAKE CARE OF THINGS AT HOME, OR GET ALONG WITH OTHER PEOPLE: NOT DIFFICULT AT ALL
IF YOU CHECKED OFF ANY PROBLEMS ON THIS QUESTIONNAIRE, HOW DIFFICULT HAVE THESE PROBLEMS MADE IT FOR YOU TO DO YOUR WORK, TAKE CARE OF THINGS AT HOME, OR GET ALONG WITH OTHER PEOPLE: NOT DIFFICULT AT ALL
5. BEING SO RESTLESS THAT IT IS HARD TO SIT STILL: NOT AT ALL
6. BECOMING EASILY ANNOYED OR IRRITABLE: SEVERAL DAYS
3. WORRYING TOO MUCH ABOUT DIFFERENT THINGS: NOT AT ALL

## 2025-09-01 ASSESSMENT — PATIENT HEALTH QUESTIONNAIRE - PHQ9
5. POOR APPETITE OR OVEREATING: NOT AT ALL
1. LITTLE INTEREST OR PLEASURE IN DOING THINGS: NOT AT ALL
1. LITTLE INTEREST OR PLEASURE IN DOING THINGS: NOT AT ALL
3. TROUBLE FALLING OR STAYING ASLEEP: NEARLY EVERY DAY
SUM OF ALL RESPONSES TO PHQ QUESTIONS 1-9: 8
5. POOR APPETITE OR OVEREATING: NOT AT ALL
2. FEELING DOWN, DEPRESSED OR HOPELESS: NOT AT ALL
6. FEELING BAD ABOUT YOURSELF - OR THAT YOU ARE A FAILURE OR HAVE LET YOURSELF OR YOUR FAMILY DOWN: NOT AT ALL
3. TROUBLE FALLING OR STAYING ASLEEP: NEARLY EVERY DAY
6. FEELING BAD ABOUT YOURSELF - OR THAT YOU ARE A FAILURE OR HAVE LET YOURSELF OR YOUR FAMILY DOWN: NOT AT ALL
SUM OF ALL RESPONSES TO PHQ QUESTIONS 1-9: 8
7. TROUBLE CONCENTRATING ON THINGS, SUCH AS READING THE NEWSPAPER OR WATCHING TELEVISION: SEVERAL DAYS
10. IF YOU CHECKED OFF ANY PROBLEMS, HOW DIFFICULT HAVE THESE PROBLEMS MADE IT FOR YOU TO DO YOUR WORK, TAKE CARE OF THINGS AT HOME, OR GET ALONG WITH OTHER PEOPLE: VERY DIFFICULT
4. FEELING TIRED OR HAVING LITTLE ENERGY: NEARLY EVERY DAY
SUM OF ALL RESPONSES TO PHQ QUESTIONS 1-9: 8
2. FEELING DOWN, DEPRESSED OR HOPELESS: NOT AT ALL
7. TROUBLE CONCENTRATING ON THINGS, SUCH AS READING THE NEWSPAPER OR WATCHING TELEVISION: SEVERAL DAYS
9. THOUGHTS THAT YOU WOULD BE BETTER OFF DEAD, OR OF HURTING YOURSELF: NOT AT ALL
10. IF YOU CHECKED OFF ANY PROBLEMS, HOW DIFFICULT HAVE THESE PROBLEMS MADE IT FOR YOU TO DO YOUR WORK, TAKE CARE OF THINGS AT HOME, OR GET ALONG WITH OTHER PEOPLE: VERY DIFFICULT
SUM OF ALL RESPONSES TO PHQ QUESTIONS 1-9: 8
SUM OF ALL RESPONSES TO PHQ QUESTIONS 1-9: 8
9. THOUGHTS THAT YOU WOULD BE BETTER OFF DEAD, OR OF HURTING YOURSELF: NOT AT ALL
4. FEELING TIRED OR HAVING LITTLE ENERGY: NEARLY EVERY DAY
8. MOVING OR SPEAKING SO SLOWLY THAT OTHER PEOPLE COULD HAVE NOTICED. OR THE OPPOSITE - BEING SO FIDGETY OR RESTLESS THAT YOU HAVE BEEN MOVING AROUND A LOT MORE THAN USUAL: SEVERAL DAYS
8. MOVING OR SPEAKING SO SLOWLY THAT OTHER PEOPLE COULD HAVE NOTICED. OR THE OPPOSITE, BEING SO FIGETY OR RESTLESS THAT YOU HAVE BEEN MOVING AROUND A LOT MORE THAN USUAL: SEVERAL DAYS

## 2025-09-02 ENCOUNTER — HOSPITAL ENCOUNTER (OUTPATIENT)
Dept: PHYSICAL THERAPY | Age: 45
Setting detail: RECURRING SERIES
Discharge: HOME | End: 2025-09-05
Attending: ORTHOPAEDIC SURGERY
Payer: MEDICARE

## 2025-09-02 PROCEDURE — 97110 THERAPEUTIC EXERCISES: CPT

## 2025-09-02 PROCEDURE — 97140 MANUAL THERAPY 1/> REGIONS: CPT

## 2025-09-02 RX ORDER — LAMOTRIGINE 100 MG/1
100 TABLET ORAL DAILY
Qty: 90 TABLET | Refills: 1 | OUTPATIENT
Start: 2025-09-02

## 2025-09-02 ASSESSMENT — PAIN SCALES - GENERAL: PAINLEVEL_OUTOF10: 4

## 2025-09-02 ASSESSMENT — PAIN DESCRIPTION - ORIENTATION: ORIENTATION: LEFT

## 2025-09-02 ASSESSMENT — PAIN DESCRIPTION - DESCRIPTORS: DESCRIPTORS: ACHING

## 2025-09-02 ASSESSMENT — PAIN DESCRIPTION - LOCATION: LOCATION: SHOULDER

## 2025-09-03 ENCOUNTER — TELEMEDICINE (OUTPATIENT)
Dept: BEHAVIORAL/MENTAL HEALTH CLINIC | Age: 45
End: 2025-09-03
Payer: MEDICARE

## 2025-09-03 DIAGNOSIS — G89.4 CHRONIC PAIN DISORDER: ICD-10-CM

## 2025-09-03 DIAGNOSIS — F41.1 GENERALIZED ANXIETY DISORDER: ICD-10-CM

## 2025-09-03 DIAGNOSIS — R53.82 CHRONIC FATIGUE: ICD-10-CM

## 2025-09-03 DIAGNOSIS — E03.4 HYPOTHYROIDISM DUE TO ACQUIRED ATROPHY OF THYROID: ICD-10-CM

## 2025-09-03 DIAGNOSIS — F33.0 MILD EPISODE OF RECURRENT MAJOR DEPRESSIVE DISORDER: Primary | ICD-10-CM

## 2025-09-03 DIAGNOSIS — F51.05 INSOMNIA DUE TO MENTAL DISORDER: ICD-10-CM

## 2025-09-03 PROCEDURE — 99214 OFFICE O/P EST MOD 30 MIN: CPT | Performed by: PSYCHIATRY & NEUROLOGY

## 2025-09-03 RX ORDER — VENLAFAXINE HYDROCHLORIDE 75 MG/1
75 CAPSULE, EXTENDED RELEASE ORAL DAILY
Qty: 30 CAPSULE | Refills: 3 | Status: SHIPPED | OUTPATIENT
Start: 2025-09-03

## 2025-09-03 RX ORDER — LAMOTRIGINE 100 MG/1
100 TABLET ORAL DAILY
Qty: 30 TABLET | Refills: 3 | Status: SHIPPED | OUTPATIENT
Start: 2025-09-03

## (undated) DEVICE — BIT DRILL 14MM

## (undated) DEVICE — GLOVE SURG SZ 7 L12IN FNGR THK79MIL GRN LTX FREE

## (undated) DEVICE — DRAPE SURG W60XL84IN SMS POLYPR U SHP L FLM IMPERV SPL W/

## (undated) DEVICE — PAD FLR CLN ABSORBENT 46X40 IN IMPERV BLU QUIK SUITE LTX

## (undated) DEVICE — GLOVE ORANGE PI 7   MSG9070

## (undated) DEVICE — ADHESIVE SKIN CLSR 0.7ML TOP DERMBND ADV

## (undated) DEVICE — DRAPE SURG ST 3/4 SHEET W53XL77IN STD POLYPR 3 QTR DISP

## (undated) DEVICE — DRESSING POST OPERATIVE 6X4IN VISIBLE OPSITE

## (undated) DEVICE — DRAPE SURG SHEET SPLIT W77XL108IN STD SPL W/ ADH PROXIMA ABSRB REINF LN

## (undated) DEVICE — (D)PREP SKN CHLRAPRP APPL 26ML -- CONVERT TO ITEM 371833

## (undated) DEVICE — GARMENT,MEDLINE,DVT,INT,CALF,LG, GEN2: Brand: MEDLINE

## (undated) DEVICE — NEEDLE HYPO 21GA L1.5IN INTRAMUSCULAR S STL LATCH BVL UP

## (undated) DEVICE — STOCKINETTE ORTH W12XL48IN COT 2 PLY HLLW FOR HANDLING LMB

## (undated) DEVICE — 3M™ STERI-DRAPE™ U-DRAPE 1015: Brand: STERI-DRAPE™

## (undated) DEVICE — BANDAGE COBAN 6 IN WND 6INX5YD FOAM

## (undated) DEVICE — SOLUTION IRRIG 3000ML 0.9% SOD CHL USP UROMATIC PLAS CONT

## (undated) DEVICE — PENCIL SMK EVAC BLADE COAT 70 MM BUTTON SWITCH NEPTUNE E-SEP

## (undated) DEVICE — ACDF DR VANPELT & LUCAS: Brand: MEDLINE INDUSTRIES, INC.

## (undated) DEVICE — STRIP,CLOSURE,WOUND,MEDI-STRIP,1/2X4: Brand: MEDLINE

## (undated) DEVICE — SUTURE MONOCRYL + ABSORBABLE MONOFILAMENT 3-0 PS1 12 IN UD SXMP1B101

## (undated) DEVICE — TROCAR: Brand: KII FIOS FIRST ENTRY

## (undated) DEVICE — PROBE ABLAT XL 90DEG ASPIR BPLR RF 1 PC ELECTRD ERGO HNDL

## (undated) DEVICE — AGENT HEMOSTATIC SURGIFLOW MATRIX KIT W/THROMBIN

## (undated) DEVICE — SUTURE MCRYL SZ 2-0 L27IN ABSRB UD CP-1 1 L36MM 1/2 CIR REV Y266H

## (undated) DEVICE — SYRINGE MED 30ML STD CLR PLAS LUERLOCK TIP N CTRL DISP

## (undated) DEVICE — SOL ANTI-FOG 6ML MEDC -- MEDICHOICE - CONVERT TO 358427

## (undated) DEVICE — SWITCH DRAPE TENET 7633

## (undated) DEVICE — KIT DISP W/ SPEAR TRCR TIP OBT AND 2.6MM DRL FOR 2.6

## (undated) DEVICE — TROCARS: Brand: KII® BLUNT TIP ACCESS SYSTEM

## (undated) DEVICE — APPLICATOR MEDICATED 26 CC SOLUTION HI LT ORNG CHLORAPREP

## (undated) DEVICE — KIT POS SHLDR STBL LIMB POS SL DRP DISP TENET T-MAX

## (undated) DEVICE — SOLUTION IRRIG 1000ML 09% SOD CHL USP PIC PLAS CONTAINER

## (undated) DEVICE — SHEARS ENDOSCP L36CM DIA5MM ULTRASONIC CRV TIP W/ ADV

## (undated) DEVICE — Device

## (undated) DEVICE — ELECTRODE PT RET AD L9FT HI MOIST COND ADH HYDRGEL CORDED

## (undated) DEVICE — DRAPE SURG IOBAN W17XL23IN FAB ANTIMIC GEN INCIS LNR FULL W HNDL

## (undated) DEVICE — SHOULDER ARTHRO DR KOCH: Brand: MEDLINE INDUSTRIES, INC.

## (undated) DEVICE — 4-PORT MANIFOLD: Brand: NEPTUNE 2

## (undated) DEVICE — SOFT SILICONE HYDROCELLULAR FOAM DRESSING WITH LOCK AWAY LAYER: Brand: ALLEVYN LIFE XL 21X21 CTN10

## (undated) DEVICE — WATERPROOF, BACTERIA PROOF DRESSING WITH ABSORBENT SEE THROUGH PAD: Brand: OPSITE POST-OP VISIBLE 15X10CM CTN 20

## (undated) DEVICE — TROCAR: Brand: KII® SLEEVE

## (undated) DEVICE — BLADE SURG NO15 12MM S STL REUSE HNDL CONVENTIONAL SHRP TIP

## (undated) DEVICE — SUTURE MCRYL SZ 4-0 L27IN ABSRB UD L19MM PS-2 1/2 CIR PRIM Y426H

## (undated) DEVICE — INTENDED FOR TISSUE SEPARATION, AND OTHER PROCEDURES THAT REQUIRE A SHARP SURGICAL BLADE TO PUNCTURE OR CUT.: Brand: BARD-PARKER ® STAINLESS STEEL BLADES

## (undated) DEVICE — SPONGE GZ 16 PLY WVN COT 4INX4IN STERIL

## (undated) DEVICE — NEEDLE QUINCKE 18GX3.5

## (undated) DEVICE — GARMENT,MEDLINE,DVT,INT,CALF,MED, GEN2: Brand: MEDLINE

## (undated) DEVICE — GLOVE ORANGE PI 8   MSG9080

## (undated) DEVICE — SUTURE VICRYL SZ 2-0 L36IN ABSRB UD L36MM CT-1 1/2 CIR J945H

## (undated) DEVICE — SUTURE VCRL + SZ 3-0 L18IN ABSRB UD PS-2 3/8 CIR REV CUT VCP497H

## (undated) DEVICE — TUBING PMP L16FT MAIN DISP FOR AR-6400 AR-6475

## (undated) DEVICE — SHOULDER STABILIZATION KIT,                                    DISPOSABLE 12 PER BOX

## (undated) DEVICE — STERILE PVP: Brand: MEDLINE INDUSTRIES, INC.

## (undated) DEVICE — SOLUTION IRRIG 1000ML H2O PIC PLAS SHATTERPROOF CONTAINER

## (undated) DEVICE — SURGICAL PROCEDURE PACK BASIC ST FRANCIS

## (undated) DEVICE — BLADE SAW W9XL31MM THK0.38MM CUT THK0.43MM 12 TEETH THN OSC

## (undated) DEVICE — APPLICATOR BNDG 1MM ADH PREMIERPRO EXOFIN

## (undated) DEVICE — DRAPE TOP ABSRB REINF HK AND LOOP LN HLDR ADH ALONG REINF

## (undated) DEVICE — ADHESIVE SKIN CLOSURE WND 8.661X1.5 IN 22 CM LIQUIBAND SECUR

## (undated) DEVICE — PENCIL ES L3M BTTN SWCH HOLSTER W/ BLDE ELECTRD EDGE

## (undated) DEVICE — GLOVE ORANGE PI 7 1/2   MSG9075

## (undated) DEVICE — SUTURE FIBERLOOP SZ 2-0 L20IN NONABSORBABLE BLU BRAID AR7234C

## (undated) DEVICE — CONTAINER SPEC FRMLN 120ML --

## (undated) DEVICE — SUTURE SZ 0 27IN 5/8 CIR UR-6  TAPER PT VIOLET ABSRB VICRYL J603H

## (undated) DEVICE — SOLUTION IV 1000ML 0.9% SOD CHL

## (undated) DEVICE — ELECTRODE NDL 2.8IN COAT VALLEYLAB

## (undated) DEVICE — BLADE SHV L13CM DIA4MM BNE CUT AGG DEB COOLCUT

## (undated) DEVICE — SHEET, ORTHO, SPLIT, STERILE: Brand: MEDLINE

## (undated) DEVICE — REM POLYHESIVE ADULT PATIENT RETURN ELECTRODE: Brand: VALLEYLAB

## (undated) DEVICE — [THREADED CANNULA.  DO NOT RESTERILIZE,  DO NOT USE IF PACKAGE IS DAMAGED]: Brand: DRI-LOK

## (undated) DEVICE — SUTURE MONOCRYL SZ 2-0 L27IN ABSRB UD CP-1 1 L36MM 1/2 CIR REV Y266H

## (undated) DEVICE — LAP CHOLE: Brand: MEDLINE INDUSTRIES, INC.

## (undated) DEVICE — SOLUTION IRRIG 1000ML 0.9% SOD CHL USP POUR PLAS BTL

## (undated) DEVICE — [HIGH FLOW INSUFFLATOR,  DO NOT USE IF PACKAGE IS DAMAGED,  KEEP DRY,  KEEP AWAY FROM SUNLIGHT,  PROTECT FROM HEAT AND RADIOACTIVE SOURCES.]: Brand: PNEUMOSURE

## (undated) DEVICE — SUTURE ENDOLOOP SZ 0 L18IN ABSRB VLT LIG SLDE KNOT VCRL